# Patient Record
Sex: FEMALE | Race: WHITE | Employment: OTHER | URBAN - METROPOLITAN AREA
[De-identification: names, ages, dates, MRNs, and addresses within clinical notes are randomized per-mention and may not be internally consistent; named-entity substitution may affect disease eponyms.]

---

## 2018-03-08 PROBLEM — G43.509 PERSISTENT MIGRAINE AURA WITHOUT CEREBRAL INFARCTION AND WITHOUT STATUS MIGRAINOSUS, NOT INTRACTABLE: Status: ACTIVE | Noted: 2018-03-08

## 2018-03-08 PROBLEM — E53.8 VITAMIN B12 DEFICIENCY: Status: ACTIVE | Noted: 2018-03-08

## 2018-03-08 PROBLEM — I87.2 VENOUS INSUFFICIENCY: Status: ACTIVE | Noted: 2018-03-08

## 2018-03-08 PROBLEM — F31.70 BIPOLAR AFFECTIVE DISORDER IN REMISSION (HCC): Status: RESOLVED | Noted: 2018-03-08 | Resolved: 2018-03-08

## 2018-03-08 PROBLEM — F33.0 DEPRESSION, MAJOR, RECURRENT, MILD (HCC): Status: ACTIVE | Noted: 2018-03-08

## 2018-03-08 PROBLEM — F31.70 BIPOLAR AFFECTIVE DISORDER IN REMISSION (HCC): Status: ACTIVE | Noted: 2018-03-08

## 2018-03-20 PROBLEM — G44.89 OTHER HEADACHE SYNDROME: Status: ACTIVE | Noted: 2018-03-20

## 2018-03-20 PROBLEM — R76.8 ELEVATED RHEUMATOID FACTOR: Status: ACTIVE | Noted: 2018-03-20

## 2018-03-20 PROBLEM — R05.9 COUGH: Status: ACTIVE | Noted: 2018-03-20

## 2018-03-20 PROBLEM — E55.9 VITAMIN D DEFICIENCY: Status: ACTIVE | Noted: 2018-03-20

## 2018-04-03 PROBLEM — G44.89 OTHER HEADACHE SYNDROME: Status: RESOLVED | Noted: 2018-03-20 | Resolved: 2018-04-03

## 2018-08-15 ENCOUNTER — HOSPITAL ENCOUNTER (OUTPATIENT)
Dept: MAMMOGRAPHY | Age: 63
Discharge: HOME | End: 2018-08-15
Attending: INTERNAL MEDICINE
Payer: COMMERCIAL

## 2018-08-15 DIAGNOSIS — M81.0 OSTEOPOROSIS, UNSPECIFIED OSTEOPOROSIS TYPE, UNSPECIFIED PATHOLOGICAL FRACTURE PRESENCE: ICD-10-CM

## 2018-08-15 PROCEDURE — 77080 DXA BONE DENSITY AXIAL: CPT

## 2018-08-15 NOTE — PROGRESS NOTES
Please call patient with lab results. Low bone mass.  Please advise patient to take Vitamin D supplement of 1000 international units/day and 1200 mg calcium

## 2018-09-10 ENCOUNTER — HOSPITAL ENCOUNTER (OUTPATIENT)
Dept: SURGERY | Age: 63
Discharge: HOME | End: 2018-09-10
Payer: MEDICARE

## 2018-09-10 VITALS
WEIGHT: 165.13 LBS | HEART RATE: 98 BPM | HEIGHT: 63 IN | TEMPERATURE: 97.9 F | RESPIRATION RATE: 18 BRPM | BODY MASS INDEX: 29.26 KG/M2 | SYSTOLIC BLOOD PRESSURE: 130 MMHG | DIASTOLIC BLOOD PRESSURE: 86 MMHG | OXYGEN SATURATION: 98 %

## 2018-09-10 LAB
BACTERIA SPEC CULT: NORMAL
EST. AVERAGE GLUCOSE BLD GHB EST-MCNC: 194 MG/DL
GLUCOSE BLD STRIP.AUTO-MCNC: 101 MG/DL (ref 65–100)
HBA1C MFR BLD: 8.4 % (ref 4.8–6)
SERVICE CMNT-IMP: NORMAL

## 2018-09-10 PROCEDURE — 82962 GLUCOSE BLOOD TEST: CPT

## 2018-09-10 PROCEDURE — 87641 MR-STAPH DNA AMP PROBE: CPT

## 2018-09-10 PROCEDURE — 83036 HEMOGLOBIN GLYCOSYLATED A1C: CPT

## 2018-09-10 RX ORDER — INSULIN LISPRO 100 [IU]/ML
INJECTION, SOLUTION INTRAVENOUS; SUBCUTANEOUS
COMMUNITY
End: 2020-06-03

## 2018-09-10 RX ORDER — TOPIRAMATE 50 MG/1
50 TABLET, FILM COATED ORAL DAILY
COMMUNITY

## 2018-09-10 RX ORDER — ALPRAZOLAM 2 MG/1
2 TABLET ORAL
COMMUNITY

## 2018-09-10 RX ORDER — LANOLIN ALCOHOL/MO/W.PET/CERES
1000 CREAM (GRAM) TOPICAL DAILY
COMMUNITY
End: 2019-12-02

## 2018-09-10 NOTE — PERIOP NOTES
Patient verified name, , and surgery as listed in [facility]. Patient provided medical/health information and PTA medications to the best of their ability. TYPE  CASE:1B Orders per surgeon: Received and dated 18. Labs per surgeon:MRSA, Hgb A1C Labs per anesthesia protocol: SQBS. Results 101 EKG  :  Not needed at time of PAT Patient provided with and instructed on education handouts including Guide to Surgery, blood transfusions, pain management, and hand hygiene for the family and community, and [facility] brochure. Hibiclens and instructions given per hospital policy. Instructed patient to continue previous medications as prescribed prior to surgery unless otherwise directed and to take the following medications the day of surgery according to anesthesia guidelines : Albuterol, Advair, Spiriva, Gabapentin, Xanax,Topamax  . Instructed patient to hold  the following medications: Vitamin B12. Original medication prescription bottles not visualized during patient appointment. Patient teach back successful and patient demonstrates knowledge of instruction.

## 2018-09-10 NOTE — PERIOP NOTES
Recent Results (from the past 12 hour(s)) MSSA/MRSA SC BY PCR, NASAL SWAB Collection Time: 09/10/18  1:39 PM  
Result Value Ref Range Special Requests: NO SPECIAL REQUESTS Culture result:     
  SA target not detected. A MRSA NEGATIVE, SA NEGATIVE test result does not preclude MRSA or SA nasal colonization. HEMOGLOBIN A1C WITH EAG Collection Time: 09/10/18  1:49 PM  
Result Value Ref Range Hemoglobin A1c 8.4 (H) 4.8 - 6.0 % Est. average glucose 194 mg/dL GLUCOSE, POC Collection Time: 09/10/18  1:49 PM  
Result Value Ref Range Glucose (POC) 101 (H) 65 - 100 mg/dL Reviewed

## 2018-09-11 ENCOUNTER — ANESTHESIA EVENT (OUTPATIENT)
Dept: SURGERY | Age: 63
End: 2018-09-11
Payer: MEDICARE

## 2018-09-12 ENCOUNTER — HOSPITAL ENCOUNTER (OUTPATIENT)
Age: 63
Setting detail: OUTPATIENT SURGERY
Discharge: HOME | End: 2018-09-12
Attending: ORTHOPAEDIC SURGERY | Admitting: ORTHOPAEDIC SURGERY
Payer: MEDICARE

## 2018-09-12 ENCOUNTER — APPOINTMENT (OUTPATIENT)
Dept: GENERAL RADIOLOGY | Age: 63
End: 2018-09-12
Attending: ORTHOPAEDIC SURGERY
Payer: MEDICARE

## 2018-09-12 ENCOUNTER — ANESTHESIA (OUTPATIENT)
Dept: SURGERY | Age: 63
End: 2018-09-12
Payer: MEDICARE

## 2018-09-12 VITALS
HEIGHT: 63 IN | BODY MASS INDEX: 28.84 KG/M2 | RESPIRATION RATE: 11 BRPM | SYSTOLIC BLOOD PRESSURE: 105 MMHG | HEART RATE: 68 BPM | DIASTOLIC BLOOD PRESSURE: 56 MMHG | OXYGEN SATURATION: 96 % | WEIGHT: 162.8 LBS | TEMPERATURE: 98 F

## 2018-09-12 DIAGNOSIS — M48.062 LUMBAR STENOSIS WITH NEUROGENIC CLAUDICATION: Primary | ICD-10-CM

## 2018-09-12 LAB
GLUCOSE BLD STRIP.AUTO-MCNC: 181 MG/DL (ref 65–100)
GLUCOSE BLD STRIP.AUTO-MCNC: 283 MG/DL (ref 65–100)
GLUCOSE BLD STRIP.AUTO-MCNC: 318 MG/DL (ref 65–100)
POTASSIUM BLD-SCNC: 4.1 MMOL/L (ref 3.5–5.1)

## 2018-09-12 PROCEDURE — 77030019908 HC STETH ESOPH SIMS -A: Performed by: ANESTHESIOLOGY

## 2018-09-12 PROCEDURE — 76010000161 HC OR TIME 1 TO 1.5 HR INTENSV-TIER 1: Performed by: ORTHOPAEDIC SURGERY

## 2018-09-12 PROCEDURE — 82962 GLUCOSE BLOOD TEST: CPT

## 2018-09-12 PROCEDURE — 77030030163 HC BN WAX J&J -A: Performed by: ORTHOPAEDIC SURGERY

## 2018-09-12 PROCEDURE — 77030031139 HC SUT VCRL2 J&J -A: Performed by: ORTHOPAEDIC SURGERY

## 2018-09-12 PROCEDURE — 74011250636 HC RX REV CODE- 250/636

## 2018-09-12 PROCEDURE — 77030008477 HC STYL SATN SLP COVD -A: Performed by: ANESTHESIOLOGY

## 2018-09-12 PROCEDURE — 77030025623 HC BUR RND PRECIS STRY -D: Performed by: ORTHOPAEDIC SURGERY

## 2018-09-12 PROCEDURE — 77030012894: Performed by: ORTHOPAEDIC SURGERY

## 2018-09-12 PROCEDURE — 77030012406 HC DRN WND PENRS BARD -A: Performed by: ORTHOPAEDIC SURGERY

## 2018-09-12 PROCEDURE — 77030020782 HC GWN BAIR PAWS FLX 3M -B: Performed by: ANESTHESIOLOGY

## 2018-09-12 PROCEDURE — 74011250636 HC RX REV CODE- 250/636: Performed by: ANESTHESIOLOGY

## 2018-09-12 PROCEDURE — 77030032490 HC SLV COMPR SCD KNE COVD -B: Performed by: ORTHOPAEDIC SURGERY

## 2018-09-12 PROCEDURE — 74011250636 HC RX REV CODE- 250/636: Performed by: ORTHOPAEDIC SURGERY

## 2018-09-12 PROCEDURE — 76060000033 HC ANESTHESIA 1 TO 1.5 HR: Performed by: ORTHOPAEDIC SURGERY

## 2018-09-12 PROCEDURE — 74011000250 HC RX REV CODE- 250

## 2018-09-12 PROCEDURE — 74011000250 HC RX REV CODE- 250: Performed by: ORTHOPAEDIC SURGERY

## 2018-09-12 PROCEDURE — 76210000020 HC REC RM PH II FIRST 0.5 HR: Performed by: ORTHOPAEDIC SURGERY

## 2018-09-12 PROCEDURE — 77030014650 HC SEAL MTRX FLOSEL BAXT -C: Performed by: ORTHOPAEDIC SURGERY

## 2018-09-12 PROCEDURE — 84132 ASSAY OF SERUM POTASSIUM: CPT

## 2018-09-12 PROCEDURE — 72020 X-RAY EXAM OF SPINE 1 VIEW: CPT

## 2018-09-12 PROCEDURE — 74011250637 HC RX REV CODE- 250/637: Performed by: ANESTHESIOLOGY

## 2018-09-12 PROCEDURE — 77030008703 HC TU ET UNCUF COVD -A: Performed by: ANESTHESIOLOGY

## 2018-09-12 PROCEDURE — 77030018836 HC SOL IRR NACL ICUM -A: Performed by: ORTHOPAEDIC SURGERY

## 2018-09-12 PROCEDURE — 76210000016 HC OR PH I REC 1 TO 1.5 HR: Performed by: ORTHOPAEDIC SURGERY

## 2018-09-12 PROCEDURE — 77030021678 HC GLIDESCP STAT DISP VERT -B: Performed by: ANESTHESIOLOGY

## 2018-09-12 PROCEDURE — 77030039425 HC BLD LARYNG TRULITE DISP TELE -A: Performed by: ANESTHESIOLOGY

## 2018-09-12 RX ORDER — MIDAZOLAM HYDROCHLORIDE 1 MG/ML
2 INJECTION, SOLUTION INTRAMUSCULAR; INTRAVENOUS
Status: DISCONTINUED | OUTPATIENT
Start: 2018-09-12 | End: 2018-09-12 | Stop reason: HOSPADM

## 2018-09-12 RX ORDER — KETOROLAC TROMETHAMINE 30 MG/ML
INJECTION, SOLUTION INTRAMUSCULAR; INTRAVENOUS AS NEEDED
Status: DISCONTINUED | OUTPATIENT
Start: 2018-09-12 | End: 2018-09-12 | Stop reason: HOSPADM

## 2018-09-12 RX ORDER — MIDAZOLAM HYDROCHLORIDE 1 MG/ML
2 INJECTION, SOLUTION INTRAMUSCULAR; INTRAVENOUS ONCE
Status: DISCONTINUED | OUTPATIENT
Start: 2018-09-12 | End: 2018-09-12 | Stop reason: HOSPADM

## 2018-09-12 RX ORDER — VANCOMYCIN HYDROCHLORIDE 1 G/20ML
INJECTION, POWDER, LYOPHILIZED, FOR SOLUTION INTRAVENOUS AS NEEDED
Status: DISCONTINUED | OUTPATIENT
Start: 2018-09-12 | End: 2018-09-12 | Stop reason: HOSPADM

## 2018-09-12 RX ORDER — GLYCOPYRROLATE 0.2 MG/ML
INJECTION INTRAMUSCULAR; INTRAVENOUS AS NEEDED
Status: DISCONTINUED | OUTPATIENT
Start: 2018-09-12 | End: 2018-09-12 | Stop reason: HOSPADM

## 2018-09-12 RX ORDER — LIDOCAINE HYDROCHLORIDE 10 MG/ML
0.1 INJECTION INFILTRATION; PERINEURAL AS NEEDED
Status: DISCONTINUED | OUTPATIENT
Start: 2018-09-12 | End: 2018-09-12 | Stop reason: HOSPADM

## 2018-09-12 RX ORDER — BUPIVACAINE HYDROCHLORIDE AND EPINEPHRINE 5; 5 MG/ML; UG/ML
INJECTION, SOLUTION EPIDURAL; INTRACAUDAL; PERINEURAL AS NEEDED
Status: DISCONTINUED | OUTPATIENT
Start: 2018-09-12 | End: 2018-09-12 | Stop reason: HOSPADM

## 2018-09-12 RX ORDER — OXYCODONE HYDROCHLORIDE 5 MG/1
5 TABLET ORAL
Status: COMPLETED | OUTPATIENT
Start: 2018-09-12 | End: 2018-09-12

## 2018-09-12 RX ORDER — FENTANYL CITRATE 50 UG/ML
INJECTION, SOLUTION INTRAMUSCULAR; INTRAVENOUS AS NEEDED
Status: DISCONTINUED | OUTPATIENT
Start: 2018-09-12 | End: 2018-09-12 | Stop reason: HOSPADM

## 2018-09-12 RX ORDER — FENTANYL CITRATE 50 UG/ML
100 INJECTION, SOLUTION INTRAMUSCULAR; INTRAVENOUS ONCE
Status: DISCONTINUED | OUTPATIENT
Start: 2018-09-12 | End: 2018-09-12 | Stop reason: HOSPADM

## 2018-09-12 RX ORDER — ROCURONIUM BROMIDE 10 MG/ML
INJECTION, SOLUTION INTRAVENOUS AS NEEDED
Status: DISCONTINUED | OUTPATIENT
Start: 2018-09-12 | End: 2018-09-12 | Stop reason: HOSPADM

## 2018-09-12 RX ORDER — SODIUM CHLORIDE, SODIUM LACTATE, POTASSIUM CHLORIDE, CALCIUM CHLORIDE 600; 310; 30; 20 MG/100ML; MG/100ML; MG/100ML; MG/100ML
100 INJECTION, SOLUTION INTRAVENOUS CONTINUOUS
Status: DISCONTINUED | OUTPATIENT
Start: 2018-09-12 | End: 2018-09-12 | Stop reason: HOSPADM

## 2018-09-12 RX ORDER — ACETAMINOPHEN 10 MG/ML
1000 INJECTION, SOLUTION INTRAVENOUS ONCE
Status: COMPLETED | OUTPATIENT
Start: 2018-09-12 | End: 2018-09-12

## 2018-09-12 RX ORDER — HYDROMORPHONE HYDROCHLORIDE 2 MG/ML
0.5 INJECTION, SOLUTION INTRAMUSCULAR; INTRAVENOUS; SUBCUTANEOUS
Status: DISCONTINUED | OUTPATIENT
Start: 2018-09-12 | End: 2018-09-12 | Stop reason: HOSPADM

## 2018-09-12 RX ORDER — NEOSTIGMINE METHYLSULFATE 1 MG/ML
INJECTION INTRAVENOUS AS NEEDED
Status: DISCONTINUED | OUTPATIENT
Start: 2018-09-12 | End: 2018-09-12 | Stop reason: HOSPADM

## 2018-09-12 RX ORDER — PROPOFOL 10 MG/ML
INJECTION, EMULSION INTRAVENOUS AS NEEDED
Status: DISCONTINUED | OUTPATIENT
Start: 2018-09-12 | End: 2018-09-12 | Stop reason: HOSPADM

## 2018-09-12 RX ORDER — HYDROCODONE BITARTRATE AND ACETAMINOPHEN 7.5; 325 MG/1; MG/1
1 TABLET ORAL
Qty: 35 TAB | Refills: 0 | Status: SHIPPED | OUTPATIENT
Start: 2018-09-12 | End: 2018-12-19 | Stop reason: ALTCHOICE

## 2018-09-12 RX ORDER — NALOXONE HYDROCHLORIDE 0.4 MG/ML
0.04 INJECTION, SOLUTION INTRAMUSCULAR; INTRAVENOUS; SUBCUTANEOUS
Status: DISCONTINUED | OUTPATIENT
Start: 2018-09-12 | End: 2018-09-12 | Stop reason: HOSPADM

## 2018-09-12 RX ORDER — LIDOCAINE HYDROCHLORIDE 20 MG/ML
INJECTION, SOLUTION EPIDURAL; INFILTRATION; INTRACAUDAL; PERINEURAL AS NEEDED
Status: DISCONTINUED | OUTPATIENT
Start: 2018-09-12 | End: 2018-09-12 | Stop reason: HOSPADM

## 2018-09-12 RX ORDER — CEFAZOLIN SODIUM/WATER 2 G/20 ML
2 SYRINGE (ML) INTRAVENOUS
Status: COMPLETED | OUTPATIENT
Start: 2018-09-12 | End: 2018-09-12

## 2018-09-12 RX ORDER — ONDANSETRON 2 MG/ML
INJECTION INTRAMUSCULAR; INTRAVENOUS AS NEEDED
Status: DISCONTINUED | OUTPATIENT
Start: 2018-09-12 | End: 2018-09-12 | Stop reason: HOSPADM

## 2018-09-12 RX ADMIN — OXYCODONE HYDROCHLORIDE 5 MG: 5 TABLET ORAL at 11:36

## 2018-09-12 RX ADMIN — LIDOCAINE HYDROCHLORIDE 60 MG: 20 INJECTION, SOLUTION EPIDURAL; INFILTRATION; INTRACAUDAL; PERINEURAL at 09:51

## 2018-09-12 RX ADMIN — ROCURONIUM BROMIDE 50 MG: 10 INJECTION, SOLUTION INTRAVENOUS at 09:51

## 2018-09-12 RX ADMIN — Medication 2 G: at 09:49

## 2018-09-12 RX ADMIN — SODIUM CHLORIDE, SODIUM LACTATE, POTASSIUM CHLORIDE, AND CALCIUM CHLORIDE: 600; 310; 30; 20 INJECTION, SOLUTION INTRAVENOUS at 10:45

## 2018-09-12 RX ADMIN — FENTANYL CITRATE 100 MCG: 50 INJECTION, SOLUTION INTRAMUSCULAR; INTRAVENOUS at 09:51

## 2018-09-12 RX ADMIN — PROPOFOL 200 MG: 10 INJECTION, EMULSION INTRAVENOUS at 09:51

## 2018-09-12 RX ADMIN — ACETAMINOPHEN 1000 MG: 10 INJECTION, SOLUTION INTRAVENOUS at 11:33

## 2018-09-12 RX ADMIN — NEOSTIGMINE METHYLSULFATE 3 MG: 1 INJECTION INTRAVENOUS at 10:44

## 2018-09-12 RX ADMIN — SODIUM CHLORIDE, SODIUM LACTATE, POTASSIUM CHLORIDE, AND CALCIUM CHLORIDE 100 ML/HR: 600; 310; 30; 20 INJECTION, SOLUTION INTRAVENOUS at 08:04

## 2018-09-12 RX ADMIN — ONDANSETRON 4 MG: 2 INJECTION INTRAMUSCULAR; INTRAVENOUS at 10:44

## 2018-09-12 RX ADMIN — HYDROMORPHONE HYDROCHLORIDE 0.25 MG: 2 INJECTION, SOLUTION INTRAMUSCULAR; INTRAVENOUS; SUBCUTANEOUS at 11:58

## 2018-09-12 RX ADMIN — GLYCOPYRROLATE 0.4 MG: 0.2 INJECTION INTRAMUSCULAR; INTRAVENOUS at 10:44

## 2018-09-12 RX ADMIN — KETOROLAC TROMETHAMINE 30 MG: 30 INJECTION, SOLUTION INTRAMUSCULAR; INTRAVENOUS at 10:44

## 2018-09-12 NOTE — ANESTHESIA POSTPROCEDURE EVALUATION
Post-Anesthesia Evaluation and Assessment Patient: Eyad Bergeron MRN: [id number]  SSN: [id number] YOB: 1955  Age: 59 y.o. Sex: female Cardiovascular Function/Vital Signs Visit Vitals  /56  Pulse 68  Temp 36.7 °C (98 °F)  Resp 11  
 Ht 5' 3\" (1.6 m)  Wt 73.8 kg (162 lb 12.8 oz)  SpO2 96%  BMI 28.84 kg/m2 Patient is status post general anesthesia for Procedure(s): LEFT L4-5 FARHEEN LAMINECTOMY. Nausea/Vomiting: None Postoperative hydration reviewed and adequate. Pain: 
Pain Scale 1: Numeric (0 - 10) (09/12/18 1159) Pain Intensity 1: 7 (09/12/18 1159) Managed Neurological Status:  
Neuro (WDL): Exceptions to WDL (09/12/18 1137) Neuro Neurologic State: Alert (09/12/18 1137) Orientation Level: Oriented X4 (09/12/18 1137) Cognition: Appropriate decision making; Appropriate for age attention/concentration; Appropriate safety awareness; Follows commands (09/12/18 1137) Speech: Clear (09/12/18 1137) LUE Motor Response: Purposeful (09/12/18 1137) LLE Motor Response: Purposeful (09/12/18 1137) RUE Motor Response: Purposeful (09/12/18 1137) RLE Motor Response: Purposeful (09/12/18 1137) At baseline Mental Status and Level of Consciousness: Awake. Pulmonary Status:  
O2 Device: Room air (09/12/18 1219) Adequate oxygenation and airway patent Complications related to anesthesia: None Post-anesthesia assessment completed. No concerns Signed By: Lorraine Lew MD   
 September 12, 2018

## 2018-09-12 NOTE — DISCHARGE INSTRUCTIONS
Discharge Instructions    Wound Care and Showering  Your wound will typically be covered with a clear plastic dressing when you go home from the hospital. Since it is transparent, you will see the underlying gauze turn red with blood which is normal. You do not need to change the dressing unless it is leaking from the edges. Otherwise leave this dressing in place. The clear plastic dressing is waterproof so you can take a shower while it is on. You may remove the clear plastic dressing and the underlying gauze 3 days after surgery. There will be small tape strips under the gauze which should be left in place. If there is no leaking from the wound, you may take a shower and allow the tape strips to get wet. Some of them may fall off. The remaining strips will be removed once you return to the office. If there is persistent leaking when you first remove the clear dressing, apply new gauze and new clear plastic dressing (typically purchased at a pharmacy) over the wound. Hair washing is permissible in the shower. No tub baths, hot tubs or whirlpools until seen in the office. If any of the following should occur, please call the office:    -Persistent drainage from the incision site.  -Opening of incisions  -Fevers greater than 101 degrees  -Flu-like symptoms  -Increased redness    Exercise  You have unlimited walking and stair climbing privileges. Walking outside or walking on a treadmill without an incline is also allowed. Do NOT lift anything weighing greater than 10-15 lbs. Especially try to avoid lifting or reaching above your head. Sleeping  You may sleep in any comfortable position. Many patients find comfort sleeping in a recliner chair. It is normal to have difficulty sleeping for the first several weeks following your surgery. We recommend trying Benadryl, Melatonin, or Tylenol PM for help sleeping. All are over-the-counter and can be found in drugstores.      Eating  Because of the tubes in your throat while asleep during surgery, it is normal to have a sore throat and some difficulty swallowing solid foods after your surgery. This may persist for several weeks. Eating soft foods like yogurt, macaroni, and mashed potatoes seem to help. Today, you may have bland foods, nothing spicy or greasy. Pain  If you feel you need pain medicine, you may take regular or extra-strength Tylenol. Do NOT take an anti-inflammatory medication such as Advil, Aleve, or Motrin for the first 8 weeks following your surgery. Anti-inflammatory medications like these hinder bone growth and healing, which is critical in the weeks following surgery. Do NOT resume taking Foasamax for 8 weeks after your fusion surgery. To help alleviate persistent soreness around the shoulder lades, apply ice or warm moist compresses. Driving  You may NOT drive a car until told otherwise by your physician. You may be a passenger for short distances (about 20-30 minutes). If you must take a longer trip, be sure to make several pit stops so that you can walk and stretch your legs. Reclining in the passenger seat seems to be the most comfortable position for most patients. In some states, it is illegal to drive a car while wearing a neck brace. Follow up appointments  When you are discharged from the hospital, a follow up appointment will be made for 2-3 weeks from your surgery date. Call [phone] to confirm your appointment. After general anesthesia or intravenous sedation, for 24 hours or while taking prescription Narcotics:  · Limit your activities  · Do not drive and operate hazardous machinery  · Do not make important personal or business decisions  · Do  not drink alcoholic beverages  · If you have not urinated within 8 hours after discharge, please contact your surgeon on call. *  Please give a list of your current medications to your Primary Care Provider.   *  Please update this list whenever your medications are discontinued, doses are      changed, or new medications (including over-the-counter products) are added. *  Please carry medication information at all times in case of emergency situations. These are general instructions for a healthy lifestyle:  No smoking/ No tobacco products/ Avoid exposure to second hand smoke  Surgeon General's Warning:  Quitting smoking now greatly reduces serious risk to your health. Obesity, smoking, and sedentary lifestyle greatly increases your risk for illness  A healthy diet, regular physical exercise & weight monitoring are important for maintaining a healthy lifestyle    You may be retaining fluid if you have a history of heart failure or if you experience any of the following symptoms:  Weight gain of 3 pounds or more overnight or 5 pounds in a week, increased swelling in our hands or feet or shortness of breath while lying flat in bed. Please call your doctor as soon as you notice any of these symptoms; do not wait until your next office visit. Recognize signs and symptoms of STROKE:  F-face looks uneven  A-arms unable to move or move unevenly  S-speech slurred or non-existent  T-time-call 911 as soon as signs and symptoms begin-DO NOT go       Back to bed or wait to see if you get better-TIME IS BRAIN.

## 2018-09-12 NOTE — PERIOP NOTES
POC potassium = 4.1. Glucose = 283. Reported to Dr. Elia Boas. Orders rcvd to instruct patient to put insulin pump back on at basal rate. Patient advised and complied.

## 2018-09-12 NOTE — OP NOTES
[address] [address]   [phone]    OPERATIVE REPORT    Patient ID:Vicky Moss  [id number]  1955  59 y.o. DATE OF SURGERY: 9/12/2018    SURGEON: Dr. Rhina Santana DIAGNOSIS:left  L4 - L5 lateral recess stenosis. POSTOPERATIVE DIAGNOSIS: left L4 - L5 lateral recess stenosis. PROCEDURE: left L4 - L5 hemilaminectomy with use of the operating microscope for decompression of the neural elements. ANESTHESIA: General.    ESTIMATED BLOOD LOSS: 50 cc    POSTOPERATIVE CONDITION: Stable. INTRAOPERATIVE COMPLICATIONS: None. INDICATION FOR PROCEDURE: The patient has a history of low back pain with episodic radiation to the right lower extremity consistent with neurogenic claudication primarily affecting the L5 nerve root. The patient has failed an extended period of observation and conservative measures. In the outpatient setting, the risks, benefits, and potential complications of the procedure were discussed and an informed consent was obtained. DESCRIPTION OF PROCEDURE: After adequate induction of general anesthesia, the patient was positioned prone on the [facility] spinal table. Care was taken to pad all bony prominences. Shoulders and elbows were placed in a 90-90 position. The abdomen was allowed to hang free to decrease intraabdominal pressure. The legs were flexed using the sling. The lumbar area was prepped and draped in the usual sterile fashion using a DuraPrep scrub. Preoperative antibiotics were administered. A time-out was called to confirm appropriate patient, proposed procedure, and proposed incision site. With this confirmation, an incision was created over the appropriate interspace. Dissection was carried down to the lumbodorsal fascia. The lumbodorsal fascia was released on the left side and dissection was carried down to the lamina and pars interarticularis.  A 000 curette was used to elevate the ligamentum flavum at its origin on the caudal surface of the L4  lamina and a Penfield number four was slipped just anterior to the lamina. C-arm fluoroscopy was brought in and used to obtain a cross table fluoroscopic image to confirm appropriate spinal level. With this confirmation, the Lemon Deck number four was removed and the area was marked with electrocautery. The operating microscope was brought to the surgical field. The ligamentum flavum was then elevated off of its insertion on the cephalad surface of the  L5  lamina. A 4 mm Kerrison was used to perform a hemilaminectomy of L4 as well as the cephalad portion of L5. The ligamentum flavum was carefully elevated off of the thecal sac and excised with the Kerrison rongeur. The descending nerve root was identified and retracted medially. The lateral recess was undercut laterally to the pedicle. A foraminotomy was performed to decompress the exiting nerve root. The nerve root was retracted medially again with the CASANDRA'Felix nerve root retractor. The underlying disk was identified and inspected. A disc buldge was noted and trimmed. The nerve was released from retraction and the area inspected and palpated with a nerve hook for adequacy of decompression. This was satisfactory. The lateral rescess was flushed with saline solution. The dorsal wound was flushed as well. The lumbodorsal fascia was approximated with a number one Vicryl suture in interrupted fashion. The skin and subcutaneous tissue were then closed in a layered fashion. Marcaine was infiltrated subcutaneously. Benzoin and Steri-Strips were applied. Sterile dressings were applied. The patient tolerated the procedure well and was returned to the postanesthesia care unit in stable condition. At the end of the case, all sponge, needle, and instrument counts were correct. Signed: Ila Guan.  Tiffanie Nesbitt MD

## 2018-09-12 NOTE — PERIOP NOTES
DR Giana Banegas MADE AWARE OF PTS B/P OF 90/52. DR Giana Banegas GAVE VERBAL ORDER FOR 1 G OFIRMEV IV AT THIS TIME.

## 2018-09-12 NOTE — IP AVS SNAPSHOT
[address] [address] 
[phone] Patient: Tariq Lares MRN: [id number] QNP:[id number] About your hospitalization You were admitted on:  September 12, 2018 You last received care in the:  [facility] PACU You were discharged on:  September 12, 2018 Why you were hospitalized Your primary diagnosis was:  Lumbar Stenosis With Neurogenic Claudication Follow-up Information Follow up With Details Comments Contact Info Ahsan Park MD   [address] [facility] [address] 
[phone] Flakito Guerrero MD Follow up on 9/27/2018 1:15 PM @ [address] [facility] [address] 
[phone] Your Scheduled Appointments Tuesday October 16, 2018  8:45 AM EDT  
LAB with [facility] [address] [place] [address] ([address] [place] [address]) [address] [place] [id number]  
[phone] Wednesday October 24, 2018 11:00 AM EDT Office Visit with Ahsan Park MD  
[address] [place] [address] ([address] [place] [address]) [address] [place] [address]  
[phone] Discharge Orders None A check elisha indicates which time of day the medication should be taken. My Medications START taking these medications Instructions Each Dose to Equal  
 Morning Noon Evening Bedtime HYDROcodone-acetaminophen 7.5-325 mg per tablet Commonly known as:  Rolinda Doles Your last dose was: Your next dose is: Take 1 Tab by mouth every four (4) hours as needed for Pain. Max Daily Amount: 6 Tabs. 1 Tab CHANGE how you take these medications Instructions Each Dose to Equal  
 Morning Noon Evening Bedtime  
 lisinopril 40 mg tablet Commonly known as:  Talisha Shows What changed:  when to take this Your last dose was: Your next dose is: Take 1 Tab by mouth daily. 40 mg CONTINUE taking these medications Instructions Each Dose to Equal  
 Morning Noon Evening Bedtime  
 albuterol 90 mcg/actuation inhaler Commonly known as:  PROVENTIL HFA, VENTOLIN HFA, PROAIR HFA Your last dose was: Your next dose is: Take 1 Puff by inhalation every six (6) hours as needed for Wheezing. 1 Puff  
    
   
   
   
  
 cyanocobalamin 1,000 mcg tablet Your last dose was: Your next dose is: Take 1,000 mcg by mouth daily. 1000 mcg  
    
   
   
   
  
 diclofenac 1 % Gel Commonly known as:  VOLTAREN Your last dose was: Your next dose is:    
   
   
 Apply 4 g to affected area four (4) times daily as needed. 4 g  
    
   
   
   
  
 fluticasone-salmeterol 250-50 mcg/dose diskus inhaler Commonly known as:  ADVAIR Your last dose was: Your next dose is: Take 1 Puff by inhalation two (2) times a day. 1 Puff  
    
   
   
   
  
 furosemide 20 mg tablet Commonly known as:  LASIX Your last dose was: Your next dose is: Take 1 Tab by mouth daily. 20 mg  
    
   
   
   
  
 gabapentin 300 mg capsule Commonly known as:  NEURONTIN Your last dose was: Your next dose is: Take 300 mg by mouth three (3) times daily. 300 mg HumaLOG U-100 Insulin 100 unit/mL injection Generic drug:  insulin lispro Your last dose was: Your next dose is:    
   
   
 by SubCUTAneous route. 16.8 units per day continuous plus bolus doses. LATUDA 80 mg Tab tablet Generic drug:  lurasidone Your last dose was: Your next dose is: Take 80 mg by mouth daily (with dinner). 80 mg  
    
   
   
   
  
 SUMAtriptan 100 mg tablet Commonly known as:  IMITREX Your last dose was: Your next dose is: Take 100 mg by mouth once as needed for Migraine. 100 mg Syringe with Needle (Disp) 1 mL 25 gauge x 5/8\" Syrg Your last dose was: Your next dose is:    
   
   
 1,000 mcg by Does Not Apply route. 1000 mcg Syringe with Needle, Safety 3 mL 25 gauge x 1\" Syrg Your last dose was: Your next dose is:    
   
   
 1 mL by IntraMUSCular route. 1 mL  
    
   
   
   
  
 tiotropium bromide 1.25 mcg/actuation inhaler Commonly known as:  Jona Founds Your last dose was: Your next dose is: Take 2 Puffs by inhalation daily. 2 Puff TOPAMAX 50 mg tablet Generic drug:  topiramate Your last dose was: Your next dose is: Take 50 mg by mouth two (2) times a day. 50 mg  
    
   
   
   
  
 XANAX 2 mg tablet Generic drug:  ALPRAZolam  
   
Your last dose was: Your next dose is: Take 2 mg by mouth three (3) times daily as needed for Anxiety. 2 mg Where to Get Your Medications Information on where to get these meds will be given to you by the nurse or doctor. ! Ask your nurse or doctor about these medications HYDROcodone-acetaminophen 7.5-325 mg per tablet Opioid Education Prescription Opioids: What You Need to Know: 
 
Prescription opioids can be used to help relieve moderate-to-severe pain and are often prescribed following a surgery or injury, or for certain health conditions. These medications can be an important part of treatment but also come with serious risks. Opioids are strong pain medicines. Examples include hydrocodone, oxycodone, fentanyl, and morphine. Heroin is an example of an illegal opioid.   It is important to work with your health care provider to make sure you are getting the safest, most effective care. WHAT ARE THE RISKS AND SIDE EFFECTS OF OPIOID USE? Prescription opioids carry serious risks of addiction and overdose, especially with prolonged use. An opioid overdose, often marked by slow breathing, can cause sudden death. The use of prescription opioids can have a number of side effects as well, even when taken as directed. · Tolerance-meaning you might need to take more of a medication for the same pain relief · Physical dependence-meaning you have symptoms of withdrawal when the medication is stopped. Withdrawal symptoms can include nausea, sweating, chills, diarrhea, stomach cramps, and muscle aches. Withdrawal can last up to several weeks, depending on which drug you took and how long you took it. · Increased sensitivity to pain · Constipation · Nausea, vomiting, and dry mouth · Sleepiness and dizziness · Confusion · Depression · Low levels of testosterone that can result in lower sex drive, energy, and strength · Itching and sweating RISKS ARE GREATER WITH:      
· History of drug misuse, substance use disorder, or overdose · Mental health conditions (such as depression or anxiety) · Sleep apnea · Older age (72 years or older) · Pregnancy Avoid alcohol while taking prescription opioids. Also, unless specifically advised by your health care provider, medications to avoid include: · Benzodiazepines (such as Xanax or Valium) · Muscle relaxants (such as Soma or Flexeril) · Hypnotics (such as Ambien or Lunesta) · Other prescription opioids KNOW YOUR OPTIONS Talk to your health care provider about ways to manage your pain that don't involve prescription opioids. Some of these options may actually work better and have fewer risks and side effects. Options may include: 
· Pain relievers such as acetaminophen, ibuprofen, and naproxen · Some medications that are also used for depression or seizures · Physical therapy and exercise · Counseling to help patients learn how to cope better with triggers of pain and stress. · Application of heat or cold compress · Massage therapy · Relaxation techniques Be Informed Make sure you know the name of your medication, how much and how often to take it, and its potential risks & side effects. IF YOU ARE PRESCRIBED OPIOIDS FOR PAIN: 
· Never take opioids in greater amounts or more often than prescribed. Remember the goal is not to be pain-free but to manage your pain at a tolerable level. · Follow up with your primary care provider to: · Work together to create a plan on how to manage your pain. · Talk about ways to help manage your pain that don't involve prescription opioids. · Talk about any and all concerns and side effects. · Help prevent misuse and abuse. · Never sell or share prescription opioids · Help prevent misuse and abuse. · Store prescription opioids in a secure place and out of reach of others (this may include visitors, children, friends, and family). · Safely dispose of unused/unwanted prescription opioids: Find your community drug take-back program or your pharmacy mail-back program, or flush them down the toilet, following guidance from the Food and Drug Administration (www.fda.gov/Drugs/ResourcesForYou). · Visit www.cdc.gov/drugoverdose to learn about the risks of opioid abuse and overdose. · If you believe you may be struggling with addiction, tell your health care provider and ask for guidance or call [address] [place], [place] [address]Cloud[employer]s at [phone]. Discharge Instructions Discharge Instructions Wound Care and Showering Your wound will typically be covered with a clear plastic dressing when you go home from the hospital. Since it is transparent, you will see the underlying gauze turn red with blood which is normal. You do not need to change the dressing unless it is leaking from the edges. Otherwise leave this dressing in place. The clear plastic dressing is waterproof so you can take a shower while it is on. You may remove the clear plastic dressing and the underlying gauze 3 days after surgery. There will be small tape strips under the gauze which should be left in place. If there is no leaking from the wound, you may take a shower and allow the tape strips to get wet. Some of them may fall off. The remaining strips will be removed once you return to the office. If there is persistent leaking when you first remove the clear dressing, apply new gauze and new clear plastic dressing (typically purchased at a pharmacy) over the wound. Hair washing is permissible in the shower. No tub baths, hot tubs or whirlpools until seen in the office. If any of the following should occur, please call the office: 
 
-Persistent drainage from the incision site. 
-Opening of incisions 
-Fevers greater than 101 degrees 
-Flu-like symptoms 
-Increased redness Exercise You have unlimited walking and stair climbing privileges. Walking outside or walking on a treadmill without an incline is also allowed. Do NOT lift anything weighing greater than 10-15 lbs. Especially try to avoid lifting or reaching above your head. Sleeping You may sleep in any comfortable position. Many patients find comfort sleeping in a recliner chair. It is normal to have difficulty sleeping for the first several weeks following your surgery. We recommend trying Benadryl, Melatonin, or Tylenol PM for help sleeping. All are over-the-counter and can be found in drugstores. Eating Because of the tubes in your throat while asleep during surgery, it is normal to have a sore throat and some difficulty swallowing solid foods after your surgery. This may persist for several weeks. Eating soft foods like yogurt, macaroni, and mashed potatoes seem to help.  Today, you may have bland foods, nothing spicy or greasy. Pain If you feel you need pain medicine, you may take regular or extra-strength Tylenol. Do NOT take an anti-inflammatory medication such as Advil, Aleve, or Motrin for the first 8 weeks following your surgery. Anti-inflammatory medications like these hinder bone growth and healing, which is critical in the weeks following surgery. Do NOT resume taking Foasamax for 8 weeks after your fusion surgery. To help alleviate persistent soreness around the shoulder lades, apply ice or warm moist compresses. Driving You may NOT drive a car until told otherwise by your physician. You may be a passenger for short distances (about 20-30 minutes). If you must take a longer trip, be sure to make several pit stops so that you can walk and stretch your legs. Reclining in the passenger seat seems to be the most comfortable position for most patients. In some states, it is illegal to drive a car while wearing a neck brace. Follow up appointments When you are discharged from the hospital, a follow up appointment will be made for 2-3 weeks from your surgery date. Call [phone] to confirm your appointment. After general anesthesia or intravenous sedation, for 24 hours or while taking prescription Narcotics: · Limit your activities · Do not drive and operate hazardous machinery · Do not make important personal or business decisions · Do  not drink alcoholic beverages · If you have not urinated within 8 hours after discharge, please contact your surgeon on call. *  Please give a list of your current medications to your Primary Care Provider. *  Please update this list whenever your medications are discontinued, doses are 
    changed, or new medications (including over-the-counter products) are added. *  Please carry medication information at all times in case of emergency situations. These are general instructions for a healthy lifestyle: No smoking/ No tobacco products/ Avoid exposure to second hand smoke Surgeon General's Warning:  Quitting smoking now greatly reduces serious risk to your health. Obesity, smoking, and sedentary lifestyle greatly increases your risk for illness A healthy diet, regular physical exercise & weight monitoring are important for maintaining a healthy lifestyle You may be retaining fluid if you have a history of heart failure or if you experience any of the following symptoms:  Weight gain of 3 pounds or more overnight or 5 pounds in a week, increased swelling in our hands or feet or shortness of breath while lying flat in bed. Please call your doctor as soon as you notice any of these symptoms; do not wait until your next office visit. Recognize signs and symptoms of STROKE: 
F-face looks uneven A-arms unable to move or move unevenly S-speech slurred or non-existent T-time-call 911 as soon as signs and symptoms begin-DO NOT go Back to bed or wait to see if you get better-TIME IS BRAIN. Introducing [facility]! Mariajose Bland introduces [employer] patient portal. Now you can access parts of your medical record, email your doctor's office, and request medication refills online. 1. In your internet browser, go to [email or web address] 2. Click on the First Time User? Click Here link in the Sign In box. You will see the New Member Sign Up page. 3. Enter your [employer] Access Code exactly as it appears below. You will not need to use this code after youve completed the sign-up process. If you do not sign up before the expiration date, you must request a new code. · [employer] Access Code: [email or web address] Expires: 9/16/2018 11:45 AM 
 
4. Enter the last four digits of your Social Security Number (xxxx) and Date of Birth (mm/dd/yyyy) as indicated and click Submit. You will be taken to the next sign-up page. 5. Create a [employer] ID. This will be your [employer] login ID and cannot be changed, so think of one that is secure and easy to remember. 6. Create a [employer] password. You can change your password at any time. 7. Enter your Password Reset Question and Answer. This can be used at a later time if you forget your password. 8. Enter your e-mail address. You will receive e-mail notification when new information is available in [facility] 9. Click Sign Up. You can now view and download portions of your medical record. 10. Click the Download Summary menu link to download a portable copy of your medical information. If you have questions, please visit the Frequently Asked Questions section of the [employer] website. Remember, [employer] is NOT to be used for urgent needs. For medical emergencies, dial 911. Now available from your iPhone and Android! Introducing Romie Richards As a [facility] patient, I wanted to make you aware of our electronic visit tool called Romie Yuanlaurenvonnie. [facility] 24/7 allows you to connect within minutes with a medical provider 24 hours a day, seven days a week via a mobile device or tablet or logging into a secure website from your computer. You can access Romie Richards from anywhere in the [place]. A virtual visit might be right for you when you have a simple condition and feel like you just dont want to get out of bed, or cant get away from work for an appointment, when your regular [facility] provider is not available (evenings, weekends or holidays), or when youre out of town and need minor care. Electronic visits cost only $49 and if the [facility] 24/7 provider determines a prescription is needed to treat your condition, one can be electronically transmitted to a nearby pharmacy*. Please take a moment to enroll today if you have not already done so. The enrollment process is free and takes just a few minutes.   To enroll, please download the [email or web address] 24/7 prashant to your tablet or phone, or visit [email or web address] to enroll on your computer. And, as an [address] [place] [address] patient with a [employer] account, the results of your visits will be scanned into your electronic medical record and your primary care provider will be able to view the scanned results. We urge you to continue to see your regular [email or web address] provider for your ongoing medical care. And while your primary care provider may not be the one available when you seek a [employer] virtual visit, the peace of mind you get from getting a real diagnosis real time can be priceless. For more information on [employer], view our Frequently Asked Questions (FAQs) at [email or web address]. Sincerely, 
 
Tameka Coon MD 
Chief Medical Officer Carola Suzy Alejo *:  certain medications cannot be prescribed via [employer] Unresulted Labs-Please follow up with your PCP about these lab tests Order Current Status NC XR TECHNOLOGIST SERVICE In process XR SPINE LUMB SNGL V In process Providers Seen During Your Hospitalization Provider Specialty Primary office phone Derrick Black MD Orthopedic Surgery [phone] Your Primary Care Physician (PCP) Primary Care Physician Office Phone Office Fax Lawyer Ramirez [phone] You are allergic to the following Allergen Reactions Doxepin Hives Nausea and Vomiting Nortriptyline Hives Other Medication Hives Nausea and Vomiting  
 elavil Plaquenil (Hydroxychloroquine) Hives Tizanidine Other (comments) Sleep walking and  nocturnal eating Vistaril (Hydroxyzine Pamoate) Other (comments) Insomnia Recent Documentation Height Weight BMI OB Status Smoking Status 1.6 m 73.8 kg 28.84 kg/m2 Hysterectomy Former Smoker Emergency Contacts Name Discharge Info Relation Home Work Mobile Carlos Sánchez DISCHARGE CAREGIVER [3] Spouse [3] [phone] Patient Belongings The following personal items are in your possession at time of discharge: 
  Dental Appliances: None  Visual Aid: Glasses, At home      Home Medications: None   Jewelry: None  Clothing: Dress, Footwear, Undergarments, Jacket/Coat    Other Valuables: Other (comment) (insulin pump) Please provide this summary of care documentation to your next provider. Signatures-by signing, you are acknowledging that this After Visit Summary has been reviewed with you and you have received a copy. Patient Signature:  ____________________________________________________________ Date:  ____________________________________________________________  
  
[facility] Provider Signature:  ____________________________________________________________ Date:  ____________________________________________________________

## 2018-09-12 NOTE — ANESTHESIA PREPROCEDURE EVALUATION
Anesthetic History Review of Systems / Medical History Patient summary reviewed, nursing notes reviewed and pertinent labs reviewed Pulmonary COPD: mild Neuro/Psych Headaches and psychiatric history Cardiovascular Hypertension: well controlled Exercise tolerance: >4 METS 
  
GI/Hepatic/Renal 
  
 
 
 
 
 
 Endo/Other Diabetes (insulin pump): well controlled, type 2, using insulin Arthritis (RA) Other Findings Physical Exam 
 
Airway Mallampati: III 
TM Distance: 4 - 6 cm Neck ROM: decreased range of motion Mouth opening: Normal 
 
 Cardiovascular Regular rate and rhythm,  S1 and S2 normal,  no murmur, click, rub, or gallop Dental 
 
Dentition: Caps/crowns Pulmonary Breath sounds clear to auscultation Abdominal 
 
 
 
 Other Findings Anesthetic Plan ASA: 2 Anesthesia type: general 
 
 
 
 
Induction: Intravenous Anesthetic plan and risks discussed with: Patient Glidescope in room.

## 2018-09-12 NOTE — PERIOP NOTES
POC glucose = 318. Patient bolused herself using her insulin pump with 4.7 of Humalog. Results and patient intervention reported to Dr. Fermín Orellana. No new orders.

## 2018-12-19 PROBLEM — R05.9 COUGH: Status: RESOLVED | Noted: 2018-03-20 | Resolved: 2018-12-19

## 2019-05-01 PROBLEM — D69.6 THROMBOCYTOPENIA (HCC): Status: ACTIVE | Noted: 2019-05-01

## 2019-05-01 PROBLEM — R77.8 ELEVATED TROPONIN: Status: ACTIVE | Noted: 2019-05-01

## 2019-05-07 ENCOUNTER — HOSPITAL ENCOUNTER (OUTPATIENT)
Dept: LAB | Age: 64
Discharge: HOME | End: 2019-05-07
Payer: MEDICARE

## 2019-05-07 DIAGNOSIS — E10.11 DIABETIC KETOACIDOSIS WITH COMA ASSOCIATED WITH TYPE 1 DIABETES MELLITUS (HCC): ICD-10-CM

## 2019-05-07 PROBLEM — N17.9 AKI (ACUTE KIDNEY INJURY) (HCC): Status: ACTIVE | Noted: 2019-05-07

## 2019-05-07 LAB
ANION GAP SERPL CALC-SCNC: 8 MMOL/L (ref 7–16)
BUN SERPL-MCNC: 11 MG/DL (ref 8–23)
CALCIUM SERPL-MCNC: 9.7 MG/DL (ref 8.3–10.4)
CHLORIDE SERPL-SCNC: 101 MMOL/L (ref 98–107)
CO2 SERPL-SCNC: 32 MMOL/L (ref 21–32)
CREAT SERPL-MCNC: 0.9 MG/DL (ref 0.6–1)
GLUCOSE SERPL-MCNC: 66 MG/DL (ref 65–100)
POTASSIUM SERPL-SCNC: 3.6 MMOL/L (ref 3.5–5.1)
SODIUM SERPL-SCNC: 141 MMOL/L (ref 136–145)

## 2019-05-07 PROCEDURE — 36415 COLL VENOUS BLD VENIPUNCTURE: CPT

## 2019-05-07 PROCEDURE — 80048 BASIC METABOLIC PNL TOTAL CA: CPT

## 2019-05-17 ENCOUNTER — HOSPITAL ENCOUNTER (OUTPATIENT)
Dept: CT IMAGING | Age: 64
Discharge: HOME | End: 2019-05-17
Attending: INTERNAL MEDICINE

## 2019-05-17 DIAGNOSIS — R93.1 ABNORMAL ECHOCARDIOGRAM: ICD-10-CM

## 2019-05-17 DIAGNOSIS — I21.4 NSTEMI (NON-ST ELEVATED MYOCARDIAL INFARCTION) (HCC): ICD-10-CM

## 2019-05-17 DIAGNOSIS — R77.8 ELEVATED TROPONIN: ICD-10-CM

## 2019-05-17 DIAGNOSIS — E10.42 TYPE 1 DIABETES MELLITUS WITH DIABETIC POLYNEUROPATHY (HCC): ICD-10-CM

## 2019-05-17 DIAGNOSIS — R94.31 ABNORMAL ELECTROCARDIOGRAM: ICD-10-CM

## 2019-05-17 RX ORDER — SODIUM CHLORIDE 0.9 % (FLUSH) 0.9 %
10 SYRINGE (ML) INJECTION
Status: ACTIVE | OUTPATIENT
Start: 2019-05-17 | End: 2019-05-17

## 2019-05-21 NOTE — PROGRESS NOTES
Called patient regarding lab results. Informed pt Dr. Raffi Iraheta would like her to be seen sooner regarding her CT results. Scheduled pt an appointment on Friday, May 31, 2019 at 8:15 AM with Dr. Raffi Iraheta at the [facility]. Informed pt of appointment time and gave her instructions to the [facility]. Pt verbalized understanding.

## 2019-05-31 PROBLEM — N17.9 AKI (ACUTE KIDNEY INJURY) (HCC): Status: RESOLVED | Noted: 2019-05-07 | Resolved: 2019-05-31

## 2019-05-31 PROBLEM — E10.11 DIABETIC KETOACIDOSIS WITH COMA ASSOCIATED WITH TYPE 1 DIABETES MELLITUS (HCC): Status: RESOLVED | Noted: 2019-05-07 | Resolved: 2019-05-31

## 2019-05-31 PROBLEM — I25.119 CORONARY ARTERY DISEASE INVOLVING NATIVE CORONARY ARTERY OF NATIVE HEART WITH ANGINA PECTORIS (HCC): Status: ACTIVE | Noted: 2019-05-31

## 2019-05-31 PROBLEM — I25.2 H/O NON-ST ELEVATION MYOCARDIAL INFARCTION (NSTEMI): Status: ACTIVE | Noted: 2019-05-31

## 2019-06-12 NOTE — PROGRESS NOTES
Pre-assessment complete. Procedure education completed with patient. Time allowed for questions/ concerns. Patient verbalizes understanding of procedure. Patient reminded to take a half dose of insulin before midnight. Patient also reminded if for some reason blood sugar drops too low, to drink clear juice and notify the preop nurse in the morning.

## 2019-06-14 ENCOUNTER — HOSPITAL ENCOUNTER (OUTPATIENT)
Dept: CARDIAC CATH/INVASIVE PROCEDURES | Age: 64
Discharge: HOME | End: 2019-06-15
Attending: INTERNAL MEDICINE | Admitting: INTERNAL MEDICINE
Payer: MEDICARE

## 2019-06-14 PROBLEM — I25.10 CAD S/P PERCUTANEOUS CORONARY ANGIOPLASTY: Status: ACTIVE | Noted: 2019-06-14

## 2019-06-14 PROBLEM — Z98.61 CAD S/P PERCUTANEOUS CORONARY ANGIOPLASTY: Status: ACTIVE | Noted: 2019-06-14

## 2019-06-14 LAB
ACT BLD: 301 SECS (ref 70–128)
ATRIAL RATE: 83 BPM
CALCULATED P AXIS, ECG09: 56 DEGREES
CALCULATED R AXIS, ECG10: 36 DEGREES
CALCULATED T AXIS, ECG11: 22 DEGREES
DIAGNOSIS, 93000: NORMAL
GLUCOSE BLD STRIP.AUTO-MCNC: 108 MG/DL (ref 65–100)
GLUCOSE BLD STRIP.AUTO-MCNC: 156 MG/DL (ref 65–100)
GLUCOSE BLD STRIP.AUTO-MCNC: 209 MG/DL (ref 65–100)
GLUCOSE BLD STRIP.AUTO-MCNC: 238 MG/DL (ref 65–100)
P-R INTERVAL, ECG05: 172 MS
Q-T INTERVAL, ECG07: 390 MS
QRS DURATION, ECG06: 88 MS
QTC CALCULATION (BEZET), ECG08: 458 MS
VENTRICULAR RATE, ECG03: 83 BPM

## 2019-06-14 PROCEDURE — 92978 ENDOLUMINL IVUS OCT C 1ST: CPT

## 2019-06-14 PROCEDURE — 74011636320 HC RX REV CODE- 636/320: Performed by: INTERNAL MEDICINE

## 2019-06-14 PROCEDURE — 77030013140 HC MSK NEB VYRM -A

## 2019-06-14 PROCEDURE — 77030004534 HC CATH ANGI DX INFN CARD -A

## 2019-06-14 PROCEDURE — 93458 L HRT ARTERY/VENTRICLE ANGIO: CPT

## 2019-06-14 PROCEDURE — 94640 AIRWAY INHALATION TREATMENT: CPT

## 2019-06-14 PROCEDURE — 82962 GLUCOSE BLOOD TEST: CPT

## 2019-06-14 PROCEDURE — 99153 MOD SED SAME PHYS/QHP EA: CPT

## 2019-06-14 PROCEDURE — C1725 CATH, TRANSLUMIN NON-LASER: HCPCS

## 2019-06-14 PROCEDURE — C1887 CATHETER, GUIDING: HCPCS

## 2019-06-14 PROCEDURE — 93005 ELECTROCARDIOGRAM TRACING: CPT | Performed by: INTERNAL MEDICINE

## 2019-06-14 PROCEDURE — 74011250636 HC RX REV CODE- 250/636

## 2019-06-14 PROCEDURE — 74011000250 HC RX REV CODE- 250: Performed by: NURSE PRACTITIONER

## 2019-06-14 PROCEDURE — 99152 MOD SED SAME PHYS/QHP 5/>YRS: CPT

## 2019-06-14 PROCEDURE — 92928 PRQ TCAT PLMT NTRAC ST 1 LES: CPT

## 2019-06-14 PROCEDURE — C1753 CATH, INTRAVAS ULTRASOUND: HCPCS

## 2019-06-14 PROCEDURE — C1769 GUIDE WIRE: HCPCS

## 2019-06-14 PROCEDURE — 85347 COAGULATION TIME ACTIVATED: CPT

## 2019-06-14 PROCEDURE — 74011250637 HC RX REV CODE- 250/637: Performed by: INTERNAL MEDICINE

## 2019-06-14 PROCEDURE — 77030029997 HC DEV COM RDL R BND TELE -B

## 2019-06-14 PROCEDURE — C1874 STENT, COATED/COV W/DEL SYS: HCPCS

## 2019-06-14 PROCEDURE — C1894 INTRO/SHEATH, NON-LASER: HCPCS

## 2019-06-14 PROCEDURE — 77030012468 HC VLV BLEEDBK CNTRL ABBT -B

## 2019-06-14 PROCEDURE — 94760 N-INVAS EAR/PLS OXIMETRY 1: CPT

## 2019-06-14 PROCEDURE — 74011250636 HC RX REV CODE- 250/636: Performed by: INTERNAL MEDICINE

## 2019-06-14 PROCEDURE — 74011000250 HC RX REV CODE- 250: Performed by: INTERNAL MEDICINE

## 2019-06-14 RX ORDER — ALBUTEROL SULFATE 0.83 MG/ML
2.5 SOLUTION RESPIRATORY (INHALATION)
Status: DISCONTINUED | OUTPATIENT
Start: 2019-06-14 | End: 2019-06-15 | Stop reason: HOSPADM

## 2019-06-14 RX ORDER — METOPROLOL SUCCINATE 50 MG/1
50 TABLET, EXTENDED RELEASE ORAL DAILY
Status: DISCONTINUED | OUTPATIENT
Start: 2019-06-15 | End: 2019-06-15 | Stop reason: HOSPADM

## 2019-06-14 RX ORDER — ALPRAZOLAM 0.5 MG/1
2 TABLET ORAL
Status: DISCONTINUED | OUTPATIENT
Start: 2019-06-14 | End: 2019-06-14 | Stop reason: SDUPTHER

## 2019-06-14 RX ORDER — ATORVASTATIN CALCIUM 40 MG/1
80 TABLET, FILM COATED ORAL DAILY
Status: DISCONTINUED | OUTPATIENT
Start: 2019-06-14 | End: 2019-06-15 | Stop reason: HOSPADM

## 2019-06-14 RX ORDER — ALPRAZOLAM 2 MG/1
2 TABLET ORAL
Status: DISCONTINUED | OUTPATIENT
Start: 2019-06-14 | End: 2019-06-15 | Stop reason: HOSPADM

## 2019-06-14 RX ORDER — ONDANSETRON 4 MG/1
4 TABLET, ORALLY DISINTEGRATING ORAL
Status: DISCONTINUED | OUTPATIENT
Start: 2019-06-14 | End: 2019-06-15 | Stop reason: HOSPADM

## 2019-06-14 RX ORDER — SODIUM CHLORIDE 9 MG/ML
75 INJECTION, SOLUTION INTRAVENOUS CONTINUOUS
Status: DISPENSED | OUTPATIENT
Start: 2019-06-14 | End: 2019-06-14

## 2019-06-14 RX ORDER — HYDROMORPHONE HYDROCHLORIDE 1 MG/ML
.5-1 INJECTION, SOLUTION INTRAMUSCULAR; INTRAVENOUS; SUBCUTANEOUS
Status: DISCONTINUED | OUTPATIENT
Start: 2019-06-14 | End: 2019-06-14

## 2019-06-14 RX ORDER — FUROSEMIDE 20 MG/1
20 TABLET ORAL DAILY
Status: DISCONTINUED | OUTPATIENT
Start: 2019-06-15 | End: 2019-06-15 | Stop reason: HOSPADM

## 2019-06-14 RX ORDER — ATORVASTATIN CALCIUM 40 MG/1
80 TABLET, FILM COATED ORAL DAILY
Status: DISCONTINUED | OUTPATIENT
Start: 2019-06-15 | End: 2019-06-14

## 2019-06-14 RX ORDER — NITROGLYCERIN 0.4 MG/1
0.4 TABLET SUBLINGUAL AS NEEDED
Status: DISCONTINUED | OUTPATIENT
Start: 2019-06-14 | End: 2019-06-15 | Stop reason: HOSPADM

## 2019-06-14 RX ORDER — LIDOCAINE HYDROCHLORIDE 10 MG/ML
10-200 INJECTION INFILTRATION; PERINEURAL ONCE
Status: COMPLETED | OUTPATIENT
Start: 2019-06-14 | End: 2019-06-14

## 2019-06-14 RX ORDER — TOPIRAMATE 50 MG/1
50 TABLET, FILM COATED ORAL 2 TIMES DAILY
Status: DISCONTINUED | OUTPATIENT
Start: 2019-06-14 | End: 2019-06-15 | Stop reason: HOSPADM

## 2019-06-14 RX ORDER — HEPARIN SODIUM 10000 [USP'U]/ML
40-80 INJECTION, SOLUTION INTRAVENOUS; SUBCUTANEOUS
Status: DISCONTINUED | OUTPATIENT
Start: 2019-06-14 | End: 2019-06-14

## 2019-06-14 RX ORDER — MIDAZOLAM HYDROCHLORIDE 1 MG/ML
.5-2 INJECTION, SOLUTION INTRAMUSCULAR; INTRAVENOUS
Status: DISCONTINUED | OUTPATIENT
Start: 2019-06-14 | End: 2019-06-14

## 2019-06-14 RX ORDER — ATORVASTATIN CALCIUM 40 MG/1
80 TABLET, FILM COATED ORAL DAILY
Status: DISCONTINUED | OUTPATIENT
Start: 2019-06-14 | End: 2019-06-14

## 2019-06-14 RX ORDER — GABAPENTIN 300 MG/1
300 CAPSULE ORAL 3 TIMES DAILY
Status: DISCONTINUED | OUTPATIENT
Start: 2019-06-14 | End: 2019-06-15 | Stop reason: HOSPADM

## 2019-06-14 RX ORDER — HEPARIN SODIUM 200 [USP'U]/100ML
3 INJECTION, SOLUTION INTRAVENOUS CONTINUOUS
Status: DISCONTINUED | OUTPATIENT
Start: 2019-06-14 | End: 2019-06-14

## 2019-06-14 RX ORDER — FENTANYL CITRATE 50 UG/ML
25-50 INJECTION, SOLUTION INTRAMUSCULAR; INTRAVENOUS
Status: DISCONTINUED | OUTPATIENT
Start: 2019-06-14 | End: 2019-06-14

## 2019-06-14 RX ORDER — BUDESONIDE 0.5 MG/2ML
500 INHALANT ORAL
Status: DISCONTINUED | OUTPATIENT
Start: 2019-06-14 | End: 2019-06-15 | Stop reason: HOSPADM

## 2019-06-14 RX ORDER — GUAIFENESIN 100 MG/5ML
81-324 LIQUID (ML) ORAL ONCE
Status: DISCONTINUED | OUTPATIENT
Start: 2019-06-14 | End: 2019-06-14

## 2019-06-14 RX ORDER — DULOXETIN HYDROCHLORIDE 60 MG/1
60 CAPSULE, DELAYED RELEASE ORAL 2 TIMES DAILY
Status: DISCONTINUED | OUTPATIENT
Start: 2019-06-14 | End: 2019-06-15 | Stop reason: HOSPADM

## 2019-06-14 RX ORDER — GUAIFENESIN 100 MG/5ML
81 LIQUID (ML) ORAL DAILY
Status: DISCONTINUED | OUTPATIENT
Start: 2019-06-14 | End: 2019-06-15 | Stop reason: HOSPADM

## 2019-06-14 RX ORDER — SODIUM CHLORIDE 9 MG/ML
75 INJECTION, SOLUTION INTRAVENOUS CONTINUOUS
Status: DISCONTINUED | OUTPATIENT
Start: 2019-06-14 | End: 2019-06-14

## 2019-06-14 RX ORDER — DIAZEPAM 5 MG/1
5 TABLET ORAL ONCE
Status: COMPLETED | OUTPATIENT
Start: 2019-06-14 | End: 2019-06-14

## 2019-06-14 RX ADMIN — DULOXETIN HYDROCHLORIDE 60 MG: 60 CAPSULE, DELAYED RELEASE ORAL at 17:20

## 2019-06-14 RX ADMIN — SODIUM CHLORIDE 75 ML/HR: 900 INJECTION, SOLUTION INTRAVENOUS at 06:35

## 2019-06-14 RX ADMIN — HEPARIN SODIUM 7000 UNITS: 10000 INJECTION INTRAVENOUS; SUBCUTANEOUS at 08:48

## 2019-06-14 RX ADMIN — IOPAMIDOL 150 ML: 755 INJECTION, SOLUTION INTRAVENOUS at 09:13

## 2019-06-14 RX ADMIN — GABAPENTIN 300 MG: 300 CAPSULE ORAL at 20:49

## 2019-06-14 RX ADMIN — HEPARIN SODIUM 2 ML: 10000 INJECTION, SOLUTION INTRAVENOUS; SUBCUTANEOUS at 08:37

## 2019-06-14 RX ADMIN — TICAGRELOR 180 MG: 90 TABLET ORAL at 08:52

## 2019-06-14 RX ADMIN — ALBUTEROL SULFATE 2.5 MG: 2.5 SOLUTION RESPIRATORY (INHALATION) at 13:53

## 2019-06-14 RX ADMIN — ALPRAZOLAM 2 MG: 2 TABLET ORAL at 20:49

## 2019-06-14 RX ADMIN — BUDESONIDE 500 MCG: 0.5 INHALANT RESPIRATORY (INHALATION) at 19:16

## 2019-06-14 RX ADMIN — SODIUM CHLORIDE 75 ML/HR: 900 INJECTION, SOLUTION INTRAVENOUS at 12:02

## 2019-06-14 RX ADMIN — GABAPENTIN 300 MG: 300 CAPSULE ORAL at 15:59

## 2019-06-14 RX ADMIN — LIDOCAINE HYDROCHLORIDE 2 ML: 10 INJECTION, SOLUTION INFILTRATION; PERINEURAL at 08:36

## 2019-06-14 RX ADMIN — HEPARIN SODIUM 3 ML/HR: 5000 INJECTION, SOLUTION INTRAVENOUS; SUBCUTANEOUS at 08:27

## 2019-06-14 RX ADMIN — TICAGRELOR 90 MG: 90 TABLET ORAL at 20:49

## 2019-06-14 RX ADMIN — DIAZEPAM 5 MG: 5 TABLET ORAL at 07:04

## 2019-06-14 RX ADMIN — ATORVASTATIN CALCIUM 80 MG: 40 TABLET, FILM COATED ORAL at 16:00

## 2019-06-14 RX ADMIN — TOPIRAMATE 50 MG: 50 TABLET, FILM COATED ORAL at 17:19

## 2019-06-14 RX ADMIN — MIDAZOLAM HYDROCHLORIDE 2 MG: 1 INJECTION, SOLUTION INTRAMUSCULAR; INTRAVENOUS at 08:39

## 2019-06-14 RX ADMIN — ALBUTEROL SULFATE 2.5 MG: 2.5 SOLUTION RESPIRATORY (INHALATION) at 19:16

## 2019-06-14 RX ADMIN — MIDAZOLAM HYDROCHLORIDE 2 MG: 1 INJECTION, SOLUTION INTRAMUSCULAR; INTRAVENOUS at 08:36

## 2019-06-14 NOTE — PROGRESS NOTES
TRANSFER - OUT REPORT:    Verbal report given to Caty Carpenter RN(name) on Radha Santizo  being transferred to telemetry(unit) for routine progression of care       Report consisted of patients Situation, Background, Assessment and   Recommendations(SBAR). Information from the following report(s) Kardex, Procedure Summary and Cardiac Rhythm normal sinus was reviewed with the receiving nurse. Lines:   Peripheral IV 06/14/19 Left Antecubital (Active)        Opportunity for questions and clarification was provided.       Patient transported with:   Monitor  Registered Nurse

## 2019-06-14 NOTE — PROGRESS NOTES
TRANSFER - IN REPORT:    Verbal report received from Unique Hargrove RN on Arthurine Friends being received from [address] [place] [address] for routine post - op      Report consisted of patients Situation, Background, Assessment and Recommendations(SBAR). Information from the following report(s) SBAR, Kardex and Procedure Summary was reviewed with the receiving nurse. Opportunity for questions and clarification was provided. Assessment completed upon patients arrival to unit and care assumed. Patient arrived to floor via stretcher. Telemetry monitor applied. R radial site with TR band with 12 ml air. Patient educated to limit movement of R arm. Patient verbalized understanding of education. Patient oriented to room and skin and assessment completed upon arrival to room. BP cycling q15 minutes. Bed low and locked. Call light within reach. Side rails X2. Skin assessment completed. Sacrum intact. Heels are intact. R radial site with TR band. Insulin pump on RL abdomen.

## 2019-06-14 NOTE — PROCEDURES
Brief Cardiac Procedure Note    Patient: Daniel Gutierrez MRN: [id number]  SSN: [id number]    YOB: 1955  Age: 62 y.o. Sex: female      Date of Procedure: 6/14/2019     Pre-procedure Diagnosis: Typical Angina    Post-procedure Diagnosis: Coronary Artery Disease    Reason for Procedure: New Onset Angina < or = 2 Months    Procedure: Left Heart Catheterization with Percutaneous Coronary Intervention    Brief Description of Procedure: rra    Performed By: Estle Hodgkins, MD     Assistants:     Anesthesia: Moderate Sedation    Estimated Blood Loss: Less than 10 mL      Specimens: None    Implants: None    Findings:   Ef 65  Lm ok  Lad 75% prox  lcx ok  rca ok  Pci: 0% lad 3.5x 23 Gail  + heparin  + Brilinta    Complications: None    Recommendations: Continue medical therapy.     Signed By: Estle Hodgkins, MD     June 14, 2019

## 2019-06-14 NOTE — PROGRESS NOTES
Patient has her own insulin pump to RLQ abdomen.  Patient signed consent to use her own insulin pump while at hospital.

## 2019-06-14 NOTE — PROGRESS NOTES
Patient received to [address] [place] [address] room # 10  Ambulatory from [facility]. Patient scheduled for [facility] today with Dr Charles Rodríguez. Procedure reviewed & questions answered, voiced good understanding consent obtained & placed on chart. All medications and medical history reviewed. Will prep patient per orders. Patient & family updated on plan of care. The patient has a fraility score of 3-MANAGING WELL, based on independent of ADLs/ambulation. Increased symptoms with exertion.

## 2019-06-14 NOTE — DIABETES MGMT
Patient s/p [facility] with PCI. Admitting blood glucose 209. HbA1c 7.5 (eAG 169). Most recent FSBS 238. Creatinine 0.90. GFR >60. Diabetes management consult acknowledged for insulin pump. Pt is a type 1 diabetic. Pt was diagnosed with diabetes in \"her late 35s. \" Pt voices a positive family history of diabetes. Pt states she has a working glucometer and supplies at home. Per patient she typically checks her blood glucose levels \"8 times a day. \" Pt sees Dr. Emmanuelle Crane with endocrinology for management of diabetes. Pt currently using minimed paradigm insulin pump per provider order. Pt infuses Humalog. Per patient she last changed her set yesterday (June 13th). Patient basal rates:  0111-7827: 0.55 units  9526-9220: 0.7 units  4432-8655: 0.75 units  8975-8927: 0.75 units    TDD: 16.8 units  I:C- 8  Sensitivity factor: 30    Pt HbA1c ranged from 6.8%-9% since 2013. Pt HbA1c in December was 9. April was 7.7%. Now 7.5%. Pt unsure what she has been doing to improve her A1c, but then states I have been trying to eat less carbs. Pt also states \"I have to eat 3 meals a day\" to keep her blood glucose levels stable. Pt does voice a history of hypoglycemia. 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 Pt states yesterday her sugar dropped to the 40s. Reviewed signs, symptoms, and treatment of hypoglycemia. Pt states \"my  doesn't understand when my blood sugar drops low it wipes me out for the whole day. \" Empathized with patient and encouraged pt to evaluate possible causes of hypoglycemia at home to reduce risk of future hypoglycemia. Pt states she boluses \"anytime I put something in my mouth. \" Pt previously had Dexcom, but states it broke and insurance would not cover a new one. Pt states she now has new insurance and is to follow up with Dr. Emmanuelle Crane on Monday. Encouraged pt to discuss the possibility of getting a CGM to help with glycemic control. Pt verbalized understanding.  Pt has had formal diabetic education in the past and states if she wants more education Dr. Ingris Rodríguez can send her. Pt has had a history of DKA and MI in April. Pt states she has had DKA in the past as well. Educated pt on the effects and stress of DKA. Pt verbalized understanding. Reviewed the risk of complications from uncontrolled diabetes. Educated pt on the risk of further heart disease with uncontrolled diabetes. Pt states \"I didn't know the stent could clog back up. \" Encouraged compliance with diabetic regimen. Pt states she ate lunch today. Encouraged compliance with diabetic diet at home. Emphasized the importance of calling endocrinology if blood glucose levels remain out of target goals. Pt verbalized understanding. Encouraged patient to continue to work on lifestyle modifications and to follow up with PCP for further titration of regimen. Patient verbalized understanding and voices no further questions regarding diabetes management.

## 2019-06-14 NOTE — PROGRESS NOTES
TRANSFER - OUT REPORT:    Verbal report given to Randolph Chowdhury RN on Kirsty Perdomo  being transferred to [address] [place] [address] for routine progression of care       Report consisted of patients Situation, Background, Assessment and Recommendations(SBAR). Information from the following report(s) SBAR, Kardex, Procedure Summary and MAR was reviewed with the receiving nurse. Opportunity for questions and clarification was provided.       [facility] with Dr Juancarlos Hill  HERIBERTO LAD  7000 heparin    180 brilinta  4 versed  Right radial RBand 10ml at 0915

## 2019-06-14 NOTE — PROGRESS NOTES
Pt admitted at Observation status. Pt instructed on home medication policy; pt voices understanding. Pt provided copies of the following: Admission pamphlet with Observation insert and Medicare FAQ's. Home meds ordered per MD, verified with [facility] and supplied by patient. No narcotic meds. Medications verified and labeled per pharmacy and placed in locked box in patient's room. The medications received from the patient include Topamax, cymbalta, lipitor, metoprolol, latuda, and lasix. Pt admitted as Observation status. Pt instructed on home medication policy; pt voices understanding. Pt provided copies of the following: Admission pamphlet with Observation insert and Medicare FAQ's. Home meds ordered per MD, verified with [facility] and supplied by patient. Home medications include narcotics. Medications verified and labeled per pharmacy and placed in locked box in patient's room. Home narcotics counted and verified with patient, 2 RN's and pharmacy; home narcotics locked up at nursing station with Narcotic Inventory Record completed. The medications received from the patient include Xanax and Gabapentin.

## 2019-06-14 NOTE — PROGRESS NOTES
Radial compression band removed at 1345 after slowly reducing air from 12 cc to zero as per hospital protocol. No bleeding or hematoma noted. 2 x 2 gauze with tegaderm placed over puncture site. The affected extremity is warm and dry to the touch. Frequent vital signs documented per flowsheet. Patient instructed to call if any bleeding noted on gauze. Patient verbalized understanding the nursing instructions.

## 2019-06-14 NOTE — PROGRESS NOTES
Bedside and Verbal shift change report given to Tammy Be RN (oncoming nurse) by self [facility] nurse). Report included the following information SBAR, Kardex, Intake/Output, MAR, Recent Results and Med Rec Status. R radial site assessed at bedside.

## 2019-06-14 NOTE — PROGRESS NOTES
Report received from [address] [place] [address]. Procedural findings communicated. Intra procedural  medication administration reviewed. Progression of care discussed.      Patient received into [facility] 7 post sheath removal.     Access site without bleeding or swelling yes    Dressing dry and intact yes    Patient instructed to limit movement to right upper extremity    Routine post procedural vital signs and site assessment initiated yes

## 2019-06-15 VITALS
OXYGEN SATURATION: 98 % | WEIGHT: 158.1 LBS | HEIGHT: 63 IN | DIASTOLIC BLOOD PRESSURE: 76 MMHG | SYSTOLIC BLOOD PRESSURE: 127 MMHG | RESPIRATION RATE: 18 BRPM | BODY MASS INDEX: 28.01 KG/M2 | TEMPERATURE: 98.2 F | HEART RATE: 88 BPM

## 2019-06-15 LAB
ANION GAP SERPL CALC-SCNC: 9 MMOL/L (ref 7–16)
BUN SERPL-MCNC: 8 MG/DL (ref 8–23)
CALCIUM SERPL-MCNC: 8.7 MG/DL (ref 8.3–10.4)
CHLORIDE SERPL-SCNC: 110 MMOL/L (ref 98–107)
CO2 SERPL-SCNC: 23 MMOL/L (ref 21–32)
CREAT SERPL-MCNC: 0.7 MG/DL (ref 0.6–1)
GLUCOSE BLD STRIP.AUTO-MCNC: 222 MG/DL (ref 65–100)
GLUCOSE SERPL-MCNC: 159 MG/DL (ref 65–100)
POTASSIUM SERPL-SCNC: 3.5 MMOL/L (ref 3.5–5.1)
SODIUM SERPL-SCNC: 142 MMOL/L (ref 136–145)

## 2019-06-15 PROCEDURE — 74011250637 HC RX REV CODE- 250/637: Performed by: INTERNAL MEDICINE

## 2019-06-15 PROCEDURE — 74011250637 HC RX REV CODE- 250/637: Performed by: NURSE PRACTITIONER

## 2019-06-15 PROCEDURE — 80048 BASIC METABOLIC PNL TOTAL CA: CPT

## 2019-06-15 PROCEDURE — 82962 GLUCOSE BLOOD TEST: CPT

## 2019-06-15 PROCEDURE — 36415 COLL VENOUS BLD VENIPUNCTURE: CPT

## 2019-06-15 RX ORDER — ATORVASTATIN CALCIUM 80 MG/1
80 TABLET, FILM COATED ORAL DAILY
Qty: 30 TAB | Refills: 11 | Status: SHIPPED | OUTPATIENT
Start: 2019-06-15 | End: 2020-06-03 | Stop reason: SDUPTHER

## 2019-06-15 RX ORDER — ACETAMINOPHEN 500 MG
1000 TABLET ORAL
Status: DISCONTINUED | OUTPATIENT
Start: 2019-06-15 | End: 2019-06-15 | Stop reason: HOSPADM

## 2019-06-15 RX ORDER — POTASSIUM CHLORIDE 20 MEQ/1
40 TABLET, EXTENDED RELEASE ORAL
Status: COMPLETED | OUTPATIENT
Start: 2019-06-15 | End: 2019-06-15

## 2019-06-15 RX ORDER — GUAIFENESIN 100 MG/5ML
81 LIQUID (ML) ORAL DAILY
Qty: 30 TAB | Refills: 11 | Status: SHIPPED | OUTPATIENT
Start: 2019-06-15 | End: 2020-06-03 | Stop reason: SDUPTHER

## 2019-06-15 RX ORDER — NITROGLYCERIN 0.4 MG/1
0.4 TABLET SUBLINGUAL AS NEEDED
Qty: 1 BOTTLE | Refills: 3 | Status: SHIPPED | OUTPATIENT
Start: 2019-06-15

## 2019-06-15 RX ADMIN — FUROSEMIDE 20 MG: 20 TABLET ORAL at 08:11

## 2019-06-15 RX ADMIN — ACETAMINOPHEN 1000 MG: 500 TABLET, FILM COATED ORAL at 04:18

## 2019-06-15 RX ADMIN — ASPIRIN 81 MG 81 MG: 81 TABLET ORAL at 08:08

## 2019-06-15 RX ADMIN — METOPROLOL SUCCINATE 50 MG: 50 TABLET, EXTENDED RELEASE ORAL at 08:11

## 2019-06-15 RX ADMIN — TOPIRAMATE 50 MG: 50 TABLET, FILM COATED ORAL at 08:12

## 2019-06-15 RX ADMIN — DULOXETIN HYDROCHLORIDE 60 MG: 60 CAPSULE, DELAYED RELEASE ORAL at 08:09

## 2019-06-15 RX ADMIN — TICAGRELOR 90 MG: 90 TABLET ORAL at 08:08

## 2019-06-15 RX ADMIN — POTASSIUM CHLORIDE 40 MEQ: 20 TABLET, EXTENDED RELEASE ORAL at 08:08

## 2019-06-15 RX ADMIN — GABAPENTIN 300 MG: 300 CAPSULE ORAL at 08:11

## 2019-06-15 RX ADMIN — ATORVASTATIN CALCIUM 80 MG: 40 TABLET, FILM COATED ORAL at 08:09

## 2019-06-15 NOTE — PROGRESS NOTES
Problem: Falls - Risk of  Goal: *Absence of Falls  Description  Document Arelis Schneider Fall Risk and appropriate interventions in the flowsheet.   Outcome: Progressing Towards Goal     Problem: Patient Education: Go to Patient Education Activity  Goal: Patient/Family Education  Outcome: Progressing Towards Goal     Problem: Cath Lab Procedures: Post-Cath Day of Procedure (Initiate SCIP Measures for Post-Op Care)  Goal: Activity/Safety  Outcome: Progressing Towards Goal  Goal: Consults, if ordered  Outcome: Progressing Towards Goal  Goal: Diagnostic Test/Procedures  Outcome: Progressing Towards Goal  Goal: Nutrition/Diet  Outcome: Progressing Towards Goal  Goal: Discharge Planning  Outcome: Progressing Towards Goal  Goal: Medications  Outcome: Progressing Towards Goal  Goal: Respiratory  Outcome: Progressing Towards Goal  Goal: Treatments/Interventions/Procedures  Outcome: Progressing Towards Goal  Goal: Psychosocial  Outcome: Progressing Towards Goal  Goal: *Procedure site is without bleeding and signs of infection six hours post sheath removal  Outcome: Progressing Towards Goal  Goal: *Hemodynamically stable  Outcome: Progressing Towards Goal  Goal: *Optimal pain control at patient's stated goal  Outcome: Progressing Towards Goal

## 2019-06-15 NOTE — DISCHARGE SUMMARY
[facility] Cardiology Discharge Summary     Patient ID:  Darcy Schaffer  [id number]  31 y.o.  1955    Admit date: 6/14/2019    Discharge date:  6/15/2019    Admitting Physician: Prciila Davis MD     Discharge Physician:  Dr. Charissa Woodson    Admission Diagnoses: HTN (hypertension) [I10]  CAD S/P percutaneous coronary angioplasty [I25.10, Z98.61]    Discharge Diagnoses:    Diagnosis    CAD S/P percutaneous coronary angioplasty    H/O non-ST elevation myocardial infarction (NSTEMI)    Coronary artery disease involving native coronary artery of native heart with angina pectoris    Depression, major, recurrent, mild     Vitamin B12 deficiency    Fibromyalgia    Hypertension    Type 1 diabetes mellitus     Chronic obstructive pulmonary disease     Former smoker       Cardiology Procedures this admission:  Left heart catheterization with PCI  Consults: None    Hospital Course: Patient was seen at the office of [facility] Cardiology by Dr. Brielle Lucas. Andry Anne for complaints of recent NSTEMI while admitted with DKA at [facility]. She had CTA that showed significant LAD stenosis and she was subsequently scheduled for a LHC at [facility] on 6/14/19. Patient underwent cardiac catheterization by Dr. Rosanne Wild. Patient was found to have a high-grade 75% stenosis of the pLAD that was stented with a 3.5 x 23 [address] with 0% residual stenosis. Patient tolerated the procedure well and was taken to the telemetry floor for recovery. The following morning patient was up feeling well without any complaints of chest pain or shortness of breath. Patient's right radial cath site was clean, dry and intact without hematoma or bruit. Patient's labs were stable. Patient was seen and examined by Dr. Charissa Woodson and determined stable and ready for discharge. Patient was instructed on the importance of medication compliance including taking Aspirin and Brilinta everyday without missing a dose.  After receiving drug eluting stents, the patient will remain on dual anti-platelet therapy for 1 year. For maximized medical therapy for CAD, patient will continue BB, ACE-I, and statin as well. The patient will follow up with [address] [place] [address] Cardiology Dr. Syd Felix. Joyceann Shock on 6/28/19 @ 9:45AM in the [place] office and has been referred to cardiac rehab. DISPOSITION: The patient is being discharged home in stable condition on a low saturated fat, low cholesterol and low salt diet. The patient is instructed to advance activities as tolerated to the limit of fatigue or shortness of breath. The patient is instructed to avoid all heavy lifting, straining, stooping or squatting for 3-5 days. The patient is instructed to watch the cath site for bleeding/oozing; if seen, the patient is instructed to apply firm pressure with a clean cloth and call [address] [place] [address] Cardiology at [phone]. The patient is instructed to watch for signs of infection which include: increasing area of redness, fever/hot to touch or purulent drainage at the catheterization site. The patient is instructed not to soak in a bathtub for 7-10 days, but is cleared to shower. The patient is instructed to call the office or return to the ER for immediate evaluation for any shortness of breath or chest pain not relieved by NTG. Discharge Exam:   Visit Vitals  /74 (BP 1 Location: Left arm, BP Patient Position: At rest)   Pulse 87   Temp 97.8 °F (36.6 °C)   Resp 18   Ht 5' 3\" (1.6 m)   Wt 70.8 kg (156 lb)   SpO2 98%   BMI 27.63 kg/m²       Patient has been seen by Dr. Felicita Blake: see his progress note for exam details.     Recent Results (from the past 24 hour(s))   EKG, 12 LEAD, INITIAL    Collection Time: 06/14/19  6:53 AM   Result Value Ref Range    Ventricular Rate 83 BPM    Atrial Rate 83 BPM    P-R Interval 172 ms    QRS Duration 88 ms    Q-T Interval 390 ms    QTC Calculation (Bezet) 458 ms    Calculated P Axis 56 degrees    Calculated R Axis 36 degrees    Calculated T Axis 22 degrees    Diagnosis Normal sinus rhythm  Normal ECG  When compared with ECG of 10-DILLAN-2015 08:31,  Nonspecific T wave abnormality now evident in Inferior leads  Confirmed by DANICA FLORES (), Armida Juan A ([id number]) on 6/14/2019 7:21:48 AM     GLUCOSE, POC    Collection Time: 06/14/19  7:03 AM   Result Value Ref Range    Glucose (POC) 209 (H) 65 - 100 mg/dL   POC ACTIVATED CLOTTING TIME    Collection Time: 06/14/19  8:59 AM   Result Value Ref Range    Activated Clotting Time (POC) 301 (H) 70 - 128 SECS   GLUCOSE, POC    Collection Time: 06/14/19 11:23 AM   Result Value Ref Range    Glucose (POC) 238 (H) 65 - 100 mg/dL   GLUCOSE, POC    Collection Time: 06/14/19  4:00 PM   Result Value Ref Range    Glucose (POC) 156 (H) 65 - 100 mg/dL   GLUCOSE, POC    Collection Time: 06/14/19  9:18 PM   Result Value Ref Range    Glucose (POC) 108 (H) 65 - 100 mg/dL         Patient Instructions:      My Medications      START taking these medications      Instructions Each Dose to Equal Morning Noon Evening Bedtime   aspirin 81 mg chewable tablet  Replaces:  aspirin 325 mg tablet    Your last dose was: Your next dose is: Take 1 Tab by mouth daily. 81 mg                 ticagrelor 90 mg tablet  Commonly known as:  BRILINTA    Your last dose was: Your next dose is: Take 1 Tab by mouth every twelve (12) hours every twelve (12) hours. 90 mg                    CHANGE how you take these medications      Instructions Each Dose to Equal Morning Noon Evening Bedtime   atorvastatin 80 mg tablet  Commonly known as:  LIPITOR  What changed:    · medication strength  · how much to take    Your last dose was: Your next dose is: Take 1 Tab by mouth daily. 80 mg                 * nitroglycerin 0.4 mg SL tablet  Commonly known as:  NITROSTAT  What changed:  Another medication with the same name was added. Make sure you understand how and when to take each. Your last dose was:       Your next dose is:          PLACE 1 T UNDER THE TONGUE Q 5 MINUTES PRN FOR CHEST PAIN                  * nitroglycerin 0.4 mg SL tablet  Commonly known as:  NITROSTAT  What changed: You were already taking a medication with the same name, and this prescription was added. Make sure you understand how and when to take each. Your last dose was: Your next dose is:          1 Tab by SubLINGual route as needed for Chest Pain. Up to 3 doses. 0.4 mg                     * This list has 2 medication(s) that are the same as other medications prescribed for you. Read the directions carefully, and ask your doctor or other care provider to review them with you. CONTINUE taking these medications      Instructions Each Dose to Equal Morning Noon Evening Bedtime   albuterol 90 mcg/actuation inhaler  Commonly known as:  PROVENTIL HFA, VENTOLIN HFA, PROAIR HFA    Your last dose was: Your next dose is: Take 1 Puff by inhalation every six (6) hours as needed for Wheezing. 1 Puff                 CALCIUM 500 + D PO    Your last dose was: Your next dose is: Take  by mouth.                  cyanocobalamin 1,000 mcg tablet    Your last dose was: Your next dose is: Take 1,000 mcg by mouth daily. 1000 mcg                 DULoxetine 60 mg capsule  Commonly known as:  CYMBALTA    Your last dose was: Your next dose is: Take 60 mg by mouth two (2) times a day. 60 mg                 fluticasone propion-salmeterol 250-50 mcg/dose diskus inhaler  Commonly known as:  ADVAIR/WIXELA    Your last dose was: Your next dose is: Take 1 Puff by inhalation two (2) times a day. 1 Puff                 furosemide 20 mg tablet  Commonly known as:  LASIX    Your last dose was: Your next dose is: Take 1 Tab by mouth daily. 20 mg                 gabapentin 300 mg capsule  Commonly known as:  NEURONTIN    Your last dose was: Your next dose is:           Take 300 mg by mouth three (3) times daily. 300 mg                 HumaLOG U-100 Insulin 100 unit/mL injection  Generic drug:  insulin lispro    Your last dose was: Your next dose is:          by SubCUTAneous route. 16.8 units per day continuous plus bolus doses. LATUDA 80 mg Tab tablet  Generic drug:  lurasidone    Your last dose was: Your next dose is: Take 80 mg by mouth daily (with dinner). 80 mg                 metoprolol succinate 50 mg XL tablet  Commonly known as:  TOPROL-XL    Your last dose was: Your next dose is: Take 1 Tab by mouth daily for 30 days. Take half tablet once a day   50 mg                 ondansetron hcl 4 mg tablet  Commonly known as:  ZOFRAN    Your last dose was: Your next dose is: Take 1 Tab by mouth every eight (8) hours as needed for Nausea. 4 mg                 SUMAtriptan 100 mg tablet  Commonly known as:  IMITREX    Your last dose was: Your next dose is: Take 100 mg by mouth once as needed for Migraine. 100 mg                 Syringe with Needle (Disp) 1 mL 25 gauge x 5/8\" Syrg    Your last dose was: Your next dose is:          1,000 mcg by Does Not Apply route. 1000 mcg                 Syringe with Needle, Safety 3 mL 25 gauge x 1\" Syrg    Your last dose was: Your next dose is:          1 mL by IntraMUSCular route. 1 mL                 tiotropium bromide 1.25 mcg/actuation inhaler  Commonly known as:  Via [address] 74    Your last dose was: Your next dose is: Take 2 Puffs by inhalation daily. 2 Puff                 TOPAMAX 50 mg tablet  Generic drug:  topiramate    Your last dose was: Your next dose is: Take 50 mg by mouth two (2) times a day. 50 mg                 VITAMIN D3 PO    Your last dose was: Your next dose is: Take  by mouth. XANAX 2 mg tablet  Generic drug:  ALPRAZolam    Your last dose was: Your next dose is:           Take 2 mg by mouth three (3) times daily as needed for Anxiety. 2 mg                    STOP taking these medications    aspirin 325 mg tablet  Commonly known as:  ASPIRIN  Replaced by:  aspirin 81 mg chewable tablet        clopidogrel 75 mg Tab  Commonly known as:  PLAVIX              Where to Get Your Medications      Information on where to get these meds will be given to you by the nurse or doctor.     Ask your nurse or doctor about these medications  · aspirin 81 mg chewable tablet  · atorvastatin 80 mg tablet  · nitroglycerin 0.4 mg SL tablet  · ticagrelor 90 mg tablet       Signed:   BRADFORD Rabago  6/15/19 @ [phone]

## 2019-06-15 NOTE — PROGRESS NOTES
Bedside and Verbal shift change report given to Farrah Nails. Report included the following information SBAR, Kardex, MAR, Accordion and Recent Results.

## 2019-06-15 NOTE — PROGRESS NOTES
Discharge instructions reviewed with Patient and spouse. Prescriptions given for ASA, brilinta,nitro, and lipitor and med info sheets provided for all new medications. Opportunity for questions provided. Patient and Spouse voiced understanding of all discharge instructions. Telemetry monitor removed by primary RN.

## 2019-06-15 NOTE — DISCHARGE INSTRUCTIONS
The patient is being discharged home in stable condition on a low saturated fat, low cholesterol and low salt diet. The patient is instructed to advance activities as tolerated to the limit of fatigue or shortness of breath. The patient is instructed to avoid all heavy lifting, straining, stooping or squatting for 3-5 days. The patient is instructed to watch the cath site for bleeding/oozing; if seen, the patient is instructed to apply firm pressure with a clean cloth and call [facility] at [phone]. The patient is instructed to watch for signs of infection which include: increasing area of redness, fever/hot to touch or purulent drainage at the catheterization site. The patient is instructed not to soak in a bathtub for 7-10 days, but is cleared to shower. The patient is instructed to call the office or return to the ER for immediate evaluation for any shortness of breath or chest pain not relieved by NTG.        Patient Education        Coronary Angiogram: What to Expect at [address] [place] [address]    A coronary angiogram is a test to examine the large blood vessel of your heart (coronary artery). The doctor inserted a thin, flexible tube (catheter) into a blood vessel in your groin. In some cases, the catheter is placed in a blood vessel in the arm. Your groin or arm may have a bruise and feel sore for a day or two after a coronary angiogram. You can do light activities around the house but nothing strenuous for several days. This care sheet gives you a general idea about how long it will take for you to recover. But each person recovers at a different pace. Follow the steps below to feel better as quickly as possible. How can you care for yourself at home? Activity    · If the doctor gave you a sedative:  ? For 24 hours, don't do anything that requires attention to detail.  It takes time for the medicine's effects to completely wear off.  ? For your safety, do not drive or operate any machinery that could be dangerous. Wait until the medicine wears off and you can think clearly and react easily.     · Do not do strenuous exercise and do not lift, pull, or push anything heavy until your doctor says it is okay. This may be for a day or two. You can walk around the house and do light activity, such as cooking.     · If the catheter was placed in your groin, try not to walk up stairs for the first couple of days.     · If the catheter was placed in your arm near your wrist, do not bend your wrist deeply for the first couple of days. Be careful using your hand to get into and out of a chair or bed.     · If your doctor recommends it, get more exercise. Walking is a good choice. Bit by bit, increase the amount you walk every day. Try for at least 30 minutes on most days of the week. Diet    · Drink plenty of fluids to help your body flush out the dye. If you have kidney, heart, or liver disease and have to limit fluids, talk with your doctor before you increase the amount of fluids you drink.     · Keep eating a heart-healthy diet that has lots of fruits, vegetables, and whole grains. If you have not been eating this way, talk to your doctor. You also may want to talk to a dietitian. This expert can help you to learn about healthy foods and plan meals. Medicines    · Your doctor will tell you if and when you can restart your medicines. He or she will also give you instructions about taking any new medicines.     · If you take blood thinners, such as warfarin (Coumadin), clopidogrel (Plavix), or aspirin, be sure to talk to your doctor. He or she will tell you if and when to start taking those medicines again. Make sure that you understand exactly what your doctor wants you to do.     · Your doctor may prescribe a blood-thinning medicine like aspirin or clopidogrel (Plavix).  It is very important that you take these medicines exactly as directed in order to keep the coronary artery open and reduce your risk of a heart attack. Be safe with medicines. Call your doctor if you think you are having a problem with your medicine.    Care of the catheter site    · For 1 or 2 days, keep a bandage over the spot where the catheter was inserted. The bandage probably will fall off in this time.     · Put ice or a cold pack on the area for 10 to 20 minutes at a time to help with soreness or swelling. Put a thin cloth between the ice and your skin.     · You may shower 24 to 48 hours after the procedure, if your doctor okays it. Pat the incision dry.     · Do not soak the catheter site until it is healed. Don't take a bath for 1 week, or until your doctor tells you it is okay.     · If you are bleeding, lie down and press on the area for 15 minutes to try to make it stop. If the bleeding does not stop, call your doctor or seek immediate medical care. Follow-up care is a key part of your treatment and safety. Be sure to make and go to all appointments, and call your doctor if you are having problems. It's also a good idea to know your test results and keep a list of the medicines you take. When should you call for help? Call 911 anytime you think you may need emergency care. For example, call if:    · You passed out (lost consciousness).     · You have severe trouble breathing.     · You have sudden chest pain and shortness of breath, or you cough up blood.     · You have symptoms of a heart attack. These may include:  ? Chest pain or pressure, or a strange feeling in the chest.  ? Sweating. ? Shortness of breath. ? Nausea or vomiting. ? Pain, pressure, or a strange feeling in the back, neck, jaw, or upper belly, or in one or both shoulders or arms. ? Lightheadedness or sudden weakness. ? A fast or irregular heartbeat. After you call 911, the  may tel you to chew 1 adult-strength or 2 to 4 low-dose aspirin. Wait for an ambulance.  Do not try to drive yourself.     · You have been diagnosed with angina, and you have symptoms that do not go away with rest or are not getting better within 5 minutes after you take a dose of nitroglycerin.    Call your doctor now or seek immediate medical care if:    · You are bleeding from the area where the catheter was put in your artery.     · You have a fast-growing, painful lump at the catheter site.     · You have signs of infection, such as:  ? Increased pain, swelling, warmth, or redness. ? Red streaks leading from the catheter site. ? Pus draining from the catheter site. ? A fever.     · Your leg or arm looks blue or feels cold, numb, or tingly.    Watch closely for changes in your health, and be sure to contact your doctor if you have any problems. Where can you learn more? Go to http://ismael-haleigh.info/. Enter S960 in the search box to learn more about \"Coronary Angiogram: What to Expect at Home. \"  Current as of: July 22, 2018  Content Version: 11.9  © 0159-6704 [employer]. Care instructions adapted under license by [employer] (which disclaims liability or warranty for this information). If you have questions about a medical condition or this instruction, always ask your healthcare professional. [place] [address] any warranty or liability for your use of this information. Patient Education        Learning About Coronary Artery Disease (CAD)  What is coronary artery disease? Coronary artery disease (CAD) occurs when plaque builds up in the arteries that bring oxygen-rich blood to your heart. Plaque is a fatty substance made of cholesterol, calcium, and other substances in the blood. This process is called hardening of the arteries, or atherosclerosis. What happens when you have coronary artery disease? · Plaque may narrow the coronary arteries. Narrowed arteries cause poor blood flow. This can lead to angina symptoms such as chest pain or discomfort.  If blood flow is completely blocked, you could have a heart attack. · You can slow CAD and reduce the risk of future problems by making changes in your lifestyle. These include quitting smoking and eating heart-healthy foods. · Treatments for CAD, along with changes in your lifestyle, can help you live a longer and healthier life. How can you prevent coronary artery disease? · Do not smoke. It may be the best thing you can do to prevent heart disease. If you need help quitting, talk to your doctor about stop-smoking programs and medicines. These can increase your chances of quitting for good. · Be active. Get at least 30 minutes of exercise on most days of the week. Walking is a good choice. You also may want to do other activities, such as running, swimming, cycling, or playing tennis or team sports. · Eat heart-healthy foods. Eat more fruits and vegetables and less foods that contain saturated and trans fats. Limit alcohol, sodium, and sweets. · Stay at a healthy weight. Lose weight if you need to. · Manage other health problems such as diabetes, high blood pressure, and high cholesterol. · Manage stress. Stress can hurt your heart. To keep stress low, talk about your problems and feelings. Don't keep your feelings hidden. · If you have talked about it with your doctor, take a low-dose aspirin every day. Aspirin can help certain people lower their risk of a heart attack or stroke. But taking aspirin isn't right for everyone, because it can cause serious bleeding. Do not start taking daily aspirin unless your doctor knows about it. How is coronary artery disease treated? · Your doctor will suggest that you make lifestyle changes. For example, your doctor may ask you to eat healthy foods, quit smoking, lose extra weight, and be more active. · You will have to take medicines. · Your doctor may suggest a procedure to open narrowed or blocked arteries. This is called angioplasty.  Or your doctor may suggest using healthy blood vessels to create detours around narrowed or blocked arteries. This is called bypass surgery. Follow-up care is a key part of your treatment and safety. Be sure to make and go to all appointments, and call your doctor if you are having problems. It's also a good idea to know your test results and keep a list of the medicines you take. Where can you learn more? Go to http://ismael-haleigh.info/. Enter [phone] in the search box to learn more about \"Learning About Coronary Artery Disease (CAD). \"  Current as of: July 22, 2018  Content Version: 11.9  © 5129-0962 [employer]. Care instructions adapted under license by [employer] (which disclaims liability or warranty for this information). If you have questions about a medical condition or this instruction, always ask your healthcare professional. [address] any warranty or liability for your use of this information. Patient Education   Ticagrelor (By mouth)   Ticagrelor (btp-VO-qtpo-or)  Helps prevent stroke, heart attack, and other heart problems. This medicine is a blood thinner. Brand Name(s): Brilinta   There may be other brand names for this medicine. When This Medicine Should Not Be Used: This medicine is not right for everyone. Do not use it if you had an allergic reaction to ticagrelor, or if you have bleeding problems (such as a bleeding stomach ulcer) or a history of bleeding in your brain. How to Use This Medicine:   Tablet  · Your doctor will tell you how much medicine to use. Do not use more than directed. Take this medicine at the same time every day. · Your doctor may tell you to take aspirin with this medicine. Do not use more than 100 milligrams of aspirin per day. Check the labels of other medicines to make sure they do not contain aspirin. · If you cannot swallow the tablet, you may do this:   ¨ Crush the tablet and mix it in a glass of water. Drink it right away.  Rinse the glass with more water and drink that too, so you get all the medicine. ¨ You may give the tablet and water mixture through a nasogastric tube. Flush the tube with more water so you receive all the medicine. · This medicine should come with a Medication Guide. Ask your pharmacist for a copy if you do not have one. · Missed dose: Skip the missed dose and take your next dose as usual. Do not take extra medicine to make up for a missed dose. · Store the medicine in a closed container at room temperature, away from heat, moisture, and direct light. Drugs and Foods to Avoid:   Ask your doctor or pharmacist before using any other medicine, including over-the-counter medicines, vitamins, and herbal products. · Some medicines can affect how ticagrelor works. Tell your doctor if you are using any of the following:  ¨ Atazanavir, carbamazepine, clarithromycin, digoxin, indinavir, itraconazole, ketoconazole, lovastatin, nefazodone, nelfinavir, phenobarbital, phenytoin, rifampin, ritonavir, saquinavir, simvastatin, telithromycin, or voriconazole  ¨ Blood thinner (including warfarin or heparin)  ¨ NSAID pain or arthritis medicine (including celecoxib, diclofenac, ibuprofen, naproxen)  Warnings While Using This Medicine:   · Tell your doctor if you are pregnant or breastfeeding, or if you have liver disease, heart rhythm problems (including slow heartbeat), lung or breathing problems (such as asthma or COPD), or a history of bleeding problems. · This medicine may cause you to bleed and bruise more easily, and it may take longer than usual for bleeding to stop. Be careful to avoid injuries. · Do not stop using this medicine unless your doctor tells you to. To stop it may increase your risk of a heart attack, blood clot, or other serious problem. · Tell any doctor or dentist who treats you that you are using this medicine.  With your doctor's permission, you may need to stop using this medicine several days before you have surgery to reduce the risk of bleeding problems. Follow your doctor's instructions carefully. · Your doctor will do lab tests at regular visits to check on the effects of this medicine. Keep all appointments. · Keep all medicine out of the reach of children. Never share your medicine with anyone. Possible Side Effects While Using This Medicine:   Call your doctor right away if you notice any of these side effects:  · Allergic reaction: Itching or hives, swelling in your face or hands, swelling or tingling in your mouth or throat, chest tightness, trouble breathing  · Bloody or black, tarry stools, red or dark brown urine  · Fast, slow, or pounding heartbeat  · Trouble breathing  · Unusual bleeding, bruising, or weakness  · Vomiting of blood or material that looks like coffee grounds  If you notice other side effects that you think are caused by this medicine, tell your doctor. Call your doctor for medical advice about side effects. You may report side effects to FDA at 8-074-FDA-6774  © 2017 [facility] Information is for End User's use only and may not be sold, redistributed or otherwise used for commercial purposes. The above information is an  only. It is not intended as medical advice for individual conditions or treatments. Talk to your doctor, nurse or pharmacist before following any medical regimen to see if it is safe and effective for you. Patient Education   Nitroglycerin (By mouth)   Nitroglycerin (ebb-[id number]-OEWS-er-in)  Treats or prevents angina (chest pain). This medicine is a nitrate. Brand Name(s): Nitro-Time, Nitrostat   There may be other brand names for this medicine. When This Medicine Should Not Be Used: This medicine is not right for everyone. Do not use it if you had an allergic reaction to nitroglycerin or similar medicines. How to Use This Medicine:   Long Acting Capsule, Tablet, Long Acting Tablet  · Your doctor will tell you how much medicine to use.  Do not use more than directed. Sit down before you take this medicine, because it could make you lightheaded. · Most people use this medicine for only part of the day or as needed. · Extended-release capsule or extended-release tablet:   ¨ Take on an empty stomach with a full glass of water. ¨ Swallow whole. Do not crush, break, or chew it. · Buccal tablet:   ¨ Place it between your gum and upper cheek or upper lip. Let the tablet dissolve slowly in your mouth over several hours. Do not chew, crush, or swallow the tablet or put it under your tongue. ¨ Avoid drinking anything hot while the tablet is in your mouth and do not touch the tablet with your tongue. ¨ Do not go to sleep with a buccal tablet in your mouth. · Sublingual tablet:   ¨ Wet the tablet with saliva and place it under your tongue or inside your cheek. Let the tablet dissolve. Do not chew, crush, or swallow the tablet. Wait 5 minutes. If you still have pain, take a second tablet. Do not take more than 3 tablets in 15 minutes. If you still have pain after you take a total of 3 tablets, this is an emergency. Call 911. Do not drive yourself to the hospital.  ¨ You may use this medicine 5 to 10 minutes before an activity that can cause angina. This may help prevent the attack. · Read and follow the patient instructions that come with this medicine. Talk to your doctor or pharmacist if you have any questions. · Missed dose: Take a dose as soon as you remember. If it is almost time for your next dose, wait until then and take a regular dose. Do not take extra medicine to make up for a missed dose. · Store the medicine in a closed container at room temperature, away from heat, moisture, and direct light. Store the sublingual tablets at room temperature in the original glass container, away from heat, moisture, and direct light.   How to Store and Dispose of This Medicine:   · Store the medicine at room temperature in a closed container, away from heat, moisture, and direct light. Ask your pharmacist, doctor, or health caregiver about the best way to dispose of any outdated medicine or medicine no longer needed. · Keep all medicine out of the reach of children and never share your medicine with anyone. Drugs and Foods to Avoid:   Ask your doctor or pharmacist before using any other medicine, including over-the-counter medicines, vitamins, and herbal products. · Do not use this medicine if you are also using avanafil, riociguat, sildenafil, tadalafil, or vardenafil. · Some medicines can affect how nitroglycerin works. Tell your doctor if you are using any of the following:  ¨ Alteplase, aspirin, heparin  ¨ Blood pressure medicine  ¨ Diuretic (water pill)  ¨ Ergot medicine  · Tell your doctor if you are using any medicine that makes your mouth dry, such as medicines that treat depression. · Do not drink alcohol while you are using this medicine. Warnings While Using This Medicine:   · Tell your doctor if you are pregnant or breastfeeding, or if you have kidney disease, anemia, congestive heart failure, enlarged heart, low blood pressure, or a recent heart attack. Tell your doctor if you have a history of a head injury. · This medicine may make you dizzy or lightheaded. Do not drive or do anything else that could be dangerous until you know how this medicine affects you. These symptoms may be worse if you drink alcohol. · Medicines that treat chest pain sometimes cause headaches when you first start using it. This is normal. Do not stop using the medicine or change the time you use it to avoid headaches. Ask your doctor if you can take aspirin or acetaminophen to treat the headache. · Tell any doctor or dentist who treats you that you are using this medicine. This medicine may affect certain medical test results. · Keep all medicine out of the reach of children. Never share your medicine with anyone.   Possible Side Effects While Using This Medicine:   Call your doctor right away if you notice any of these side effects:  · Allergic reaction: Itching or hives, swelling in your face or hands, swelling or tingling in your mouth or throat, chest tightness, trouble breathing  · Blurred vision, dry mouth  · Increased chest pain, fast or slow heartbeat  · Severe or ongoing dizziness, lightheadedness, or fainting  · Throbbing, severe, or ongoing headache, confusion, low fever, or trouble seeing  · Trouble breathing, cold sweat, blue skin, lips, or nails  · Warmth or redness in your face, neck, arms, or upper chest  If you notice these less serious side effects, talk with your doctor:   · Nausea, vomiting, weakness  If you notice other side effects that you think are caused by this medicine, tell your doctor. Call your doctor for medical advice about side effects. You may report side effects to FDA at 0-164-FDA-5188  © 2017 [facility] Information is for End User's use only and may not be sold, redistributed or otherwise used for commercial purposes. The above information is an  only. It is not intended as medical advice for individual conditions or treatments. Talk to your doctor, nurse or pharmacist before following any medical regimen to see if it is safe and effective for you. Patient Education        Taking Aspirin and Other Antiplatelets Safely: Care Instructions  Your Care Instructions    Aspirin and other antiplatelet medicines help prevent blood clots from forming. They can help some people lower their risk of a heart attack or stroke. But these medicines can also make you more likely to bleed. That's why it's important to learn how to take them safely. If you take aspirin, be sure you know how to take it. Your doctor can tell you what dose to take and how often to take it. One low-dose aspirin is 81 milligrams (mg). But the dose for daily aspirin can range from 81 mg to 325 mg.  If you take another antiplatelet, take it as prescribed. Follow-up care is a key part of your treatment and safety. Be sure to make and go to all appointments, and call your doctor if you are having problems. It's also a good idea to know your test results and keep a list of the medicines you take. How can you care for yourself at home? · Before you start to take daily aspirin or some other antiplatelet, tell your doctor all the medicines, vitamins, herbal products, and supplements you take. · Tell your doctors, dentist, and pharmacist that you take an antiplatelet. · Take your medicine as your doctor directs. Make sure that you understand exactly what your doctor wants you to do. If another doctor says to stop taking the medicine for any reason, talk to the doctor who prescribed it before you stop. · Take your medicine at the same time every day. · Do not chew or crush the coated or time-release forms of your medicine. · If you miss a dose, don't take an extra dose to make up for it. · Ask your doctor whether you can drink alcohol. And ask how much you can drink. When you take an antiplatelet, drinking too much raises your risk for liver damage and stomach bleeding. · If you are pregnant, are breastfeeding, or plan to become pregnant, talk to your doctor about what medicines are safe. · Talk with your doctor before you take a pain medicine. Many pain medicines have aspirin. Too much aspirin can be harmful. · Wear medical alert jewelry. This lets others know that you take an antiplatelet. You can buy it at most drugstores. · Try to avoid injuries that might make you bleed. For example, be careful when you exercise and when you play sports. Make your home safe to reduce your risk of falling. When should you call for help? [id number] anytime you think you may need emergency care.  For example, call if:    · You have a sudden, severe headache that is different from past headaches.    Call your doctor now or seek immediate medical care if:    · You have any abnormal bleeding, such as:  ? A nosebleed that you can't easily stop. ? Bloody or black stools, or rectal bleeding. ? Bloody or pink urine.     · You feel dizzy or lightheaded or feel like you may faint.    Watch closely for changes in your health, and be sure to contact your doctor if you have any problems. Where can you learn more? Go to http://ismael-haleigh.info/. Enter U779 in the search box to learn more about \"Taking Aspirin and Other Antiplatelets Safely: Care Instructions. \"  Current as of: July 22, 2018  Content Version: 11.9  © 6814-1242 [employer]. Care instructions adapted under license by [employer] (which disclaims liability or warranty for this information). If you have questions about a medical condition or this instruction, always ask your healthcare professional. [place] [address] any warranty or liability for your use of this information. Patient Education        Statins: Care Instructions  Your Care Instructions    Statins are medicines that lower your cholesterol and your risk for a heart attack and stroke. Cholesterol is a type of fat in your blood. If you have too much cholesterol, it can build up in blood vessels. This raises your risk of heart disease, heart attack, and stroke. Statins lower cholesterol by blocking how much your body makes. This prevents cholesterol from building up in your blood vessels. This is called hardening of the arteries. It is the starting point for some heart and blood flow problems, such as heart disease. Statins may also reduce inflammation around the buildup (called plaque). This can lower the risk that the plaque will break apart and lead to a heart attack or stroke. A heart-healthy lifestyle is important for lowering your risk whether you take statins or not. This includes eating healthy foods, being active, staying at a healthy weight, and not smoking.   You must take statins regularly for them to work well. If you stop, your cholesterol and your risk will go back up. Examples of statins include:  · Atorvastatin (Lipitor). · Lovastatin (Mevacor). · Pravastatin (Pravachol). · Simvastatin (Zocor). Statins interact with many medicines. So tell your doctor all of the other medicines that you take. These include prescription medicines, over-the-counter medicines, dietary supplements, and herbal products. Follow-up care is a key part of your treatment and safety. Be sure to make and go to all appointments, and call your doctor if you are having problems. It's also a good idea to know your test results and keep a list of the medicines you take. How can you care for yourself at home? · Take statins exactly as your doctor tells you. High cholesterol has no symptoms. So it is easy to forget to take the pills. Try to make a system that reminds you to take them. · Do not take two or more medicines at the same time unless the doctor told you to. Statins can interact with other medicines. · Always tell your doctor if you think you are having a side effect. If side effects are a problem with one medicine, a different one may be used. · Keep making the lifestyle changes your doctor suggests. Eat heart-healthy foods, be active, don't smoke, and stay at a healthy weight. · Talk to your doctor about avoiding grapefruit juice if you take statins. Grapefruit juice can raise the level of this medicine in your blood. This could increase side effects. When should you call for help? Watch closely for changes in your health, and be sure to contact your doctor if:    · You think you are having problems with your medicine.     · You have aches or muscle pain. Where can you learn more? Go to http://ismael-haleigh.info/. Enter R358 in the search box to learn more about \"Statins: Care Instructions. \"  Current as of: July 22, 2018  Content Version: 11.9  © 2577-5148 Healthwise, Incorporated. Care instructions adapted under license by [facility] (which disclaims liability or warranty for this information). If you have questions about a medical condition or this instruction, always ask your healthcare professional. [address] any warranty or liability for your use of this information. DISCHARGE SUMMARY from Nurse    PATIENT INSTRUCTIONS:    After general anesthesia or intravenous sedation, for 24 hours or while taking prescription Narcotics:  · Limit your activities  · Do not drive and operate hazardous machinery  · Do not make important personal or business decisions  · Do  not drink alcoholic beverages  · If you have not urinated within 8 hours after discharge, please contact your surgeon on call. Report the following to your surgeon:  · Excessive pain, swelling, redness or odor of or around the surgical area  · Temperature over 100.5  · Nausea and vomiting lasting longer than 4 hours or if unable to take medications  · Any signs of decreased circulation or nerve impairment to extremity: change in color, persistent  numbness, tingling, coldness or increase pain  · Any questions    What to do at Home:  Recommended activity: Activity as tolerated      *  Please give a list of your current medications to your Primary Care Provider. *  Please update this list whenever your medications are discontinued, doses are      changed, or new medications (including over-the-counter products) are added. *  Please carry medication information at all times in case of emergency situations. These are general instructions for a healthy lifestyle:    No smoking/ No tobacco products/ Avoid exposure to second hand smoke  Surgeon General's Warning:  Quitting smoking now greatly reduces serious risk to your health.     Obesity, smoking, and sedentary lifestyle greatly increases your risk for illness    A healthy diet, regular physical exercise & weight monitoring are important for maintaining a healthy lifestyle    You may be retaining fluid if you have a history of heart failure or if you experience any of the following symptoms:  Weight gain of 3 pounds or more overnight or 5 pounds in a week, increased swelling in our hands or feet or shortness of breath while lying flat in bed. Please call your doctor as soon as you notice any of these symptoms; do not wait until your next office visit. Recognize signs and symptoms of STROKE:    F-face looks uneven    A-arms unable to move or move unevenly    S-speech slurred or non-existent    T-time-call 911 as soon as signs and symptoms begin-DO NOT go       Back to bed or wait to see if you get better-TIME IS BRAIN. Warning Signs of HEART ATTACK     Call 911 if you have these symptoms:   Chest discomfort. Most heart attacks involve discomfort in the center of the chest that lasts more than a few minutes, or that goes away and comes back. It can feel like uncomfortable pressure, squeezing, fullness, or pain.  Discomfort in other areas of the upper body. Symptoms can include pain or discomfort in one or both arms, the back, neck, jaw, or stomach.  Shortness of breath with or without chest discomfort.  Other signs may include breaking out in a cold sweat, nausea, or lightheadedness. Don't wait more than five minutes to call 911 - MINUTES MATTER! Fast action can save your life. Calling 911 is almost always the fastest way to get lifesaving treatment. Emergency Medical Services staff can begin treatment when they arrive -- up to an hour sooner than if someone gets to the hospital by car. The discharge information has been reviewed with the patient. The patient verbalized understanding.   Discharge medications reviewed with the patient and appropriate educational materials and side effects teaching were provided. ___________________________________________________________________________________________________________________________________  Patient Education        Heart-Healthy Diet: Care Instructions  Your Care Instructions    A heart-healthy diet has lots of vegetables, fruits, nuts, beans, and whole grains, and is low in salt. It limits foods that are high in saturated fat, such as meats, cheeses, and fried foods. It may be hard to change your diet, but even small changes can lower your risk of heart attack and heart disease. Follow-up care is a key part of your treatment and safety. Be sure to make and go to all appointments, and call your doctor if you are having problems. It's also a good idea to know your test results and keep a list of the medicines you take. How can you care for yourself at home? Watch your portions  · Learn what a serving is. A \"serving\" and a \"portion\" are not always the same thing. Make sure that you are not eating larger portions than are recommended. For example, a serving of pasta is ½ cup. A serving size of meat is 2 to 3 ounces. A 3-ounce serving is about the size of a deck of cards. Measure serving sizes until you are good at [place]" them. Keep in mind that restaurants often serve portions that are 2 or 3 times the size of one serving. · To keep your energy level up and keep you from feeling hungry, eat often but in smaller portions. · Eat only the number of calories you need to stay at a healthy weight. If you need to lose weight, eat fewer calories than your body burns (through exercise and other physical activity). Eat more fruits and vegetables  · Eat a variety of fruit and vegetables every day. Dark green, deep orange, red, or yellow fruits and vegetables are especially good for you. Examples include spinach, carrots, peaches, and berries. · Keep carrots, celery, and other veggies handy for snacks.  Buy fruit that is in season and store it where you can see it so that you will be tempted to eat it. · Cook dishes that have a lot of veggies in them, such as stir-fries and soups. Limit saturated and trans fat  · Read food labels, and try to avoid saturated and trans fats. They increase your risk of heart disease. Trans fat is found in many processed foods such as cookies and crackers. · Use olive or canola oil when you cook. Try cholesterol-lowering spreads, such as Benecol or Take Control. · Bake, broil, grill, or steam foods instead of frying them. · Choose lean meats instead of high-fat meats such as hot dogs and sausages. Cut off all visible fat when you prepare meat. · Eat fish, skinless poultry, and meat alternatives such as soy products instead of high-fat meats. Soy products, such as tofu, may be especially good for your heart. · Choose low-fat or fat-free milk and dairy products. Eat fish  · Eat at least two servings of fish a week. Certain fish, such as salmon and tuna, contain omega-3 fatty acids, which may help reduce your risk of heart attack. Eat foods high in fiber  · Eat a variety of grain products every day. Include whole-grain foods that have lots of fiber and nutrients. Examples of whole-grain foods include oats, whole wheat bread, and brown rice. · Buy whole-grain breads and cereals, instead of white bread or pastries. Limit salt and sodium  · Limit how much salt and sodium you eat to help lower your blood pressure. · Taste food before you salt it. Add only a little salt when you think you need it. With time, your taste buds will adjust to less salt. · Eat fewer snack items, fast foods, and other high-salt, processed foods. Check food labels for the amount of sodium in packaged foods. · Choose low-sodium versions of canned goods (such as soups, vegetables, and beans). Limit sugar  · Limit drinks and foods with added sugar. These include candy, desserts, and soda pop.   Limit alcohol  · Limit alcohol to no more than 2 drinks a day for men and 1 drink a day for women. Too much alcohol can cause health problems. When should you call for help? Watch closely for changes in your health, and be sure to contact your doctor if:    · You would like help planning heart-healthy meals. Where can you learn more? Go to http://ismael-haleigh.info/. Enter V137 in the search box to learn more about \"Heart-Healthy Diet: Care Instructions. \"  Current as of: July 22, 2018  Content Version: 11.9  © 9503-5015 [employer], Incorporated. Care instructions adapted under license by [employer] (which disclaims liability or warranty for this information). If you have questions about a medical condition or this instruction, always ask your healthcare professional. [address] any warranty or liability for your use of this information.

## 2019-06-15 NOTE — PROGRESS NOTES
[facility] CARDIOLOGY PROGRESS NOTE           6/15/2019 7:28 AM    Admit Date: 6/14/2019      Subjective:   Doing well, feeling well after PCI, walking no angina. ROS:  Cardiovascular:  As noted above    Objective:      Vitals:    06/14/19 1916 06/14/19 2057 06/15/19 0044 06/15/19 0505   BP:  135/77 148/74 146/83   Pulse:  89 87 77   Resp:  18 18 18   Temp:  97.9 °F (36.6 °C) 97.8 °F (36.6 °C) 98 °F (36.7 °C)   SpO2: 97% 97% 98% 98%   Weight:    71.7 kg (158 lb 1.6 oz)   Height:           Physical Exam:  General-No Acute Distress  Neck- supple, no JVD  CV- regular rate and rhythm no MRG  Lung- clear bilaterally  Abd- soft, nontender, nondistended  Ext- no edema bilaterally. Skin- warm and dry    Data Review:   Recent Labs     06/15/19  0358 06/12/19  0839    141   K 3.5 4.1   BUN 8 11   CREA 0.70 0.90   * 172*   WBC  --  4.9   HGB  --  13.9   HCT  --  43.8   PLT  --  223   CHOL  --  193   LDLC  --  89   HDL  --  90       Assessment/Plan:     Active Problems:    CAD S/P percutaneous coronary angioplasty (6/14/2019)  Doing well after PCI, ready for d/c. Patient is doing well post stent placement. No recurrent angina. Discussed importance of compliance with dual anti-platelet therapy. Discussed risk of stent thrombosis, myocardial infarction and death if non-compliant. The patient has been instructed to call with any angina or equivalent as reviewed today. All questions were answered with the patient voicing complete understanding. Follow up with Dr. Phyllis Gore in several weeks.            Anand Carcamo DO  6/15/2019 7:28 AM

## 2019-06-15 NOTE — PROGRESS NOTES
Verbal bedside report received from [address]. Assumed care of patient. R radial site assessed  at bedside with outgoing RN.

## 2019-06-15 NOTE — PROCEDURES
[facility]  CARDIAC CATH    Name:  Baldomero Machado  MR#:  [id number]  :  1955  ACCOUNT #:  [de-identified]  DATE OF SERVICE:  2019    PROCEDURES PERFORMED:  1. Left heart catheterization with left ventriculography and coronary angiography. 2.  PCI proximal LAD. PREOPERATIVE DIAGNOSES:  Coronary artery disease with angina pectoris. POSTOPERATIVE DIAGNOSES:  Coronary artery disease with angina pectoris. SURGEON:  Benjamin Varghese MD    ASSISTANT:  None. ESTIMATED BLOOD LOSS:  Zero. SPECIMENS REMOVED:  None. COMPLICATIONS:  None. IMPLANTS:  Drug-eluting coronary stent. ANESTHESIA:  Conscious sedation. HISTORY:  This is a 81-year-old diabetic female. She has had a recent episode of DKA, an elevated troponin, a history of angina, and a  recent cardiac CTA. This revealed calcification and significant proximal LAD disease. Cardiac catheterization with possible PCI is recommended. PROCEDURE AND FINDINGS:  Via the right radial artery, a 5-Maltese multipurpose was advanced to the aorta. It is used to inject the right coronary artery. This is a nondominant normal looking vessel. The multipurpose was then advanced into the left ventricle. Left ventriculography was performed. Left ventricular size and function is normal. Ejection fraction 60%. Left ventricular end-diastolic pressure was 10 mmHg, and there was no gradient on pullback across the aortic valve. This catheter was then changed out for a 5-Maltese 3.5 EBU. Angiography reveals a normal left main. It divides into LAD, ramus intermedius, and left circumflex. The LAD has a severe proximal stenosis 75%. The ramus is normal.  The left circumflex is a dominant normal vessel. Angioplasty was embarked upon. She is anticoagulated with heparin 100 units per kg IV. She is given Brilinta 180 mg p.o. Over a 180 Runthrough wire, an IVUS was performed,.  The LAD was then directly stented with a 3.5 x 23 mm long [address] stent. This was postdilated 3.5 mm. Repeat angio and IVUS showed a good result. The case was stopped at this point. She tolerated it well. IMPRESSION:  1. Normal left ventricular function. 2.  Severe single-vessel coronary artery disease as described. There is a high-grade proximal left anterior descending stenosis. 3.  Successful percutaneous coronary intervention was performed as described above. A good result was obtained after implantation of a 3.5 x 23 mm long [address] stent.       Darby Simms MD      [id number]  D:  06/14/2019 9:35  T:  06/14/2019 9:47  JOB #:  [id number]

## 2019-06-15 NOTE — PROGRESS NOTES
Problem: Diabetes Self-Management  Goal: *Disease process and treatment process  Description  Define diabetes and identify own type of diabetes; list 3 options for treating diabetes. Outcome: Progressing Towards Goal  Goal: *Incorporating nutritional management into lifestyle  Description  Describe effect of type, amount and timing of food on blood glucose; list 3 methods for planning meals. Outcome: Progressing Towards Goal  Goal: *Incorporating physical activity into lifestyle  Description  State effect of exercise on blood glucose levels. Outcome: Progressing Towards Goal  Goal: *Developing strategies to promote health/change behavior  Description  Define the ABC's of diabetes; identify appropriate screenings, schedule and personal plan for screenings. Outcome: Progressing Towards Goal  Goal: *Using medications safely  Description  State effect of diabetes medications on diabetes; name diabetes medication taking, action and side effects. Outcome: Progressing Towards Goal  Goal: *Monitoring blood glucose, interpreting and using results  Description  Identify recommended blood glucose targets  and personal targets. Outcome: Progressing Towards Goal  Goal: *Prevention, detection, treatment of acute complications  Description  List symptoms of hyper- and hypoglycemia; describe how to treat low blood sugar and actions for lowering  high blood glucose level. Outcome: Progressing Towards Goal  Goal: *Prevention, detection and treatment of chronic complications  Description  Define the natural course of diabetes and describe the relationship of blood glucose levels to long term complications of diabetes.   Outcome: Progressing Towards Goal  Goal: *Developing strategies to address psychosocial issues  Description  Describe feelings about living with diabetes; identify support needed and support network  Outcome: Progressing Towards Goal  Goal: *Insulin pump training  Outcome: Progressing Towards Goal     Problem: Patient Education: Go to Patient Education Activity  Goal: Patient/Family Education  Outcome: Progressing Towards Goal     Problem: Falls - Risk of  Goal: *Absence of Falls  Description  Document William Troy Fall Risk and appropriate interventions in the flowsheet.   Outcome: Progressing Towards Goal     Problem: Cath Lab Procedures: Discharge Outcomes  Goal: *Stable cardiac rhythm  Outcome: Progressing Towards Goal  Goal: *Hemodynamically stable  Outcome: Progressing Towards Goal  Goal: *Optimal pain control at patient's stated goal  Outcome: Progressing Towards Goal  Goal: *Pulses palpable, skin color within defined limits, skin temperature warm  Outcome: Progressing Towards Goal  Goal: *Lungs clear or at baseline  Outcome: Progressing Towards Goal  Goal: *Demonstrates ability to perform prescribed activity without shortness of breath or discomfort  Outcome: Progressing Towards Goal  Goal: *Verbalizes home exercise program, activity guidelines, cardiac precautions  Outcome: Progressing Towards Goal  Goal: *Verbalizes understanding and describes prescribed diet  Outcome: Progressing Towards Goal  Goal: *Verbalizes understanding and describes medication purposes and frequencies  Outcome: Progressing Towards Goal  Goal: *Identifies cardiac risk factors  Outcome: Progressing Towards Goal  Goal: *No signs and symptoms of infection or wound complications  Outcome: Progressing Towards Goal  Goal: *Anxiety reduced or absent  Outcome: Progressing Towards Goal  Goal: *Verbalizes and demonstrates incision care  Outcome: Progressing Towards Goal  Goal: *Understands and describes signs and symptoms to report to providers(Stroke Metric)  Outcome: Progressing Towards Goal  Goal: *Describes follow-up/return visits to physicians  Outcome: Progressing Towards Goal  Goal: *Describes available resources and support systems  Outcome: Progressing Towards Goal

## 2019-06-19 PROBLEM — D69.6 THROMBOCYTOPENIA (HCC): Status: RESOLVED | Noted: 2019-05-01 | Resolved: 2019-06-19

## 2019-06-19 PROBLEM — R77.8 ELEVATED TROPONIN: Status: RESOLVED | Noted: 2019-05-01 | Resolved: 2019-06-19

## 2019-06-19 PROBLEM — I25.2 H/O NON-ST ELEVATION MYOCARDIAL INFARCTION (NSTEMI): Status: RESOLVED | Noted: 2019-05-31 | Resolved: 2019-06-19

## 2019-06-21 ENCOUNTER — PATIENT OUTREACH (OUTPATIENT)
Dept: CASE MANAGEMENT | Age: 64
End: 2019-06-21

## 2019-06-21 NOTE — PROGRESS NOTES
Chart audit/review completed for case management needs. Outreach to patient was completed. I explained to Ms. Armida Higuera my role and we discussed management of her health. Patient was appreciative but at this time did not wish to be enrolled. She also stated she has [employer] at this time and is no longer with [facility]. This note will not be viewable in [address]

## 2019-06-28 PROBLEM — E78.00 PURE HYPERCHOLESTEROLEMIA: Status: ACTIVE | Noted: 2019-06-28

## 2019-09-11 ENCOUNTER — HOSPITAL ENCOUNTER (OUTPATIENT)
Dept: CT IMAGING | Age: 64
Discharge: HOME | End: 2019-09-11
Attending: INTERNAL MEDICINE
Payer: MEDICARE

## 2019-09-11 DIAGNOSIS — R10.11 RIGHT UPPER QUADRANT ABDOMINAL PAIN: ICD-10-CM

## 2019-09-11 PROCEDURE — 74177 CT ABD & PELVIS W/CONTRAST: CPT

## 2019-09-11 PROCEDURE — 74011000258 HC RX REV CODE- 258: Performed by: INTERNAL MEDICINE

## 2019-09-11 PROCEDURE — 74011636320 HC RX REV CODE- 636/320: Performed by: INTERNAL MEDICINE

## 2019-09-11 RX ORDER — SODIUM CHLORIDE 0.9 % (FLUSH) 0.9 %
10 SYRINGE (ML) INJECTION
Status: COMPLETED | OUTPATIENT
Start: 2019-09-11 | End: 2019-09-11

## 2019-09-11 RX ADMIN — DIATRIZOATE MEGLUMINE AND DIATRIZOATE SODIUM 15 ML: 660; 100 LIQUID ORAL; RECTAL at 15:19

## 2019-09-11 RX ADMIN — IOPAMIDOL 100 ML: 755 INJECTION, SOLUTION INTRAVENOUS at 15:19

## 2019-09-11 RX ADMIN — SODIUM CHLORIDE 100 ML: 900 INJECTION, SOLUTION INTRAVENOUS at 15:19

## 2019-09-11 RX ADMIN — Medication 10 ML: at 15:19

## 2019-12-03 PROBLEM — E55.9 VITAMIN D DEFICIENCY: Status: RESOLVED | Noted: 2018-03-20 | Resolved: 2019-12-03

## 2019-12-13 ENCOUNTER — APPOINTMENT (OUTPATIENT)
Dept: GENERAL RADIOLOGY | Age: 64
End: 2019-12-13
Attending: EMERGENCY MEDICINE
Payer: MEDICARE

## 2019-12-13 ENCOUNTER — HOSPITAL ENCOUNTER (EMERGENCY)
Age: 64
Discharge: HOME | End: 2019-12-13
Attending: EMERGENCY MEDICINE
Payer: MEDICARE

## 2019-12-13 VITALS
DIASTOLIC BLOOD PRESSURE: 83 MMHG | HEIGHT: 63 IN | SYSTOLIC BLOOD PRESSURE: 118 MMHG | BODY MASS INDEX: 29.06 KG/M2 | OXYGEN SATURATION: 97 % | RESPIRATION RATE: 32 BRPM | WEIGHT: 164 LBS | TEMPERATURE: 99.1 F | HEART RATE: 85 BPM

## 2019-12-13 DIAGNOSIS — R07.89 MUSCULOSKELETAL CHEST PAIN: Primary | ICD-10-CM

## 2019-12-13 DIAGNOSIS — S22.41XD CLOSED FRACTURE OF MULTIPLE RIBS OF RIGHT SIDE WITH ROUTINE HEALING, SUBSEQUENT ENCOUNTER: ICD-10-CM

## 2019-12-13 LAB
ALBUMIN SERPL-MCNC: 3.4 G/DL (ref 3.2–4.6)
ALBUMIN/GLOB SERPL: 0.9 {RATIO} (ref 1.2–3.5)
ALP SERPL-CCNC: 77 U/L (ref 50–130)
ALT SERPL-CCNC: 21 U/L (ref 12–65)
ANION GAP SERPL CALC-SCNC: 7 MMOL/L (ref 7–16)
AST SERPL-CCNC: 16 U/L (ref 15–37)
BASOPHILS # BLD: 0 K/UL (ref 0–0.2)
BASOPHILS NFR BLD: 0 % (ref 0–2)
BILIRUB SERPL-MCNC: 0.9 MG/DL (ref 0.2–1.1)
BUN SERPL-MCNC: 14 MG/DL (ref 8–23)
CALCIUM SERPL-MCNC: 8.7 MG/DL (ref 8.3–10.4)
CHLORIDE SERPL-SCNC: 101 MMOL/L (ref 98–107)
CO2 SERPL-SCNC: 24 MMOL/L (ref 21–32)
CREAT SERPL-MCNC: 0.86 MG/DL (ref 0.6–1)
DIFFERENTIAL METHOD BLD: ABNORMAL
EOSINOPHIL # BLD: 0.1 K/UL (ref 0–0.8)
EOSINOPHIL NFR BLD: 1 % (ref 0.5–7.8)
ERYTHROCYTE [DISTWIDTH] IN BLOOD BY AUTOMATED COUNT: 13.6 % (ref 11.9–14.6)
GLOBULIN SER CALC-MCNC: 3.6 G/DL (ref 2.3–3.5)
GLUCOSE SERPL-MCNC: 318 MG/DL (ref 65–100)
HCT VFR BLD AUTO: 35.7 % (ref 35.8–46.3)
HGB BLD-MCNC: 12 G/DL (ref 11.7–15.4)
IMM GRANULOCYTES # BLD AUTO: 0 K/UL (ref 0–0.5)
IMM GRANULOCYTES NFR BLD AUTO: 0 % (ref 0–5)
LYMPHOCYTES # BLD: 1.4 K/UL (ref 0.5–4.6)
LYMPHOCYTES NFR BLD: 15 % (ref 13–44)
MCH RBC QN AUTO: 30.5 PG (ref 26.1–32.9)
MCHC RBC AUTO-ENTMCNC: 33.6 G/DL (ref 31.4–35)
MCV RBC AUTO: 90.8 FL (ref 79.6–97.8)
MONOCYTES # BLD: 0.6 K/UL (ref 0.1–1.3)
MONOCYTES NFR BLD: 7 % (ref 4–12)
NEUTS SEG # BLD: 7.1 K/UL (ref 1.7–8.2)
NEUTS SEG NFR BLD: 76 % (ref 43–78)
NRBC # BLD: 0 K/UL (ref 0–0.2)
PLATELET # BLD AUTO: 252 K/UL (ref 150–450)
PMV BLD AUTO: 10.9 FL (ref 9.4–12.3)
POTASSIUM SERPL-SCNC: 3.8 MMOL/L (ref 3.5–5.1)
PROT SERPL-MCNC: 7 G/DL (ref 6.3–8.2)
RBC # BLD AUTO: 3.93 M/UL (ref 4.05–5.2)
SODIUM SERPL-SCNC: 132 MMOL/L (ref 136–145)
TROPONIN I BLD-MCNC: 0 NG/ML (ref 0.02–0.05)
WBC # BLD AUTO: 9.4 K/UL (ref 4.3–11.1)

## 2019-12-13 PROCEDURE — 74011250636 HC RX REV CODE- 250/636: Performed by: EMERGENCY MEDICINE

## 2019-12-13 PROCEDURE — 99284 EMERGENCY DEPT VISIT MOD MDM: CPT | Performed by: EMERGENCY MEDICINE

## 2019-12-13 PROCEDURE — 80053 COMPREHEN METABOLIC PANEL: CPT

## 2019-12-13 PROCEDURE — 93005 ELECTROCARDIOGRAM TRACING: CPT | Performed by: EMERGENCY MEDICINE

## 2019-12-13 PROCEDURE — 84484 ASSAY OF TROPONIN QUANT: CPT

## 2019-12-13 PROCEDURE — 85025 COMPLETE CBC W/AUTO DIFF WBC: CPT

## 2019-12-13 PROCEDURE — 71045 X-RAY EXAM CHEST 1 VIEW: CPT

## 2019-12-13 PROCEDURE — 96374 THER/PROPH/DIAG INJ IV PUSH: CPT | Performed by: EMERGENCY MEDICINE

## 2019-12-13 RX ORDER — HYDROMORPHONE HYDROCHLORIDE 1 MG/ML
0.5 INJECTION, SOLUTION INTRAMUSCULAR; INTRAVENOUS; SUBCUTANEOUS ONCE
Status: COMPLETED | OUTPATIENT
Start: 2019-12-13 | End: 2019-12-13

## 2019-12-13 RX ORDER — HYDROCODONE BITARTRATE AND ACETAMINOPHEN 5; 325 MG/1; MG/1
1 TABLET ORAL
Qty: 20 TAB | Refills: 0 | Status: SHIPPED | OUTPATIENT
Start: 2019-12-13 | End: 2019-12-18

## 2019-12-13 RX ADMIN — HYDROMORPHONE HYDROCHLORIDE 0.5 MG: 1 INJECTION, SOLUTION INTRAMUSCULAR; INTRAVENOUS; SUBCUTANEOUS at 21:18

## 2019-12-14 LAB
ATRIAL RATE: 89 BPM
CALCULATED P AXIS, ECG09: 63 DEGREES
CALCULATED R AXIS, ECG10: 54 DEGREES
CALCULATED T AXIS, ECG11: 40 DEGREES
DIAGNOSIS, 93000: NORMAL
P-R INTERVAL, ECG05: 174 MS
Q-T INTERVAL, ECG07: 358 MS
QRS DURATION, ECG06: 84 MS
QTC CALCULATION (BEZET), ECG08: 435 MS
VENTRICULAR RATE, ECG03: 89 BPM

## 2019-12-14 NOTE — DISCHARGE INSTRUCTIONS

## 2019-12-14 NOTE — ED NOTES
Report given to Berenice Sheriff. Care assumed by that RN at this time. Patient medicated per MAR- restful in bed in NAD. No further needs expressed or apparent at current.

## 2019-12-14 NOTE — ED NOTES
I have reviewed discharge instructions with the patient. The patient verbalized understanding. Patient left ED via Discharge Method: wheelchair to Home with spouse. Opportunity for questions and clarification provided. Patient given 1 scripts. To continue your aftercare when you leave the hospital, you may receive an automated call from our care team to check in on how you are doing. This is a free service and part of our promise to provide the best care and service to meet your aftercare needs.  If you have questions, or wish to unsubscribe from this service please call [phone]. Thank you for Choosing our [facility] Emergency Department.

## 2019-12-14 NOTE — ED PROVIDER NOTES
Patient presents complaining of a left-sided chest pain that she cannot describe or give a specific intensity of that was present when she woke up this morning. She has been dealing with right-sided chest pain for several days after a fall resulting in some rib fractures. She states the pain on the left is also pleuritic although she denies any fever or chills and review of systems otherwise negative. In addition to the rib fracture injury sustained in the fall she also sustained a right wrist sprain and a left knee sprain. She has a large amount of bruising noted to the right hand and wrist.  The patient is on Brilinta with a history of coronary artery disease and a single vessel stenting. The history is provided by the patient. Chest Pain    This is a new problem. The current episode started 6 to 12 hours ago. The problem has not changed since onset. The problem occurs constantly. The pain is associated with normal activity and rest. The pain is severe. The pain does not radiate. The symptoms are aggravated by deep breathing. Associated symptoms include cough. Pertinent negatives include no abdominal pain, no back pain, no claudication, no diaphoresis, no dizziness, no exertional chest pressure, no fever, no headaches, no hemoptysis, no irregular heartbeat, no leg pain, no lower extremity edema, no malaise/fatigue, no nausea, no near-syncope, no numbness, no orthopnea, no palpitations, no PND, no shortness of breath, no sputum production, no vomiting and no weakness. She has tried nothing for the symptoms. The treatment provided no relief. Risk factors include cardiac disease. Procedural history includes cardiac catheterization and cardiac stents.        Past Medical History:   Diagnosis Date    Anxiety     Arthritis     Asthma     Cervical spondylosis with myelopathy 6/17/2015    Chronic obstructive pulmonary disease (HCC)     inhalers     Chronic pain     back  - and all over pain     Depression  Diabetes ([facility] [address]     type 1, adverage glucose 150-300, symptomatic below 70.  uses insulin pump    Diverticulosis     resolved at present - sees DR Andie Benson when needed    DJD (degenerative joint disease)     Fibromyalgia     all over     Former smoker     quit     Hormone replacement therapy     Hypertension     Insomnia     Migraine     Psychiatric diagnosis     bipolar    Psychiatric disorder     anxiety and depression    RA (rheumatoid arthritis) ([facility] [address]     Unspecified adverse effect of anesthesia     \"woke up\"during  surgery on toe    Vitamin B 12 deficiency     Vitamin D deficiency        Past Surgical History:   Procedure Laterality Date    HX CATARACT REMOVAL Bilateral     HX  SECTION      times 2    HX COLONOSCOPY      HX HYSTERECTOMY N/A     total     HX ORTHOPAEDIC      left knee scope    HX ORTHOPAEDIC      right foot \"bone removal\"     HX ORTHOPAEDIC Bilateral     shoulder scopes    HX ORTHOPAEDIC Right     shaved down bone in great toe     HX ORTHOPAEDIC Right     right rotator cuff     HX ORTHOPAEDIC N/A     neck ( plate and screws was put in )     HX TONY AND BSO           Family History:   Problem Relation Age of Onset    Cancer Father     Lung Disease Father     Cancer Maternal Aunt     Psychiatric Disorder Mother     Heart Disease Paternal Grandmother     Heart Disease Paternal Grandfather        Social History     Socioeconomic History    Marital status:      Spouse name: Not on file    Number of children: Not on file    Years of education: Not on file    Highest education level: Not on file   Occupational History    Not on file   Social Needs    Financial resource strain: Not on file    Food insecurity:     Worry: Not on file     Inability: Not on file    Transportation needs:     Medical: Not on file     Non-medical: Not on file   Tobacco Use    Smoking status: Former Smoker     Packs/day: 1.00     Years: 30.00     Pack years: 30.00 Types: Cigarettes     Last attempt to quit: 6/10/2002     Years since quittin.5    Smokeless tobacco: Never Used    Tobacco comment: qit 6 years ago   Substance and Sexual Activity    Alcohol use: No     Comment: rare     Drug use: No     Types: Prescription, OTC    Sexual activity: Not on file   Lifestyle    Physical activity:     Days per week: Not on file     Minutes per session: Not on file    Stress: Not on file   Relationships    Social connections:     Talks on phone: Not on file     Gets together: Not on file     Attends Yazidi service: Not on file     Active member of club or organization: Not on file     Attends meetings of clubs or organizations: Not on file     Relationship status: Not on file    Intimate partner violence:     Fear of current or ex partner: Not on file     Emotionally abused: Not on file     Physically abused: Not on file     Forced sexual activity: Not on file   Other Topics Concern    Not on file   Social History Narrative    Not on file         ALLERGIES: Doxepin; Nortriptyline; Other medication; Plaquenil [hydroxychloroquine]; Tizanidine; and Vistaril [hydroxyzine pamoate]    Review of Systems   Constitutional: Negative for diaphoresis, fever and malaise/fatigue. Respiratory: Positive for cough. Negative for hemoptysis, sputum production and shortness of breath. Cardiovascular: Positive for chest pain. Negative for palpitations, orthopnea, claudication, PND and near-syncope. Gastrointestinal: Negative for abdominal pain, nausea and vomiting. Musculoskeletal: Negative for back pain. Neurological: Negative for dizziness, weakness, numbness and headaches. All other systems reviewed and are negative. Vitals:    19 2049   BP: 128/70   Pulse: 66   Resp: 18   Temp: 99.1 °F (37.3 °C)   SpO2: 98%   Weight: 74.4 kg (164 lb)   Height: 5' 3\" (1.6 m)            Physical Exam  Vitals signs and nursing note reviewed.    Constitutional:       Appearance: She is well-developed. HENT:      Head: Normocephalic and atraumatic. Eyes:      Extraocular Movements: Extraocular movements intact. Conjunctiva/sclera: Conjunctivae normal.      Pupils: Pupils are equal, round, and reactive to light. Neck:      Musculoskeletal: Normal range of motion and neck supple. Cardiovascular:      Rate and Rhythm: Normal rate and regular rhythm. Pulses: Normal pulses. Pulmonary:      Effort: Pulmonary effort is normal.      Breath sounds: Normal breath sounds. Chest:      Chest wall: Tenderness present. Abdominal:      Tenderness: There is no tenderness. Musculoskeletal: Normal range of motion. General: Tenderness and signs of injury present. No swelling or deformity. Right lower leg: No edema. Left lower leg: No edema. Skin:     General: Skin is warm and dry. Capillary Refill: Capillary refill takes less than 2 seconds. Neurological:      General: No focal deficit present. Mental Status: She is alert and oriented to person, place, and time.    Psychiatric:         Mood and Affect: Mood normal.         Behavior: Behavior normal.          MDM  Number of Diagnoses or Management Options     Amount and/or Complexity of Data Reviewed  Clinical lab tests: ordered and reviewed  Tests in the radiology section of CPT®: ordered and reviewed  Review and summarize past medical records: yes  Independent visualization of images, tracings, or specimens: yes (EKG at 2047: Is a normal sinus rhythm, rate of 89, normal EKG)    Risk of Complications, Morbidity, and/or Mortality  Presenting problems: moderate  Diagnostic procedures: moderate  Management options: moderate    Patient Progress  Patient progress: stable         Procedures

## 2019-12-19 ENCOUNTER — HOSPITAL ENCOUNTER (OUTPATIENT)
Dept: GENERAL RADIOLOGY | Age: 64
Discharge: HOME | End: 2019-12-19

## 2019-12-19 DIAGNOSIS — M79.601 RIGHT ARM PAIN: ICD-10-CM

## 2019-12-20 NOTE — PROGRESS NOTES
Please call patient with lab results. Xrays negative. Pain control at this point. Discuss meds with psychiatry.

## 2020-06-01 LAB — SARS-COV-2, NAA: NOT DETECTED

## 2020-06-01 RX ORDER — SODIUM CHLORIDE 0.9 % (FLUSH) 0.9 %
5-40 SYRINGE (ML) INJECTION EVERY 8 HOURS
Status: CANCELLED | OUTPATIENT
Start: 2020-06-01

## 2020-06-01 RX ORDER — SODIUM CHLORIDE 0.9 % (FLUSH) 0.9 %
5-40 SYRINGE (ML) INJECTION AS NEEDED
Status: CANCELLED | OUTPATIENT
Start: 2020-06-01

## 2020-06-03 PROBLEM — S20.00XA BRUISE OF BREAST: Status: ACTIVE | Noted: 2020-06-03

## 2020-06-03 PROBLEM — N63.10 BREAST MASS, RIGHT: Status: ACTIVE | Noted: 2020-06-03

## 2020-06-03 PROBLEM — Z01.810 PREOP CARDIOVASCULAR EXAM: Status: ACTIVE | Noted: 2020-06-03

## 2020-06-03 PROBLEM — S20.01XA: Status: ACTIVE | Noted: 2020-06-03

## 2020-06-04 PROBLEM — Z01.810 PREOP CARDIOVASCULAR EXAM: Status: RESOLVED | Noted: 2020-06-03 | Resolved: 2020-06-04

## 2020-06-04 PROBLEM — Z98.61 CAD S/P PERCUTANEOUS CORONARY ANGIOPLASTY: Status: RESOLVED | Noted: 2019-06-14 | Resolved: 2020-06-04

## 2020-06-04 PROBLEM — I25.10 CAD S/P PERCUTANEOUS CORONARY ANGIOPLASTY: Status: RESOLVED | Noted: 2019-06-14 | Resolved: 2020-06-04

## 2020-06-04 PROBLEM — S20.01XA: Status: RESOLVED | Noted: 2020-06-03 | Resolved: 2020-06-04

## 2020-06-07 ENCOUNTER — ANESTHESIA EVENT (OUTPATIENT)
Dept: SURGERY | Age: 65
End: 2020-06-07
Payer: MEDICARE

## 2020-06-07 NOTE — ANESTHESIA PREPROCEDURE EVALUATION
Relevant Problems   No relevant active problems       Anesthetic History               Review of Systems / Medical History  Patient summary reviewed and pertinent labs reviewed    Pulmonary    COPD      Smoker (Former)         Neuro/Psych         Psychiatric history (Depression)     Cardiovascular    Hypertension          Past MI, CAD and cardiac stents (2019)    Exercise tolerance: >4 METS  Comments: Low risk perfusion scan 1/2020   GI/Hepatic/Renal     GERD: well controlled           Endo/Other    Diabetes: type 1    Arthritis (rheumatoid)     Other Findings              Physical Exam    Airway  Mallampati: II  TM Distance: > 6 cm  Neck ROM: normal range of motion   Mouth opening: Normal     Cardiovascular  Regular rate and rhythm,  S1 and S2 normal,  no murmur, click, rub, or gallop             Dental  No notable dental hx       Pulmonary  Breath sounds clear to auscultation               Abdominal         Other Findings            Anesthetic Plan    ASA: 3  Anesthesia type: general            Anesthetic plan and risks discussed with: Patient

## 2020-06-08 ENCOUNTER — HOSPITAL ENCOUNTER (OUTPATIENT)
Age: 65
Setting detail: OUTPATIENT SURGERY
Discharge: HOME | End: 2020-06-08
Attending: ORTHOPAEDIC SURGERY | Admitting: ORTHOPAEDIC SURGERY
Payer: MEDICARE

## 2020-06-08 ENCOUNTER — ANESTHESIA (OUTPATIENT)
Dept: SURGERY | Age: 65
End: 2020-06-08
Payer: MEDICARE

## 2020-06-08 VITALS
TEMPERATURE: 97.8 F | OXYGEN SATURATION: 94 % | SYSTOLIC BLOOD PRESSURE: 144 MMHG | WEIGHT: 179 LBS | RESPIRATION RATE: 14 BRPM | DIASTOLIC BLOOD PRESSURE: 80 MMHG | BODY MASS INDEX: 31.71 KG/M2 | HEART RATE: 88 BPM

## 2020-06-08 LAB
GLUCOSE BLD STRIP.AUTO-MCNC: 225 MG/DL (ref 65–100)
POTASSIUM BLD-SCNC: 4.5 MMOL/L (ref 3.5–5.1)

## 2020-06-08 PROCEDURE — 74011250637 HC RX REV CODE- 250/637: Performed by: ANESTHESIOLOGY

## 2020-06-08 PROCEDURE — 76210000063 HC OR PH I REC FIRST 0.5 HR: Performed by: ORTHOPAEDIC SURGERY

## 2020-06-08 PROCEDURE — 74011250636 HC RX REV CODE- 250/636: Performed by: NURSE ANESTHETIST, CERTIFIED REGISTERED

## 2020-06-08 PROCEDURE — 77030006668 HC BLD SHV MENSCS STRY -B: Performed by: ORTHOPAEDIC SURGERY

## 2020-06-08 PROCEDURE — 76210000021 HC REC RM PH II 0.5 TO 1 HR: Performed by: ORTHOPAEDIC SURGERY

## 2020-06-08 PROCEDURE — 77030038012 HC WND COBLATN S&N -F: Performed by: ORTHOPAEDIC SURGERY

## 2020-06-08 PROCEDURE — 74011250636 HC RX REV CODE- 250/636: Performed by: ORTHOPAEDIC SURGERY

## 2020-06-08 PROCEDURE — 74011000250 HC RX REV CODE- 250: Performed by: NURSE ANESTHETIST, CERTIFIED REGISTERED

## 2020-06-08 PROCEDURE — 77030010509 HC AIRWY LMA MSK TELE -A: Performed by: ANESTHESIOLOGY

## 2020-06-08 PROCEDURE — 84132 ASSAY OF SERUM POTASSIUM: CPT

## 2020-06-08 PROCEDURE — 77030029203 HC IRR KT CYSTO/TUR BBMI -A: Performed by: ORTHOPAEDIC SURGERY

## 2020-06-08 PROCEDURE — 74011250636 HC RX REV CODE- 250/636: Performed by: ANESTHESIOLOGY

## 2020-06-08 PROCEDURE — 76010000138 HC OR TIME 0.5 TO 1 HR: Performed by: ORTHOPAEDIC SURGERY

## 2020-06-08 PROCEDURE — 76060000032 HC ANESTHESIA 0.5 TO 1 HR: Performed by: ORTHOPAEDIC SURGERY

## 2020-06-08 PROCEDURE — 82962 GLUCOSE BLOOD TEST: CPT

## 2020-06-08 PROCEDURE — 77030000032 HC CUF TRNQT ZIMM -B: Performed by: ORTHOPAEDIC SURGERY

## 2020-06-08 RX ORDER — SODIUM CHLORIDE, SODIUM LACTATE, POTASSIUM CHLORIDE, CALCIUM CHLORIDE 600; 310; 30; 20 MG/100ML; MG/100ML; MG/100ML; MG/100ML
100 INJECTION, SOLUTION INTRAVENOUS CONTINUOUS
Status: DISCONTINUED | OUTPATIENT
Start: 2020-06-08 | End: 2020-06-08 | Stop reason: HOSPADM

## 2020-06-08 RX ORDER — SODIUM CHLORIDE 0.9 % (FLUSH) 0.9 %
5-40 SYRINGE (ML) INJECTION AS NEEDED
Status: DISCONTINUED | OUTPATIENT
Start: 2020-06-08 | End: 2020-06-08 | Stop reason: HOSPADM

## 2020-06-08 RX ORDER — MIDAZOLAM HYDROCHLORIDE 1 MG/ML
2 INJECTION, SOLUTION INTRAMUSCULAR; INTRAVENOUS
Status: DISCONTINUED | OUTPATIENT
Start: 2020-06-08 | End: 2020-06-08 | Stop reason: HOSPADM

## 2020-06-08 RX ORDER — LIDOCAINE HYDROCHLORIDE 20 MG/ML
INJECTION, SOLUTION EPIDURAL; INFILTRATION; INTRACAUDAL; PERINEURAL AS NEEDED
Status: DISCONTINUED | OUTPATIENT
Start: 2020-06-08 | End: 2020-06-08 | Stop reason: HOSPADM

## 2020-06-08 RX ORDER — HYDROCODONE BITARTRATE AND ACETAMINOPHEN 7.5; 325 MG/1; MG/1
1 TABLET ORAL AS NEEDED
Status: DISCONTINUED | OUTPATIENT
Start: 2020-06-08 | End: 2020-06-08 | Stop reason: HOSPADM

## 2020-06-08 RX ORDER — OXYCODONE HYDROCHLORIDE 5 MG/1
5 TABLET ORAL
Status: DISCONTINUED | OUTPATIENT
Start: 2020-06-08 | End: 2020-06-08 | Stop reason: HOSPADM

## 2020-06-08 RX ORDER — FAMOTIDINE 20 MG/1
20 TABLET, FILM COATED ORAL ONCE
Status: COMPLETED | OUTPATIENT
Start: 2020-06-08 | End: 2020-06-08

## 2020-06-08 RX ORDER — SODIUM CHLORIDE 0.9 % (FLUSH) 0.9 %
5-40 SYRINGE (ML) INJECTION EVERY 8 HOURS
Status: DISCONTINUED | OUTPATIENT
Start: 2020-06-08 | End: 2020-06-08 | Stop reason: HOSPADM

## 2020-06-08 RX ORDER — PROPOFOL 10 MG/ML
INJECTION, EMULSION INTRAVENOUS AS NEEDED
Status: DISCONTINUED | OUTPATIENT
Start: 2020-06-08 | End: 2020-06-08 | Stop reason: HOSPADM

## 2020-06-08 RX ORDER — FENTANYL CITRATE 50 UG/ML
INJECTION, SOLUTION INTRAMUSCULAR; INTRAVENOUS AS NEEDED
Status: DISCONTINUED | OUTPATIENT
Start: 2020-06-08 | End: 2020-06-08 | Stop reason: HOSPADM

## 2020-06-08 RX ORDER — LIDOCAINE HYDROCHLORIDE 10 MG/ML
0.1 INJECTION INFILTRATION; PERINEURAL AS NEEDED
Status: DISCONTINUED | OUTPATIENT
Start: 2020-06-08 | End: 2020-06-08 | Stop reason: HOSPADM

## 2020-06-08 RX ORDER — ROPIVACAINE HYDROCHLORIDE 5 MG/ML
INJECTION, SOLUTION EPIDURAL; INFILTRATION; PERINEURAL AS NEEDED
Status: DISCONTINUED | OUTPATIENT
Start: 2020-06-08 | End: 2020-06-08 | Stop reason: HOSPADM

## 2020-06-08 RX ORDER — NALOXONE HYDROCHLORIDE 0.4 MG/ML
0.1 INJECTION, SOLUTION INTRAMUSCULAR; INTRAVENOUS; SUBCUTANEOUS AS NEEDED
Status: DISCONTINUED | OUTPATIENT
Start: 2020-06-08 | End: 2020-06-08 | Stop reason: HOSPADM

## 2020-06-08 RX ORDER — FENTANYL CITRATE 50 UG/ML
100 INJECTION, SOLUTION INTRAMUSCULAR; INTRAVENOUS ONCE
Status: DISCONTINUED | OUTPATIENT
Start: 2020-06-08 | End: 2020-06-08 | Stop reason: HOSPADM

## 2020-06-08 RX ORDER — EPHEDRINE SULFATE/0.9% NACL/PF 50 MG/5 ML
SYRINGE (ML) INTRAVENOUS AS NEEDED
Status: DISCONTINUED | OUTPATIENT
Start: 2020-06-08 | End: 2020-06-08 | Stop reason: HOSPADM

## 2020-06-08 RX ORDER — SODIUM CHLORIDE 9 MG/ML
25 INJECTION, SOLUTION INTRAVENOUS CONTINUOUS
Status: DISCONTINUED | OUTPATIENT
Start: 2020-06-08 | End: 2020-06-08 | Stop reason: HOSPADM

## 2020-06-08 RX ORDER — ONDANSETRON 2 MG/ML
INJECTION INTRAMUSCULAR; INTRAVENOUS AS NEEDED
Status: DISCONTINUED | OUTPATIENT
Start: 2020-06-08 | End: 2020-06-08 | Stop reason: HOSPADM

## 2020-06-08 RX ORDER — HYDROMORPHONE HYDROCHLORIDE 2 MG/ML
0.5 INJECTION, SOLUTION INTRAMUSCULAR; INTRAVENOUS; SUBCUTANEOUS
Status: DISCONTINUED | OUTPATIENT
Start: 2020-06-08 | End: 2020-06-08 | Stop reason: HOSPADM

## 2020-06-08 RX ADMIN — Medication 10 MG: at 08:04

## 2020-06-08 RX ADMIN — ONDANSETRON 4 MG: 2 INJECTION INTRAMUSCULAR; INTRAVENOUS at 08:12

## 2020-06-08 RX ADMIN — FAMOTIDINE 20 MG: 20 TABLET, FILM COATED ORAL at 06:52

## 2020-06-08 RX ADMIN — PROPOFOL 150 MG: 10 INJECTION, EMULSION INTRAVENOUS at 07:59

## 2020-06-08 RX ADMIN — FENTANYL CITRATE 50 MCG: 50 INJECTION INTRAMUSCULAR; INTRAVENOUS at 07:59

## 2020-06-08 RX ADMIN — LIDOCAINE HYDROCHLORIDE 80 MG: 20 INJECTION, SOLUTION EPIDURAL; INFILTRATION; INTRACAUDAL; PERINEURAL at 07:59

## 2020-06-08 RX ADMIN — SODIUM CHLORIDE, SODIUM LACTATE, POTASSIUM CHLORIDE, AND CALCIUM CHLORIDE 100 ML/HR: 600; 310; 30; 20 INJECTION, SOLUTION INTRAVENOUS at 06:52

## 2020-06-08 RX ADMIN — HYDROCODONE BITARTRATE AND ACETAMINOPHEN 1 TABLET: 7.5; 325 TABLET ORAL at 09:04

## 2020-06-08 NOTE — H&P
Outpatient Surgery History and Physical:  Kylie Vance was seen and examined. CHIEF COMPLAINT:   LT knee . PE:     Visit Vitals  /71 (BP 1 Location: Left arm, BP Patient Position: At rest)   Pulse (!) 103   Temp 97.6 °F (36.4 °C)   Resp 16   Wt 179 lb (81.2 kg)   SpO2 97%   BMI 31.71 kg/m²       Heart:   Regular rhythm      Lungs:  Are clear      Past Medical History:    Patient Active Problem List    Diagnosis    Bruise of rigt breast    Breast mass, right    Pure hypercholesterolemia    Coronary artery disease involving native coronary artery of native heart with angina pectoris ([facility] [address]    Lumbar stenosis with neurogenic claudication    Elevated rheumatoid factor    Vitamin D deficiency    Depression, major, recurrent, mild (HCC)    Vitamin B12 deficiency    Persistent migraine aura without cerebral infarction and without status migrainosus, not intractable    Venous insufficiency    Fibromyalgia    Hypertension    Type 1 diabetes mellitus (HCC)     type 1, uses insulin pump--  Dr. Marcello Neri Chronic obstructive pulmonary disease ([facility] [address]    Former smoker     quit          Surgical History:   Past Surgical History:   Procedure Laterality Date    HX CATARACT REMOVAL Bilateral     HX  SECTION      times 2    HX COLONOSCOPY      HX HYSTERECTOMY N/A     total     HX ORTHOPAEDIC      left knee scope    HX ORTHOPAEDIC      right foot \"bone removal\"     HX ORTHOPAEDIC Bilateral     shoulder scopes    HX ORTHOPAEDIC Right     shaved down bone in great toe     HX ORTHOPAEDIC Right     right rotator cuff     HX ORTHOPAEDIC N/A     neck ( plate and screws was put in )     NEUROLOGICAL PROCEDURE UNLISTED  2019    lower back       Social History: Patient  reports that she quit smoking about 18 years ago. Her smoking use included cigarettes. She has a 30.00 pack-year smoking history.  She has never used smokeless tobacco. She reports current alcohol use of about 11.0 standard drinks of alcohol per week. She reports that she does not use drugs. Family History:   Family History   Problem Relation Age of Onset    Cancer Father     Lung Disease Father     Cancer Maternal Aunt     Heart Disease Paternal Grandmother     Heart Disease Paternal Grandfather        Allergies: Reviewed per EMR  Allergies   Allergen Reactions    Doxepin Hives and Nausea and Vomiting    Nortriptyline Hives    Other Medication Hives and Nausea and Vomiting     elavil    Plaquenil [Hydroxychloroquine] Hives    Tizanidine Other (comments)     Sleep walking and  nocturnal eating     Vistaril [Hydroxyzine Pamoate] Other (comments)     Insomnia           Medications:    No current facility-administered medications on file prior to encounter. Current Outpatient Medications on File Prior to Encounter   Medication Sig    omeprazole (PRILOSEC) 20 mg capsule Take 1 Cap by mouth daily.  fluticasone propion-salmeteroL (ADVAIR/WIXELA) 250-50 mcg/dose diskus inhaler Take 1 Puff by inhalation two (2) times a day.  lisinopriL (PRINIVIL, ZESTRIL) 40 mg tablet Take 1 Tab by mouth daily.  metoprolol succinate (TOPROL-XL) 50 mg XL tablet Take 1 Tab by mouth daily.  furosemide (LASIX) 20 mg tablet TAKE 1 TABLET BY MOUTH DAILY    tiotropium bromide (SPIRIVA RESPIMAT) 1.25 mcg/actuation inhaler Take 2 Puffs by inhalation daily.  ARIPiprazole (ABILIFY) 10 mg tablet Take 5 mg by mouth nightly.  gabapentin (NEURONTIN) 300 mg capsule Take 300 mg by mouth three (3) times daily.  SUMAtriptan (IMITREX) 100 mg tablet Take 50 mg by mouth once as needed for Migraine.  ALPRAZolam (XANAX) 2 mg tablet Take 2 mg by mouth three (3) times daily as needed for Anxiety. Usually takes 3 times daily    topiramate (TOPAMAX) 50 mg tablet Take 50 mg by mouth daily.     albuterol (PROVENTIL HFA, VENTOLIN HFA, PROAIR HFA) 90 mcg/actuation inhaler Take 1 Puff by inhalation every six (6) hours as needed for Wheezing.  Syringe with Needle, Disp, 1 mL 25 gauge x 5/8\" syrg 1,000 mcg by Does Not Apply route.  Syringe with Needle, Safety 3 mL 25 gauge x 1\" syrg 1 mL by IntraMUSCular route.  fluconazole (DIFLUCAN) 150 mg tablet Take 150 mg by mouth every thirty (30) days. FDA advises cautious prescribing of oral fluconazole in pregnancy. monthly    nitroglycerin (NITROSTAT) 0.4 mg SL tablet 1 Tab by SubLINGual route as needed for Chest Pain. Up to 3 doses.  ondansetron hcl (ZOFRAN) 4 mg tablet Take 1 Tab by mouth every eight (8) hours as needed for Nausea. The surgery is planned for the  LT knee. History and physical has been reviewed. The patient has been examined. There have been no significant clinical changes since the completion of the originally dated History and Physical.  Patient identified by surgeon; surgical site was confirmed by patient and surgeon. The patient is here today for outpatient surgery. I have examined the patient, no changes are noted in the patient's medical status. Necessity for the procedure/care is still present and the history and physical above is current. See the office notes for the full long term history of the problem. Please see the recent office notes for the musculoskeletal examination.     Signed By: Nusrat Delgado MD     June 8, 2020 6:58 AM

## 2020-06-08 NOTE — DISCHARGE INSTRUCTIONS
POST OPERATIVE INSTRUCTIONS FOR KNEE ARTHROSCOPY    1. Unless you receive other instructions, you may weight bear as tolerated      2. Apply ice to your knee for 20-30 minutes several times per day for the first 3 days and then as needed. Icing will decrease the amount of inflammation and is helpful after exercise    3. You may remove the dressings the following day and apply waterproof band aids. Some bleeding and drainage may persist for several days following  surgery. Waterproof band aids may be used while showering and avoid tub baths for two weeks. 4. You will be given pain medications and do not drive under the influence. Some patients take pain medication at night and antiinflammatory medication during day  when pain decreases. 5. You may be given Phenergan for nausea    6. You will receive a phone call from our office notifying you of your follow up visit    7. If any problems call [phone]    TYPICAL SIDE EFFECTS OF PAIN MEDICATION:  *    Constipation: Drink lots of fluids. Over the counter stool softener if needed. *    Nausea: Take pain medication with food. Call your doctor with persistent nausea. ACTIVITY  · As tolerated and as directed by your doctor. · Bathe or shower as directed by your doctor. DIET  · Day of surgery: Clear liquids until no nausea or vomiting; small portion, light diet Hinds foods (ex: baked chicken, plain rice, grits, scrambled eggs, toast). Nothing greasy, fried or spicy today. · Advance to regular diet on second day, unless your doctor orders otherwise. · If nausea and vomiting continues, call your doctor. PAIN  · Take pain medication as directed by your doctor. · DO NOT take aspirin or blood thinners unless directed by your doctor.        CALL YOUR DOCTOR IF    s Call your doctor if pain is NOT relieved by medication.   s Excessive bleeding that does not stop after holding pressure over the area  · Temperature of 101 degrees F or above  · Excessive redness, swelling or bruising, and/ or green or yellow, smelly discharge from incision    AFTER ANESTHESIA   · For the first 24 hours: DO NOT Drive, Drink alcoholic beverages, or Make important decisions. · Be aware of dizziness following anesthesia and while taking pain medication. DISCHARGE SUMMARY from Nurse    PATIENT INSTRUCTIONS:    After general anesthesia or intravenous sedation, for 24 hours or while taking prescription Narcotics:  · Limit your activities  · Do not drive and operate hazardous machinery  · Do not make important personal or business decisions  · Do  not drink alcoholic beverages  · If you have not urinated within 8 hours after discharge, please contact your surgeon on call. *  Please give a list of your current medications to your Primary Care Provider. *  Please update this list whenever your medications are discontinued, doses are      changed, or new medications (including over-the-counter products) are added. *  Please carry medication information at all times in case of emergency situations. Preventing Infection at Home  We care about preventing infection and avoiding the spread of germs - not only when you are in the hospital but also when you return home. When you return home from the hospital, its important to take the following steps to help prevent infection and avoid spreading germs that could infect you and others. Ask everyone in your home to follow these guidelines, too. Clean Your Hands  · Clean your hands whenever your hands are visibly dirty, before you eat, before or after touching your mouth, nose or eyes, and before preparing food. Clean them after contact with body fluids, using the restroom, touching animals or changing diapers. · When washing hands, wet them with warm water and work up a lather. Rub hands for at least 15 seconds, then rinse them and pat them dry with a clean towel or paper towel.   · When using hand sanitizers, it should take about 15 seconds to rub your hands dry. If not, you probably didnt apply enough . Cover Your Sneeze or Cough  Germs are released into the air whenever you sneeze or cough. To prevent the spread of infection:  · Turn away from other people before coughing or sneezing. · Cover your mouth or nose with a tissue when you cough or sneeze. Put the tissue in the trash. · If you dont have a tissue, cough or sneeze into your upper sleeve, not your hands. · Always clean your hands after coughing or sneezing. Care for Wounds  Your skin is your bodys first line of defense against germs, but an open wound leaves an easy way for germs to enter your body. To prevent infection:  · Clean your hands before and after changing wound dressings, and wear gloves to change dressings if recommended by your doctor. · Take special care with IV lines or other devices inserted into the body. If you must touch them, clean your hands first.  · Follow any specific instructions from your doctor to care for your wounds. Contact your doctor if you experience any signs of infection, such as fever or increased redness at the surgical or wound site. Keep a Clean Home  · Clean or wipe commonly touched hard surfaces like door handles, sinks, tabletops, phones and TV remotes. · Use products labeled disinfectant to kill harmful bacteria and viruses. · Use a clean cloth or paper towel to clean and dry surfaces. Wiping surfaces with a dirty dishcloth, sponge or towel will only spread germs. · Never share toothbrushes, rivas, drinking glasses, utensils, razor blades, face cloths or bath towels to avoid spreading germs. · Be sure that the linens that you sleep on are clean. · Keep pets away from wounds and wash your hands after touching pets, their toys or bedding. We care about you and your health. Remember, preventing infections is a team effort between you, your family, friends and health care providers. These are general instructions for a healthy lifestyle:    No smoking/ No tobacco products/ Avoid exposure to second hand smoke    Surgeon General's Warning:  Quitting smoking now greatly reduces serious risk to your health. Obesity, smoking, and sedentary lifestyle greatly increases your risk for illness    A healthy diet, regular physical exercise & weight monitoring are important for maintaining a healthy lifestyle    You may be retaining fluid if you have a history of heart failure or if you experience any of the following symptoms:  Weight gain of 3 pounds or more overnight or 5 pounds in a week, increased swelling in our hands or feet or shortness of breath while lying flat in bed. Please call your doctor as soon as you notice any of these symptoms; do not wait until your next office visit. Recognize signs and symptoms of STROKE:    F-face looks uneven    A-arms unable to move or move unevenly    S-speech slurred or non-existent    T-time-call 911 as soon as signs and symptoms begin-DO NOT go       Back to bed or wait to see if you get better-TIME IS BRAIN. Advance Care Planning  People with COVID-19 may have no symptoms, mild symptoms, such as fever, cough, and shortness of breath or they may have more severe illness, developing severe and fatal pneumonia. As a result, Advance Care Planning with attention to naming a health care decision maker (someone you trust to make healthcare decisions for you if you could not speak for yourself) and sharing other health care preferences is important BEFORE a possible health crisis. Please contact your Primary Care Provider to discuss Advance Care Planning.      Preventing the Spread of Coronavirus Disease 2019 in Homes and Residential Communities  For the most recent information go to [employer]    Prevention steps for People with confirmed or suspected COVID-19 (including persons under investigation) who do not need to be hospitalized  and   People with confirmed COVID-19 who were hospitalized and determined to be medically stable to go home    Your healthcare provider and public health staff will evaluate whether you can be cared for at home. If it is determined that you do not need to be hospitalized and can be isolated at home, you will be monitored by staff from your local or state health department. You should follow the prevention steps below until a healthcare provider or local or state health department says you can return to your normal activities. Stay home except to get medical care  People who are mildly ill with COVID-19 are able to isolate at home during their illness. You should restrict activities outside your home, except for getting medical care. Do not go to work, school, or public areas. Avoid using public transportation, ride-sharing, or taxis. Separate yourself from other people and animals in your home  People: As much as possible, you should stay in a specific room and away from other people in your home. Also, you should use a separate bathroom, if available. Animals: You should restrict contact with pets and other animals while you are sick with COVID-19, just like you would around other people. Although there have not been reports of pets or other animals becoming sick with COVID-19, it is still recommended that people sick with COVID-19 limit contact with animals until more information is known about the virus. When possible, have another member of your household care for your animals while you are sick. If you are sick with COVID-19, avoid contact with your pet, including petting, snuggling, being kissed or licked, and sharing food. If you must care for your pet or be around animals while you are sick, wash your hands before and after you interact with pets and wear a facemask.   Call ahead before visiting your doctor  If you have a medical appointment, call the healthcare provider and tell them that you have or may have COVID-19. This will help the healthcare providers office take steps to keep other people from getting infected or exposed. Wear a facemask  You should wear a facemask when you are around other people (e.g., sharing a room or vehicle) or pets and before you enter a healthcare providers office. If you are not able to wear a facemask (for example, because it causes trouble breathing), then people who live with you should not stay in the same room with you, or they should wear a facemask if they enter your room. Cover your coughs and sneezes  Cover your mouth and nose with a tissue when you cough or sneeze. Throw used tissues in a lined trash can. Immediately wash your hands with soap and water for at least 20 seconds or, if soap and water are not available, clean your hands with an alcohol-based hand  that contains at least 60% alcohol. Clean your hands often  Wash your hands often with soap and water for at least 20 seconds, especially after blowing your nose, coughing, or sneezing; going to the bathroom; and before eating or preparing food. If soap and water are not readily available, use an alcohol-based hand  with at least 60% alcohol, covering all surfaces of your hands and rubbing them together until they feel dry. Soap and water are the best option if hands are visibly dirty. Avoid touching your eyes, nose, and mouth with unwashed hands. Avoid sharing personal household items  You should not share dishes, drinking glasses, cups, eating utensils, towels, or bedding with other people or pets in your home. After using these items, they should be washed thoroughly with soap and water. Clean all high-touch surfaces everyday  High touch surfaces include counters, tabletops, doorknobs, bathroom fixtures, toilets, phones, keyboards, tablets, and bedside tables.  Also, clean any surfaces that may have blood, stool, or body fluids on them. Use a household cleaning spray or wipe, according to the label instructions. Labels contain instructions for safe and effective use of the cleaning product including precautions you should take when applying the product, such as wearing gloves and making sure you have good ventilation during use of the product. Monitor your symptoms  Seek prompt medical attention if your illness is worsening (e.g., difficulty breathing). Before seeking care, call your healthcare provider and tell them that you have, or are being evaluated for, COVID-19. Put on a facemask before you enter the facility. These steps will help the healthcare providers office to keep other people in the office or waiting room from getting infected or exposed. Ask your healthcare provider to call the local or state health department. Persons who are placed under active monitoring or facilitated self-monitoring should follow instructions provided by their local health department or occupational health professionals, as appropriate. When working with your local health department check their available hours. If you have a medical emergency and need to call 911, notify the dispatch personnel that you have, or are being evaluated for COVID-19. If possible, put on a facemask before emergency medical services arrive. Discontinuing home isolation  Patients with confirmed COVID-19 should remain under home isolation precautions until the risk of secondary transmission to others is thought to be low. The decision to discontinue home isolation precautions should be made on a case-by-case basis, in consultation with healthcare providers and state and local health departments.

## 2020-06-08 NOTE — OP NOTES
[facility]  OPERATIVE REPORT    Name:  Aretha Burrell  MR#:  [id number]  :  1955  ACCOUNT #:  [de-identified]  DATE OF SERVICE:  2020    PREOPERATIVE DIAGNOSIS:  Left lateral meniscus tear. POSTOPERATIVE DIAGNOSES:  1.  Large displaced tear of the lateral meniscus. 2.  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 3.  Mild chondromalacia of the medial femoral condyle. PROCEDURE PERFORMED:  1. Abrasion arthroplasty of the left knee, [id number].  2.  Lateral meniscectomy, 96927. SURGEON:  Emily Benton MD    ASSISTANT:  None. ANESTHESIA:  General.    COMPLICATIONS:  None. SPECIMENS REMOVED:  None. IMPLANTS:  None. ESTIMATED BLOOD LOSS:  Minimal.    PROCEDURE:  After an adequate level of general anesthesia was obtained, the left leg was prepped and draped in the usual sterile fashion. Tourniquet was used for the procedure. Photos were made for the patient. Anteromedial and anterolateral portals were made. There was some mild chondromalacia of the patellofemoral joint and a chondroplasty was performed. The gutters were clear. The ACL and PCL were intact. In the lateral compartment, the patient had a very large displaced tear of the posterior one-half of the lateral meniscus. The large tear was resected with the basket and the shaver and was left with a stable rim. Small punctated areas of grade III chondromalacia in the lateral compartment and abrasion arthroplasty was performed. The ACL and PCL were intact. There was a small area of grade II chondromalacia of the medial femoral condyle about the size of a nickel to a quarter and with delaminating flaps were stabilized with radiofrequency. The meniscus was stable. The knee was then copiously irrigated. Sterile dressings were applied.       Juanita Baptiste MD      [id number]  D:  2020 8:34  T:  2020 8:48  JOB #:  [id number]  CC:  [address] [place] [address]

## 2020-06-08 NOTE — PERIOP NOTES
Pt discharged home with rx x1 (norco), wound care instructions and follow up information. Verbal understanding of all instructions by patient and spouse Ruthy Gibson). All questions answered prior to discharge. All belongings taken with pt. Pt taken to car via wheelchair by ÁNGEL Ramos.

## 2020-06-08 NOTE — ANESTHESIA POSTPROCEDURE EVALUATION
Procedure(s):  LEFT KNEE ARTHROSCOPY WITH LATERAL MENISCECTOMY, ABRASION ARTHROPLASTY. general    Anesthesia Post Evaluation      Multimodal analgesia: multimodal analgesia used between 6 hours prior to anesthesia start to PACU discharge  Patient location during evaluation: PACU  Patient participation: complete - patient participated  Level of consciousness: awake and alert  Pain management: adequate  Airway patency: patent  Anesthetic complications: no  Cardiovascular status: acceptable  Respiratory status: acceptable  Hydration status: acceptable  Post anesthesia nausea and vomiting:  none  Final Post Anesthesia Temperature Assessment:  Normothermia (36.0-37.5 degrees C)      INITIAL Post-op Vital signs:   Vitals Value Taken Time   /77 6/8/2020  8:52 AM   Temp 36.6 °C (97.8 °F) 6/8/2020  8:39 AM   Pulse 84 6/8/2020  8:55 AM   Resp 14 6/8/2020  8:52 AM   SpO2 98 % 6/8/2020  8:55 AM   Vitals shown include unvalidated device data.

## 2020-06-29 ENCOUNTER — HOSPITAL ENCOUNTER (OUTPATIENT)
Dept: PHYSICAL THERAPY | Age: 65
Discharge: HOME | End: 2020-06-29

## 2020-07-09 ENCOUNTER — HOSPITAL ENCOUNTER (OUTPATIENT)
Dept: PHYSICAL THERAPY | Age: 65
Discharge: HOME | End: 2020-07-09
Payer: MEDICARE

## 2020-07-09 PROCEDURE — 97110 THERAPEUTIC EXERCISES: CPT

## 2020-07-09 PROCEDURE — 97162 PT EVAL MOD COMPLEX 30 MIN: CPT

## 2020-07-09 NOTE — THERAPY EVALUATION
Sonam Howard  : 1955  [place]  [place] [facility] at . [place] [address]  [address] [place] [address], [address] [place] [address]  [phone]   [phone]         OUTPATIENT PHYSICAL THERAPY:Initial Assessment 2020    ICD-10: Treatment Diagnosis:  Pain in left knee (M25.562)  Effusion, left knee (M25.462)  Stiffness of left knee, not elsewhere classified (M25.662)  Unilateral primary osteoarthritis, left knee (M17.12)          Precautions/Allergies:   Doxepin; Nortriptyline; Other medication; Plaquenil [hydroxychloroquine]; Tizanidine; and Vistaril [hydroxyzine pamoate]   Fall Risk Score: 2 (? 5 = High Risk)  MD Orders: Eval and Treat  MEDICAL/REFERRING DIAGNOSIS:  knee   DATE OF ONSET: 2020  REFERRING PHYSICIAN: Rocío Jensen MD  RETURN PHYSICIAN APPOINTMENT:      INITIAL ASSESSMENT:  Ms. Jair Kc presents to physical therapy with decreased strength, ROM, joint mobility, functional mobility. These S/S are consistent with left knee pain. Patient will benefit from skilled physical therapy for manual therapeutic techniques (as appropriate), therapeutic exercises and activities, balance and comprehensive home exercises program to address current impairments and functional limitations. Sonam Howard will benefit from skilled PT (medically necessary) in order to address above deficits affecting participation in basic ADLs and overall functional tolerance. PROBLEM LIST (Impacting functional limitations):   1. Decreased Strength  2. Decreased ADL/Functional Activities  3. Decreased Transfer Abilities  4. Decreased Ambulation Ability/Technique  5. Decreased Balance  6. Increased Pain  7. Decreased Joint mobility/Flexibility   8. Decreased Activity Tolerance  9. Decreased [place] with Home Exercise Program INTERVENTIONS PLANNED:   1. Balance Exercise  2. Bed Mobility  3. Cold  4. Cryotherapy  5. Family Education  6.  Gait Training  7. Heat  8. Home Exercise Program (HEP)  9. Manual Therapy  10. Neuromuscular Re-education/Strengthening  11. Range of Motion (ROM)  12. Therapeutic Activites  13. Therapeutic Exercise/Strengthening  14. Transfer Training  15. Ultrasound (US)  16. Vasopneumatic Compression          TREATMENT PLAN:  Effective Dates: 7/9/2020 TO 9/8/2020 (60 days). Frequency/Duration: 2 times a week for 60 Days    GOALS: (Goals have been discussed and agreed upon with patient.)   Short-Term Goals~4 weeks  Goal Met   1. Chato Barger will be independent with HEP for strength and ROM 1.  [] Date:   2. Chato Barger will tolerate manual therapy/joint mobilizations to increase knee flexion ROM so pt can ambulate stairs and walk with a more normalized gait pattern. 2.  [] Date:   3. Chato Barger will participate in LE strengthening exercises for hip, knee, ankle with weight as appropriate for 3 sets of 10. 3.  [] Date:   4. Chato Barger will tolerate scar massage as appropriate to improve tissue mobility with patient to perform independently after education 4. [] Date:   5. Chato Barger will participate in static and dynamic balance activities for 5 minutes to help improve proprioception and decrease risk of falls 5. [] Date:   6. Chato Barger to increase lower extremity functional scale by 10 points to show improvement in areas of difficulty 6. [] Date:   7. Chato Barger will demonstrate left knee flexion >= 115 degrees to improve functional mobility and tolerance of ADLs. 7.  [] Date:   8. Chato Barger will demonstrate left knee extension >= 0 degrees to improve functional mobility and tolerance of ADLs 8. [] Date:   5. Chato Barger will improve MMT Left LE to >=4+/5 to improve current level of independence and community reintegration. 9.  [] Date:         Long Term Goals~8 weeks Goal Met   1. Chato Barger will be independent in HEP of stretching and strengthening 1. [] Date:   2. Wolfgang Holliday will be able to perform sit to stand transfers independently with increased knee flexion and decreased use of upper extremities 2. [] Date:   3. Wolfgang Holliday will ascend/descend 12 steps with reciprocal gait pattern and rail 3. [] Date:   4. Wolfgang Holliday to increase lower extremity functional scale by 20 points to show improvement in areas of difficulty  4. [] Date:                 Outcome Measure: Tool Used: Lower Extremity Functional Scale (LEFS)    Score:  Initial: 6/80 Most Recent: X/80 (Date: -- )   Interpretation of Score: 20 questions each scored on a 5 point scale with 0 representing \"extreme difficulty or unable to perform\" and 4 representing \"no difficulty\". The lower the score, the greater the functional disability. 80/80 represents no disability. Minimal detectable change is 9 points. Medical Necessity:   · Skilled intervention continues to be required due to current impairment. Reason for Services/Other Comments:  · Patient continues to require skilled intervention due to patient continues to present with impairments assessed at initial evaluation and requiring skilled physical therapy to meet goals for PT. Total Treatment Duration:         Rehabilitation Potential For Stated Goals: Good  Regarding Mateo Sánchez's therapy, I certify that the treatment plan above will be carried out by a therapist or under their direction. Thank you for this referral,  Jose Eduardo Carney, PT     Referring Physician Signature: Edmond Johns MD              Date                  HISTORY:   History of Present Injury/Illness (Reason for Referral):  Pt reports that she has had long standing knee pain and a previous history of surgery in her left knee. Pt had her last surgery on June 08, 2020 but hasn't been seen in PT clinics since.  Pt comes intro therapy with limited ROM and increased pain in her knee and wants to get back to functioning properly.    -Present Symptoms (on day of evaluation):  Pain above and below knee on lateral side. Pain Severity:  · Currently: 4/10  · Best: 3/10  · Worst: 8/10    · Aggravating factors: going up and down stairs, standing, walking, squatting, kneeling, rising after sitting, lateral movements   · Relieving factors: Rest and Self-medicating  · Irritability: High (onset of Pain is shorter than alleviation of Pain)    Past Medical History/Comorbidities:   Ms. Edith Wolfe  has a past medical history of Anxiety, Arthritis, Asthma, CAD (coronary artery disease) (2019), Cervical spondylosis with myelopathy (2015), Chronic obstructive pulmonary disease ([address] Chronic pain, Depression, Diabetes ([address] Diverticulosis, DJD (degenerative joint disease), Fibromyalgia, Former smoker, GERD (gastroesophageal reflux disease), Hormone replacement therapy, Hypertension, Insomnia, Left knee pain, Migraine, Psychiatric diagnosis, RA (rheumatoid arthritis) ([address] (dx yrs ago), Unspecified adverse effect of anesthesia, Vitamin B 12 deficiency, and Vitamin D deficiency. Ms. Edith Wolfe  has a past surgical history that includes hx colonoscopy; hx cataract removal (Bilateral); hx  section; hx hysterectomy (N/A, ); hx orthopaedic; hx orthopaedic; hx orthopaedic (Bilateral); hx orthopaedic (Right); hx orthopaedic (Right); hx orthopaedic (N/A); and neurological procedure unlisted ().     Active Ambulatory Problems     Diagnosis Date Noted    Fibromyalgia     Hypertension     Type 1 diabetes mellitus (HCC)     Chronic obstructive pulmonary disease ([address]     Former smoker     Depression, major, recurrent, mild ([address] 2018    Vitamin B12 deficiency 2018    Persistent migraine aura without cerebral infarction and without status migrainosus, not intractable 2018    Venous insufficiency 2018    Elevated rheumatoid factor 2018    Vitamin D deficiency 2018    Lumbar stenosis with neurogenic claudication 2018    Coronary artery disease involving native coronary artery of native heart with angina pectoris ([facility] 05/31/2019    Pure hypercholesterolemia 06/28/2019    Bruise of rigt breast 06/03/2020    Breast mass, right 06/03/2020     Resolved Ambulatory Problems     Diagnosis Date Noted    Psychiatric diagnosis     Hormone replacement therapy     Cervical spondylosis with myelopathy 06/17/2015    Depression 06/17/2015    Bipolar affective disorder in remission ([facility] 03/08/2018    Cough 03/20/2018    Other headache syndrome 03/20/2018    Elevated troponin 05/01/2019    Thrombocytopenia ([facility] 05/01/2019    Diabetic ketoacidosis with coma associated with type 1 diabetes mellitus ([facility] 05/07/2019    ADRIENNE [facility]acute kidney injury) [facility] 05/07/2019    H/O non-ST elevation myocardial infarction (NSTEMI) 05/31/2019    CAD S/P percutaneous coronary angioplasty 06/14/2019    Traumatic ecchymosis of right female breast 06/03/2020    Preop cardiovascular exam 06/03/2020     Past Medical History:   Diagnosis Date    Anxiety     Arthritis     Asthma     CAD (coronary artery disease) 04/24/2019    Chronic pain     Diabetes ([address]     Diverticulosis     DJD (degenerative joint disease)     GERD (gastroesophageal reflux disease)     Insomnia     Left knee pain     Migraine     RA (rheumatoid arthritis) ([facility] dx yrs ago    Unspecified adverse effect of anesthesia     Vitamin B 12 deficiency      Social History/Living Environment:        Social History     Socioeconomic History    Marital status:      Spouse name: Not on file    Number of children: Not on file    Years of education: Not on file    Highest education level: Not on file   Occupational History    Not on file   Social Needs    Financial resource strain: Not on file    Food insecurity     Worry: Not on file     Inability: Not on file    Transportation needs     Medical: Not on file     Non-medical: Not on file   Tobacco Use    Smoking status: Former Smoker     Packs/day: 1.00     Years: 30.00     Pack years: 30.00     Types: Cigarettes     Last attempt to quit: 6/10/2002     Years since quittin.0    Smokeless tobacco: Never Used   Substance and Sexual Activity    Alcohol use: Yes     Alcohol/week: 11.0 standard drinks     Types: 4 Glasses of wine, 7 Cans of beer per week    Drug use: No    Sexual activity: Not on file   Lifestyle    Physical activity     Days per week: Not on file     Minutes per session: Not on file    Stress: Not on file   Relationships    Social connections     Talks on phone: Not on file     Gets together: Not on file     Attends Anabaptism service: Not on file     Active member of club or organization: Not on file     Attends meetings of clubs or organizations: Not on file     Relationship status: Not on file    Intimate partner violence     Fear of current or ex partner: Not on file     Emotionally abused: Not on file     Physically abused: Not on file     Forced sexual activity: Not on file   Other Topics Concern    Not on file   Social History Narrative    Not on file     Prior Level of Function/Work/Activity:  Normal  Previous Treatment Approach  none  Other Clinical Tests:  none  Current Medications:    Current Outpatient Medications:     DULoxetine (CYMBALTA) 60 mg capsule, Take 1 Cap by mouth daily. , Disp: 90 Cap, Rfl: 0    OTHER, Insulin via pump, Disp: , Rfl:     clopidogreL (Plavix) 75 mg tab, Take 1 Tab by mouth daily. , Disp: 30 Tab, Rfl: 5    atorvastatin (LIPITOR) 80 mg tablet, Take 1 Tab by mouth daily. , Disp: 30 Tab, Rfl: 5    aspirin 81 mg chewable tablet, Take 1 Tab by mouth daily. , Disp: 30 Tab, Rfl: 5    omeprazole (PRILOSEC) 20 mg capsule, Take 1 Cap by mouth daily. , Disp: 10 Cap, Rfl: 0    fluticasone propion-salmeteroL (ADVAIR/WIXELA) 250-50 mcg/dose diskus inhaler, Take 1 Puff by inhalation two (2) times a day., Disp: 1 Inhaler, Rfl: 4    lisinopriL (PRINIVIL, ZESTRIL) 40 mg tablet, Take 1 Tab by mouth daily. , Disp: 30 Tab, Rfl: 3    metoprolol succinate (TOPROL-XL) 50 mg XL tablet, Take 1 Tab by mouth daily. , Disp: 30 Tab, Rfl: 5    furosemide (LASIX) 20 mg tablet, TAKE 1 TABLET BY MOUTH DAILY, Disp: 90 Tab, Rfl: 3    tiotropium bromide (SPIRIVA RESPIMAT) 1.25 mcg/actuation inhaler, Take 2 Puffs by inhalation daily. , Disp: 1 Inhaler, Rfl: 6    ARIPiprazole (ABILIFY) 10 mg tablet, Take 5 mg by mouth nightly., Disp: , Rfl:     fluconazole (DIFLUCAN) 150 mg tablet, Take 150 mg by mouth every thirty (30) days. FDA advises cautious prescribing of oral fluconazole in pregnancy. monthly, Disp: , Rfl:     nitroglycerin (NITROSTAT) 0.4 mg SL tablet, 1 Tab by SubLINGual route as needed for Chest Pain. Up to 3 doses. , Disp: 1 Bottle, Rfl: 3    ondansetron hcl (ZOFRAN) 4 mg tablet, Take 1 Tab by mouth every eight (8) hours as needed for Nausea., Disp: 20 Tab, Rfl: 0    gabapentin (NEURONTIN) 300 mg capsule, Take 300 mg by mouth three (3) times daily. , Disp: , Rfl:     SUMAtriptan (IMITREX) 100 mg tablet, Take 50 mg by mouth once as needed for Migraine. , Disp: , Rfl:     ALPRAZolam (XANAX) 2 mg tablet, Take 2 mg by mouth three (3) times daily as needed for Anxiety. Usually takes 3 times daily, Disp: , Rfl:     topiramate (TOPAMAX) 50 mg tablet, Take 50 mg by mouth daily. , Disp: , Rfl:     albuterol (PROVENTIL HFA, VENTOLIN HFA, PROAIR HFA) 90 mcg/actuation inhaler, Take 1 Puff by inhalation every six (6) hours as needed for Wheezing., Disp: 1 Inhaler, Rfl: 6    Syringe with Needle, Disp, 1 mL 25 gauge x 5/8\" syrg, 1,000 mcg by Does Not Apply route., Disp: , Rfl:     Syringe with Needle, Safety 3 mL 25 gauge x 1\" syrg, 1 mL by IntraMUSCular route., Disp: , Rfl:       Ambulatory/Rehab Services  Model Falls Risk Assessment    Risk Factors:       (5)  History of Recent Falls [w/in 3 months] Ability to Rise from Chair:       (1)  Pushes up, successful in one attempt    Falls Prevention Plan:       Mobility Assistance Device (specify):  Two Wheeled walker   Total: (5 or greater = High Risk): 6    ©2010 [facility] [employer] States Patent #[id number]. Federal Law prohibits the replication, distribution or use without written permission from [facility]         Date Last Reviewed:  7/9/2020   Number of Personal Factors/Comorbidities that affect the Plan of Care: 1-2: MODERATE COMPLEXITY   EXAMINATION:   Observation/Orthostatic Postural Assessment:   Gait:  Step two, antalgic gait  Genu Valgus  Palpation:  Assessed @ Initial Visit: Tenderness to Generalized knee    AROM/PROM         Joint: Eval Date:  Re-Assess Date:  Re-Assess Date:    Active ROM RIGHT LEFT RIGHT LEFT RIGHT LEFT   Knee Extension             Knee Flexion             Hip Flexion             Ankle mobility                                                 Passive ROM         Knee Extension             Knee Flexion               Strength:     Eval Date:  Re-Assess Date:  Re-Assess Date:      RIGHT LEFT RIGHT LEFT RIGHT LEFT   Knee Flexion  3+/5  3+/5           Knee Extension  4/5  3+/5           Hip Flexion  4/5  4-/5           Hip Abduction  4-/5  4-/5           Hip Extension  4-/5  4-/5           Ankle Dorsiflexion   5/5 5/5                          Special Tests:   Not Indicated      Manual:  Initial Evaluation           Joint Directon Grade Treatment Effect Re-asess                         Joint Directon Grade Treatment Effect   Tibiofemoral Distraction I, II and III Increased Symptoms                                          Pt presents with  two  Incisions the Anterior Knee from 5 o'clock an 7 o'clock. Normal Skin Healing around the incisions. Scar had no evidence of Redness. Pt was educated on S/S of DVT and infections and who to call if symptoms change.     Pt does had a doppler after surgery for blood clots but was found to be negative. Skin inspection and weight weight seemed to be tolerated well but with some signs of pain due to post surgical status. Mild tightness in back of calf. Neurological Screen:  No radiating symptoms down leg    Functional Mobility: Limited tolerance of walking and standing  Sit to Stand=  Squat=  Single Leg Step Down=    Balance:    Single leg stance: R= <6 seconds seconds / L=<6 seconds seconds  TUG= 15 seconds  30 second STS= 3   Body Structures Involved:  1. Bones  2. Joints  3. Muscles  4. Ligaments Body Functions Affected:  1. Sensory/Pain  2. Neuromusculoskeletal  3. Movement Related Activities and Participation Affected:  1. Mobility  2. Self Care   Number of elements that affect the Plan of Care: 3: MODERATE COMPLEXITY   CLINICAL PRESENTATION:   Presentation: Evolving clinical presentation with unstable and unpredictable characteristics: HIGH COMPLEXITY   CLINICAL DECISION MAKING:      Use of outcome tool(s) and clinical judgement create a POC that gives a: Difficult prediction of patient's progress: HIGH COMPLEXITY   See treatment note for associated treatment provided today.       Future Appointments   Date Time Provider Nikhil Eng   7/13/2020  1:00 PM Yamila Wright, [facility]   7/15/2020  1:00 PM Yamila Wright, PT [facility]   7/17/2020  1:00 PM Emigdio Khan DPT [facility]   7/20/2020  1:00 PM Yamila Wright, PT [facility]   7/22/2020  3:00 PM Yamila Wright, PT [facility]   7/24/2020  1:00 PM Emigdio Khan DPT [facility]   7/27/2020  1:00 PM Yamila Wright, PT [facility]   7/29/2020  1:00 PM Yamila Wright, PT [facility]   7/31/2020  1:00 PM Yamila Wright, PT [facility]   8/3/2020  1:00 PM Yamila Wright, PT [facility]   8/5/2020  1:00 PM Yamila Wright, PT [facility]   8/10/2020  1:00 PM Yamila Wright, PT [facility]   8/12/2020  1:00 PM Mary Rotunda NADIRA, PT [facility]   8/17/2020  1:00 PM Macie Adan PT [facility]   8/19/2020  1:00 PM [facility]an PT [facility]   8/24/2020 10:15 AM Sharif Zavala MD [facility]SA MAIN   8/24/2020  1:00 PM Macie Adan, [facility]   8/26/2020  1:00 PM [facility]   9/8/2020  1:45 PM Mckayla Durand MD [facility]   11/30/2020 10:00 AM [facility]   12/7/2020  3:40 PM Rico Damico MD [facility]         Lori Crooks, PT

## 2020-07-09 NOTE — PROGRESS NOTES
Camden Nav  : 1955  [place] / [place] / Product Type: Medicare /  [facility] at [address] [place] [address]. [facility], [address], [address] [place] [address]  [phone]   [phone]                                                          Ramandeep Rosales MD      OUTPATIENT PHYSICAL THERAPY: Daily Treatment Note 2020 Visit Count:  1    Tx Diagnosis:  Pain in left knee (M25.562)  Effusion, left knee (M25.462)  Stiffness of left knee, not elsewhere classified (M25.662)  Unilateral primary osteoarthritis, left knee (M17.12)        Pre-treatment Symptoms/Complaints:  See Initial Eval Dated 7/10/2020 for more details. Pain: Initial:1/10 Post Session: 4/10   Medications Last Reviewed:  7/10/2020     Updated Objective Findings: See Initial Eval for more details. TREATMENT:   THERAPEUTIC EXERCISE: (30 minutes):  Exercises per grid below to improve mobility, strength and balance. Required minimal visual, verbal and manual cues to promote proper body alignment and promote proper body posture. Progressed resistance and complexity of movement as indicated. Date:  2020 Date:   Date:     Activity/Exercise Parameters Parameters Parameters   Education HEP, POC, PT goals, anatomy/pathology, Education on Scar management, Prognosis, Ambulatory device, Precautions and Goals for therapy. Asistive device education and fitting 8 min     Knee extension 10 min     Knee bending 6 min                             THERAPEUTIC ACTIVITY: ( 0 minutes): Activities per gid below to improve functional movement related mobility, strength and balance to improve neuro-muscular carryover to daily functional activities for improving patient's quality of life. Required visual, verbal and manual cues to promote proper body alignment and promote proper body posture/mechanics. Progressed resistance and complexity of movement as indicated.      Date:  2020 Date: Date:     Activity/Exercise Parameters Parameters Parameters                                                                               MANUAL THERAPY: (0 minutes): Joint mobilization, Soft tissue mobilization was utilized and necessary because of the patient's restricted joint motion and restricted motion of soft tissue mobility. Date  7/9/2020    Technique Used Grade  Level # Time(s) Effect while being performed                                                                 HEP Log Date 1.    7/10/2020   2.  7/10/2020   3. 7/10/2020   4.    5.           [email or web address] Portal  Treatment/Session Summary:    Response to Treatment: Pt demonstrated understanding of POC and initial HEP. No increase in pain or adverse reactions. Communication/Consultation:  POC, HEP, PT goals, Faxed initial evaluation to MD.   Equipment provided today: HEP Handout     Recommendations/Intent for next treatment session:   Next visit will focus on Manual Therapy Quad strengthening Hip strengthening soft tissue mobilization Gait training. Treatment Plan of Care Effective Dates: 7/9/2020 TO 9/8/2020 (60 days).   Frequency/Duration: 3 times a week for 60 Days             Total Treatment Billable Duration:   30  Rx plus Eval   PT Patient Time In/Time Out  Time In: 1402  Time Out: Charles 70, PT    Future Appointments   Date Time Provider Nikhil Eng   7/13/2020  1:00 PM Kinza Ibarra, PT [facility]   7/15/2020  1:00 PM Kinza Ibarra, PT [facility]   7/17/2020  1:00 PM Johnie Pederson, SWATHIT [facility]   7/20/2020  1:00 PM Kinza Ibarra, PT [facility]   7/22/2020  3:00 PM Kinza Ibarra, PT [facility]   7/24/2020  1:00 PM Johnie Pederson, KAILEY [facility]   7/27/2020  1:00 PM Kinza Ibarra, PT [facility]   7/29/2020  1:00 PM Kinza Ibarra, PT [facility]   7/31/2020  1:00 PM Kinza Ibarra, PT [facility]   8/3/2020  1:00 PM Gre[place] Course, PT [facility]   8/5/2020  1:00 PM [place] Course, PT [facility]   8/10/2020  1:00 PM Shilpa Course, PT [facility]   8/12/2020  1:00 PM Shilpa Course, PT [facility]   8/17/2020  1:00 PM Gre[place] Course, PT [facility]   8/19/2020  1:00 PM Greendale Course, PT [facility]   8/24/2020 10:15 AM Siena Barnes MD [facility] MA[facility]   8/24/2020  1:00 PM [place] Course, PT [facility]   8/26/2020  1:00 PM Shilpa Course, PT [facility]   9/8/2020  1:45 PM Tanja Montes MD [facility]   11/30/2020 10:00 AM [facility]   12/7/2020  3:40 PM Lázaro Lamar MD [facility]

## 2020-07-13 ENCOUNTER — HOSPITAL ENCOUNTER (OUTPATIENT)
Dept: PHYSICAL THERAPY | Age: 65
Discharge: HOME | End: 2020-07-13
Payer: MEDICARE

## 2020-07-13 PROCEDURE — 97110 THERAPEUTIC EXERCISES: CPT

## 2020-07-13 NOTE — PROGRESS NOTES
Alis Pollard  : 1955  [place] / Product Type: Medicare /  [address] at [address] [facility]. [facility], [address] [place] [address]  [phone]   [phone]                                                          Brady Closs, MD      OUTPATIENT PHYSICAL THERAPY: Daily Treatment Note 2020 Visit Count:  2    Tx Diagnosis:  Pain in left knee (M25.562)  Effusion, left knee (M25.462)  Stiffness of left knee, not elsewhere classified (M25.662)  Unilateral primary osteoarthritis, left knee (M17.12)        Pre-treatment Symptoms/Complaints:  Pt reports a marked improvement in symtpoms walking improved feelinig improved knee extension improved. Pain: Initial:1/10 Post Session: 4/10   Medications Last Reviewed:  2020     Updated Objective Findings: BP: 154/80 77-BPM        TREATMENT:   THERAPEUTIC EXERCISE: (41 minutes):  Exercises per grid below to improve mobility, strength and balance. Required minimal visual, verbal and manual cues to promote proper body alignment and promote proper body posture. Progressed resistance and complexity of movement as indicated. Date:  2020 Date:  2020 Date:     Activity/Exercise Parameters Parameters Parameters   Education HEP, POC, PT goals, anatomy/pathology, Education on Scar management, Prognosis, Ambulatory device, Precautions and Goals for therapy. Education on Medical supply stores and walker fitting. Asistive device education and fitting 8 min     Knee extension 10 min 7 min    Knee bending 6 min 15 min    walking  4 min    nustep  8 min                THERAPEUTIC ACTIVITY: ( 0 minutes): Activities per gid below to improve functional movement related mobility, strength and balance to improve neuro-muscular carryover to daily functional activities for improving patient's quality of life.  Required visual, verbal and manual cues to promote proper body alignment and promote proper body posture/mechanics. Progressed resistance and complexity of movement as indicated. Date:  7/9/2020 Date:  7/13/2020 Date:     Activity/Exercise Parameters Parameters Parameters                                                                               MANUAL THERAPY: (0 minutes): Joint mobilization, Soft tissue mobilization was utilized and necessary because of the patient's restricted joint motion and restricted motion of soft tissue mobility. Date  7/13/2020    Technique Used Grade Level # Time(s) Effect while being performed                                                                 HEP Log Date 1.    7/13/2020   2.  7/13/2020   3. 7/13/2020   4.    5.           [employer] Portal  Treatment/Session Summary:    Response to Treatment: Pt limited due to drop in blood sugar and uneasiness. PT focued on Knee ROM and dosage  of exercises to improve knee motion. Pt ha dimproved walk with upright posture. Communication/Consultation:  POC, HEP, PT goals, Faxed initial evaluation to MD.   Equipment provided today: HEP Handout     Recommendations/Intent for next treatment session:   Next visit will focus on Manual Therapy Quad strengthening Hip strengthening soft tissue mobilization Gait training. Treatment Plan of Care Effective Dates: 7/9/2020 TO 9/8/2020 (60 days).   Frequency/Duration: 3 times a week for 60 Days             Total Treatment Billable Duration:   41  Rx  PT Patient Time In/Time Out  Time In: 1301  Time Out: Roxanna Caballero PT    Future Appointments   Date Time Provider Nikhil Eng   7/15/2020  1:00 PM Haze Minal, PT [facility]   7/17/2020  1:00 PM Lor Dewey DPT [facility]   7/20/2020  1:00 PM [facility]   7/22/2020  3:00 PM Haze Bigness, [facility]   7/24/2020  1:00 PM Lor eDwey DPT [facility]   7/27/2020  1:00 PM Damion Phi, PT [facility]   7/29/2020  1:00 PM Damion Yip, PT [facility]   7/31/2020  1:00 PM Damion Yip, PT [facility]   8/3/2020  1:00 PM Damion Yip, PT [facility]   8/5/2020  1:00 PM Damion Yip, PT [facility]   8/10/2020  1:00 PM Damion Yip, PT [facility]   8/12/2020  1:00 PM Damion Yip, PT [facility]   8/17/2020  1:00 PM Damion Yip, PT [facility]   8/19/2020  1:00 PM Damion Phi, PT [facility]   8/24/2020 10:15 AM Vivian Vargas MD [facility] MAIN   8/24/2020  1:00 PM Damion Yip, PT [facility]   8/26/2020  1:00 PM Damion Yip, PT [facility]   9/8/2020  1:45 PM Barber Levin MD [facility]   11/30/2020 10:00 AM [facility]   12/7/2020  3:40 PM Aleyad Fields MD [facility]

## 2020-07-15 ENCOUNTER — HOSPITAL ENCOUNTER (OUTPATIENT)
Dept: PHYSICAL THERAPY | Age: 65
Discharge: HOME | End: 2020-07-15
Payer: MEDICARE

## 2020-07-15 PROCEDURE — 97110 THERAPEUTIC EXERCISES: CPT

## 2020-07-15 NOTE — PROGRESS NOTES
Chato Barger  : 1955  [place] / Product Type: Medicare /  [address] [facility] at [address] [place] [address]. [facility], [address] [place] [address]  [phone]   [phone]                                                          Shayna Navarro MD      OUTPATIENT PHYSICAL THERAPY: Daily Treatment Note 7/15/2020 Visit Count:  3    Tx Diagnosis:  Pain in left knee (M25.562)  Effusion, left knee (M25.462)  Stiffness of left knee, not elsewhere classified (M25.662)  Unilateral primary osteoarthritis, left knee (M17.12)        Pre-treatment Symptoms/Complaints:  Pt reports that she fell at her house when her dogs started barking. Patient rushed to make sure it wasn't an intruder. Pt tripped on the way over and her knee has been hurting since. Pt Came into therapy without her walker. Pain: Initial:1/10 Post Session: 4/10   Medications Last Reviewed:  7/15/2020     Updated Objective Findings: BP: 154/80 77-BPM        TREATMENT:   THERAPEUTIC EXERCISE: (41 minutes):  Exercises per grid below to improve mobility, strength and balance. Required minimal visual, verbal and manual cues to promote proper body alignment and promote proper body posture. Progressed resistance and complexity of movement as indicated. Date:  2020 Date:  2020 Date:  7/15/2020   Activity/Exercise Parameters Parameters Parameters   Education HEP, POC, PT goals, anatomy/pathology, Education on Scar management, Prognosis, Ambulatory device, Precautions and Goals for therapy. Education on Medical supply stores and walker fitting. Education on the importance of being on a walker. Risk of falls Education on update with doctor. Asistive device education and fitting 8 min     Knee extension 10 min 7 min    Knee bending 6 min 15 min    walking  4 min    nustep  8 min 10 min               THERAPEUTIC ACTIVITY: ( 0 minutes):  Activities per gid below to improve functional movement related mobility, strength and balance to improve neuro-muscular carryover to daily functional activities for improving patient's quality of life. Required visual, verbal and manual cues to promote proper body alignment and promote proper body posture/mechanics. Progressed resistance and complexity of movement as indicated. Date:  7/9/2020 Date:  7/13/2020 Date:     Activity/Exercise Parameters Parameters Parameters                                                                               MANUAL THERAPY: (0 minutes): Joint mobilization, Soft tissue mobilization was utilized and necessary because of the patient's restricted joint motion and restricted motion of soft tissue mobility. Date  7/15/2020    Technique Used Grade Level # Time(s) Effect while being performed                                                                 HEP Log Date 1.    7/15/2020   2.  7/15/2020   3. 7/15/2020   4.    5.           [employer] Portal  Treatment/Session Summary:    Response to Treatment: Pt held from therapy today and referred to physician to update on status. Pts gait was atntalgic with weight bearing and limited on mobility. Imporved with in clinic rolling walking. Communication/Consultation:  POC, HEP, PT goals, Faxed initial evaluation to MD.   Equipment provided today: HEP Handout     Recommendations/Intent for next treatment session:   Next visit will focus on Manual Therapy Quad strengthening Hip strengthening soft tissue mobilization Gait training. Treatment Plan of Care Effective Dates: 7/9/2020 TO 9/8/2020 (60 days).   Frequency/Duration: 3 times a week for 60 Days             Total Treatment Billable Duration:   30  Rx     Verjaxon Millernds, PT    Future Appointments   Date Time Provider Nikhil Eng   7/20/2020  1:00 PM Yumiko Phelps PT [facility]   7/22/2020  3:00 PM Yumiko Phelps PT [facility]   7/24/2020  1:00 PM Michael Gonzalez, DPT [facility]   7/27/2020  1:00 PM Beau Ramal, PT [facility]   7/29/2020  1:00 PM Beau Ramal, PT [facility]   7/31/2020  1:00 PM Beau Ramal, PT [facility]   8/3/2020  1:00 PM Beau Ramal, PT [facility]   8/5/2020  1:00 PM Beau Ramal, PT [facility]   8/10/2020  1:00 PM Beau Ramal, PT [facility]   8/12/2020  1:00 PM Beau Ramal, PT [facility]   8/17/2020  1:00 PM Beau Ramal, PT [facility]   8/19/2020  1:00 PM Beau Ramal, PT [facility]   8/24/2020 10:15 AM Alexandro Telles MD [facility] MA[facility]   8/24/2020  1:00 PM Beau Ramal, PT [facility]   8/26/2020  1:00 PM Beau Ramal, PT [facility]   9/8/2020  1:45 PM Denae Sequeira MD [facility]   11/30/2020 10:00 AM [facility] LAB [facility]   12/7/2020  3:40 PM Silver Brock MD [facility]

## 2020-07-17 ENCOUNTER — APPOINTMENT (OUTPATIENT)
Dept: PHYSICAL THERAPY | Age: 65
End: 2020-07-17
Payer: MEDICARE

## 2020-07-22 ENCOUNTER — APPOINTMENT (OUTPATIENT)
Dept: PHYSICAL THERAPY | Age: 65
End: 2020-07-22
Payer: MEDICARE

## 2020-07-24 ENCOUNTER — APPOINTMENT (OUTPATIENT)
Dept: PHYSICAL THERAPY | Age: 65
End: 2020-07-24
Payer: MEDICARE

## 2020-07-27 ENCOUNTER — APPOINTMENT (OUTPATIENT)
Dept: PHYSICAL THERAPY | Age: 65
End: 2020-07-27
Payer: MEDICARE

## 2020-07-31 ENCOUNTER — APPOINTMENT (OUTPATIENT)
Dept: PHYSICAL THERAPY | Age: 65
End: 2020-07-31
Payer: MEDICARE

## 2020-08-03 ENCOUNTER — HOSPITAL ENCOUNTER (OUTPATIENT)
Dept: PHYSICAL THERAPY | Age: 65
Discharge: HOME | End: 2020-08-03
Payer: MEDICARE

## 2020-08-03 PROCEDURE — 97110 THERAPEUTIC EXERCISES: CPT

## 2020-08-03 PROCEDURE — 97116 GAIT TRAINING THERAPY: CPT

## 2020-08-03 NOTE — PROGRESS NOTES
Romie No  : 1955  [place] / Product Type: Medicare /  [id number] at [address] [place]. [facility]., [address] [place], [address]  [phone]   [phone]                                                          Paulina Lee MD      OUTPATIENT PHYSICAL THERAPY: Daily Treatment Note 8/3/2020 Visit Count:  4    Tx Diagnosis:  Pain in left knee (M25.562)  Effusion, left knee (M25.462)  Stiffness of left knee, not elsewhere classified (M25.662)  Unilateral primary osteoarthritis, left knee (M17.12)        Pre-treatment Symptoms/Complaints:  Pt reports that she fell at her house when her dogs started barking. Patient rushed to make sure it wasn't an intruder. Pt tripped on the way over and her knee has been hurting since. Pt Came into therapy without her walker. Pain: Initial:1/10 Post Session: 4/10   Medications Last Reviewed:  8/3/2020     Updated Objective Findings: BP: 154/80 77-BPM        TREATMENT:   THERAPEUTIC EXERCISE: (30 minutes):  Exercises per grid below to improve mobility, strength and balance. Required minimal visual, verbal and manual cues to promote proper body alignment and promote proper body posture. Progressed resistance and complexity of movement as indicated. Date:  2020 Date:  2020 Date:  7/15/2020 Date  8/3/2020   Activity/Exercise Parameters Parameters Parameters    Education HEP, POC, PT goals, anatomy/pathology, Education on Scar management, Prognosis, Ambulatory device, Precautions and Goals for therapy. Education on Medical supply stores and walker fitting. Education on the importance of being on a walker. Risk of falls Education on update with doctor. Educated on injections and the expectations of injections in regards of pain and the overall goals of therapy.       Asistive device education and fitting 8 min      Knee extension 10 min 7 min  11 min   Knee bending 6 min 15 min     walking  4 min     nustep  8 min 10 min 10 min   Calf raises    30xs   Knee extension    30s red band   Hamstring Curl    30xs red band                       THERAPEUTIC ACTIVITY: ( 0 minutes): Activities per gid below to improve functional movement related mobility, strength and balance to improve neuro-muscular carryover to daily functional activities for improving patient's quality of life. Required visual, verbal and manual cues to promote proper body alignment and promote proper body posture/mechanics. Progressed resistance and complexity of movement as indicated. Date:  7/9/2020 Date:  7/13/2020 Date:   Date  8/3/2020   Activity/Exercise Parameters Parameters Parameters                                                                            Gait Training ( 10):  Gait training to improve and/or restore physical functioning as related to mobility and balance. Ambulated   with modified independence and minimal verbal cues related to their stride length and heel striketo promote proper body mechanics. Instruction in performance of Assistive device training to correct Unstable walking with hurt knee. Gracy Hargrove MANUAL THERAPY: (0 minutes): Joint mobilization, Soft tissue mobilization was utilized and necessary because of the patient's restricted joint motion and restricted motion of soft tissue mobility. Date  8/3/2020    Technique Used Grade Level # Time(s) Effect while being performed                                                                 HEP Log Date 1.    8/3/2020   2.  8/3/2020   3. 8/3/2020   4.    5.           [employer] Portal  Treatment/Session Summary:    Response to Treatment: Pt imporved in walking tolerance and quality today but was educated that pain may return despite injection. MIld pain with weight bearing exercises that improved with rest. Patient encouraged to still keep a cane along with her incase of aggravation.    Communication/Consultation: POC, HEP, PT goals, Faxed initial evaluation to MD.   Equipment provided today: HEP Handout     Recommendations/Intent for next treatment session:   Next visit will focus on Manual Therapy Quad strengthening Hip strengthening soft tissue mobilization Gait training. Treatment Plan of Care Effective Dates: 7/9/2020 TO 9/8/2020 (60 days).   Frequency/Duration: 3 times a week for 60 Days             Total Treatment Billable Duration:   40  Rx  PT Patient Time In/Time Out  Time In: 1301  Time Out: Calvin 60 Miguel Thrasher PT    Future Appointments   Date Time Provider Nikhil Eng   8/10/2020  1:00 PM Billy Kidney, PT [facility]   8/12/2020  1:00 PM Billy Kidney, PT [facility]   8/17/2020  1:00 PM Billy Kidney, PT [facility]   8/19/2020  1:00 PM Billy Kidney, PT [facility]   8/24/2020 10:15 AM Salvador Goodpasture Claudetta Rosella, MD [facility] CSA [facility] MAIN   8/24/2020  1:00 PM Billy Kidney, PT [facility]   9/8/2020  1:45 PM Abhishek Everett MD [facility] UCD   11/30/2020 10:00 AM [facility]   12/7/2020  3:40 PM Leticia Vo MD [facility] CA[facility]

## 2020-08-05 ENCOUNTER — APPOINTMENT (OUTPATIENT)
Dept: PHYSICAL THERAPY | Age: 65
End: 2020-08-05
Payer: MEDICARE

## 2020-08-10 ENCOUNTER — HOSPITAL ENCOUNTER (OUTPATIENT)
Dept: PHYSICAL THERAPY | Age: 65
Discharge: HOME | End: 2020-08-10
Payer: MEDICARE

## 2020-08-10 PROCEDURE — 97110 THERAPEUTIC EXERCISES: CPT

## 2020-08-10 NOTE — PROGRESS NOTES
Catrina Black  : 1955  [place] / Product Type: Medicare /  [address] [facility] at [address] [place] [address]. [facility], [address] [place] [address]  [phone]   [phone]                                                          Eduin Harris MD      OUTPATIENT PHYSICAL THERAPY: Daily Treatment Note 8/10/2020 Visit Count:  5    Tx Diagnosis:  Pain in left knee (M25.562)  Effusion, left knee (M25.462)  Stiffness of left knee, not elsewhere classified (M25.662)  Unilateral primary osteoarthritis, left knee (M17.12)        Pre-treatment Symptoms/Complaints:  Pt reports that she has been in pain since last week and she is having a real hard time walking around. Pain: Initial:1/10 Post Session: 4/10   Medications Last Reviewed:  8/10/2020     Updated Objective Findings: BP: 154/80 77-BPM        TREATMENT:   THERAPEUTIC EXERCISE: (15 minutes):  Exercises per grid below to improve mobility, strength and balance. Required minimal visual, verbal and manual cues to promote proper body alignment and promote proper body posture. Progressed resistance and complexity of movement as indicated. Date:  2020 Date:  2020 Date:  7/15/2020 Date  8/3/2020 Date  8/10/2020   Activity/Exercise Parameters Parameters Parameters     Education HEP, POC, PT goals, anatomy/pathology, Education on Scar management, Prognosis, Ambulatory device, Precautions and Goals for therapy. Education on Medical supply stores and walker fitting. Education on the importance of being on a walker. Risk of falls Education on update with doctor. Educated on injections and the expectations of injections in regards of pain and the overall goals of therapy.     Education on DVT and Aicha Shaista for DVTS.  += pitting edema, >3 cms difference, point tenderness along venous system   Asistive device education and fitting 8 min       Knee extension 10 min 7 min  11 min Knee bending 6 min 15 min      walking  4 min      nustep  8 min 10 min 10 min    Calf raises    30xs    Knee extension    30s red band    Hamstring Curl    30xs red band            DVT evaluation              THERAPEUTIC ACTIVITY: ( 0 minutes): Activities per gid below to improve functional movement related mobility, strength and balance to improve neuro-muscular carryover to daily functional activities for improving patient's quality of life. Required visual, verbal and manual cues to promote proper body alignment and promote proper body posture/mechanics. Progressed resistance and complexity of movement as indicated. Date:  7/9/2020 Date:  7/13/2020 Date:   Date  8/3/2020   Activity/Exercise Parameters Parameters Parameters       [facility]                                                           . MANUAL THERAPY: (0 minutes): Joint mobilization, Soft tissue mobilization was utilized and necessary because of the patient's restricted joint motion and restricted motion of soft tissue mobility. Date  8/10/2020    Technique Used Grade Level # Time(s) Effect while being performed                                                                 HEP Log Date 1.    8/10/2020   2.  8/10/2020   3. 8/10/2020   4.    5.           [employer] Portal  Treatment/Session Summary:    Response to Treatment: POt was assessed with Wells criteria of risk stratification with med-high risk of DVT. PT called the patients referring physician to inform of findings. Pt referred today to innervision for a doppler of the left lower extremity. Communication/Consultation:  POC, HEP, PT goals, Faxed initial evaluation to MD.   Equipment provided today: HEP Handout     Recommendations/Intent for next treatment session:   Next visit will focus on Manual Therapy Quad strengthening Hip strengthening soft tissue mobilization Gait training.            Treatment Plan of Care Effective Dates: 7/9/2020 TO 9/8/2020 (60 days).   Frequency/Duration: 3 times a week for 60 Days             Total Treatment Billable Duration:   15  Rx  PT Patient Time In/Time Out  Time In: 1301  Time Out: [address] Kayode Payne, [facility]OL[facility]    Future Appointments   Date Time Provider Nikhil Eng   8/12/2020  1:00 PM Brandi Jones, PT [facility] AND HOME [facility]   8/17/2020  1:00 PM Brandi Jones PT [facility]   8/19/2020  1:00 PM Brandi Jones PT [facility]   8/24/2020 10:15 AM Patrica Sen MD [facility] CSA [facility] MAIN   8/24/2020  1:00 PM Brandi Jones PT [facility]   9/8/2020  1:45 PM Karron Blizzard, MD [facility]   11/30/2020 10:00 AM [facility]   12/7/2020  3:40 PM Werner Parkinson MD [facility]

## 2020-08-12 ENCOUNTER — HOSPITAL ENCOUNTER (OUTPATIENT)
Dept: PHYSICAL THERAPY | Age: 65
Discharge: HOME | End: 2020-08-12
Payer: MEDICARE

## 2020-08-12 ENCOUNTER — APPOINTMENT (OUTPATIENT)
Dept: PHYSICAL THERAPY | Age: 65
End: 2020-08-12
Payer: MEDICARE

## 2020-08-12 PROCEDURE — 97140 MANUAL THERAPY 1/> REGIONS: CPT

## 2020-08-12 PROCEDURE — 97110 THERAPEUTIC EXERCISES: CPT

## 2020-08-12 NOTE — PROGRESS NOTES
Romie No  : 1955  [place] / Product Type: Medicare /  Amee Aponte at 4 [facility]. [facility], [address] [place] [address], [address]  [phone]   [phone]                                                          Paulina Lee MD      OUTPATIENT PHYSICAL THERAPY: Daily Treatment Note 2020 Visit Count:  6    Tx Diagnosis:  Pain in left knee (M25.562)  Effusion, left knee (M25.462)  Stiffness of left knee, not elsewhere classified (M25.662)  Unilateral primary osteoarthritis, left knee (M17.12)        Pre-treatment Symptoms/Complaints:  Pt reports that she is in lot of pain and she had her doppler. Pt comes into PT without a walker. Pain: Initial:1/10 Post Session: 4/10   Medications Last Reviewed:  2020     Updated Objective Findings: BP: 154/80 77-BPM        TREATMENT:   THERAPEUTIC EXERCISE: (15 minutes):  Exercises per grid below to improve mobility, strength and balance. Required minimal visual, verbal and manual cues to promote proper body alignment and promote proper body posture. Progressed resistance and complexity of movement as indicated. Date:  2020 Date:  7/15/2020 Date  8/3/2020 Date  8/10/2020 Date  2020   Activity/Exercise Parameters Parameters      Education Education on Medical supply stores and walker fitting. Education on the importance of being on a walker. Risk of falls Education on update with doctor. Educated on injections and the expectations of injections in regards of pain and the overall goals of therapy.     Education on DVT and Meño Noe for DVTS.  += pitting edema, >3 cms difference, point tenderness along venous system    Asistive device education and fitting        Knee extension 7 min  11 min     Knee bending 15 min       walking 4 min       nustep 8 min 10 min 10 min     Calf raises   30xs     Knee extension   30s red band     Hamstring Curl   30xs red band     Walking program     12 min   Weight shifting     3 min         THERAPEUTIC ACTIVITY: ( 0 minutes): Activities per gid below to improve functional movement related mobility, strength and balance to improve neuro-muscular carryover to daily functional activities for improving patient's quality of life. Required visual, verbal and manual cues to promote proper body alignment and promote proper body posture/mechanics. Progressed resistance and complexity of movement as indicated. Date:  7/9/2020 Date:  7/13/2020 Date:   Date  8/3/2020   Activity/Exercise Parameters Parameters Parameters       [facility]                                                           . Game ready: No pressure 20 min     MANUAL THERAPY: (13 minutes): Joint mobilization, Soft tissue mobilization was utilized and necessary because of the patient's restricted joint motion and restricted motion of soft tissue mobility. Date  8/12/2020    Technique Used Grade Level # Time(s) Effect while being performed   Unicondylar glides  Knee 13 min                                                           HEP Log Date 1.    8/12/2020   2.  8/12/2020   3. 8/12/2020   4.    5.           [facility] Portal  Treatment/Session Summary:    Response to Treatment: Pt focused on palliative care to improve weight bearing tolerance and gait quality. Pt tolerated treatment well and had improvement quality in walking. Communication/Consultation:  POC, HEP, PT goals, Faxed initial evaluation to MD.   Equipment provided today: HEP Handout     Recommendations/Intent for next treatment session:   Next visit will focus on Manual Therapy Quad strengthening Hip strengthening soft tissue mobilization Gait training. Treatment Plan of Care Effective Dates: 7/9/2020 TO 9/8/2020 (60 days).   Frequency/Duration: 3 times a week for 60 Days             Total Treatment Billable Duration:   28  Rx  PT Patient Time In/Time Out  Time In: 1302  Time Out: [address] [facility] Jomar Spaulding PT    Future Appointments   Date Time Provider Nikhil Albertina   8/17/2020  1:00 PM Rosemary Hui, PT [facility] AND [facility]   8/19/2020  1:00 PM Rosemary Hui, PT [facility] AND [facility]   8/24/2020 10:15 AM Juanise Shea Haley MD [facility] CSA CSA MAIN   8/24/2020  1:00 PM Rosemary Hui, PT [facility]   9/21/2020  2:15 PM Heather aLwrence MD [facility] UCDS UCD   11/30/2020 10:00 AM CAFM LAB [facility] CAFM CAFM   12/7/2020  3:40 PM Gerardo Sales MD [facility] CAFM CAFM

## 2020-08-17 ENCOUNTER — HOSPITAL ENCOUNTER (OUTPATIENT)
Dept: PHYSICAL THERAPY | Age: 65
Discharge: HOME | End: 2020-08-17
Payer: MEDICARE

## 2020-08-17 NOTE — PROGRESS NOTES
Diamond Rodríguez  : 1955  [place] / Product Type: Medicare /  Alvino Gem at 4 [facility]. [address] [facility] [address] [place] [address], [address]  [phone]   [phone]        OUTPATIENT DAILY NOTE    NAME: Diamond Rodríguez    DATE: 2020    Patient Cancelled physical therapy appointment today due to [address] Appointment. Will follow up next appointment. Pt will follow up with doctor next week to return to PT.     Bre Pagan, YOLY    Future Appointments   Date Time Provider Nikhil Eng   2020  7:00 PM Joy Ibarra, PT [facility] AND [facility]   2020  1:00 PM Joy Ibarra, PT [facility]   2020 10:15 AM [place] MD [facility] Sharp CSA CSA MAIN   2020  1:00 PM Joy Ibarra PT [facility]   2020  2:15 PM MD CORY Lamas UCDS UCD   2020 10:00 AM CAFM LAB [facility] CAFM CAFM   2020  3:40 PM MD CORY Jin CAFM CAFM     2

## 2020-08-19 ENCOUNTER — APPOINTMENT (OUTPATIENT)
Dept: PHYSICAL THERAPY | Age: 65
End: 2020-08-19
Payer: MEDICARE

## 2020-08-19 ENCOUNTER — HOSPITAL ENCOUNTER (OUTPATIENT)
Dept: PHYSICAL THERAPY | Age: 65
Discharge: HOME | End: 2020-08-19
Payer: MEDICARE

## 2020-08-19 PROCEDURE — 97140 MANUAL THERAPY 1/> REGIONS: CPT

## 2020-08-19 PROCEDURE — 97110 THERAPEUTIC EXERCISES: CPT

## 2020-08-19 NOTE — PROGRESS NOTES
Jenn Ryan  : 1955  [place] / Product Type: Medicare /  [id number] at [address] [place]. [facility], [address] [place] [address], [facility], [address] [place] [address]  [phone]   [phone]                                                          Huy Cameron MD      OUTPATIENT PHYSICAL THERAPY: Daily Treatment Note 2020 Visit Count:  7    Tx Diagnosis:  Pain in left knee (M25.562)  Effusion, left knee (M25.462)  Stiffness of left knee, not elsewhere classified (M25.662)  Unilateral primary osteoarthritis, left knee (M17.12)        Pre-treatment Symptoms/Complaints:  Pt reports that she has imrpoved a great deal and is walking around much better. Pain: Initial:1/10 Post Session: 4/10   Medications Last Reviewed:  2020     Updated Objective Findings: BP: 154/80 77-BPM        TREATMENT:   THERAPEUTIC EXERCISE: (40 minutes):  Exercises per grid below to improve mobility, strength and balance. Required minimal visual, verbal and manual cues to promote proper body alignment and promote proper body posture. Progressed resistance and complexity of movement as indicated. Date:  7/15/2020 Date  8/3/2020 Date  8/10/2020 Date  2020 Date  2020   Activity/Exercise Parameters       Education Education on the importance of being on a walker. Risk of falls Education on update with doctor. Educated on injections and the expectations of injections in regards of pain and the overall goals of therapy.     Education on DVT and Dixie Minks for DVTS.  += pitting edema, >3 cms difference, point tenderness along venous system     Asistive device education and fitting        Knee extension  11 min      Knee bending        walking        nustep 10 min 10 min   8 min   Calf raises  30xs   30xs   Knee extension  30s red band      Hamstring Curl  30xs red band      Walking program    12 min    Weight shifting    3 min    Hip abduction     30xs each side Sit to stands     30xs elevated surface   Calf stretch     3 min   Knee swings      10 lbs 3 min         THERAPEUTIC ACTIVITY: ( 0 minutes): Activities per gid below to improve functional movement related mobility, strength and balance to improve neuro-muscular carryover to daily functional activities for improving patient's quality of life. Required visual, verbal and manual cues to promote proper body alignment and promote proper body posture/mechanics. Progressed resistance and complexity of movement as indicated. Date:  7/9/2020 Date:  7/13/2020 Date:   Date  8/3/2020   Activity/Exercise Parameters Parameters Parameters                                                                                MANUAL THERAPY: (18 minutes): Joint mobilization, Soft tissue mobilization was utilized and necessary because of the patient's restricted joint motion and restricted motion of soft tissue mobility. Date  8/19/2020    Technique Used Grade Level # Time(s) Effect while being performed   Unicondylar glides and distraction  Knee 18 min                                                Game ready  Supine 15 min 34 degrees low pressure           HEP Log Date 1.    8/19/2020   2.  8/19/2020   3. 8/19/2020   4.    5.           [employer] Portal  Treatment/Session Summary:    Response to Treatment: Pt continued with body weight strengthening and walking to imporve knee motion and exercise tolerance. No pain with exercises and ha improvemnt in functional trasfers post treatment. Communication/Consultation:  POC, HEP, PT goals, Faxed initial evaluation to MD.   Equipment provided today: HEP Handout     Recommendations/Intent for next treatment session:   Next visit will focus on Manual Therapy Quad strengthening Hip strengthening soft tissue mobilization Gait training. Treatment Plan of Care Effective Dates: 7/9/2020 TO 9/8/2020 (60 days).   Frequency/Duration: 3 times a week for 60 Days Total Treatment Billable Duration:   58  Rx  PT Patient Time In/Time Out  Time In: 1300  Time Out: [address], PT    Future Appointments   Date Time Provider Nikhil Eng   8/24/2020 10:15 AM Donnamae Alpers, MD [facility] CSA CSA MAIN   8/24/2020  1:00 PM Calista Landing, PT [facility]   8/28/2020  1:00 PM Calista Landing, PT [facility]   9/1/2020  2:00 PM Calista Landing, PT [facility]   9/4/2020 11:00 AM Calista Landing, PT [facility]   9/8/2020  1:00 PM Calista Landing, PT [facility]   9/10/2020  1:00 PM Calista Landing, PT [facility]   9/14/2020  1:00 PM Calista Landing, PT [facility]   9/17/2020  1:00 PM Calista Landing, PT [facility]   9/21/2020 11:00 AM Calista Landing, PT [facility]   9/21/2020  2:15 PM Biis Tejeda MD [facility] UCDS UCD   9/24/2020 11:00 AM Calista Landing, PT [facility]   9/28/2020  1:00 PM Calista Landing, PT [facility]   11/30/2020 10:00 AM [facility] LAB [facility] CAFM CAFM   12/7/2020  3:40 PM Yudelka Altamirano MD [facility] CAFM CAFM

## 2020-08-24 ENCOUNTER — HOSPITAL ENCOUNTER (OUTPATIENT)
Dept: PHYSICAL THERAPY | Age: 65
Discharge: HOME | End: 2020-08-24
Payer: MEDICARE

## 2020-08-24 PROBLEM — Z91.198: Status: ACTIVE | Noted: 2020-08-24

## 2020-08-24 PROCEDURE — 97110 THERAPEUTIC EXERCISES: CPT

## 2020-08-24 PROCEDURE — 97530 THERAPEUTIC ACTIVITIES: CPT

## 2020-08-24 NOTE — PROGRESS NOTES
Veronica Olivo  : 1955  [place] / Product Type: Medicare /  [address] [facility] at [address] [place] [address]. [facility], [address], [address] [place] [address]  [phone]   [phone]                                                          David Ayon MD      OUTPATIENT PHYSICAL THERAPY: Daily Treatment Note 2020 Visit Count:  8    Tx Diagnosis:  Pain in left knee (M25.562)  Effusion, left knee (M25.462)  Stiffness of left knee, not elsewhere classified (M25.662)  Unilateral primary osteoarthritis, left knee (M17.12)        Pre-treatment Symptoms/Complaints:  Pt reports that she is doing well but her major complaint is waking up in the night and having her knee is painful. Pain: Initial:1/10 Post Session: 4/10   Medications Last Reviewed:  2020     Updated Objective Findings: -6 degrees of extension        TREATMENT:   THERAPEUTIC EXERCISE: (42 minutes):  Exercises per grid below to improve mobility, strength and balance. Required minimal visual, verbal and manual cues to promote proper body alignment and promote proper body posture. Progressed resistance and complexity of movement as indicated. Date  8/3/2020 Date  8/10/2020 Date  2020 Date  2020 Date  2020   Activity/Exercise        Education Educated on injections and the expectations of injections in regards of pain and the overall goals of therapy. Education on DVT and Arthea Pippins for DVTS.  += pitting edema, >3 cms difference, point tenderness along venous system   Educated on pain and Harm vs hurt. Reviewed Patient's progress over the course of two weeks.    Asistive device education and fitting        Knee extension 11 min       Knee bending        walking     6 min 600 ft   nustep 10 min   8 min 8 min   Calf raises 30xs   30xs 30xs   Knee extension 30s red band       Hamstring Curl 30xs red band       Walking program   12 min     Weight shifting   3 min     Hip abduction    30xs each side 30xs   Sit to stands    30xs elevated surface 30xs on low table   Calf stretch    3 min    Knee swings     10 lbs 3 min 13 lbs 3 min   Stool scoots     20 ft 5 xs                                 THERAPEUTIC ACTIVITY: ( 12 minutes): Activities per gid below to improve functional movement related mobility, strength and balance to improve neuro-muscular carryover to daily functional activities for improving patient's quality of life. Required visual, verbal and manual cues to promote proper body alignment and promote proper body posture/mechanics. Progressed resistance and complexity of movement as indicated. Date:  7/9/2020 Date:  7/13/2020 Date:   Date  8/3/2020   Activity/Exercise Parameters Parameters Parameters    Lateral walking       8xs no band   Monster walk       8xs no band   Stair climbs       3 mins                                                 MANUAL THERAPY: (0 minutes): Joint mobilization, Soft tissue mobilization was utilized and necessary because of the patient's restricted joint motion and restricted motion of soft tissue mobility. Date  8/24/2020    Technique Used Grade Level # Time(s) Effect while being performed                                                      Game ready  Supine 10 min 34 degrees low pressure           HEP Log Date 1.    8/24/2020   2.  8/24/2020   3. 8/24/2020   4.    5.           [employer] Portal  Treatment/Session Summary:    Response to Treatment: Pt continued with strengthening and progressed with exercises to improve strengthening and mobility. Mild pain with exercises so patient was educated on the premis of harm vs hurt.    Communication/Consultation:  POC, HEP, PT goals, Faxed initial evaluation to MD.   Equipment provided today: HEP Handout     Recommendations/Intent for next treatment session:   Next visit will focus on Manual Therapy Quad strengthening Hip strengthening soft tissue mobilization Gait training. Treatment Plan of Care Effective Dates: 7/9/2020 TO 9/8/2020 (60 days).   Frequency/Duration: 3 times a week for 60 Days             Total Treatment Billable Duration:   57  Rx  PT Patient Time In/Time Out  Time In: 1301  Time Out: [address] [place], YOLY    Future Appointments   Date Time Provider Nikhil Eng   8/28/2020  1:00 PM Lamas Kiran, PT [facility]   9/1/2020  2:00 PM Lamas Kiran, PT [facility]   9/3/2020  2:45 PM Emily Alex MD [facility] CSA CSA MAIN   9/4/2020 11:00 AM Lamas Kiran, PT [facility]   9/8/2020  1:00 PM Lamas Kiran, PT [facility]   9/10/2020  1:00 PM Lamas Kiran, PT [facility]   9/14/2020  1:00 PM Lamas Kiran, PT [facility]   9/17/2020  1:00 PM Lamas Kiran, PT [facility]   9/21/2020  2:15 PM Lydia Lynn MD [facility] UCDS UCD   9/22/2020  1:00 PM Lamas Kiran, PT [facility]   9/24/2020 11:00 AM Lamas Kiran, PT [facility]   9/28/2020  1:00 PM Lamas Kiran, PT [facility]   11/30/2020 10:00 AM CA[facility] LAB [facility] CAFM CAFM   12/7/2020  3:40 PM Ly Jaquez MD [facility] CAFM CAFM

## 2020-08-26 ENCOUNTER — APPOINTMENT (OUTPATIENT)
Dept: PHYSICAL THERAPY | Age: 65
End: 2020-08-26
Payer: MEDICARE

## 2020-08-28 ENCOUNTER — HOSPITAL ENCOUNTER (OUTPATIENT)
Dept: PHYSICAL THERAPY | Age: 65
Discharge: HOME | End: 2020-08-28
Payer: MEDICARE

## 2020-08-28 NOTE — PROGRESS NOTES
Yobany Shay  : 1955  [place] / Product Type: Medicare /  [id number] at [address] [facility]. [address] [place] [address] [address] [place] [address], [address]  [phone]   [phone]        OUTPATIENT DAILY NOTE    NAME: Yobany Shay    DATE: 2020    Patient Cancelled physical therapy appointment today due to Sickness. Will follow up next appointment.     Tye Scott PT    Future Appointments   Date Time Provider Nikhil Eng   2020  7:00 PM Damion Yip, PT [facility]   2020  2:00 PM Damion Yip, PT [facility]   9/3/2020  2:45 PM Merced Posada MD [facility] CSA CSA MAIN   2020 11:00 AM Damion Yip, PT [facility]   2020  1:00 PM Damion Yip, PT [facility]   9/10/2020  1:00 PM Damion Yip, PT [facility]   2020  1:00 PM Damion Yip, PT [facility]   2020  1:00 PM Damion Yip, PT [facility]   2020  2:15 PM Barber Levin MD [facility] UCDS UCD   2020  1:00 PM Damion Yip, PT [facility]   2020 11:00 AM Damion Yip, PT [facility]   2020  1:00 PM Damion Yip, PT [facility]   2020 10:00 AM [facility] LAB [facility] CAFM CAFM   2020  3:40 PM Aleyda Fields MD [facility] CAFM CAFM

## 2020-09-01 ENCOUNTER — HOSPITAL ENCOUNTER (OUTPATIENT)
Dept: PHYSICAL THERAPY | Age: 65
Discharge: HOME | End: 2020-09-01
Payer: MEDICARE

## 2020-09-01 PROCEDURE — 97530 THERAPEUTIC ACTIVITIES: CPT

## 2020-09-01 PROCEDURE — 97140 MANUAL THERAPY 1/> REGIONS: CPT

## 2020-09-01 PROCEDURE — 97110 THERAPEUTIC EXERCISES: CPT

## 2020-09-01 NOTE — PROGRESS NOTES
Otto Franklin  : 1955  [place] / Product Type: Medicare /  [address] [facility] at [address] [place] [address]. [facility] [address], [address] [place] [address]  [phone]   [phone]                                                          Luis Hernandez MD      OUTPATIENT PHYSICAL THERAPY: Daily Treatment Note 2020 Visit Count:  9    Tx Diagnosis:  Pain in left knee (M25.562)  Effusion, left knee (M25.462)  Stiffness of left knee, not elsewhere classified (M25.662)  Unilateral primary osteoarthritis, left knee (M17.12)        Pre-treatment Symptoms/Complaints:  Pt reports that her knee was doing well but she had to clean her whole house and her knee is bothering her. Pain: Initial:1/10 Post Session: 4/10   Medications Last Reviewed:  2020     Updated Objective Findings: -6 degrees of extension        TREATMENT:   THERAPEUTIC EXERCISE: (25 minutes):  Exercises per grid below to improve mobility, strength and balance. Required minimal visual, verbal and manual cues to promote proper body alignment and promote proper body posture. Progressed resistance and complexity of movement as indicated. Date  8/10/2020 Date  2020 Date  2020 Date  2020 Date  2020   Activity/Exercise        Education Education on DVT and Wilmon Polio for DVTS.  += pitting edema, >3 cms difference, point tenderness along venous system   Educated on pain and Harm vs hurt. Reviewed Patient's progress over the course of two weeks.     Asistive device education and fitting        Knee extension        Knee bending        walking    6 min 600 ft    nustep   8 min 8 min 9 min   Calf raises   30xs 30xs 30xs   Knee extension        Hamstring Curl        Walking program  12 min      Weight shifting  3 min      Hip abduction   30xs each side 30xs 30xs   Sit to stands   30xs elevated surface 30xs on low table 30xs   Calf stretch   3 min  3 min   Knee swings    10 lbs 3 min 13 lbs 3 min    Stool scoots    20 ft 5 xs 20 ft 5 xs                                 THERAPEUTIC ACTIVITY: ( 10 minutes): Activities per gid below to improve functional movement related mobility, strength and balance to improve neuro-muscular carryover to daily functional activities for improving patient's quality of life. Required visual, verbal and manual cues to promote proper body alignment and promote proper body posture/mechanics. Progressed resistance and complexity of movement as indicated. Date:  7/9/2020 Date:  7/13/2020 Date:   Date  8/3/2020 Date  9/1/2020   Activity/Exercise Parameters Parameters Parameters     Lateral walking       8xs no band 10 xs no band   Monster walk       8xs no band 10 xs no band   Stair climbs       3 mins    shuttle        25lbs 3xq0                                          MANUAL THERAPY: (10 minutes): Joint mobilization, Soft tissue mobilization was utilized and necessary because of the patient's restricted joint motion and restricted motion of soft tissue mobility. Date  9/1/2020    Technique Used Grade Level # Time(s) Effect while being performed   distraction  Tibiofeoral 6    Soft tissue  Hip adductors 4                                         Game ready  Supine 10 min 34 degrees low pressure           HEP Log Date 1.    9/2/2020   2.  9/2/2020   3. 9/2/2020   4.    5.           [employer] Portal  Treatment/Session Summary:      Response to Treatment: COntinued with light resistance strengthening to improve mobility and hip strength. Mild pain with eccetric phase of squats but able to tolerate closed chain squatting. Communication/Consultation:  POC, HEP, PT goals, Faxed initial evaluation to MD.   Equipment provided today: HEP Handout     Recommendations/Intent for next treatment session:   Next visit will focus on Manual Therapy Quad strengthening Hip strengthening soft tissue mobilization Gait training. Treatment Plan of Care Effective Dates: 7/9/2020 TO 9/8/2020 (60 days).   Frequency/Duration: 3 times a week for 60 Days             Total Treatment Billable Duration:   55  Rx  PT Patient Time In/Time Out  Time In: 1351  Time Out: Jose Antonio Oconnell, YOLY    Future Appointments   Date Time Provider Nikhil Eng   9/3/2020  2:45 PM Nhi Capps MD [facility] CSA CSA MAIN   9/8/2020  1:00 PM Amor Aye, PT [facility]   9/10/2020  1:00 PM Amor Aye, PT [facility]   9/14/2020  1:00 PM Amor Aye, PT [facility]   9/17/2020  1:00 PM Amor Aye, PT [facility]   9/21/2020  2:15 PM Ara Walker MD [facility] UCDS UCD   9/22/2020  1:00 PM Amor Aye, PT [facility]   9/24/2020 11:00 AM Amor Belle, PT [facility]   9/28/2020  1:00 PM Amor Belle, PT [facility]   11/30/2020 10:00 AM [facility] LAB [facility] CAFM CAFM   12/7/2020  3:40 PM Joellen Read MD [facility] CAFM CAFM

## 2020-09-04 ENCOUNTER — APPOINTMENT (OUTPATIENT)
Dept: PHYSICAL THERAPY | Age: 65
End: 2020-09-04
Payer: MEDICARE

## 2020-09-08 ENCOUNTER — HOSPITAL ENCOUNTER (OUTPATIENT)
Dept: PHYSICAL THERAPY | Age: 65
Discharge: HOME | End: 2020-09-08
Payer: MEDICARE

## 2020-09-10 ENCOUNTER — APPOINTMENT (OUTPATIENT)
Dept: PHYSICAL THERAPY | Age: 65
End: 2020-09-10
Payer: MEDICARE

## 2020-09-14 ENCOUNTER — HOSPITAL ENCOUNTER (OUTPATIENT)
Dept: PHYSICAL THERAPY | Age: 65
Discharge: HOME | End: 2020-09-14
Payer: MEDICARE

## 2020-09-14 PROCEDURE — 97110 THERAPEUTIC EXERCISES: CPT

## 2020-09-14 PROCEDURE — 97530 THERAPEUTIC ACTIVITIES: CPT

## 2020-09-14 NOTE — PROGRESS NOTES
Miladsi Lopez  : 1955  [place] / Product Type: Medicare /  [address] [facility] at [address] [place] [address]. [facility], [address] [place] [address]  [phone]   [phone]                                                          Mateo Benton MD      OUTPATIENT PHYSICAL THERAPY: Daily Treatment Note 2020 Visit Count:  10    Tx Diagnosis:  Pain in left knee (M25.562)  Effusion, left knee (M25.462)  Stiffness of left knee, not elsewhere classified (M25.662)  Unilateral primary osteoarthritis, left knee (M17.12)        Pre-treatment Symptoms/Complaints:  Please See Re-Certification note dated 20 for more details. Pain: Initial:2/10 Post Session: 0/10   Medications Last Reviewed:  2020     Updated Objective Findings: Please see Re-Certification  dated 20 for more details. TREATMENT:   THERAPEUTIC EXERCISE: (27 minutes):  Exercises per grid below to improve mobility, strength and balance. Required minimal visual, verbal and manual cues to promote proper body alignment and promote proper body posture. Progressed resistance and complexity of movement as indicated. Date  8/10/2020 Date  2020 Date  2020 Date  2020 Date  2020 Date  2020   Activity/Exercise         Education Education on DVT and Sundeep Miles for DVTS.  += pitting edema, >3 cms difference, point tenderness along venous system   Educated on pain and Harm vs hurt. Reviewed Patient's progress over the course of two weeks.      Asistive device education and fitting         Knee extension         Knee bending         walking    6 min 600 ft     nustep   8 min 8 min 9 min 8 min   Calf raises   30xs 30xs 30xs 30xs   Knee extension         Hamstring Curl         Walking program  12 min       Weight shifting  3 min       Hip abduction   30xs each side 30xs 30xs 30xs   Sit to stands   30xs elevated surface 30xs on low table 30xs 30xs 15lbs   Calf stretch   3 min  3 min 3 min   Knee swings    10 lbs 3 min 13 lbs 3 min     Stool scoots    20 ft 5 xs 20 ft 5 xs                                     THERAPEUTIC ACTIVITY: ( 26 minutes): Activities per gid below to improve functional movement related mobility, strength and balance to improve neuro-muscular carryover to daily functional activities for improving patient's quality of life. Required visual, verbal and manual cues to promote proper body alignment and promote proper body posture/mechanics. Progressed resistance and complexity of movement as indicated. Date:  7/9/2020 Date:  7/13/2020 Date:   Date  8/3/2020 Date  9/1/2020 Date  9/14/2020   Activity/Exercise Parameters Parameters Parameters      Lateral walking       8xs no band 10 xs no band 20xs orange band   Monster walk       8xs no band 10 xs no band 20xs orange band   Stair climbs       3 mins  3 min   shuttle        25lbs 3x10 100 lbs 3x6                                             MANUAL THERAPY: (0 minutes): Joint mobilization, Soft tissue mobilization was utilized and necessary because of the patient's restricted joint motion and restricted motion of soft tissue mobility. Date  9/14/2020    Technique Used Grade Level # Time(s) Effect while being performed                                                      Game ready  Supine 10 min 34 degrees low pressure           HEP Log Date 1.    9/14/2020   2.  9/14/2020   3. 9/14/2020   4.    5.           [employer] Portal  Treatment/Session Summary:      Response to Treatment: Please See Certification Note dated 9.14.20 for more details. Communication/Consultation:  POC, HEP, PT goals, Faxed initial evaluation to MD.   Equipment provided today: HEP Handout     Recommendations/Intent for next treatment session:   Next visit will focus on Manual Therapy Quad strengthening Hip strengthening soft tissue mobilization Gait training.            Treatment Plan of Care Effective Dates: 7/9/2020 TO 9/8/2020 (60 days).   Frequency/Duration: 3 times a week for 60 Days             Total Treatment Billable Duration:   53  Rx  PT Patient Time In/Time Out  Time In: 1301  Time Out: 50 [facility] Elizabeth Ramos, YOLY    Future Appointments   Date Time Provider Nikhil Eng   9/17/2020  1:00 PM Darya Maddox, PT [facility]   9/21/2020  2:15 PM Alfonso Quevedo MD [facility] UCD   9/22/2020  1:00 PM Darya Maddox, PT [facility]   9/24/2020 11:00 AM Darya Maddox, PT [facility]   9/28/2020  1:00 PM Darya Maddox, PT [facility]   11/30/2020 10:00 AM [facility] LAB [facility] CAFM CAFM   12/7/2020  3:40 PM Zachary Stewart MD [facility] CAFM CAFM

## 2020-09-17 ENCOUNTER — HOSPITAL ENCOUNTER (OUTPATIENT)
Dept: PHYSICAL THERAPY | Age: 65
Discharge: HOME | End: 2020-09-17
Payer: MEDICARE

## 2020-09-17 NOTE — PROGRESS NOTES
Chaitanya Murphy  : 1955  [place]: [place] / Product Type: Medicare /  [id number] at [address] [place]. [address] [place] [address] [address] [place] [address], [facility], [address] [place] [address] Road  [phone]   [phone]        OUTPATIENT DAILY NOTE    NAME: Chaitanya Murphy    DATE: 2020    Patient Cancelled physical therapy appointment today due to Car trouble. Will follow up next appointment.     Beau Berger PT    Future Appointments   Date Time Provider Nikhil Eng   2020  7:00 PM Phuong Merida [facility]   2020  2:15 PM Paulette Toussaint MD [facility]   2020  1:00 PM Phuong Merida PT [facility]   2020 11:00 AM Phuong Merida PT [facility]   2020  1:00 PM Phuong Merida PT [facility]   2020 10:00 AM [facility]   2020  3:40 PM Fred Vidales MD [facility] HE[facility]

## 2020-09-21 ENCOUNTER — APPOINTMENT (OUTPATIENT)
Dept: PHYSICAL THERAPY | Age: 65
End: 2020-09-21
Payer: MEDICARE

## 2020-09-22 ENCOUNTER — HOSPITAL ENCOUNTER (OUTPATIENT)
Dept: PHYSICAL THERAPY | Age: 65
Discharge: HOME | End: 2020-09-22
Payer: MEDICARE

## 2020-09-22 PROCEDURE — 97110 THERAPEUTIC EXERCISES: CPT

## 2020-09-22 NOTE — PROGRESS NOTES
Catrina Black  : 1955  [place]: [place] / Product Type: Medicare /  [id number] at [address] [place]. [facility], [address] [place] [address], [facility], [address] [place] [address]  [phone]   [phone]                                                          Eduin Harris MD      OUTPATIENT PHYSICAL THERAPY: Daily Treatment Note 2020 Visit Count:  11    Tx Diagnosis:  Pain in left knee (M25.562)  Effusion, left knee (M25.462)  Stiffness of left knee, not elsewhere classified (M25.662)  Unilateral primary osteoarthritis, left knee (M17.12)        Pre-treatment Symptoms/Complaints:  Pt reports that her knee has moments where it btohers her and she last goal she wants is to walk faster. Pain: Initial:2/10 Post Session: 0/10   Medications Last Reviewed:  2020     Updated Objective Findings: Please see Re-Certification  dated 20 for more details. TREATMENT:   THERAPEUTIC EXERCISE: (44 minutes):  Exercises per grid below to improve mobility, strength and balance. Required minimal visual, verbal and manual cues to promote proper body alignment and promote proper body posture. Progressed resistance and complexity of movement as indicated. Date  2020 Date  2020 Date  2020 Date  2020 Date  2020 Date  2020   Activity/Exercise         Education   Educated on pain and Harm vs hurt. Reviewed Patient's progress over the course of two weeks.       Asistive device education and fitting         Knee extension         Knee bending         walking   6 min 600 ft      nustep  8 min 8 min 9 min 8 min 8 min   Calf raises  30xs 30xs 30xs 30xs 30xs   Knee extension         Hamstring Curl         Walking program 12 min        Weight shifting 3 min        Hip abduction  30xs each side 30xs 30xs 30xs 30s   Sit to stands  30xs elevated surface 30xs on low table 30xs 30xs 15lbs    Calf stretch  3 min  3 min 3 min 3 min   Knee swings   10 lbs 3 min 13 lbs 3 min      Stool scoots   20 ft 5 xs 20 ft 5 xs     Walking program      12 min                           THERAPEUTIC ACTIVITY: ( 0 minutes): Activities per gid below to improve functional movement related mobility, strength and balance to improve neuro-muscular carryover to daily functional activities for improving patient's quality of life. Required visual, verbal and manual cues to promote proper body alignment and promote proper body posture/mechanics. Progressed resistance and complexity of movement as indicated. Date:  7/13/2020 Date:   Date  8/3/2020 Date  9/1/2020 Date  9/14/2020   Activity/Exercise Parameters Parameters      Lateral walking     8xs no band 10 xs no band 20xs orange band   Monster walk     8xs no band 10 xs no band 20xs orange band   Stair climbs     3 mins  3 min   shuttle      25lbs 3x10 100 lbs 3x6                                       MANUAL THERAPY: (0 minutes): Joint mobilization, Soft tissue mobilization was utilized and necessary because of the patient's restricted joint motion and restricted motion of soft tissue mobility. Date  9/22/2020    Technique Used Grade Level # Time(s) Effect while being performed                                                      Game ready  Supine 10 min 34 degrees low pressure           HEP Log Date 1.    9/22/2020   2.  9/22/2020   3. 9/22/2020   4.    5.           [employer] Portal  Treatment/Session Summary:      Response to Treatment: Pt continued with exerciss but discussed the importance of getting on a walking program to improve walking speed and aquatics to help with fibro. Communication/Consultation:  POC, HEP, PT goals, Faxed initial evaluation to MD.   Equipment provided today: HEP Handout     Recommendations/Intent for next treatment session:   Next visit will focus on Manual Therapy Quad strengthening Hip strengthening soft tissue mobilization Gait training.            Treatment Plan of Care Effective Dates: 7/9/2020 TO 9/8/2020 (60 days).   Frequency/Duration: 3 times a week for 60 Days             Total Treatment Billable Duration:   44  Rx  PT Patient Time In/Time Out  Time In: 1300  Time Out: [address] Susan Triplett, PT    Future Appointments   Date Time Provider Nikhil Eng   9/24/2020 11:00 AM Damion Yip PT [facility]   9/28/2020  1:00 PM Damion Yip PT [facility]   11/30/2020 10:00 AM [facility] LAB [facility]   12/7/2020  3:40 PM Aleyda Fields MD [facility] CAF CAF

## 2020-09-24 ENCOUNTER — HOSPITAL ENCOUNTER (OUTPATIENT)
Dept: PHYSICAL THERAPY | Age: 65
Discharge: HOME | End: 2020-09-24
Payer: MEDICARE

## 2020-09-25 NOTE — PROGRESS NOTES
Aakash Dhaliwal  : 1955  [place]: [place] / Product Type: Medicare /  Shorty Negar at [address] [place] [address]. [address] [place] [address] [address] [place] [address]  [phone]   [phone]        OUTPATIENT DAILY NOTE    NAME: Aakash Dhaliwal    DATE: 2020    Patient Cancelled physical therapy appointment today due to Work Conflict. Will follow up next appointment.     Naz Avila PT    Future Appointments   Date Time Provider Nikhil Eng   2020  1:00 PM Linda Valentin PT [facility] AND [facility]   10/2/2020  1:00 PM Linda Valentin PT [facility]   2020 10:00 AM [facility]   2020  3:40 PM Isaias Dugan MD [facility] FOR BEHAVIORAL HEALTH

## 2020-09-28 ENCOUNTER — HOSPITAL ENCOUNTER (OUTPATIENT)
Dept: PHYSICAL THERAPY | Age: 65
Discharge: HOME | End: 2020-09-28
Payer: MEDICARE

## 2020-09-28 NOTE — PROGRESS NOTES
Eliecer Jackson  : 1955  [place] / Product Type: Medicare /  [id number] at [address] [facility]. [address] [place] [address] [address] [place] [address], [address]  [phone]   [phone]        OUTPATIENT DAILY NOTE    NAME: Eliecer Jackson    DATE: 2020    Patient Cancelled physical therapy appointment today due to Sickness. Patient will be discharge from Physical Therapy at this time.     Alexia Fairbanks, PT    Future Appointments   Date Time Provider Nikhil Eng   2020  [facility]   2020 10:00 AM [facility]   2020  3:40 PM Norman East MD [facility]

## 2020-12-07 PROBLEM — Z91.198: Status: RESOLVED | Noted: 2020-08-24 | Resolved: 2020-12-07

## 2020-12-07 PROBLEM — S20.00XA BRUISE OF BREAST: Status: RESOLVED | Noted: 2020-06-03 | Resolved: 2020-12-07

## 2020-12-07 PROBLEM — E78.00 PURE HYPERCHOLESTEROLEMIA: Status: RESOLVED | Noted: 2019-06-28 | Resolved: 2020-12-07

## 2021-01-29 PROBLEM — Z95.820 S/P ANGIOPLASTY WITH STENT: Status: ACTIVE | Noted: 2021-01-29

## 2021-02-28 ENCOUNTER — APPOINTMENT (OUTPATIENT)
Dept: GENERAL RADIOLOGY | Age: 66
DRG: 637 | End: 2021-02-28
Attending: STUDENT IN AN ORGANIZED HEALTH CARE EDUCATION/TRAINING PROGRAM
Payer: COMMERCIAL

## 2021-02-28 ENCOUNTER — HOSPITAL ENCOUNTER (INPATIENT)
Age: 66
LOS: 4 days | Discharge: HOME | DRG: 637 | End: 2021-03-04
Attending: STUDENT IN AN ORGANIZED HEALTH CARE EDUCATION/TRAINING PROGRAM | Admitting: FAMILY MEDICINE
Payer: COMMERCIAL

## 2021-02-28 DIAGNOSIS — E10.10 DIABETIC KETOACIDOSIS WITHOUT COMA ASSOCIATED WITH TYPE 1 DIABETES MELLITUS (HCC): Primary | ICD-10-CM

## 2021-02-28 DIAGNOSIS — A41.9 SEPTIC SHOCK (HCC): ICD-10-CM

## 2021-02-28 DIAGNOSIS — R77.8 ELEVATED TROPONIN: ICD-10-CM

## 2021-02-28 DIAGNOSIS — A41.9 SEPSIS, DUE TO UNSPECIFIED ORGANISM, UNSPECIFIED WHETHER ACUTE ORGAN DYSFUNCTION PRESENT (HCC): ICD-10-CM

## 2021-02-28 DIAGNOSIS — E10.10 TYPE 1 DIABETES MELLITUS WITH KETOACIDOSIS WITHOUT COMA (HCC): ICD-10-CM

## 2021-02-28 DIAGNOSIS — N17.9 AKI (ACUTE KIDNEY INJURY) (HCC): ICD-10-CM

## 2021-02-28 DIAGNOSIS — I25.119 CORONARY ARTERY DISEASE INVOLVING NATIVE CORONARY ARTERY OF NATIVE HEART WITH ANGINA PECTORIS (HCC): ICD-10-CM

## 2021-02-28 DIAGNOSIS — R65.21 SEPTIC SHOCK (HCC): ICD-10-CM

## 2021-02-28 DIAGNOSIS — J44.0 CHRONIC OBSTRUCTIVE PULMONARY DISEASE WITH ACUTE LOWER RESPIRATORY INFECTION (HCC): ICD-10-CM

## 2021-02-28 DIAGNOSIS — E78.00 PURE HYPERCHOLESTEROLEMIA: ICD-10-CM

## 2021-02-28 PROBLEM — E87.20 LACTIC ACIDEMIA: Status: RESOLVED | Noted: 2021-02-28 | Resolved: 2021-02-28

## 2021-02-28 PROBLEM — D72.829 LEUKOCYTOSIS: Status: ACTIVE | Noted: 2021-02-28

## 2021-02-28 PROBLEM — E87.6 HYPOKALEMIA: Status: ACTIVE | Noted: 2021-02-28

## 2021-02-28 PROBLEM — E87.6 HYPOKALEMIA: Status: RESOLVED | Noted: 2021-02-28 | Resolved: 2021-02-28

## 2021-02-28 PROBLEM — E87.20 LACTIC ACIDEMIA: Status: ACTIVE | Noted: 2021-02-28

## 2021-02-28 PROBLEM — E87.5 HYPERKALEMIA: Status: ACTIVE | Noted: 2021-02-28

## 2021-02-28 PROBLEM — E87.1 HYPONATREMIA: Status: ACTIVE | Noted: 2021-02-28

## 2021-02-28 LAB
ADMINISTERED INITIALS, ADMINIT: NORMAL
ALBUMIN SERPL-MCNC: 4.2 G/DL (ref 3.2–4.6)
ALBUMIN/GLOB SERPL: 1.1 {RATIO} (ref 1.2–3.5)
ALP SERPL-CCNC: 91 U/L (ref 50–136)
ALT SERPL-CCNC: 35 U/L (ref 12–65)
ANION GAP SERPL CALC-SCNC: 20 MMOL/L (ref 7–16)
ANION GAP SERPL CALC-SCNC: 25 MMOL/L (ref 7–16)
APPEARANCE UR: CLEAR
APTT PPP: 25.9 SEC (ref 24.1–35.1)
ARTERIAL PATENCY WRIST A: YES
AST SERPL-CCNC: 17 U/L (ref 15–37)
ATRIAL RATE: 75 BPM
BACTERIA URNS QL MICRO: 0 /HPF
BASE DEFICIT BLD-SCNC: 24 MMOL/L
BASOPHILS # BLD: 0.1 K/UL (ref 0–0.2)
BASOPHILS # BLD: 0.1 K/UL (ref 0–0.2)
BASOPHILS NFR BLD: 0 % (ref 0–2)
BASOPHILS NFR BLD: 1 % (ref 0–2)
BDY SITE: ABNORMAL
BILIRUB SERPL-MCNC: 1 MG/DL (ref 0.2–1.1)
BILIRUB UR QL: NEGATIVE
BUN SERPL-MCNC: 37 MG/DL (ref 8–23)
BUN SERPL-MCNC: 43 MG/DL (ref 8–23)
CALCIUM SERPL-MCNC: 7.7 MG/DL (ref 8.3–10.4)
CALCIUM SERPL-MCNC: 9 MG/DL (ref 8.3–10.4)
CALCULATED P AXIS, ECG09: 68 DEGREES
CALCULATED R AXIS, ECG10: 114 DEGREES
CALCULATED T AXIS, ECG11: -118 DEGREES
CASTS URNS QL MICRO: ABNORMAL /LPF
CHLORIDE SERPL-SCNC: 104 MMOL/L (ref 98–107)
CHLORIDE SERPL-SCNC: 84 MMOL/L (ref 98–107)
CO2 BLD-SCNC: 7 MMOL/L
CO2 SERPL-SCNC: 11 MMOL/L (ref 21–32)
CO2 SERPL-SCNC: 14 MMOL/L (ref 21–32)
COLLECT TIME,HTIME: 1752
COLOR UR: YELLOW
CREAT SERPL-MCNC: 1.56 MG/DL (ref 0.6–1)
CREAT SERPL-MCNC: 2.15 MG/DL (ref 0.6–1)
D50 ADMINISTERED, D50ADM: 0 ML
D50 ORDER, D50ORD: 0 ML
DIAGNOSIS, 93000: NORMAL
DIFFERENTIAL METHOD BLD: ABNORMAL
DIFFERENTIAL METHOD BLD: ABNORMAL
EOSINOPHIL # BLD: 0 K/UL (ref 0–0.8)
EOSINOPHIL # BLD: 0 K/UL (ref 0–0.8)
EOSINOPHIL NFR BLD: 0 % (ref 0.5–7.8)
EOSINOPHIL NFR BLD: 0 % (ref 0.5–7.8)
EPI CELLS #/AREA URNS HPF: ABNORMAL /HPF
ERYTHROCYTE [DISTWIDTH] IN BLOOD BY AUTOMATED COUNT: 13.3 % (ref 11.9–14.6)
ERYTHROCYTE [DISTWIDTH] IN BLOOD BY AUTOMATED COUNT: 13.4 % (ref 11.9–14.6)
EST. AVERAGE GLUCOSE BLD GHB EST-MCNC: 209 MG/DL
FLOW RATE ISTAT,IFRATE: 2 L/MIN
GAS FLOW.O2 O2 DELIVERY SYS: ABNORMAL L/MIN
GLOBULIN SER CALC-MCNC: 3.8 G/DL (ref 2.3–3.5)
GLSCOM COMMENTS: NORMAL
GLUCOSE BLD STRIP.AUTO-MCNC: 275 MG/DL (ref 65–100)
GLUCOSE BLD STRIP.AUTO-MCNC: 361 MG/DL (ref 65–100)
GLUCOSE BLD STRIP.AUTO-MCNC: 377 MG/DL (ref 65–100)
GLUCOSE BLD STRIP.AUTO-MCNC: 423 MG/DL (ref 65–100)
GLUCOSE SERPL-MCNC: 348 MG/DL (ref 65–100)
GLUCOSE SERPL-MCNC: 652 MG/DL (ref 65–100)
GLUCOSE UR STRIP.AUTO-MCNC: ABNORMAL MG/DL
GLUCOSE, GLC: 275 MG/DL
GLUCOSE, GLC: 361 MG/DL
GLUCOSE, GLC: 377 MG/DL
GLUCOSE, GLC: 423 MG/DL
HBA1C MFR BLD: 8.9 % (ref 4.2–6.3)
HCO3 BLD-SCNC: 6.4 MMOL/L (ref 22–26)
HCT VFR BLD AUTO: 42 % (ref 35.8–46.3)
HCT VFR BLD AUTO: 48.5 % (ref 35.8–46.3)
HGB BLD-MCNC: 13.1 G/DL (ref 11.7–15.4)
HGB BLD-MCNC: 15 G/DL (ref 11.7–15.4)
HGB UR QL STRIP: NEGATIVE
HIGH TARGET, HITG: 180 MG/DL
IMM GRANULOCYTES # BLD AUTO: 0.2 K/UL (ref 0–0.5)
IMM GRANULOCYTES # BLD AUTO: 0.6 K/UL (ref 0–0.5)
IMM GRANULOCYTES NFR BLD AUTO: 1 % (ref 0–5)
IMM GRANULOCYTES NFR BLD AUTO: 3 % (ref 0–5)
INSULIN ADMINSTERED, INSADM: 12 UNITS/HOUR
INSULIN ADMINSTERED, INSADM: 7.3 UNITS/HOUR
INSULIN ADMINSTERED, INSADM: 8.6 UNITS/HOUR
INSULIN ADMINSTERED, INSADM: 9.5 UNITS/HOUR
INSULIN ORDER, INSORD: 12 UNITS/HOUR
INSULIN ORDER, INSORD: 7.3 UNITS/HOUR
INSULIN ORDER, INSORD: 8.6 UNITS/HOUR
INSULIN ORDER, INSORD: 9.5 UNITS/HOUR
KETONES UR QL STRIP.AUTO: 80 MG/DL
LACTATE SERPL-SCNC: 3 MMOL/L (ref 0.4–2)
LACTATE SERPL-SCNC: 3.1 MMOL/L (ref 0.4–2)
LEUKOCYTE ESTERASE UR QL STRIP.AUTO: NEGATIVE
LOW TARGET, LOT: 140 MG/DL
LYMPHOCYTES # BLD: 1.1 K/UL (ref 0.5–4.6)
LYMPHOCYTES # BLD: 1.4 K/UL (ref 0.5–4.6)
LYMPHOCYTES NFR BLD: 6 % (ref 13–44)
LYMPHOCYTES NFR BLD: 7 % (ref 13–44)
MAGNESIUM SERPL-MCNC: 2.1 MG/DL (ref 1.8–2.4)
MAGNESIUM SERPL-MCNC: 2.7 MG/DL (ref 1.8–2.4)
MCH RBC QN AUTO: 30.4 PG (ref 26.1–32.9)
MCH RBC QN AUTO: 30.8 PG (ref 26.1–32.9)
MCHC RBC AUTO-ENTMCNC: 30.9 G/DL (ref 31.4–35)
MCHC RBC AUTO-ENTMCNC: 31.2 G/DL (ref 31.4–35)
MCV RBC AUTO: 98.2 FL (ref 79.6–97.8)
MCV RBC AUTO: 98.6 FL (ref 79.6–97.8)
MINUTES UNTIL NEXT BG, NBG: 60 MIN
MONOCYTES # BLD: 0.5 K/UL (ref 0.1–1.3)
MONOCYTES # BLD: 0.9 K/UL (ref 0.1–1.3)
MONOCYTES NFR BLD: 3 % (ref 4–12)
MONOCYTES NFR BLD: 4 % (ref 4–12)
MULTIPLIER, MUL: 0.02
MULTIPLIER, MUL: 0.03
MULTIPLIER, MUL: 0.04
MULTIPLIER, MUL: 0.04
NEUTS SEG # BLD: 14.3 K/UL (ref 1.7–8.2)
NEUTS SEG # BLD: 20.1 K/UL (ref 1.7–8.2)
NEUTS SEG NFR BLD: 87 % (ref 43–78)
NEUTS SEG NFR BLD: 88 % (ref 43–78)
NITRITE UR QL STRIP.AUTO: NEGATIVE
NRBC # BLD: 0 K/UL (ref 0–0.2)
NRBC # BLD: 0 K/UL (ref 0–0.2)
ORDER INITIALS, ORDINIT: NORMAL
OTHER OBSERVATIONS,UCOM: ABNORMAL
P-R INTERVAL, ECG05: 222 MS
PCO2 BLD: 27.5 MMHG (ref 35–45)
PH BLD: 6.98 [PH] (ref 7.35–7.45)
PH UR STRIP: 5 [PH] (ref 5–9)
PHOSPHATE SERPL-MCNC: 5.2 MG/DL (ref 2.3–3.7)
PLATELET # BLD AUTO: 238 K/UL (ref 150–450)
PLATELET # BLD AUTO: 272 K/UL (ref 150–450)
PMV BLD AUTO: 12.1 FL (ref 9.4–12.3)
PMV BLD AUTO: 12.1 FL (ref 9.4–12.3)
PO2 BLD: 102 MMHG (ref 75–100)
POTASSIUM SERPL-SCNC: 4.6 MMOL/L (ref 3.5–5.1)
POTASSIUM SERPL-SCNC: 6.7 MMOL/L (ref 3.5–5.1)
PROT SERPL-MCNC: 8 G/DL (ref 6.3–8.2)
PROT UR STRIP-MCNC: NEGATIVE MG/DL
Q-T INTERVAL, ECG07: 392 MS
QRS DURATION, ECG06: 100 MS
QTC CALCULATION (BEZET), ECG08: 437 MS
RBC # BLD AUTO: 4.26 M/UL (ref 4.05–5.2)
RBC # BLD AUTO: 4.94 M/UL (ref 4.05–5.2)
RBC #/AREA URNS HPF: ABNORMAL /HPF
SAO2 % BLD: 93 % (ref 95–98)
SERVICE CMNT-IMP: ABNORMAL
SERVICE CMNT-IMP: ABNORMAL
SODIUM SERPL-SCNC: 123 MMOL/L (ref 136–145)
SODIUM SERPL-SCNC: 135 MMOL/L (ref 136–145)
SP GR UR REFRACTOMETRY: 1.02 (ref 1–1.02)
SPECIMEN TYPE: ABNORMAL
TROPONIN-HIGH SENSITIVITY: 7 PG/ML (ref 0–14)
TROPONIN-HIGH SENSITIVITY: 9 PG/ML (ref 0–14)
UROBILINOGEN UR QL STRIP.AUTO: 0.2 EU/DL (ref 0.2–1)
VENTRICULAR RATE, ECG03: 75 BPM
WBC # BLD AUTO: 16.2 K/UL (ref 4.3–11.1)
WBC # BLD AUTO: 23.1 K/UL (ref 4.3–11.1)
WBC URNS QL MICRO: ABNORMAL /HPF

## 2021-02-28 PROCEDURE — 74011000258 HC RX REV CODE- 258: Performed by: STUDENT IN AN ORGANIZED HEALTH CARE EDUCATION/TRAINING PROGRAM

## 2021-02-28 PROCEDURE — 71045 X-RAY EXAM CHEST 1 VIEW: CPT

## 2021-02-28 PROCEDURE — 83735 ASSAY OF MAGNESIUM: CPT

## 2021-02-28 PROCEDURE — 74011000250 HC RX REV CODE- 250

## 2021-02-28 PROCEDURE — 82803 BLOOD GASES ANY COMBINATION: CPT

## 2021-02-28 PROCEDURE — 74011636637 HC RX REV CODE- 636/637: Performed by: STUDENT IN AN ORGANIZED HEALTH CARE EDUCATION/TRAINING PROGRAM

## 2021-02-28 PROCEDURE — 87086 URINE CULTURE/COLONY COUNT: CPT

## 2021-02-28 PROCEDURE — 85730 THROMBOPLASTIN TIME PARTIAL: CPT

## 2021-02-28 PROCEDURE — 87040 BLOOD CULTURE FOR BACTERIA: CPT

## 2021-02-28 PROCEDURE — 83605 ASSAY OF LACTIC ACID: CPT

## 2021-02-28 PROCEDURE — 74011250636 HC RX REV CODE- 250/636: Performed by: STUDENT IN AN ORGANIZED HEALTH CARE EDUCATION/TRAINING PROGRAM

## 2021-02-28 PROCEDURE — 93005 ELECTROCARDIOGRAM TRACING: CPT

## 2021-02-28 PROCEDURE — 96365 THER/PROPH/DIAG IV INF INIT: CPT

## 2021-02-28 PROCEDURE — 80053 COMPREHEN METABOLIC PANEL: CPT

## 2021-02-28 PROCEDURE — 96374 THER/PROPH/DIAG INJ IV PUSH: CPT

## 2021-02-28 PROCEDURE — 81003 URINALYSIS AUTO W/O SCOPE: CPT

## 2021-02-28 PROCEDURE — 94640 AIRWAY INHALATION TREATMENT: CPT

## 2021-02-28 PROCEDURE — 65610000001 HC ROOM ICU GENERAL

## 2021-02-28 PROCEDURE — 84484 ASSAY OF TROPONIN QUANT: CPT

## 2021-02-28 PROCEDURE — 99285 EMERGENCY DEPT VISIT HI MDM: CPT

## 2021-02-28 PROCEDURE — 74011000250 HC RX REV CODE- 250: Performed by: STUDENT IN AN ORGANIZED HEALTH CARE EDUCATION/TRAINING PROGRAM

## 2021-02-28 PROCEDURE — 82962 GLUCOSE BLOOD TEST: CPT

## 2021-02-28 PROCEDURE — 74011000250 HC RX REV CODE- 250: Performed by: FAMILY MEDICINE

## 2021-02-28 PROCEDURE — 85025 COMPLETE CBC W/AUTO DIFF WBC: CPT

## 2021-02-28 PROCEDURE — 96376 TX/PRO/DX INJ SAME DRUG ADON: CPT

## 2021-02-28 PROCEDURE — 74011000258 HC RX REV CODE- 258: Performed by: FAMILY MEDICINE

## 2021-02-28 PROCEDURE — 74011250636 HC RX REV CODE- 250/636: Performed by: FAMILY MEDICINE

## 2021-02-28 PROCEDURE — 84100 ASSAY OF PHOSPHORUS: CPT

## 2021-02-28 PROCEDURE — 83036 HEMOGLOBIN GLYCOSYLATED A1C: CPT

## 2021-02-28 PROCEDURE — 74011250637 HC RX REV CODE- 250/637: Performed by: FAMILY MEDICINE

## 2021-02-28 PROCEDURE — 96375 TX/PRO/DX INJ NEW DRUG ADDON: CPT

## 2021-02-28 PROCEDURE — 36600 WITHDRAWAL OF ARTERIAL BLOOD: CPT

## 2021-02-28 RX ORDER — DULOXETIN HYDROCHLORIDE 60 MG/1
60 CAPSULE, DELAYED RELEASE ORAL DAILY
Status: DISCONTINUED | OUTPATIENT
Start: 2021-03-01 | End: 2021-03-04 | Stop reason: HOSPADM

## 2021-02-28 RX ORDER — HEPARIN SODIUM 5000 [USP'U]/100ML
12-25 INJECTION, SOLUTION INTRAVENOUS
Status: DISCONTINUED | OUTPATIENT
Start: 2021-02-28 | End: 2021-02-28

## 2021-02-28 RX ORDER — GUAIFENESIN 100 MG/5ML
81 LIQUID (ML) ORAL DAILY
Status: DISCONTINUED | OUTPATIENT
Start: 2021-03-01 | End: 2021-03-04 | Stop reason: HOSPADM

## 2021-02-28 RX ORDER — HEPARIN SODIUM 5000 [USP'U]/ML
5000 INJECTION, SOLUTION INTRAVENOUS; SUBCUTANEOUS EVERY 8 HOURS
Status: DISCONTINUED | OUTPATIENT
Start: 2021-02-28 | End: 2021-02-28

## 2021-02-28 RX ORDER — ALPRAZOLAM 0.5 MG/1
2 TABLET ORAL
Status: DISCONTINUED | OUTPATIENT
Start: 2021-02-28 | End: 2021-03-04 | Stop reason: HOSPADM

## 2021-02-28 RX ORDER — BUDESONIDE AND FORMOTEROL FUMARATE DIHYDRATE 160; 4.5 UG/1; UG/1
2 AEROSOL RESPIRATORY (INHALATION)
Status: DISCONTINUED | OUTPATIENT
Start: 2021-02-28 | End: 2021-03-04 | Stop reason: HOSPADM

## 2021-02-28 RX ORDER — DEXTROSE 50 % IN WATER (D50W) INTRAVENOUS SYRINGE
25-50 AS NEEDED
Status: DISCONTINUED | OUTPATIENT
Start: 2021-02-28 | End: 2021-03-01

## 2021-02-28 RX ORDER — PROMETHAZINE HYDROCHLORIDE 25 MG/1
12.5 TABLET ORAL
Status: DISCONTINUED | OUTPATIENT
Start: 2021-02-28 | End: 2021-03-04

## 2021-02-28 RX ORDER — ACETAMINOPHEN 650 MG/1
650 SUPPOSITORY RECTAL
Status: DISCONTINUED | OUTPATIENT
Start: 2021-02-28 | End: 2021-03-04 | Stop reason: HOSPADM

## 2021-02-28 RX ORDER — TOPIRAMATE 50 MG/1
50 TABLET, FILM COATED ORAL DAILY
Status: DISCONTINUED | OUTPATIENT
Start: 2021-03-01 | End: 2021-03-04 | Stop reason: HOSPADM

## 2021-02-28 RX ORDER — NOREPINEPHRINE BIT/0.9 % NACL 4MG/250ML
.5-3 PLASTIC BAG, INJECTION (ML) INTRAVENOUS
Status: DISCONTINUED | OUTPATIENT
Start: 2021-02-28 | End: 2021-02-28 | Stop reason: SDUPTHER

## 2021-02-28 RX ORDER — SODIUM CHLORIDE 0.9 % (FLUSH) 0.9 %
5-40 SYRINGE (ML) INJECTION AS NEEDED
Status: DISCONTINUED | OUTPATIENT
Start: 2021-02-28 | End: 2021-03-04 | Stop reason: HOSPADM

## 2021-02-28 RX ORDER — HEPARIN SODIUM 5000 [USP'U]/ML
60 INJECTION, SOLUTION INTRAVENOUS; SUBCUTANEOUS ONCE
Status: COMPLETED | OUTPATIENT
Start: 2021-02-28 | End: 2021-02-28

## 2021-02-28 RX ORDER — SODIUM CHLORIDE 0.9 % (FLUSH) 0.9 %
5-40 SYRINGE (ML) INJECTION EVERY 8 HOURS
Status: DISCONTINUED | OUTPATIENT
Start: 2021-02-28 | End: 2021-03-04 | Stop reason: HOSPADM

## 2021-02-28 RX ORDER — DEXTROSE 40 %
15 GEL (GRAM) ORAL AS NEEDED
Status: DISCONTINUED | OUTPATIENT
Start: 2021-02-28 | End: 2021-03-01

## 2021-02-28 RX ORDER — NOREPINEPHRINE BIT/0.9 % NACL 4MG/250ML
PLASTIC BAG, INJECTION (ML) INTRAVENOUS
Status: COMPLETED
Start: 2021-02-28 | End: 2021-02-28

## 2021-02-28 RX ORDER — SODIUM CHLORIDE 9 MG/ML
200 INJECTION, SOLUTION INTRAVENOUS CONTINUOUS
Status: DISCONTINUED | OUTPATIENT
Start: 2021-02-28 | End: 2021-03-01 | Stop reason: ALTCHOICE

## 2021-02-28 RX ORDER — ONDANSETRON 2 MG/ML
4 INJECTION INTRAMUSCULAR; INTRAVENOUS
Status: DISCONTINUED | OUTPATIENT
Start: 2021-02-28 | End: 2021-03-04 | Stop reason: HOSPADM

## 2021-02-28 RX ORDER — ALBUTEROL SULFATE 0.83 MG/ML
5 SOLUTION RESPIRATORY (INHALATION)
Status: COMPLETED | OUTPATIENT
Start: 2021-02-28 | End: 2021-02-28

## 2021-02-28 RX ORDER — PANTOPRAZOLE SODIUM 40 MG/1
40 TABLET, DELAYED RELEASE ORAL
Status: DISCONTINUED | OUTPATIENT
Start: 2021-03-01 | End: 2021-03-04 | Stop reason: HOSPADM

## 2021-02-28 RX ORDER — CALCIUM CHLORIDE INJECTION 100 MG/ML
1 INJECTION, SOLUTION INTRAVENOUS
Status: DISCONTINUED | OUTPATIENT
Start: 2021-02-28 | End: 2021-02-28 | Stop reason: SDUPTHER

## 2021-02-28 RX ORDER — ONDANSETRON 2 MG/ML
INJECTION INTRAMUSCULAR; INTRAVENOUS
Status: ACTIVE
Start: 2021-02-28 | End: 2021-03-01

## 2021-02-28 RX ORDER — ATORVASTATIN CALCIUM 40 MG/1
80 TABLET, FILM COATED ORAL DAILY
Status: DISCONTINUED | OUTPATIENT
Start: 2021-03-01 | End: 2021-03-04 | Stop reason: HOSPADM

## 2021-02-28 RX ORDER — POLYETHYLENE GLYCOL 3350 17 G/17G
17 POWDER, FOR SOLUTION ORAL DAILY PRN
Status: DISCONTINUED | OUTPATIENT
Start: 2021-02-28 | End: 2021-03-04 | Stop reason: HOSPADM

## 2021-02-28 RX ORDER — ARIPIPRAZOLE 2 MG/1
5 TABLET ORAL
Status: DISCONTINUED | OUTPATIENT
Start: 2021-02-28 | End: 2021-03-04 | Stop reason: HOSPADM

## 2021-02-28 RX ORDER — CALCIUM GLUCONATE 20 MG/ML
1 INJECTION, SOLUTION INTRAVENOUS ONCE
Status: COMPLETED | OUTPATIENT
Start: 2021-02-28 | End: 2021-02-28

## 2021-02-28 RX ORDER — NOREPINEPHRINE BIT/0.9 % NACL 4MG/250ML
.5-16 PLASTIC BAG, INJECTION (ML) INTRAVENOUS
Status: DISCONTINUED | OUTPATIENT
Start: 2021-02-28 | End: 2021-03-02 | Stop reason: HOSPADM

## 2021-02-28 RX ORDER — GABAPENTIN 300 MG/1
300 CAPSULE ORAL 3 TIMES DAILY
Status: DISCONTINUED | OUTPATIENT
Start: 2021-02-28 | End: 2021-03-04 | Stop reason: HOSPADM

## 2021-02-28 RX ORDER — NOREPINEPHRINE BITARTRATE/D5W 4MG/250ML
.5-16 PLASTIC BAG, INJECTION (ML) INTRAVENOUS
Status: CANCELLED | OUTPATIENT
Start: 2021-02-28

## 2021-02-28 RX ORDER — HEPARIN SODIUM 5000 [USP'U]/100ML
12-25 INJECTION, SOLUTION INTRAVENOUS
Status: DISCONTINUED | OUTPATIENT
Start: 2021-03-01 | End: 2021-03-03

## 2021-02-28 RX ORDER — VANCOMYCIN 1.75 GRAM/500 ML IN 0.9 % SODIUM CHLORIDE INTRAVENOUS
1750 ONCE
Status: DISPENSED | OUTPATIENT
Start: 2021-02-28 | End: 2021-03-01

## 2021-02-28 RX ORDER — ACETAMINOPHEN 325 MG/1
650 TABLET ORAL
Status: DISCONTINUED | OUTPATIENT
Start: 2021-02-28 | End: 2021-03-04 | Stop reason: HOSPADM

## 2021-02-28 RX ADMIN — TOBRAMYCIN SULFATE 460 MG: 40 INJECTION, SOLUTION INTRAMUSCULAR; INTRAVENOUS at 23:28

## 2021-02-28 RX ADMIN — SODIUM CHLORIDE, SODIUM LACTATE, POTASSIUM CHLORIDE, AND CALCIUM CHLORIDE 1000 ML: 600; 310; 30; 20 INJECTION, SOLUTION INTRAVENOUS at 17:32

## 2021-02-28 RX ADMIN — ARIPIPRAZOLE 5 MG: 2 TABLET ORAL at 23:32

## 2021-02-28 RX ADMIN — CALCIUM GLUCONATE 1000 MG: 20 INJECTION, SOLUTION INTRAVENOUS at 16:35

## 2021-02-28 RX ADMIN — SODIUM CHLORIDE 0.1 UNITS/KG/HR: 9 INJECTION, SOLUTION INTRAVENOUS at 17:21

## 2021-02-28 RX ADMIN — BUDESONIDE AND FORMOTEROL FUMARATE DIHYDRATE 2 PUFF: 160; 4.5 AEROSOL RESPIRATORY (INHALATION) at 21:35

## 2021-02-28 RX ADMIN — SODIUM CHLORIDE 1000 ML: 900 INJECTION, SOLUTION INTRAVENOUS at 17:34

## 2021-02-28 RX ADMIN — ONDANSETRON 4 MG: 2 INJECTION INTRAMUSCULAR; INTRAVENOUS at 21:10

## 2021-02-28 RX ADMIN — Medication 4 MCG/MIN: at 16:48

## 2021-02-28 RX ADMIN — Medication 10 ML: at 23:40

## 2021-02-28 RX ADMIN — SODIUM CHLORIDE 200 ML/HR: 900 INJECTION, SOLUTION INTRAVENOUS at 23:41

## 2021-02-28 RX ADMIN — HUMAN INSULIN 10 UNITS: 100 INJECTION, SOLUTION SUBCUTANEOUS at 16:17

## 2021-02-28 RX ADMIN — SODIUM BICARBONATE: 84 INJECTION, SOLUTION INTRAVENOUS at 23:29

## 2021-02-28 RX ADMIN — HEPARIN SODIUM 4100 UNITS: 5000 INJECTION INTRAVENOUS; SUBCUTANEOUS at 17:36

## 2021-02-28 RX ADMIN — PIPERACILLIN SODIUM AND TAZOBACTAM SODIUM 4.5 G: 4; .5 INJECTION, POWDER, LYOPHILIZED, FOR SOLUTION INTRAVENOUS at 18:40

## 2021-02-28 RX ADMIN — ALBUTEROL SULFATE 5 MG: 2.5 SOLUTION RESPIRATORY (INHALATION) at 17:55

## 2021-02-28 RX ADMIN — SODIUM CHLORIDE 1000 ML: 900 INJECTION, SOLUTION INTRAVENOUS at 16:29

## 2021-02-28 RX ADMIN — SODIUM CHLORIDE 1000 ML: 900 INJECTION, SOLUTION INTRAVENOUS at 15:35

## 2021-02-28 RX ADMIN — HEPARIN SODIUM 12 UNITS/KG/HR: 5000 INJECTION, SOLUTION INTRAVENOUS at 17:37

## 2021-02-28 NOTE — ED TRIAGE NOTES
Pt is on insulin pump, per spouse he can \"smell\" that she is in DKA, BGLs have been running 400-500. Pt c/o nausea and fatigue.

## 2021-03-01 ENCOUNTER — APPOINTMENT (OUTPATIENT)
Dept: GENERAL RADIOLOGY | Age: 66
DRG: 637 | End: 2021-03-01
Attending: INTERNAL MEDICINE
Payer: COMMERCIAL

## 2021-03-01 LAB
ADMINISTERED INITIALS, ADMINIT: NORMAL
ANION GAP SERPL CALC-SCNC: 11 MMOL/L (ref 7–16)
ANION GAP SERPL CALC-SCNC: 12 MMOL/L (ref 7–16)
ARTERIAL PATENCY WRIST A: ABNORMAL
ATRIAL RATE: 79 BPM
BASE DEFICIT BLDV-SCNC: 9 MMOL/L
BDY SITE: ABNORMAL
BUN SERPL-MCNC: 24 MG/DL (ref 8–23)
BUN SERPL-MCNC: 29 MG/DL (ref 8–23)
CALCIUM SERPL-MCNC: 7.3 MG/DL (ref 8.3–10.4)
CALCIUM SERPL-MCNC: 7.6 MG/DL (ref 8.3–10.4)
CALCULATED P AXIS, ECG09: 67 DEGREES
CALCULATED R AXIS, ECG10: 87 DEGREES
CALCULATED T AXIS, ECG11: 54 DEGREES
CHLORIDE SERPL-SCNC: 107 MMOL/L (ref 98–107)
CHLORIDE SERPL-SCNC: 108 MMOL/L (ref 98–107)
CO2 BLD-SCNC: 18 MMOL/L
CO2 SERPL-SCNC: 19 MMOL/L (ref 21–32)
CO2 SERPL-SCNC: 19 MMOL/L (ref 21–32)
COLLECT TIME,HTIME: 238
COVID-19 RAPID TEST, COVR: NOT DETECTED
CREAT SERPL-MCNC: 1.29 MG/DL (ref 0.6–1)
CREAT SERPL-MCNC: 1.32 MG/DL (ref 0.6–1)
D50 ADMINISTERED, D50ADM: 0 ML
D50 ORDER, D50ORD: 0 ML
DIAGNOSIS, 93000: NORMAL
DIFFERENTIAL METHOD BLD: ABNORMAL
ERYTHROCYTE [DISTWIDTH] IN BLOOD BY AUTOMATED COUNT: 13.2 %
GAS FLOW.O2 O2 DELIVERY SYS: ABNORMAL L/MIN
GLSCOM COMMENTS: NORMAL
GLUCOSE BLD STRIP.AUTO-MCNC: 124 MG/DL (ref 65–100)
GLUCOSE BLD STRIP.AUTO-MCNC: 130 MG/DL (ref 65–100)
GLUCOSE BLD STRIP.AUTO-MCNC: 132 MG/DL (ref 65–100)
GLUCOSE BLD STRIP.AUTO-MCNC: 135 MG/DL (ref 65–100)
GLUCOSE BLD STRIP.AUTO-MCNC: 149 MG/DL (ref 65–100)
GLUCOSE BLD STRIP.AUTO-MCNC: 154 MG/DL (ref 65–100)
GLUCOSE BLD STRIP.AUTO-MCNC: 161 MG/DL (ref 65–100)
GLUCOSE BLD STRIP.AUTO-MCNC: 183 MG/DL (ref 65–100)
GLUCOSE BLD STRIP.AUTO-MCNC: 186 MG/DL (ref 65–100)
GLUCOSE BLD STRIP.AUTO-MCNC: 195 MG/DL (ref 65–100)
GLUCOSE BLD STRIP.AUTO-MCNC: 204 MG/DL (ref 65–100)
GLUCOSE BLD STRIP.AUTO-MCNC: 210 MG/DL (ref 65–100)
GLUCOSE BLD STRIP.AUTO-MCNC: 265 MG/DL (ref 65–100)
GLUCOSE BLD STRIP.AUTO-MCNC: >600 MG/DL (ref 65–100)
GLUCOSE SERPL-MCNC: 142 MG/DL (ref 65–100)
GLUCOSE SERPL-MCNC: 202 MG/DL (ref 65–100)
GLUCOSE, GLC: 130 MG/DL
GLUCOSE, GLC: 132 MG/DL
GLUCOSE, GLC: 135 MG/DL
GLUCOSE, GLC: 149 MG/DL
GLUCOSE, GLC: 154 MG/DL
GLUCOSE, GLC: 161 MG/DL
GLUCOSE, GLC: 186 MG/DL
GLUCOSE, GLC: 195 MG/DL
GLUCOSE, GLC: 204 MG/DL
GLUCOSE, GLC: 265 MG/DL
HCO3 BLDV-SCNC: 17.2 MMOL/L (ref 23–28)
HCT VFR BLD AUTO: 36.9 % (ref 35.8–46.3)
HGB BLD-MCNC: 12.3 G/DL (ref 11.7–15.4)
HIGH TARGET, HITG: 180 MG/DL
IMM GRANULOCYTES # BLD AUTO: 0.3 K/UL
IMM GRANULOCYTES NFR BLD AUTO: 2 %
INR PPP: 0.9
INSULIN ADMINSTERED, INSADM: 10.3 UNITS/HOUR
INSULIN ADMINSTERED, INSADM: 2.9 UNITS/HOUR
INSULIN ADMINSTERED, INSADM: 3.4 UNITS/HOUR
INSULIN ADMINSTERED, INSADM: 3.5 UNITS/HOUR
INSULIN ADMINSTERED, INSADM: 5 UNITS/HOUR
INSULIN ADMINSTERED, INSADM: 5.3 UNITS/HOUR
INSULIN ADMINSTERED, INSADM: 5.6 UNITS/HOUR
INSULIN ADMINSTERED, INSADM: 6.1 UNITS/HOUR
INSULIN ADMINSTERED, INSADM: 7.2 UNITS/HOUR
INSULIN ADMINSTERED, INSADM: 8.1 UNITS/HOUR
INSULIN ORDER, INSORD: 10.3 UNITS/HOUR
INSULIN ORDER, INSORD: 2.9 UNITS/HOUR
INSULIN ORDER, INSORD: 3.4 UNITS/HOUR
INSULIN ORDER, INSORD: 3.5 UNITS/HOUR
INSULIN ORDER, INSORD: 5 UNITS/HOUR
INSULIN ORDER, INSORD: 5.3 UNITS/HOUR
INSULIN ORDER, INSORD: 5.6 UNITS/HOUR
INSULIN ORDER, INSORD: 6.1 UNITS/HOUR
INSULIN ORDER, INSORD: 7.2 UNITS/HOUR
INSULIN ORDER, INSORD: 8.1 UNITS/HOUR
LACTATE SERPL-SCNC: 1.8 MMOL/L (ref 0.4–2)
LACTATE SERPL-SCNC: 2.2 MMOL/L (ref 0.4–2)
LACTATE SERPL-SCNC: 3.9 MMOL/L (ref 0.4–2)
LOW TARGET, LOT: 140 MG/DL
LYMPHOCYTES # BLD: 1.5 K/UL (ref 0.5–4.6)
LYMPHOCYTES NFR BLD: 9 %
MAGNESIUM SERPL-MCNC: 1.9 MG/DL (ref 1.8–2.4)
MAGNESIUM SERPL-MCNC: 2.1 MG/DL (ref 1.8–2.4)
MCH RBC QN AUTO: 30.5 PG (ref 26.1–32.9)
MCHC RBC AUTO-ENTMCNC: 33.3 G/DL (ref 31.4–35)
MCV RBC AUTO: 91.6 FL (ref 79.6–97.8)
MINUTES UNTIL NEXT BG, NBG: 60 MIN
MONOCYTES # BLD: 0.8 K/UL (ref 0.1–1.3)
MONOCYTES NFR BLD: 5 %
MULTIPLIER, MUL: 0.04
MULTIPLIER, MUL: 0.04
MULTIPLIER, MUL: 0.05
MULTIPLIER, MUL: 0.06
NEUTS SEG # BLD: 13.7 K/UL (ref 1.7–8.2)
NEUTS SEG NFR BLD: 84 %
NRBC # BLD: 0 K/UL (ref 0–0.2)
ORDER INITIALS, ORDINIT: NORMAL
P-R INTERVAL, ECG05: 224 MS
PCO2 BLDV: 36.9 MMHG (ref 41–51)
PH BLDV: 7.28 [PH] (ref 7.32–7.42)
PLATELET # BLD AUTO: 187 K/UL
PMV BLD AUTO: 11.3 FL (ref 9.4–12.3)
PO2 BLDV: 35 MMHG
POTASSIUM SERPL-SCNC: 4 MMOL/L (ref 3.5–5.1)
POTASSIUM SERPL-SCNC: 4 MMOL/L (ref 3.5–5.1)
PROTHROMBIN TIME: 12.8 SEC (ref 12.5–14.7)
Q-T INTERVAL, ECG07: 396 MS
QRS DURATION, ECG06: 92 MS
QTC CALCULATION (BEZET), ECG08: 454 MS
RBC # BLD AUTO: 4.03 M/UL
SAO2 % BLDV: 60 % (ref 65–88)
SARS-COV-2, COV2: NORMAL
SERVICE CMNT-IMP: ABNORMAL
SODIUM SERPL-SCNC: 138 MMOL/L (ref 136–145)
SODIUM SERPL-SCNC: 138 MMOL/L (ref 136–145)
SOURCE, COVRS: NORMAL
SPECIMEN TYPE: ABNORMAL
TROPONIN-HIGH SENSITIVITY: 175.2 PG/ML (ref 0–14)
TROPONIN-HIGH SENSITIVITY: 247.1 PG/ML (ref 0–14)
UFH PPP CHRO-ACNC: 0.19 IU/ML (ref 0.3–0.7)
UFH PPP CHRO-ACNC: 1.05 IU/ML (ref 0.3–0.7)
VENTRICULAR RATE, ECG03: 79 BPM
WBC # BLD AUTO: 16.3 K/UL (ref 4.3–11.1)

## 2021-03-01 PROCEDURE — 84484 ASSAY OF TROPONIN QUANT: CPT

## 2021-03-01 PROCEDURE — 36592 COLLECT BLOOD FROM PICC: CPT

## 2021-03-01 PROCEDURE — 94640 AIRWAY INHALATION TREATMENT: CPT

## 2021-03-01 PROCEDURE — C1751 CATH, INF, PER/CENT/MIDLINE: HCPCS

## 2021-03-01 PROCEDURE — 82962 GLUCOSE BLOOD TEST: CPT

## 2021-03-01 PROCEDURE — 2709999900 HC NON-CHARGEABLE SUPPLY

## 2021-03-01 PROCEDURE — 83605 ASSAY OF LACTIC ACID: CPT

## 2021-03-01 PROCEDURE — 02HV33Z INSERTION OF INFUSION DEVICE INTO SUPERIOR VENA CAVA, PERCUTANEOUS APPROACH: ICD-10-PCS | Performed by: INTERNAL MEDICINE

## 2021-03-01 PROCEDURE — 74011000250 HC RX REV CODE- 250: Performed by: FAMILY MEDICINE

## 2021-03-01 PROCEDURE — 74011250636 HC RX REV CODE- 250/636: Performed by: FAMILY MEDICINE

## 2021-03-01 PROCEDURE — 83735 ASSAY OF MAGNESIUM: CPT

## 2021-03-01 PROCEDURE — 36573 INSJ PICC RS&I 5 YR+: CPT | Performed by: INTERNAL MEDICINE

## 2021-03-01 PROCEDURE — 74011636637 HC RX REV CODE- 636/637: Performed by: FAMILY MEDICINE

## 2021-03-01 PROCEDURE — U0005 INFEC AGEN DETEC AMPLI PROBE: HCPCS

## 2021-03-01 PROCEDURE — 82803 BLOOD GASES ANY COMBINATION: CPT

## 2021-03-01 PROCEDURE — 80048 BASIC METABOLIC PNL TOTAL CA: CPT

## 2021-03-01 PROCEDURE — 71046 X-RAY EXAM CHEST 2 VIEWS: CPT

## 2021-03-01 PROCEDURE — 36415 COLL VENOUS BLD VENIPUNCTURE: CPT

## 2021-03-01 PROCEDURE — 74011636637 HC RX REV CODE- 636/637: Performed by: INTERNAL MEDICINE

## 2021-03-01 PROCEDURE — 74011000258 HC RX REV CODE- 258: Performed by: FAMILY MEDICINE

## 2021-03-01 PROCEDURE — 74011250637 HC RX REV CODE- 250/637: Performed by: FAMILY MEDICINE

## 2021-03-01 PROCEDURE — 87635 SARS-COV-2 COVID-19 AMP PRB: CPT

## 2021-03-01 PROCEDURE — 85025 COMPLETE CBC W/AUTO DIFF WBC: CPT

## 2021-03-01 PROCEDURE — 99222 1ST HOSP IP/OBS MODERATE 55: CPT | Performed by: INTERNAL MEDICINE

## 2021-03-01 PROCEDURE — 74011250636 HC RX REV CODE- 250/636: Performed by: INTERNAL MEDICINE

## 2021-03-01 PROCEDURE — 65610000001 HC ROOM ICU GENERAL

## 2021-03-01 PROCEDURE — 85520 HEPARIN ASSAY: CPT

## 2021-03-01 PROCEDURE — 85610 PROTHROMBIN TIME: CPT

## 2021-03-01 RX ORDER — HEPARIN SODIUM 5000 [USP'U]/ML
20 INJECTION, SOLUTION INTRAVENOUS; SUBCUTANEOUS ONCE
Status: COMPLETED | OUTPATIENT
Start: 2021-03-01 | End: 2021-03-01

## 2021-03-01 RX ORDER — INSULIN LISPRO 100 [IU]/ML
2 INJECTION, SOLUTION INTRAVENOUS; SUBCUTANEOUS
Status: DISCONTINUED | OUTPATIENT
Start: 2021-03-01 | End: 2021-03-02

## 2021-03-01 RX ORDER — SODIUM CHLORIDE 0.9 % (FLUSH) 0.9 %
30 SYRINGE (ML) INJECTION EVERY 8 HOURS
Status: DISCONTINUED | OUTPATIENT
Start: 2021-03-01 | End: 2021-03-04 | Stop reason: HOSPADM

## 2021-03-01 RX ORDER — INSULIN LISPRO 100 [IU]/ML
INJECTION, SOLUTION INTRAVENOUS; SUBCUTANEOUS
Status: DISCONTINUED | OUTPATIENT
Start: 2021-03-01 | End: 2021-03-04 | Stop reason: HOSPADM

## 2021-03-01 RX ORDER — SODIUM CHLORIDE 0.9 % (FLUSH) 0.9 %
30 SYRINGE (ML) INJECTION AS NEEDED
Status: DISCONTINUED | OUTPATIENT
Start: 2021-03-01 | End: 2021-03-04 | Stop reason: HOSPADM

## 2021-03-01 RX ORDER — VANCOMYCIN HYDROCHLORIDE
1250
Status: DISCONTINUED | OUTPATIENT
Start: 2021-03-01 | End: 2021-03-02

## 2021-03-01 RX ORDER — DEXTROSE MONOHYDRATE AND SODIUM CHLORIDE 5; .9 G/100ML; G/100ML
100 INJECTION, SOLUTION INTRAVENOUS CONTINUOUS
Status: DISCONTINUED | OUTPATIENT
Start: 2021-03-01 | End: 2021-03-01

## 2021-03-01 RX ORDER — INSULIN GLARGINE 100 [IU]/ML
20 INJECTION, SOLUTION SUBCUTANEOUS DAILY
Status: DISCONTINUED | OUTPATIENT
Start: 2021-03-01 | End: 2021-03-02

## 2021-03-01 RX ADMIN — Medication 30 ML: at 16:33

## 2021-03-01 RX ADMIN — INSULIN LISPRO 2 UNITS: 100 INJECTION, SOLUTION INTRAVENOUS; SUBCUTANEOUS at 11:34

## 2021-03-01 RX ADMIN — Medication 30 ML: at 21:35

## 2021-03-01 RX ADMIN — ATORVASTATIN CALCIUM 80 MG: 40 TABLET, FILM COATED ORAL at 08:23

## 2021-03-01 RX ADMIN — SODIUM CHLORIDE 1000 ML: 900 INJECTION, SOLUTION INTRAVENOUS at 12:19

## 2021-03-01 RX ADMIN — ASPIRIN 81 MG: 81 TABLET, CHEWABLE ORAL at 08:23

## 2021-03-01 RX ADMIN — INSULIN LISPRO 2 UNITS: 100 INJECTION, SOLUTION INTRAVENOUS; SUBCUTANEOUS at 09:37

## 2021-03-01 RX ADMIN — INSULIN GLARGINE 20 UNITS: 100 INJECTION, SOLUTION SUBCUTANEOUS at 09:36

## 2021-03-01 RX ADMIN — PANTOPRAZOLE SODIUM 40 MG: 40 TABLET, DELAYED RELEASE ORAL at 08:23

## 2021-03-01 RX ADMIN — Medication 10 ML: at 13:48

## 2021-03-01 RX ADMIN — INSULIN LISPRO 2 UNITS: 100 INJECTION, SOLUTION INTRAVENOUS; SUBCUTANEOUS at 16:52

## 2021-03-01 RX ADMIN — GABAPENTIN 300 MG: 300 CAPSULE ORAL at 16:32

## 2021-03-01 RX ADMIN — GABAPENTIN 300 MG: 300 CAPSULE ORAL at 21:34

## 2021-03-01 RX ADMIN — HEPARIN SODIUM 1650 UNITS: 5000 INJECTION INTRAVENOUS; SUBCUTANEOUS at 10:16

## 2021-03-01 RX ADMIN — BUDESONIDE AND FORMOTEROL FUMARATE DIHYDRATE 2 PUFF: 160; 4.5 AEROSOL RESPIRATORY (INHALATION) at 09:14

## 2021-03-01 RX ADMIN — ALPRAZOLAM 2 MG: 0.5 TABLET ORAL at 21:34

## 2021-03-01 RX ADMIN — PIPERACILLIN AND TAZOBACTAM 3.38 G: 3; .375 INJECTION, POWDER, LYOPHILIZED, FOR SOLUTION INTRAVENOUS at 08:35

## 2021-03-01 RX ADMIN — Medication 6 MCG/MIN: at 00:45

## 2021-03-01 RX ADMIN — GABAPENTIN 300 MG: 300 CAPSULE ORAL at 09:37

## 2021-03-01 RX ADMIN — BUDESONIDE AND FORMOTEROL FUMARATE DIHYDRATE 2 PUFF: 160; 4.5 AEROSOL RESPIRATORY (INHALATION) at 20:07

## 2021-03-01 RX ADMIN — INSULIN LISPRO 4 UNITS: 100 INJECTION, SOLUTION INTRAVENOUS; SUBCUTANEOUS at 16:52

## 2021-03-01 RX ADMIN — ARIPIPRAZOLE 5 MG: 2 TABLET ORAL at 21:34

## 2021-03-01 RX ADMIN — DULOXETINE HYDROCHLORIDE 60 MG: 60 CAPSULE, DELAYED RELEASE ORAL at 09:37

## 2021-03-01 RX ADMIN — SODIUM CHLORIDE 6.1 UNITS/HR: 9 INJECTION, SOLUTION INTRAVENOUS at 04:45

## 2021-03-01 RX ADMIN — Medication 10 ML: at 21:35

## 2021-03-01 RX ADMIN — SODIUM BICARBONATE: 84 INJECTION, SOLUTION INTRAVENOUS at 05:50

## 2021-03-01 RX ADMIN — PIPERACILLIN AND TAZOBACTAM 3.38 G: 3; .375 INJECTION, POWDER, LYOPHILIZED, FOR SOLUTION INTRAVENOUS at 16:32

## 2021-03-01 RX ADMIN — DEXTROSE MONOHYDRATE AND SODIUM CHLORIDE 100 ML/HR: 5; .9 INJECTION, SOLUTION INTRAVENOUS at 01:36

## 2021-03-01 RX ADMIN — PANTOPRAZOLE SODIUM 40 MG: 40 TABLET, DELAYED RELEASE ORAL at 16:32

## 2021-03-01 RX ADMIN — TOPIRAMATE 50 MG: 50 TABLET, FILM COATED ORAL at 08:23

## 2021-03-01 RX ADMIN — VANCOMYCIN HYDROCHLORIDE 1250 MG: 10 INJECTION, POWDER, LYOPHILIZED, FOR SOLUTION INTRAVENOUS at 12:19

## 2021-03-01 RX ADMIN — TIOTROPIUM BROMIDE INHALATION SPRAY 1 PUFF: 3.12 SPRAY, METERED RESPIRATORY (INHALATION) at 09:14

## 2021-03-01 RX ADMIN — PIPERACILLIN AND TAZOBACTAM 3.38 G: 3; .375 INJECTION, POWDER, LYOPHILIZED, FOR SOLUTION INTRAVENOUS at 01:53

## 2021-03-01 NOTE — H&P
[facility] CARDIOLOGY History &Physical                   Subjective:     Date of  Admission: 2/28/2021  3:21 PM     HPI:  Mayte Deleon is a 60 y.o. female with h/o IDDM type I on insulin pump, CAD s/p PCI LAD (6/14/19), HTN, dyslipidemia, COPD, Bipolar D/o, GERD and chronic pain who presented to [facility] ED with c/o nausea, vomiting and body aches. In ED, she was found to have DKA with BS >400 over last few days, leukocytosis, hypotension, ADRIENNE and sepsis. Rapid COVID was negative and the other test is still pending. She was admitted to ICU and started on IV antibiotics, IVF, levophed and insulin drip. She c/o some CP and HS troponin was elevated at 247.1, she was started on IV heparin. Cardiology was consulted to evaluate chest pain and elevated troponin. At this time patient reports that the main problem she has been having is fatigue. She denies ongoing chest pain or dyspnea at this time, states had some leg edema but this has resolved. She has been weaned from insulin gtt as well as vasopressor. Hemodynamics at this time are preserved. She denies any inciting event for her presentation to the hospital. No other associated symptoms. States she has insulin pump and takes her medications as directed.      Lab Results   Component Value Date     03/01/2021    K 4.0 03/01/2021    MG 2.1 03/01/2021    BUN 24 (H) 03/01/2021    CREA 1.29 (H) 03/01/2021    WBC 16.3 (H) 03/01/2021    HGB 12.3 03/01/2021     03/01/2021    INR 0.9 03/01/2021    TSH 0.967 03/15/2018        Past Medical History:   Diagnosis Date    Anxiety     Arthritis     Asthma     CAD (coronary artery disease) 04/24/2019    mi  stent x 1-- followed by dr Antony Cartagena Cervical spondylosis with myelopathy 6/17/2015    Chronic obstructive pulmonary disease (HCC)     daily inhaler    Chronic pain     back  - and all over pain     Depression     Diabetes ([address]     type 1, sqbs am avg 240-250-- hypo at 60's -- uses insulin pump    Diverticulosis resolved at present - sees DR Odette Smith when needed    DJD (degenerative joint disease)     Fibromyalgia     all over     Former smoker     quit     GERD (gastroesophageal reflux disease)     CONTROLLED WITH MED    Hormone replacement therapy     Hypertension     controlled with med    Insomnia     Left knee pain     Migraine     Psychiatric diagnosis     bipolar    RA (rheumatoid arthritis) ([address] dx yrs ago    does not see a rhuematologist    Unspecified adverse effect of anesthesia     \"woke up\"during  surgery on toe    Vitamin B 12 deficiency     Vitamin D deficiency       Past Surgical History:   Procedure Laterality Date    HX CATARACT REMOVAL Bilateral     HX  SECTION      times 2    HX COLONOSCOPY      HX HYSTERECTOMY N/A     total     HX ORTHOPAEDIC      left knee scope    HX ORTHOPAEDIC      right foot \"bone removal\"     HX ORTHOPAEDIC Bilateral     shoulder scopes    HX ORTHOPAEDIC Right     shaved down bone in great toe     HX ORTHOPAEDIC Right     right rotator cuff     HX ORTHOPAEDIC N/A     neck ( plate and screws was put in )     NEUROLOGICAL PROCEDURE UNLISTED      lower back      Family History   Problem Relation Age of Onset    Cancer Father     Lung Disease Father     Cancer Maternal Aunt     Heart Disease Paternal Grandmother     Heart Disease Paternal Grandfather       Social History     Tobacco Use    Smoking status: Former Smoker     Packs/day: 1.00     Years: 30.00     Pack years: 30.00     Types: Cigarettes     Quit date: 6/10/2002     Years since quittin.7    Smokeless tobacco: Never Used   Substance Use Topics    Alcohol use:  Yes     Alcohol/week: 11.0 standard drinks     Types: 4 Glasses of wine, 7 Cans of beer per week      Allergies   Allergen Reactions    Doxepin Hives and Nausea and Vomiting    Nortriptyline Hives    Other Medication Hives and Nausea and Vomiting     elavil    Plaquenil [Hydroxychloroquine] Hives    Tizanidine Other (comments)     Sleep walking and  nocturnal eating     Vistaril [Hydroxyzine Pamoate] Other (comments)     Insomnia         Review of Systems   Constitution: Positive for malaise/fatigue. Eyes: Negative. Cardiovascular: Positive for leg swelling (reports edema that is now resolved). Negative for chest pain and dyspnea on exertion. Respiratory: Negative for cough and shortness of breath. Hematologic/Lymphatic: Negative. Gastrointestinal: Negative. Negative for abdominal pain, nausea and vomiting. Neurological: Negative. All other systems reviewed and are negative. Objective:       Visit Vitals  /68   Pulse 94   Temp 97.9 °F (36.6 °C)   Resp 23   Ht 5' 3\" (1.6 m)   Wt 183 lb 13.8 oz (83.4 kg)   SpO2 95%   Breastfeeding No   BMI 32.57 kg/m²       03/01 0701 - 03/01 1900  In: 989 [I.V.:757]  Out: 1600 [Urine:1600]  02/27 1901 - 03/01 0700  In: 7581.1 [I.V.:7581.1]  Out: 650 [Urine:650]    Physical Exam   Constitutional: She is oriented to person, place, and time. She appears well-developed. No distress. HENT:   Head: Normocephalic and atraumatic. Nose: Nose normal.   Eyes: Conjunctivae are normal. Right eye exhibits no discharge. Left eye exhibits no discharge. No scleral icterus. Neck: Normal range of motion. No JVD present. No tracheal deviation present. Cardiovascular: Normal rate, regular rhythm, S1 normal, S2 normal and normal heart sounds. Exam reveals no gallop. No murmur heard. Pulses:       Radial pulses are 2+ on the right side and 2+ on the left side. Pulmonary/Chest: Effort normal and breath sounds normal. No respiratory distress. She has no wheezes. She has no rales. Rhonchi lower fields. Abdominal: Soft. She exhibits no distension. Musculoskeletal: Normal range of motion. General: No tenderness, deformity or edema (of legs). Neurological: She is alert and oriented to person, place, and time. Skin: Skin is warm and dry.  She is not diaphoretic. Psychiatric: She has a normal mood and affect. Her behavior is normal. Judgment normal.   Vitals reviewed. Data Review:   Recent Labs     03/01/21  0615 03/01/21  0030 02/28/21  1730 02/28/21  1730    138   < >  --    K 4.0 4.0   < >  --    MG 2.1 1.9   < >  --    BUN 24* 29*   < >  --    CREA 1.29* 1.32*   < >  --    * 202*   < >  --    WBC 16.3*  --   --  23.1*   HGB 12.3  --   --  13.1   HCT 36.9  --   --  42.0     --   --  238   INR 0.9  --   --   --     < > = values in this interval not displayed. Echo Results  (Last 48 hours)    None          CXR Results  (Last 48 hours)               03/01/21 1539  XR CHEST PA LAT Final result    Impression:  Good aeration of lungs           Narrative:  Chest X-ray       INDICATION: Shortness of breath and PICC line placement       PA and lateral views of the chest were obtained. FINDINGS: The lungs are clear. There are no infiltrates or effusions. There is   stable borderline prominence of the heart. Left-sided PICC line is present. Tip is near the cavoatrial junction. 02/28/21 1705  XR CHEST PORT Final result    Impression:  1. Right jugular catheter. 2. No acute airspace disease. Narrative:  Chest portable       CLINICAL INDICATION: Central venous catheter placement, acute DKA with severe   vomiting and nausea, generalized malaise and body aches; diabetes type 1 history   as well as previous DKA, recent hyperglycemic episodes. COMPARISON: 12/13/2019       TECHNIQUE: single AP portable view chest at 4:55 PM upright        FINDINGS:  There is no evidence of consolidation, pneumothorax, pleural effusion   or pulmonary edema. The mediastinal and hilar contours are stable, normal given   technique. There is a right jugular venous catheter with tip projecting over   right atrium.        Total external monitoring wires overlie the chest. Right humerus surgical screws   are again noted suggesting rotator cuff procedure. Partial visualization of   cervical spine surgical hardware.                      Assessment/Plan:   Principal Problem:    Septic shock ([facility] (2/28/2021)    Leukocytosis (2/28/2021)    - per medicine     Active Problems:    Elevated troponin    - secondary to DKA, septic shock    - obtain echo    - mildly elevated, trending down not consistent with plaque rupture event     - continue  ASA 81, Atorvastatin 80 for risk factor modification    - hemodynamics now stabilized     - EKG with ST and Twave changes, no STEMI        Coronary artery disease involving native coronary artery of native heart with angina pectoris ([facility] (5/31/2019)    - stop heparin gtt    - continue ASA 81, Atorvastatin 80       Pure hypercholesterolemia (6/28/2019)     - Atorvastatin 80       Hypertension ()    - low on arrival, long term consider ACE inhibitor with diabetes       DKA, type 1 (HCC) ()    Overview: type 1, uses insulin pump--  Dr. Wilfredo Villalobos    - per medicine       Chronic obstructive pulmonary disease ([facility] ()    - nebs, treatment per medicine       ADRIENNE (acute kidney injury) ([facility] (5/7/2019)    - likely prerenal given DKA       Benjamin Lozano DO  3/1/2021  4:44 PM

## 2021-03-01 NOTE — PROGRESS NOTES
Results for Cipriano Cotton (MRN [id number]) as of 3/1/2021 04:26   Ref.  Range 3/1/2021 02:40   Specimen type (POC) Latest Units:   VENOUS BLOOD   pH, venous (POC) Latest Ref Range: 7.32 - 7.42   7.28 (L)   pCO2, venous (POC) Latest Ref Range: 41 - 51 MMHG 36.9 (L)   pO2, venous (POC) Latest Units: mmHg 35   HCO3, venous (POC) Latest Ref Range: 23 - 28 MMOL/L 17.2 (L)   sO2, venous (POC) Latest Ref Range: 65 - 88 % 60 (L)   Base deficit, venous (POC) Latest Units: mmol/L 9   Site Latest Units:   CORD   Device: Latest Units:   ROOM AIR   Allens test (POC) Latest Units:   NOT APPLICABLE   COLLECT TIME Latest Units:   238

## 2021-03-01 NOTE — PROGRESS NOTES
Pharmacokinetic Consult to Pharmacist    Gold Jimenez is a 60 y.o. female being treated for bloodstream infection with tobramycin. Lab Results   Component Value Date/Time    BUN 37 (H) 02/28/2021 08:39 PM    Creatinine 1.56 (H) 02/28/2021 08:39 PM    WBC 23.1 (H) 02/28/2021 05:30 PM    Lactic acid 3.0 (H) 02/28/2021 07:27 PM      Estimated Creatinine Clearance: 37.3 mL/min (A) (based on SCr of 1.56 mg/dL (H)). CULTURES:  Bcx 2 pending  UA juvencio x1- pending    No results found for: GENR, GENT, GENP, TOBP, TOBR, TOBT    Day 1 of therapy. Will give pt tobramycin 460mg q48hr per [place] nomagram  Will continue to follow patient.       Thank you for this consult,  Kameron Wolff, JoaquinD

## 2021-03-01 NOTE — DIABETES MGMT
Patient admitted with septic shock, DKA. HbA1c 8.9 (). Patient currently on droplet plus precautions for COVID-19 rule out. Admitting blood glucose 652. patinet given IV regular 10 units and was placed on insulin gtt. Blood glucose this morning was 132. Anion gap 12. Creatinine 1.29. GFR 44. K+ 4.0. Patient last seen by diabetes management team in 2019. Patient has a PMH of type 1 diabetes. Per chart review patient states she was diagnosed in her late 35s with type 1 diabetes and patient has a positive family history of diabetes. Patient was using an insulin pump at the time with a TDD of 16.8 units. Per chart review patient saw her endocrinology via telemedicine on 2/26/21. Per chart review patient uses a Medtronic insulin pump 670G.      Basal rates: [place] 0.65, 3 am 0.95, 9 am 0.75, 3 pm 0.55  TDD: 17.1  I:C 10  sensitivity 30  BG target 120/120  Has Lantus 20 units in case of pump failure  Provider updated via [employer]

## 2021-03-01 NOTE — PROGRESS NOTES
Care Management Interventions  Palliative Care Criteria Met (RRAT>21 & CHF Dx)?: No(Dx Septic Shock Risk 18%)  Transition of Care Consult (CM Consult): Discharge Planning  Discharge Durable Medical Equipment: No(insulin pump)  Physical Therapy Consult: No  Occupational Therapy Consult: No  Speech Therapy Consult: No  Current Support Network: Lives with Spouse  Discharge Location  Discharge Placement: Home  Patient admitted for DKA history of type 1 diabetes and is in on insulin pump. Attempted to see patient and she was unavailable. Per chart review patient lives with her spouse in one story home with 3 steps to entrance. DME includes insulin pump. She has Medicare and [employer]. CM will f/u with patient for d/c planning as available.

## 2021-03-01 NOTE — H&P
[facility] Initial History and Physical Note    Patient: Sage Serna Date: 2/28/2021  female, 60 y.o. Admit Date: 2/28/2021  Attending: Gee Dan MD     ASSESSMENT AND PLAN:     Principal Problem:    Septic shock ([address] (2/28/2021)    Due to unknown organism. CXR unremarkable. UA pending. S/p aggressive IVF in ER and was started on Levophed. Continue Levophed and aggressive IVF. IV Vanc/Tobra/Zosyn. Blood cultures pending. Active Problems:    DKA, type 1 ([address] ()    Aggressive IVF, glucostabilizer protocol. ADRIENNE (acute kidney injury) ([address] [address] (5/7/2019)    IVF, follow BMP. Leukocytosis (2/28/2021)    Follow CBC      Hyperkalemia (2/28/2021)    Treated in ER, follow q4h BMp. Hyponatremia (2/28/2021)    IV NS, follow BMP      Metabolic acidemia (2/93/0310)    IV bicarbonate, follow ABG. Chronic obstructive pulmonary disease (HCC) ()    Stable. Continue home meds. Hypertension ()    Per above, hold antihypertensives      Coronary artery disease involving native coronary artery of native heart with angina pectoris ([address] [address] (5/31/2019)    Abnormal EKG in ER, cardiology consulted recommends heparin gtt, will continue. Depression, major, recurrent, mild ([address] (3/8/2018)    Stable. Pure hypercholesterolemia (6/28/2019)    Stable. Continue home meds. DVT Prophylaxis: Heparin gtt per above      Code Status: FULL CDE      Disposition: Admit to ICU for evaluation and treatment as per above. Anticipated discharge: 3-4 days     This visit took in excess of 40 minutes of Critical Care time in evaluation and management of a critically ill or injured patient, such that the critical illness or injury acutely impairs one or more organ system` such that there is a high probability of imminent or life-threatening deterioration in the patient's condition needing immediate attention or presence of critical care physician near bedside for the above time.  The time listed is exclusive of any procedures which may have been performed. Critical care time includes time spent at bedside performing patient interview and physical exam, time spent researching patient prior to interaction with patient, time spent discussing findings and treatment plan with patient and/or family, time spent discussing patient with consultants and colleagues, time spent reviewing pertinent laboratory and radiographic evaluations, time spent re-evaluating patient, and/or time spent discussing patient with nursing staff. CHIEF COMPLAINT:  I was asked to consult on this patient at the kind request of Krzysztof Morales MD, for medical management.     HISTORY OF PRESENT ILLNESS:      Patient Active Problem List   Diagnosis Code    Fibromyalgia M79.7    Hypertension I10    DKA, type 1 ([facility] E10.10    Chronic obstructive pulmonary disease ([facility]) J44.9    Depression, major, recurrent, mild ([facility]) F33.0    Vitamin B12 deficiency E53.8    Persistent migraine aura without cerebral infarction and without status migrainosus, not intractable G43.509    Venous insufficiency I87.2    Elevated rheumatoid factor R76.8    Vitamin D deficiency E55.9    Lumbar stenosis with neurogenic claudication M48.062    ADRIENNE (acute kidney injury) ([facility] N17.9    Coronary artery disease involving native coronary artery of native heart with angina pectoris ([facility]) I25.119    Pure hypercholesterolemia E78.00    Breast mass, right N63.10    S/P angioplasty with stent Z95.820    Septic shock ([facility]) A41.9, R65.21    Leukocytosis D72.829    Hyperkalemia E87.5    Hyponatremia D13.3    Metabolic acidemia B48.4       Avis Angeles is a 60 y.o. female, who  has a past medical history of Anxiety, Arthritis, Asthma, CAD (coronary artery disease) (04/24/2019), Cervical spondylosis with myelopathy (6/17/2015), Chronic obstructive pulmonary disease ([address] Chronic pain, Depression, Diabetes ([address] Diverticulosis, DJD (degenerative joint disease), Fibromyalgia, Former smoker, GERD (gastroesophageal reflux disease), Hormone replacement therapy, Hypertension, Insomnia, Left knee pain, Migraine, Psychiatric diagnosis, RA (rheumatoid arthritis) ([address] (dx yrs ago), Unspecified adverse effect of anesthesia, Vitamin B 12 deficiency, and Vitamin D deficiency. ,  has a past surgical history that includes hx colonoscopy; hx cataract removal (Bilateral); hx  section; hx hysterectomy (N/A, ); hx orthopaedic; hx orthopaedic; hx orthopaedic (Bilateral); hx orthopaedic (Right); hx orthopaedic (Right); hx orthopaedic (N/A); and neurological procedure unlisted (). Who presents to the ER with complaint of nausea and vomiting, along with body aches for the past four days. She reports that she does not feel much better now but cannot remember everything that happened tonight. She reports that the nausea is improved but still feels pain all over her body. She admits to subjective fevers at home. Per 's report, her FSBG has been over 400 for the past 2 days. Allergy  Allergies   Allergen Reactions    Doxepin Hives and Nausea and Vomiting    Nortriptyline Hives    Other Medication Hives and Nausea and Vomiting     elavil    Plaquenil [Hydroxychloroquine] Hives    Tizanidine Other (comments)     Sleep walking and  nocturnal eating     Vistaril [Hydroxyzine Pamoate] Other (comments)     Insomnia           Medication list  Prior to Admission Medications   Prescriptions Last Dose Informant Patient Reported? Taking? ALPRAZolam (XANAX) 2 mg tablet   Yes No   Sig: Take 2 mg by mouth three (3) times daily as needed for Anxiety. Usually takes 3 times daily   ARIPiprazole (ABILIFY) 10 mg tablet   Yes No   Sig: Take 5 mg by mouth nightly. DULoxetine (CYMBALTA) 60 mg capsule   No No   Sig: Take 1 Cap by mouth daily.    OTHER   Yes No   Sig: Insulin via pump   OTHER   Yes No   Sig: Novolog Insulin pump   SUMAtriptan (IMITREX) 100 mg tablet   Yes No Sig: Take 50 mg by mouth once as needed for Migraine. Syringe with Needle, Disp, 1 mL 25 gauge x 5/8\" syrg   Yes No   Si,000 mcg by Does Not Apply route. Syringe with Needle, Safety 3 mL 25 gauge x 1\" syrg   Yes No   Si mL by IntraMUSCular route. albuterol (PROVENTIL HFA, VENTOLIN HFA, PROAIR HFA) 90 mcg/actuation inhaler   No No   Sig: Take 1 Puff by inhalation every six (6) hours as needed for Wheezing. aspirin 81 mg chewable tablet   No No   Sig: Take 1 Tab by mouth daily. atorvastatin (LIPITOR) 80 mg tablet   No No   Sig: Take 1 Tab by mouth daily. fluconazole (DIFLUCAN) 150 mg tablet   Yes No   Sig: Take 150 mg by mouth every thirty (30) days. FDA advises cautious prescribing of oral fluconazole in pregnancy. monthly   fluticasone propion-salmeteroL (ADVAIR/WIXELA) 250-50 mcg/dose diskus inhaler   No No   Sig: Take 1 Puff by inhalation two (2) times a day. furosemide (LASIX) 20 mg tablet   No No   Sig: TAKE 1 TABLET BY MOUTH DAILY   gabapentin (NEURONTIN) 300 mg capsule   Yes No   Sig: Take 300 mg by mouth three (3) times daily. lisinopriL (PRINIVIL, ZESTRIL) 40 mg tablet   No No   Sig: Take 1 Tab by mouth daily. meloxicam (MOBIC) 7.5 mg tablet   Yes No   Sig: Take  by mouth daily. metoprolol succinate (TOPROL-XL) 50 mg XL tablet   No No   Sig: Take 1 Tab by mouth daily. nitroglycerin (NITROSTAT) 0.4 mg SL tablet   No No   Si Tab by SubLINGual route as needed for Chest Pain. Up to 3 doses. omeprazole (PRILOSEC) 20 mg capsule   No No   Sig: Take 1 Cap by mouth daily. ondansetron hcl (ZOFRAN) 4 mg tablet   No No   Sig: Take 1 Tab by mouth every eight (8) hours as needed for Nausea. tiotropium bromide (SPIRIVA RESPIMAT) 1.25 mcg/actuation inhaler   No No   Sig: Take 2 Puffs by inhalation daily. topiramate (TOPAMAX) 50 mg tablet   Yes No   Sig: Take 50 mg by mouth daily.       Facility-Administered Medications: None         Past Medical History  Past Medical History: Diagnosis Date    Anxiety     Arthritis     Asthma     CAD (coronary artery disease) 2019    mi  stent x 1-- followed by dr Selena Sparks Cervical spondylosis with myelopathy 2015    Chronic obstructive pulmonary disease (HCC)     daily inhaler    Chronic pain     back  - and all over pain     Depression     Diabetes (HCC)     type 1, sqbs am avg 240-250-- hypo at 60's -- uses insulin pump    Diverticulosis     resolved at present - sees DR Diony uBeno when needed    DJD (degenerative joint disease)     Fibromyalgia     all over     Former smoker     quit     GERD (gastroesophageal reflux disease)     CONTROLLED WITH MED    Hormone replacement therapy     Hypertension     controlled with med    Insomnia     Left knee pain     Migraine     Psychiatric diagnosis     bipolar    RA (rheumatoid arthritis) ([address] dx yrs ago    does not see a rhuematologist    Unspecified adverse effect of anesthesia     \"woke up\"during  surgery on toe    Vitamin B 12 deficiency     Vitamin D deficiency        Past Surgical History:   Procedure Laterality Date    HX CATARACT REMOVAL Bilateral     HX  SECTION      times 2    HX COLONOSCOPY      HX HYSTERECTOMY N/A     total     HX ORTHOPAEDIC      left knee scope    HX ORTHOPAEDIC      right foot \"bone removal\"     HX ORTHOPAEDIC Bilateral     shoulder scopes    HX ORTHOPAEDIC Right     shaved down bone in great toe     HX ORTHOPAEDIC Right     right rotator cuff     HX ORTHOPAEDIC N/A     neck ( plate and screws was put in )     NEUROLOGICAL PROCEDURE UNLISTED      lower back       Social History  Social History     Socioeconomic History    Marital status:      Spouse name: Not on file    Number of children: Not on file    Years of education: Not on file    Highest education level: Not on file   Tobacco Use    Smoking status: Former Smoker     Packs/day: 1.00     Years: 30.00     Pack years: 30.00     Types: Cigarettes Quit date: 6/10/2002     Years since quittin.7    Smokeless tobacco: Never Used   Substance and Sexual Activity    Alcohol use: Yes     Alcohol/week: 11.0 standard drinks     Types: 4 Glasses of wine, 7 Cans of beer per week    Drug use: No       Family History:   Family History   Problem Relation Age of Onset    Cancer Father     Lung Disease Father     Cancer Maternal Aunt     Heart Disease Paternal Grandmother     Heart Disease Paternal Grandfather        REVIEW OF SYSTEMS:   A 14 point review of systems was taken and pertinent positive as per HPI.     PHYSICAL EXAMINATION:  Vital 24 Hour Range Most Recent Value  Temperature Temp  Min: 97.7 °F (36.5 °C)  Max: 97.7 °F (36.5 °C) 97.7 °F (36.5 °C)    Pulse Pulse  Min: 75  Max: 86 86  Respiratory Resp  Min: 12  Max: 36 23  Blood Pressure BP  Min: 72/45  Max: 110/58 (!) 110/58  Pulse Oximetry SpO2  Min: 90 %  Max: 100 % 100 %  O2 No data recorded      Vital Most Recent Value First Value  Weight 68.5 kg (151 lb) Weight: 68.5 kg (151 lb)  Height 5' 3\" (160 cm) Height: 5' 3\" (160 cm)  BMI   N/A    Physical Exam:   General: No acute distress, speaking in full sentences, no use of accessory muscles   HEENT: Pupils equal and reactive to light and accommodation, oropharynx is clear   Neck: Supple, no lymphadenopathy, no JVD   Lungs: Clear to auscultation bilaterally   Cardiovascular: Regular rate and rhythm with normal S1 and S2   Abdomen: Soft, nontender, nondistended, normoactive bowel sounds   Extremities: No cyanosis clubbing or edema   Neuro: Nonfocal, A&O x3, significant psychomotor slowing, occasinally repeating herself  Psych: Normal affect     Intake/Output last 3 shifts:      Labs:  magnesium:7,phos:7)CMP:   Lab Results   Component Value Date/Time     (L) 2021 03:30 PM    K 6.7 (HH) 2021 03:30 PM    CL 84 (L) 2021 03:30 PM    CO2 14 (L) 2021 03:30 PM    AGAP 25 (H) 2021 03:30 PM     (HH) 2021 03:30 PM BUN 43 (H) 02/28/2021 03:30 PM    CREA 2.15 (H) 02/28/2021 03:30 PM    GFRAA 30 (L) 02/28/2021 03:30 PM    GFRNA 25 (L) 02/28/2021 03:30 PM    CA 9.0 02/28/2021 03:30 PM    MG 2.7 (H) 02/28/2021 03:30 PM    ALB 4.2 02/28/2021 03:30 PM    TBILI 1.0 02/28/2021 03:30 PM    TP 8.0 02/28/2021 03:30 PM    GLOB 3.8 (H) 02/28/2021 03:30 PM    AGRAT 1.1 (L) 02/28/2021 03:30 PM    ALT 35 02/28/2021 03:30 PM         CBC:    Lab Results   Component Value Date/Time    WBC 23.1 (H) 02/28/2021 05:30 PM    HGB 13.1 02/28/2021 05:30 PM    HCT 42.0 02/28/2021 05:30 PM     02/28/2021 05:30 PM       No results found for: INR, PTMR, PTP, PT1, PT2, INREXT    ABG:    Lab Results   Component Value Date/Time    PHI 6.98 (LL) 02/28/2021 05:56 PM    PCO2I 27.5 (L) 02/28/2021 05:56 PM    PO2I 102 (H) 02/28/2021 05:56 PM    HCO3I 6.4 (L) 02/28/2021 05:56 PM           No results found for: CPK, RCK1, RCK2, RCK3, RCK4, CKMB, CKNDX, CKND1, TROPT, TROIQ, BNPP, BNP    Imagining & Other Studies    XR Results (maximum last 3): Results from Hospital Encounter encounter on 02/28/21   XR CHEST PORT    Narrative Chest portable    CLINICAL INDICATION: Central venous catheter placement, acute DKA with severe  vomiting and nausea, generalized malaise and body aches; diabetes type 1 history  as well as previous DKA, recent hyperglycemic episodes. COMPARISON: 12/13/2019    TECHNIQUE: single AP portable view chest at 4:55 PM upright     FINDINGS:  There is no evidence of consolidation, pneumothorax, pleural effusion  or pulmonary edema. The mediastinal and hilar contours are stable, normal given  technique. There is a right jugular venous catheter with tip projecting over  right atrium. Total external monitoring wires overlie the chest. Right humerus surgical screws  are again noted suggesting rotator cuff procedure. Partial visualization of  cervical spine surgical hardware. Impression 1. Right jugular catheter.   2. No acute airspace disease. Results from Hospital Encounter encounter on 12/19/19   XR ELBOW RT MIN 3 V    Narrative Clinical History: The patient is a 62years year old Female presenting with  symptoms of right hand pain wrist pain, and forearm pain, elbow pain. Comparison:  none    Findings:    3 views of the right elbow were obtained. No fracture or dislocation is identified. Joint spaces are well-maintained. No evidence of joint effusion is demonstrated  to suggest an occult injury. Impression Impression:    No acute osseous or joint abnormalities. XR FOREARM RT AP/LAT    Narrative Clinical History: The patient is a 62years year old Female presenting with  symptoms of right hand pain wrist pain, and forearm pain, elbow pain. Comparison:  none    Findings:    2 views of the right forearm were obtained. No fracture or dislocation is identified. Joint spaces are well-maintained. Impression Impression:    No acute osseous or joint abnormalities. CT Results (maximum last 3): Results from [facility] encounter on 09/11/19   CT ABD PELV W CONT    Narrative CT OF THE ABDOMEN AND PELVIS WITH CONTRAST. CLINICAL INDICATION: Right-sided mid abdominal pain for four weeks    PROCEDURE: Serial thin section axial images obtained from the lung bases through  the proximal femurs following the administration of 100 cc of Isovue 370  intravenous contrast.  Radiation dose reduction techniques were used for this  study. Our CT scanners use one or all of the following: Automated exposure  control, adjusted of the mA and/or kV according to patient size, iterative  reconstruction    COMPARISON: No prior    FINDINGS:     CT ABDOMEN: The gallbladder is normal. The liver and spleen are unremarkable. The pancreas is normal. There is no upper abdominal or retroperitoneal  adenopathy. The bowel is normal in course, caliber, and wall thickness.  The  appendix is not clearly visualized however there are secondary findings of  appendicitis. The small bowel is unremarkable. CT PELVIS: Sigmoid colon diverticular changes are present. No isrrael  diverticulitis. The rectum is normal. The bladder is well seen with smooth thin  walls. There is no inguinal hernia or adenopathy. No aggressive osseous lesions identified. Impression IMPRESSION:  1. No acute abdominal or pelvic abnormality noted to explain the patient's pain. 2. Diverticulosis without isrrael diverticulitis. Results from [place] encounter on 05/17/19   CT CORONARY ART W CA+ W EF    Narrative CT Coronary Arteries  5/17/2019    INDICATION: The patient is a 56-year-old female referred for cardiac CTA to  evaluate her coronary anatomy. The patient had diabetic ketoacidosis in outside  hospital elevated troponin. She has diabetes and multiple risk factors. PROCEDURE: A 64 slice CT coronary angiogram procedure was performed. 110 mL of  Optiray 350 were administered intravenously followed by axial imaging through  the chest with a standard cardiac reconstruction protocol. Patient was given  oral beta blockers prior to the procedure. The average heart rate was stable  beats per minute. The calcium score was calculated and left ventricular  function was not assessed. Technical Quality: Good    INTERPRETATION:     Coronary Calcium Score: 107 located to the proximal left anterior descending     Coronary Angiography:    1. The left main originates from the left cusp and divides into the left  anterior descending and a large dominant circumflex vessel. The left main  appears normal   2. The left anterior descending large vessel coursing the apex. There is very  dense calcified and mixed calcified plaque throughout the proximal left anterior  descending artery with the area of greater than 70-80% stenosis. The distal  vessels widely patent. The diagonal's and no significant stenosis  3.  The left circumflex is the dominant vessel large dominant vessel providing  the posterior lateral wall posterior descending branches. The main circumflex  has no calcium noted. The marginal branches are all widely patent is no  significant stenosis   4. The right coronary artery small nondominant vessel which appears normal    Left Ventricle: Normal size and thickness. Left Atrium: Grossly normal size left atrium with the normal appendage. The  pulmonary veins appear normal   Right Ventricle: Normal size   Right Atrium: Normal  Pericardium: No pericardial effusion  Aorta: Normal size aorta with no significant calcification. Pulmonary Artery: Normal caliber pulmonary arteries and distal opacification   Valves: Grossly normal aortic valve and mitral valve. Mediastinum / Lung Fields: Lung fields are grossly clear. This portion be over  read by radiology. Impression IMPRESSION:  1. Calcium score: 107 located in the left anterior descending  2. Significant proximal calcified and mixed calcified LAD stenosis   3. No significant stenosis in the circumflex or small nondominant right  4. Normal left upper chamber dimension     CT CARDIAC OVER READ    Narrative CT CARDIAC OVER-READ    Multiple gated axial images were obtained through the heart during a cardiac CT. Radiation dose reduction techniques were used for this study: All CT scans  performed at this facility use one or all of the following: Automated exposure  control, adjustment of the mA and/or kVp according to patient's size, iterative  reconstruction. COMPARISON: None    FINDINGS:  -LUNGS: No infiltrates, masses, or effusions. -MEDIASTINUM/AXILLA: No significant adenopathy. -PULMONARY ARTERIES: Normal.    -CHEST WALL/BONES: Normal.  -UPPER ABDOMEN: No acute findings. Impression IMPRESSION: No acute findings in the visualized portions of the chest.                       MRI Results (maximum last 3): No results found for this or any previous visit.     Nuclear Medicine Results (maximum last 3): No results found for this or any previous visit. US Results (maximum last 3): No results found for this or any previous visit. DEXA Results (maximum last 3): Results from Hospital Encounter encounter on 08/15/18   DEXA BONE DENSITY STUDY AXIAL    Narrative DXA BONE DENSITOMETRY STUDY  8/15/2018 3:06 PM    History:  post menopausal patient    Technique: A dual energy x-ray done densitometry was performed using a [address] Comparison:  None Available    Findings: Bone mineral density is as follows:    L1-L4: 1.344 g/cm2 correlating with a T-score of 1.2    Left femoral neck:  0.755 g/cm2 correlating with a T-score -2.0    Right femoral neck: 0.837 g/cm2 correlating with a T-score of -1.4     FRAX: The patient's 10 year risk of a major osteoporotic fracture is 10.2 %. The  patient's 10 year risk of hip fracture is 1.4 %. Impression Impression: Low bone density with left femoral neck T score -2.0.        **    The National Osteoporosis Foundation recommends that medical therapy be  considered in postmenopausal women and men age 48 years and older with a:       a. Hip or vertebral fracture,       b. T-score <= -2.5 in the spine or hip (osteoporotic), or       c.  T-score between -1.0 and -2.5 (low bone mass, osteopenic), and FRAX =>  3% for hip fracture or => 20% for major osteoporotic fracture. AYE Results (maximum last 3): No results found for this or any previous visit. IR Results (maximum last 3): No results found for this or any previous visit. VAS/US Results (maximum last 3): No results found for this or any previous visit. PET Results (maximum last 3): No results found for this or any previous visit.       EKG Results     Procedure 720 Value Units Date/Time    EKG, 12 LEAD, INITIAL [[id number]] Collected: 02/28/21 1603    Order Status: Completed Updated: 02/28/21 1707     Ventricular Rate 75 BPM      Atrial Rate 75 BPM      P-R Interval 222 ms      QRS Duration 100 ms      Q-T Interval 392 ms      QTC Calculation (Bezet) 437 ms      Calculated P Axis 68 degrees      Calculated R Axis 114 degrees      Calculated T Axis -118 degrees      Diagnosis --     !! AGE AND GENDER SPECIFIC ECG ANALYSIS !! Sinus rhythm with 1st degree A-V block  Right axis deviation  qR noted in V1.   ST & T wave abnormality, consider inferior ischemia  ST & T wave abnormality, consider anterolateral ischemia  Abnormal ECG  When compared with ECG of 13-DEC-2019 20:47,  DE interval has increased    Confirmed by GERI FLORES (), TORRES STEVENSON ([id number]) on 2/28/2021 5:07:01 PM            Mat Verde MD  2/28/2021 7:19 PM

## 2021-03-01 NOTE — PROGRESS NOTES
vanc Consult    MD ordering: jesse dewey following? no  Indication: bloodstream infection  DOT:  -  days  Goal level(s): 15-20mcg/ml    Ht Readings from Last 1 Encounters:   21 5' 3\" (1.6 m)      Wt Readings from Last 1 Encounters:   21 83.4 kg (183 lb 13.8 oz)         Temp (24hrs), Av.7 °F (36.5 °C), Min:97.7 °F (36.5 °C), Max:97.7 °F (36.5 °C)    Dosing weight: 83.4 kg  60 y.o. Date:  Dose/Freq Admin Times Level/Time:    1750mg x 1835    3/1 1250 mg q18h (12)                        Recent Labs     219 21  1927 21  1730 21  1530   BUN 37*  --   --  43*   CREA 1.56*  --   --  2.15*   WBC  --   --  23.1* 16.2*   LAC  --  3.0* 3.1*  --      Estimated Creatinine Clearance: 37.3 mL/min (A) (based on SCr of 1.56 mg/dL (H)). No results found for: Kali Rizzo    Day 2  Assessment and Plan: Will continue Vancomycin 1250 mg IV every 18 hours. The next dose is due at noon.     Loi Orozco, PharmD, BCPS

## 2021-03-01 NOTE — PROGRESS NOTES
Hospitalist Progress Note    3/1/2021  Admit Date: 2021  3:21 PM   NAME: Benita Hills   :  1955   MRN:  [id number]   Attending: Ruchi Ricardo MD  PCP:  Omar Daley MD    SUBJECTIVE:   57yo female with DM1, h/o DKA, HTN, CAD (s/p stent 2019), COPD, bipolar who presents to the ED with N/V, body aches. She was septic in the ED, in DKA, hypotensive. Central line placed, started on insulin gtt, Vanc, Tobra, Zosyn. Also with concerning EKG changes, started on heparin gtt at recommendation of cardiology. 3/1 - AG closed, transition to SQ insulin, d/c bicarb gtt, cards consulted, weaning Levo    Pt reports onset of HA, body aches, chills approx 3-4 days ago. + nonproductive cough, mild SOB, + intermittent aching CP. Confused overnight but oriented this morning, aware of events leading to hospitalization. A/P:    Septic shock  Leukocytosis  No obvious infection at this time. May be related to DKA, dehydration. - cont abx for now  - f/u CXR  - rapid COVID due to viral like symptoms  - PA/lat CXR   - f/u cultures  - wean Levo    DKA  DM1  Previous hospitalizations for this. On insulin pump at home, just saw endocrine last week. AG closed. - transition to Lantus 20 units   - Humalog units TID  - diabetic educator consulted  - will likely transition back to insulin pump at d/c    Acute metabolic encephalopathy  - resolved    ADRIENNE  Cr 2.15 on admit, prerenal in setting of DKA. Down to 1.29 this AM.  - cont IVFs  - follow BMP    Chest pain  Abnormal EKG  CAD  Tropons x 2 WNL. Does endorse some intermittent CP but has chest tenderness which she states reproduces the pain.   - cont heparin gtt until cards eval  - cont ASA, statin    Bipolar disorder  - cont Abilify    Proph - heparin SQ  Full code  Dispo - home in likely 2-3 days    38 minutes critical care time spent    Review of Systems negative with exception of pertinent positives noted above  PHYSICAL EXAM     Patient Vitals for the past 24 hrs: Temp Pulse Resp BP SpO2   03/01/21 0915 -- -- -- -- 98 %   03/01/21 0736 98.4 °F (36.9 °C) 81 17 114/65 96 %   03/01/21 0631 -- 80 (!) 52 108/68 96 %   03/01/21 0616 -- 82 29 113/65 96 %   03/01/21 0601 -- 79 13 106/64 97 %   03/01/21 0546 -- 81 18 (!) 106/56 96 %   03/01/21 0531 -- 78 18 115/61 94 %   03/01/21 0516 -- 76 18 122/66 96 %   03/01/21 0501 -- 77 20 114/63 98 %   03/01/21 0448 -- 83 20 125/71 98 %   03/01/21 0346 98.7 °F (37.1 °C) 79 15 138/75 96 %   03/01/21 0339 -- -- -- 125/71 95 %   03/01/21 0316 -- 83 19 (!) 134/57 (!) 78 %   03/01/21 0301 -- 82 19 119/71 98 %   03/01/21 0246 -- 80 20 (!) 95/59 94 %   03/01/21 0231 -- 84 25 129/66 96 %   03/01/21 0216 -- 82 15 (!) 111/59 94 %   03/01/21 0201 -- 84 23 119/63 96 %   03/01/21 0146 -- 82 24 110/65 96 %   03/01/21 0131 -- 84 28 (!) 108/57 96 %   03/01/21 0116 -- 82 27 (!) 104/59 98 %   03/01/21 0101 -- 81 11 125/68 95 %   03/01/21 0046 -- 84 (!) 34 129/75 98 %   03/01/21 0031 -- 83 22 123/71 99 %   03/01/21 0016 -- 81 29 126/71 92 %   03/01/21 0001 99 °F (37.2 °C) 80 21 136/75 96 %   02/28/21 2346 -- 81 24 90/62 97 %   02/28/21 2342 -- 83 (!) 55 114/81 92 %   02/28/21 2326 -- 77 17 137/77 98 %   02/28/21 2316 -- 83 21 121/66 98 %   02/28/21 2255 -- 82 (!) 44 123/77 97 %   02/28/21 2242 -- 83 17 139/82 98 %   02/28/21 2231 -- 88 (!) 39 (!) 148/83 99 %   02/28/21 2200 -- 90 24 116/69 94 %   02/28/21 2145 -- 83 19 129/75 94 %   02/28/21 2130 -- 84 25 127/83 95 %   02/28/21 2115 -- 85 15 124/75 98 %   02/28/21 2100 -- 84 18 133/78 98 %   02/28/21 2045 -- 88 23 (!) 140/78 96 %   02/28/21 2030 -- 87 20 (!) 140/86 97 %   02/28/21 2021 97.4 °F (36.3 °C) 90 19 133/66 99 %   02/28/21 1935 -- 85 22 (!) 102/55 100 %   02/28/21 1930 -- 86 24 (!) 102/55 100 %   02/28/21 1925 -- 84 16 (!) 103/57 100 %   02/28/21 1920 -- 85 17 91/65 100 %   02/28/21 1915 -- 85 18 106/63 100 %   02/28/21 1910 -- 86 23 (!) 110/58 100 %   02/28/21 1905 -- 85 21 105/60 100 %   02/28/21 1900 -- 84 21 (!) 102/50 100 %   02/28/21 1855 -- -- -- (!) 96/51 100 %   02/28/21 1850 -- 84 20 (!) 100/56 100 %   02/28/21 1845 -- -- -- (!) 108/57 100 %   02/28/21 1840 -- 84 28 (!) 108/57 100 %   02/28/21 1830 -- 83 26 (!) 107/59 100 %   02/28/21 1820 -- 84 27 (!) 99/57 100 %   02/28/21 1815 -- 85 21 (!) 97/53 100 %   02/28/21 1810 -- 83 28 (!) 97/53 100 %   02/28/21 1805 -- 81 18 (!) 92/53 100 %   02/28/21 1800 -- 81 12 (!) 102/59 100 %   02/28/21 1757 -- -- -- -- 100 %   02/28/21 1755 -- 82 16 (!) 103/59 100 %   02/28/21 1750 -- 80 20 (!) 103/58 100 %   02/28/21 1745 -- 80 (!) 35 (!) 97/52 100 %   02/28/21 1740 -- 80 28 (!) 100/55 100 %   02/28/21 1735 -- 79 30 (!) 93/50 100 %   02/28/21 1730 -- 79 15 (!) 83/53 100 %   02/28/21 1727 -- 79 21 (!) 86/50 100 %   02/28/21 1722 -- 79 19 (!) 87/51 100 %   02/28/21 1719 -- 79 28 (!) 81/50 100 %   02/28/21 1712 -- 78 (!) 33 (!) 76/46 100 %   02/28/21 1702 -- 79 28 (!) 90/51 --   02/28/21 1656 -- 79 (!) 36 (!) 87/51 --   02/28/21 1652 -- 78 (!) 32 (!) 84/52 --   02/28/21 1649 -- 85 -- (!) 80/50 --   02/28/21 1644 -- 78 17 (!) 72/45 --   02/28/21 1606 -- -- -- (!) 76/41 --   02/28/21 1604 -- 76 -- -- 99 %   02/28/21 1549 -- -- -- (!) 72/50 90 %   02/28/21 1538 -- -- -- (!) 87/52 94 %   02/28/21 1536 -- -- -- (!) 92/54 --   02/28/21 1513 97.7 °F (36.5 °C) 75 18 (!) 81/51 98 %     Oxygen Therapy  O2 Sat (%): 98 % (03/01/21 0915)  Pulse via Oximetry: 82 beats per minute (03/01/21 0915)  O2 Device: Room air (03/01/21 0915)  O2 Flow Rate (L/min): 0 l/min (03/01/21 0915)    Intake/Output Summary (Last 24 hours) at 3/1/2021 0955  Last data filed at 3/1/2021 0835  Gross per 24 hour   Intake 7944.38 ml   Output 650 ml   Net 7294.38 ml      General: No acute distress    Neck:  R IJ  Lungs:  CTA Bilaterally  Heart:  Regular rate and rhythm,  No murmur, rub, or gallop  Abdomen: Soft, Non distended, Non tender, Positive bowel sounds  Extremities: No cyanosis, clubbing or edema  Neurologic:  No focal deficits  Psych:  Calm, cooperative    Recent Results (from the past 24 hour(s))   GLUCOSE, POC    Collection Time: 02/28/21  3:15 PM   Result Value Ref Range    Glucose (POC) >600 (HH) 65 - 100 mg/dL   CBC WITH AUTOMATED DIFF    Collection Time: 02/28/21  3:30 PM   Result Value Ref Range    WBC 16.2 (H) 4.3 - 11.1 K/uL    RBC 4.94 4.05 - 5.2 M/uL    HGB 15.0 11.7 - 15.4 g/dL    HCT 48.5 (H) 35.8 - 46.3 %    MCV 98.2 (H) 79.6 - 97.8 FL    MCH 30.4 26.1 - 32.9 PG    MCHC 30.9 (L) 31.4 - 35.0 g/dL    RDW 13.4 11.9 - 14.6 %    PLATELET 469 826 - 615 K/uL    MPV 12.1 9.4 - 12.3 FL    ABSOLUTE NRBC 0.00 0.0 - 0.2 K/uL    DF AUTOMATED      NEUTROPHILS 88 (H) 43 - 78 %    LYMPHOCYTES 7 (L) 13 - 44 %    MONOCYTES 3 (L) 4.0 - 12.0 %    EOSINOPHILS 0 (L) 0.5 - 7.8 %    BASOPHILS 1 0.0 - 2.0 %    IMMATURE GRANULOCYTES 1 0.0 - 5.0 %    ABS. NEUTROPHILS 14.3 (H) 1.7 - 8.2 K/UL    ABS. LYMPHOCYTES 1.1 0.5 - 4.6 K/UL    ABS. MONOCYTES 0.5 0.1 - 1.3 K/UL    ABS. EOSINOPHILS 0.0 0.0 - 0.8 K/UL    ABS. BASOPHILS 0.1 0.0 - 0.2 K/UL    ABS. IMM. GRANS. 0.2 0.0 - 0.5 K/UL   METABOLIC PANEL, COMPREHENSIVE    Collection Time: 02/28/21  3:30 PM   Result Value Ref Range    Sodium 123 (L) 136 - 145 mmol/L    Potassium 6.7 (HH) 3.5 - 5.1 mmol/L    Chloride 84 (L) 98 - 107 mmol/L    CO2 14 (L) 21 - 32 mmol/L    Anion gap 25 (H) 7 - 16 mmol/L    Glucose 652 (HH) 65 - 100 mg/dL    BUN 43 (H) 8 - 23 MG/DL    Creatinine 2.15 (H) 0.6 - 1.0 MG/DL    GFR est AA 30 (L) >60 ml/min/1.73m2    GFR est non-AA 25 (L) >60 ml/min/1.73m2    Calcium 9.0 8.3 - 10.4 MG/DL    Bilirubin, total 1.0 0.2 - 1.1 MG/DL    ALT (SGPT) 35 12 - 65 U/L    AST (SGOT) 17 15 - 37 U/L    Alk.  phosphatase 91 50 - 136 U/L    Protein, total 8.0 6.3 - 8.2 g/dL    Albumin 4.2 3.2 - 4.6 g/dL    Globulin 3.8 (H) 2.3 - 3.5 g/dL    A-G Ratio 1.1 (L) 1.2 - 3.5     MAGNESIUM    Collection Time: 02/28/21  3:30 PM   Result Value Ref Range    Magnesium 2.7 (H) 1.8 - 2.4 mg/dL   TROPONIN-HIGH SENSITIVITY    Collection Time: 02/28/21  3:30 PM   Result Value Ref Range    Troponin-High Sensitivity 7.0 0 - 14 pg/mL   EKG, 12 LEAD, INITIAL    Collection Time: 02/28/21  4:03 PM   Result Value Ref Range    Ventricular Rate 75 BPM    Atrial Rate 75 BPM    P-R Interval 222 ms    QRS Duration 100 ms    Q-T Interval 392 ms    QTC Calculation (Bezet) 437 ms    Calculated P Axis 68 degrees    Calculated R Axis 114 degrees    Calculated T Axis -118 degrees    Diagnosis       !! AGE AND GENDER SPECIFIC ECG ANALYSIS !! Sinus rhythm with 1st degree A-V block  Right axis deviation  qR noted in V1. ST & T wave abnormality, consider inferior ischemia  ST & T wave abnormality, consider anterolateral ischemia  Abnormal ECG  When compared with ECG of 13-DEC-2019 20:47,  NV interval has increased    Confirmed by GERI FLORES (), TORRES STEVENSON ([id number]) on 2/28/2021 5:07:01 PM     LACTIC ACID    Collection Time: 02/28/21  5:30 PM   Result Value Ref Range    Lactic acid 3.1 (H) 0.4 - 2.0 MMOL/L   PTT    Collection Time: 02/28/21  5:30 PM   Result Value Ref Range    aPTT 25.9 24.1 - 35.1 SEC   CBC WITH AUTOMATED DIFF    Collection Time: 02/28/21  5:30 PM   Result Value Ref Range    WBC 23.1 (H) 4.3 - 11.1 K/uL    RBC 4.26 4.05 - 5.2 M/uL    HGB 13.1 11.7 - 15.4 g/dL    HCT 42.0 35.8 - 46.3 %    MCV 98.6 (H) 79.6 - 97.8 FL    MCH 30.8 26.1 - 32.9 PG    MCHC 31.2 (L) 31.4 - 35.0 g/dL    RDW 13.3 11.9 - 14.6 %    PLATELET 142 638 - 985 K/uL    MPV 12.1 9.4 - 12.3 FL    ABSOLUTE NRBC 0.00 0.0 - 0.2 K/uL    DF AUTOMATED      NEUTROPHILS 87 (H) 43 - 78 %    LYMPHOCYTES 6 (L) 13 - 44 %    MONOCYTES 4 4.0 - 12.0 %    EOSINOPHILS 0 (L) 0.5 - 7.8 %    BASOPHILS 0 0.0 - 2.0 %    IMMATURE GRANULOCYTES 3 0.0 - 5.0 %    ABS. NEUTROPHILS 20.1 (H) 1.7 - 8.2 K/UL    ABS. LYMPHOCYTES 1.4 0.5 - 4.6 K/UL    ABS. MONOCYTES 0.9 0.1 - 1.3 K/UL    ABS. EOSINOPHILS 0.0 0.0 - 0.8 K/UL    ABS. BASOPHILS 0.1 0.0 - 0.2 K/UL    ABS. IMM. GRANS. 0.6 (H) 0.0 - 0.5 K/UL   TROPONIN-HIGH SENSITIVITY    Collection Time: 02/28/21  5:30 PM   Result Value Ref Range    Troponin-High Sensitivity 9.0 0 - 14 pg/mL   HEMOGLOBIN A1C WITH EAG    Collection Time: 02/28/21  5:30 PM   Result Value Ref Range    Hemoglobin A1c 8.9 (H) 4.20 - 6.30 %    Est. average glucose 209 mg/dL   EKG, 12 LEAD, INITIAL    Collection Time: 02/28/21  5:33 PM   Result Value Ref Range    Ventricular Rate 79 BPM    Atrial Rate 79 BPM    P-R Interval 224 ms    QRS Duration 92 ms    Q-T Interval 396 ms    QTC Calculation (Bezet) 454 ms    Calculated P Axis 67 degrees    Calculated R Axis 87 degrees    Calculated T Axis 54 degrees    Diagnosis       !! AGE AND GENDER SPECIFIC ECG ANALYSIS !!   Sinus rhythm with 1st degree A-V block  Low voltage QRS  Incomplete right bundle branch block  Nonspecific ST and T wave abnormality  Abnormal ECG  When compared with ECG of 28-FEB-2021 16:03,  ST no longer depressed in Inferior leads  T wave inversion no longer evident in Inferior leads  Nonspecific T wave abnormality has replaced inverted T waves in Anterolateral   leads  Confirmed by Macario Auguste MD (), Heaven Valencia ([id number]) on 3/1/2021 7:07:23 AM     POC G3    Collection Time: 02/28/21  5:56 PM   Result Value Ref Range    Device: NASAL CANNULA      pH (POC) 6.98 (LL) 7.35 - 7.45      pCO2 (POC) 27.5 (L) 35 - 45 MMHG    pO2 (POC) 102 (H) 75 - 100 MMHG    HCO3 (POC) 6.4 (L) 22 - 26 MMOL/L    sO2 (POC) 93 (L) 95 - 98 %    Base deficit (POC) 24 mmol/L    Allens test (POC) YES      Site RIGHT RADIAL      Specimen type (POC) ARTERIAL      Performed by Claudia     CO2, POC 7 MMOL/L    Flow rate (POC) 2.000 L/min    Critical value read back 00:01     COLLECT TIME 1,752     URINALYSIS W/ RFLX MICROSCOPIC    Collection Time: 02/28/21  7:07 PM   Result Value Ref Range    Color YELLOW      Appearance CLEAR      Specific gravity 1.022 1.001 - 1.023      pH (UA) 5.0 5.0 - 9.0      Protein Negative NEG mg/dL Glucose GREATER THAN/EQUAL TO 2000 (A) NEG mg/dL    Ketone 80 (A) NEG mg/dL    Bilirubin Negative NEG      Blood Negative NEG      Urobilinogen 0.2 0.2 - 1.0 EU/dL    Nitrites Negative NEG      Leukocyte Esterase Negative NEG      Bacteria 0 0 /hpf    WBC 3-5 0 /hpf    RBC 0-3 0 /hpf    Epithelial cells 0-3 0 /hpf    Casts 5-10 0 /lpf    Other observations RESULTS VERIFIED MANUALLY     LACTIC ACID    Collection Time: 02/28/21  7:27 PM   Result Value Ref Range    Lactic acid 3.0 (H) 0.4 - 2.0 MMOL/L   GLUCOSE, POC    Collection Time: 02/28/21  7:54 PM   Result Value Ref Range    Glucose (POC) 423 (H) 65 - 100 mg/dL   GLUCOSTABILIZER    Collection Time: 02/28/21  8:17 PM   Result Value Ref Range    Glucose 423 mg/dL    Insulin order 7.3 units/hour    Insulin adminstered 7.3 units/hour    Multiplier 0.020     Low target 140 mg/dL    High target 180 mg/dL    D50 order 0.0 ml    D50 administered 0.00 ml    Minutes until next BG 60 min    Order initials ALB     Administered initials ALB     GLSCOM Comments     METABOLIC PANEL, BASIC    Collection Time: 02/28/21  8:39 PM   Result Value Ref Range    Sodium 135 (L) 136 - 145 mmol/L    Potassium 4.6 3.5 - 5.1 mmol/L    Chloride 104 98 - 107 mmol/L    CO2 11 (L) 21 - 32 mmol/L    Anion gap 20 (H) 7 - 16 mmol/L    Glucose 348 (H) 65 - 100 mg/dL    BUN 37 (H) 8 - 23 MG/DL    Creatinine 1.56 (H) 0.6 - 1.0 MG/DL    GFR est AA 43 (L) >60 ml/min/1.73m2    GFR est non-AA 36 (L) >60 ml/min/1.73m2    Calcium 7.7 (L) 8.3 - 10.4 MG/DL   MAGNESIUM    Collection Time: 02/28/21  8:39 PM   Result Value Ref Range    Magnesium 2.1 1.8 - 2.4 mg/dL   PHOSPHORUS    Collection Time: 02/28/21  8:39 PM   Result Value Ref Range    Phosphorus 5.2 (H) 2.3 - 3.7 MG/DL   GLUCOSE, POC    Collection Time: 02/28/21  9:20 PM   Result Value Ref Range    Glucose (POC) 377 (H) 65 - 100 mg/dL   GLUCOSTABILIZER    Collection Time: 02/28/21  9:23 PM   Result Value Ref Range    Glucose 377 mg/dL    Insulin order 9.5 units/hour    Insulin adminstered 9.5 units/hour    Multiplier 0.030     Low target 140 mg/dL    High target 180 mg/dL    D50 order 0.0 ml    D50 administered 0.00 ml    Minutes until next BG 60 min    Order initials jlg     Administered initials deisi     GLSCOM Comments     GLUCOSE, POC    Collection Time: 02/28/21 10:29 PM   Result Value Ref Range    Glucose (POC) 361 (H) 65 - 100 mg/dL   GLUCOSTABILIZER    Collection Time: 02/28/21 10:29 PM   Result Value Ref Range    Glucose 361 mg/dL    Insulin order 12.0 units/hour    Insulin adminstered 12.0 units/hour    Multiplier 0.040     Low target 140 mg/dL    High target 180 mg/dL    D50 order 0.0 ml    D50 administered 0.00 ml    Minutes until next BG 60 min    Order initials feliceg     Administered initials deisi     GLSCSHOSHANA Comments     GLUCOSE, POC    Collection Time: 02/28/21 11:26 PM   Result Value Ref Range    Glucose (POC) 275 (H) 65 - 100 mg/dL   GLUCOSTABILIZER    Collection Time: 02/28/21 11:32 PM   Result Value Ref Range    Glucose 275 mg/dL    Insulin order 8.6 units/hour    Insulin adminstered 8.6 units/hour    Multiplier 0.040     Low target 140 mg/dL    High target 180 mg/dL    D50 order 0.0 ml    D50 administered 0.00 ml    Minutes until next BG 60 min    Order initials feliceg     Administered initials deisi     GLSCSHOSHANA Comments     METABOLIC PANEL, BASIC    Collection Time: 03/01/21 12:30 AM   Result Value Ref Range    Sodium 138 136 - 145 mmol/L    Potassium 4.0 3.5 - 5.1 mmol/L    Chloride 108 (H) 98 - 107 mmol/L    CO2 19 (L) 21 - 32 mmol/L    Anion gap 11 7 - 16 mmol/L    Glucose 202 (H) 65 - 100 mg/dL    BUN 29 (H) 8 - 23 MG/DL    Creatinine 1.32 (H) 0.6 - 1.0 MG/DL    GFR est AA 52 (L) >60 ml/min/1.73m2    GFR est non-AA 43 (L) >60 ml/min/1.73m2    Calcium 7.3 (L) 8.3 - 10.4 MG/DL   MAGNESIUM    Collection Time: 03/01/21 12:30 AM   Result Value Ref Range    Magnesium 1.9 1.8 - 2.4 mg/dL   HEPARIN XA UFH    Collection Time: 03/01/21 12:30 AM   Result Value Ref Range    Heparin Xa UFH 1.05 (H) 0.3 - 0.7 IU/mL   LACTIC ACID    Collection Time: 03/01/21 12:30 AM   Result Value Ref Range    Lactic acid 2.2 (H) 0.4 - 2.0 MMOL/L   GLUCOSE, POC    Collection Time: 03/01/21 12:39 AM   Result Value Ref Range    Glucose (POC) 186 (H) 65 - 100 mg/dL   GLUCOSTABILIZER    Collection Time: 03/01/21 12:39 AM   Result Value Ref Range    Glucose 186 mg/dL    Insulin order 5.0 units/hour    Insulin adminstered 5.0 units/hour    Multiplier 0.040     Low target 140 mg/dL    High target 180 mg/dL    D50 order 0.0 ml    D50 administered 0.00 ml    Minutes until next BG 60 min    Order initials ALB     Administered initials ALB     GLSCOM Comments     GLUCOSE, POC    Collection Time: 03/01/21  1:41 AM   Result Value Ref Range    Glucose (POC) 265 (H) 65 - 100 mg/dL   GLUCOSTABILIZER    Collection Time: 03/01/21  1:42 AM   Result Value Ref Range    Glucose 265 mg/dL    Insulin order 10.3 units/hour    Insulin adminstered 10.3 units/hour    Multiplier 0.050     Low target 140 mg/dL    High target 180 mg/dL    D50 order 0.0 ml    D50 administered 0.00 ml    Minutes until next BG 60 min    Order initials jlg     Administered initials jlg     GLSCOM Comments     GLUCOSE, POC    Collection Time: 03/01/21  2:29 AM   Result Value Ref Range    Glucose (POC) 204 (H) 65 - 100 mg/dL   GLUCOSTABILIZER    Collection Time: 03/01/21  2:31 AM   Result Value Ref Range    Glucose 204 mg/dL    Insulin order 7.2 units/hour    Insulin adminstered 7.2 units/hour    Multiplier 0.050     Low target 140 mg/dL    High target 180 mg/dL    D50 order 0.0 ml    D50 administered 0.00 ml    Minutes until next BG 60 min    Order initials jlg     Administered initials jlg     GLSCOM Comments     POC VENOUS BLOOD GAS    Collection Time: 03/01/21  2:40 AM   Result Value Ref Range    Device: ROOM AIR      pH, venous (POC) 7.28 (L) 7.32 - 7.42      pCO2, venous (POC) 36.9 (L) 41 - 51 MMHG    pO2, venous (POC) 35 mmHg HCO3, venous (POC) 17.2 (L) 23 - 28 MMOL/L    sO2, venous (POC) 60 (L) 65 - 88 %    Base deficit, venous (POC) 9 mmol/L    Allens test (POC) NOT APPLICABLE      Site CORD      Specimen type (POC) VENOUS BLOOD      Performed by Ninfa Shah     CO2, POC 18 MMOL/L    COLLECT TIME 238     GLUCOSE, POC    Collection Time: 03/01/21  3:37 AM   Result Value Ref Range    Glucose (POC) 195 (H) 65 - 100 mg/dL   GLUCOSTABILIZER    Collection Time: 03/01/21  3:42 AM   Result Value Ref Range    Glucose 195 mg/dL    Insulin order 8.1 units/hour    Insulin adminstered 8.1 units/hour    Multiplier 0.060     Low target 140 mg/dL    High target 180 mg/dL    D50 order 0.0 ml    D50 administered 0.00 ml    Minutes until next BG 60 min    Order initials jlg     Administered initials jlg     GLSCOM Comments     GLUCOSE, POC    Collection Time: 03/01/21  4:44 AM   Result Value Ref Range    Glucose (POC) 161 (H) 65 - 100 mg/dL   GLUCOSTABILIZER    Collection Time: 03/01/21  4:44 AM   Result Value Ref Range    Glucose 161 mg/dL    Insulin order 6.1 units/hour    Insulin adminstered 6.1 units/hour    Multiplier 0.060     Low target 140 mg/dL    High target 180 mg/dL    D50 order 0.0 ml    D50 administered 0.00 ml    Minutes until next BG 60 min    Order initials sb     Administered initials sb     GLSCOM Comments     GLUCOSE, POC    Collection Time: 03/01/21  5:52 AM   Result Value Ref Range    Glucose (POC) 154 (H) 65 - 100 mg/dL   GLUCOSTABILIZER    Collection Time: 03/01/21  5:53 AM   Result Value Ref Range    Glucose 154 mg/dL    Insulin order 5.6 units/hour    Insulin adminstered 5.6 units/hour    Multiplier 0.060     Low target 140 mg/dL    High target 180 mg/dL    D50 order 0.0 ml    D50 administered 0.00 ml    Minutes until next BG 60 min    Order initials jlg     Administered initials jlg     GLSCOM Comments     METABOLIC PANEL, BASIC    Collection Time: 03/01/21  6:15 AM   Result Value Ref Range    Sodium 138 136 - 145 mmol/L Potassium 4.0 3.5 - 5.1 mmol/L    Chloride 107 98 - 107 mmol/L    CO2 19 (L) 21 - 32 mmol/L    Anion gap 12 7 - 16 mmol/L    Glucose 142 (H) 65 - 100 mg/dL    BUN 24 (H) 8 - 23 MG/DL    Creatinine 1.29 (H) 0.6 - 1.0 MG/DL    GFR est AA 54 (L) >60 ml/min/1.73m2    GFR est non-AA 44 (L) >60 ml/min/1.73m2    Calcium 7.6 (L) 8.3 - 10.4 MG/DL   MAGNESIUM    Collection Time: 03/01/21  6:15 AM   Result Value Ref Range    Magnesium 2.1 1.8 - 2.4 mg/dL   CBC WITH AUTOMATED DIFF    Collection Time: 03/01/21  6:15 AM   Result Value Ref Range    WBC 16.3 (H) 4.3 - 11.1 K/uL    RBC 4.03 M/uL    HGB 12.3 11.7 - 15.4 g/dL    HCT 36.9 35.8 - 46.3 %    MCV 91.6 79.6 - 97.8 FL    MCH 30.5 26.1 - 32.9 PG    MCHC 33.3 31.4 - 35.0 g/dL    RDW 13.2 %    PLATELET 791 K/uL    MPV 11.3 9.4 - 12.3 FL    ABSOLUTE NRBC 0 0.0 - 0.2 K/uL    DF AUTOMATED      NEUTROPHILS 84 %    LYMPHOCYTES 9 %    MONOCYTES 5 %    IMMATURE GRANULOCYTES 2 %    ABS. NEUTROPHILS 13.7 (H) 1.7 - 8.2 K/UL    ABS. LYMPHOCYTES 1.5 0.5 - 4.6 K/UL    ABS. MONOCYTES 0.8 0.1 - 1.3 K/UL    ABS. IMM.  GRANS. 0.3 K/UL   LACTIC ACID    Collection Time: 03/01/21  6:15 AM   Result Value Ref Range    Lactic acid 3.9 (HH) 0.4 - 2.0 MMOL/L   HEPARIN XA UFH    Collection Time: 03/01/21  6:15 AM   Result Value Ref Range    Heparin Xa UFH 0.19 (L) 0.3 - 0.7 IU/mL   GLUCOSE, POC    Collection Time: 03/01/21  6:55 AM   Result Value Ref Range    Glucose (POC) 149 (H) 65 - 100 mg/dL   GLUCOSTABILIZER    Collection Time: 03/01/21  6:56 AM   Result Value Ref Range    Glucose 149 mg/dL    Insulin order 5.3 units/hour    Insulin adminstered 5.3 units/hour    Multiplier 0.060     Low target 140 mg/dL    High target 180 mg/dL    D50 order 0.0 ml    D50 administered 0.00 ml    Minutes until next BG 60 min    Order initials jlg     Administered initials jlg     GLSCOM Comments     LACTIC ACID    Collection Time: 03/01/21  7:57 AM   Result Value Ref Range    Lactic acid 1.8 0.4 - 2.0 MMOL/L GLUCOSE, POC    Collection Time: 03/01/21  8:01 AM   Result Value Ref Range    Glucose (POC) 132 (H) 65 - 100 mg/dL   GLUCOSTABILIZER    Collection Time: 03/01/21  8:02 AM   Result Value Ref Range    Glucose 132 mg/dL    Insulin order 3.5 units/hour    Insulin adminstered 3.5 units/hour    Multiplier 0.048     Low target 140 mg/dL    High target 180 mg/dL    D50 order 0.0 ml    D50 administered 0.00 ml    Minutes until next BG 60 min    Order initials KJM     Administered initials KJM     GLSCOM Comments         Micro: All Micro Results     Procedure Component Value Units Date/Time    CULTURE, URINE [[id number]] Collected: 02/28/21 1907    Order Status: Completed Updated: 03/01/21 0854    CULTURE, BLOOD [[id number]] Collected: 02/28/21 1903    Order Status: Completed Specimen: Blood Updated: 02/28/21 1905    CULTURE, BLOOD [[id number]] Collected: 02/28/21 1730    Order Status: Completed Specimen: Blood Updated: 02/28/21 1756          Other studies last 24 hours:  Xr Chest Port    Result Date: 2/28/2021  1. Right jugular catheter. 2. No acute airspace disease. All imaging, labs reviewed.     ASSESSMENT      Hospital Problems as of 3/1/2021 Date Reviewed: 1/29/2021          Codes Class Noted - Resolved POA    * (Principal) Septic shock ([facility] [address] [facility] ICD-10-CM: A41.9, R65.21  ICD-9-CM: 038.9, 785.52, 995.92  2/28/2021 - Present Yes        Leukocytosis ICD-10-CM: D72.829  ICD-9-CM: 288.60  2/28/2021 - Present Yes        Hyperkalemia ICD-10-CM: E87.5  ICD-9-CM: 276.7  2/28/2021 - Present Yes        Hyponatremia ICD-10-CM: E87.1  ICD-9-CM: 276.1  2/28/2021 - Present Yes        Metabolic acidemia JHG-16-MG: E87.2  ICD-9-CM: 276.2  2/28/2021 - Present Unknown        Pure hypercholesterolemia ICD-10-CM: E78.00  ICD-9-CM: 272.0  6/28/2019 - Present Yes        Coronary artery disease involving native coronary artery of native heart with angina pectoris ([email or web address] ICD-10-CM: I25.119  ICD-9-CM: 414.01, 413.9  5/31/2019 - Present Yes ADRIENNE (acute kidney injury) ([facility] ICD-10-CM: N17.9  ICD-9-CM: 584.9  5/7/2019 - Present Unknown        Depression, major, recurrent, mild ([facility] ICD-10-CM: F33.0  ICD-9-CM: 296.31  3/8/2018 - Present Yes        Hypertension ICD-10-CM: I10  ICD-9-CM: 401.9  Unknown - Present Yes        DKA, type 1 ([facility] ICD-10-CM: E10.10  ICD-9-CM: 250.13  Unknown - Present Yes    Overview Addendum 12/19/2018  1:27 PM by Medhat Mcgraw MD     type 1, uses insulin pump--  Dr. Jazz Shelton             Chronic obstructive pulmonary disease ([facility] ICD-10-CM: J44.9  ICD-9-CM: 320  Unknown - Present Yes        RESOLVED: Hypokalemia ICD-10-CM: E87.6  ICD-9-CM: 276.8  2/28/2021 - 2/28/2021 Yes                Signed By: hCarlee Barrera MD     March 1, 2021

## 2021-03-01 NOTE — PROGRESS NOTES
TRANSFER - IN REPORT:    Verbal report received from ÁNGEL Ford on Kylie Vance  being received from ER for routine progression of care      Report consisted of patients Situation, Background, Assessment and   Recommendations(SBAR). Information from the following report(s) SBAR, ED Summary, Procedure Summary, Intake/Output, MAR and Recent Results was reviewed with the receiving nurse. Opportunity for questions and clarification was provided. Assessment completed upon patients arrival to unit and care assumed.

## 2021-03-01 NOTE — ROUTINE PROCESS
Bedside and Verbal report given to self by Sanchez Singh RN. Report included SBAR, Kardex, ED Summary, Procedure Summary, Intake and Output and Cardiac Rhythm NSR. Heparin and Insulin gtts verified on MAR at bedside with off-going RN.

## 2021-03-01 NOTE — PROGRESS NOTES
PICC Placement Note    PRE-PROCEDURE VERIFICATION  Correct Procedure: yes. Time out completed with assistant Mariusz Whatley RN and all persons present in agreement with time out. Correct Site:  yes  Temperature: Temp: 97.3 °F (36.3 °C), Temperature Source: Temp Source: Oral  Recent Labs     03/01/21  0615   BUN 24*   CREA 1.29*      INR 0.9   WBC 16.3*     Allergies: Doxepin, Nortriptyline, Other medication, Plaquenil [hydroxychloroquine], Tizanidine, and Vistaril [hydroxyzine pamoate]  Education materials for [facility] Care given to patient or family. PROCEDURE DETAIL  A triple lumen PICC line was started for vascular access. The following documentation is in addition to the PICC properties in the lines/airways flowsheet :  Lot #: [id number]  xylocaine used: yes  Mid-Arm Circumference: 32 (cm)  Internal Catheter Length: 42 (cm)  Internal Catheter Total Length: 42 (cm)  Vein Selection for PICC:left basilic  Central Line Bundle followed yes  Complication Related to Insertion: none  Both the insertion guidewire and ECG guidewire were removed intact all ports have positive blood return and were flush well with normal saline. The location of the tip of the PICC is verified using ECG technology. The tip is in the SVC per ECG reading. See image below.            Line is okay to use: yes

## 2021-03-02 LAB
ANION GAP SERPL CALC-SCNC: 12 MMOL/L (ref 7–16)
ANION GAP SERPL CALC-SCNC: 17 MMOL/L (ref 7–16)
BASOPHILS # BLD: 0 K/UL (ref 0–0.2)
BASOPHILS NFR BLD: 1 % (ref 0–2)
BUN SERPL-MCNC: 6 MG/DL (ref 8–23)
BUN SERPL-MCNC: 8 MG/DL (ref 8–23)
CALCIUM SERPL-MCNC: 8.3 MG/DL (ref 8.3–10.4)
CALCIUM SERPL-MCNC: 8.4 MG/DL (ref 8.3–10.4)
CHLORIDE SERPL-SCNC: 102 MMOL/L (ref 98–107)
CHLORIDE SERPL-SCNC: 104 MMOL/L (ref 98–107)
CO2 SERPL-SCNC: 16 MMOL/L (ref 21–32)
CO2 SERPL-SCNC: 21 MMOL/L (ref 21–32)
CREAT SERPL-MCNC: 0.81 MG/DL (ref 0.6–1)
CREAT SERPL-MCNC: 0.83 MG/DL (ref 0.6–1)
DIFFERENTIAL METHOD BLD: ABNORMAL
EOSINOPHIL # BLD: 0.1 K/UL (ref 0–0.8)
EOSINOPHIL NFR BLD: 1 % (ref 0.5–7.8)
ERYTHROCYTE [DISTWIDTH] IN BLOOD BY AUTOMATED COUNT: 14 % (ref 11.9–14.6)
GLUCOSE BLD STRIP.AUTO-MCNC: 211 MG/DL (ref 65–100)
GLUCOSE BLD STRIP.AUTO-MCNC: 227 MG/DL (ref 65–100)
GLUCOSE BLD STRIP.AUTO-MCNC: 236 MG/DL (ref 65–100)
GLUCOSE BLD STRIP.AUTO-MCNC: 343 MG/DL (ref 65–100)
GLUCOSE SERPL-MCNC: 178 MG/DL (ref 65–100)
GLUCOSE SERPL-MCNC: 319 MG/DL (ref 65–100)
HCT VFR BLD AUTO: 32.4 % (ref 35.8–46.3)
HGB BLD-MCNC: 10.9 G/DL (ref 11.7–15.4)
IMM GRANULOCYTES # BLD AUTO: 0 K/UL (ref 0–0.5)
IMM GRANULOCYTES NFR BLD AUTO: 0 % (ref 0–5)
LYMPHOCYTES # BLD: 1.2 K/UL (ref 0.5–4.6)
LYMPHOCYTES NFR BLD: 15 % (ref 13–44)
MAGNESIUM SERPL-MCNC: 2 MG/DL (ref 1.8–2.4)
MCH RBC QN AUTO: 30.5 PG (ref 26.1–32.9)
MCHC RBC AUTO-ENTMCNC: 33.6 G/DL (ref 31.4–35)
MCV RBC AUTO: 90.8 FL (ref 79.6–97.8)
MONOCYTES # BLD: 0.5 K/UL (ref 0.1–1.3)
MONOCYTES NFR BLD: 6 % (ref 4–12)
NEUTS SEG # BLD: 6.2 K/UL (ref 1.7–8.2)
NEUTS SEG NFR BLD: 77 % (ref 43–78)
NRBC # BLD: 0 K/UL (ref 0–0.2)
PLATELET # BLD AUTO: 123 K/UL (ref 150–450)
PMV BLD AUTO: 11.4 FL (ref 9.4–12.3)
POTASSIUM SERPL-SCNC: 3.7 MMOL/L (ref 3.5–5.1)
POTASSIUM SERPL-SCNC: 4 MMOL/L (ref 3.5–5.1)
RBC # BLD AUTO: 3.57 M/UL (ref 4.05–5.2)
SARS COV-2, XPGCVT: NEGATIVE
SODIUM SERPL-SCNC: 135 MMOL/L (ref 136–145)
SODIUM SERPL-SCNC: 137 MMOL/L (ref 136–145)
WBC # BLD AUTO: 8 K/UL (ref 4.3–11.1)

## 2021-03-02 PROCEDURE — 94640 AIRWAY INHALATION TREATMENT: CPT

## 2021-03-02 PROCEDURE — 80048 BASIC METABOLIC PNL TOTAL CA: CPT

## 2021-03-02 PROCEDURE — 2709999900 HC NON-CHARGEABLE SUPPLY

## 2021-03-02 PROCEDURE — 83735 ASSAY OF MAGNESIUM: CPT

## 2021-03-02 PROCEDURE — 99232 SBSQ HOSP IP/OBS MODERATE 35: CPT | Performed by: INTERNAL MEDICINE

## 2021-03-02 PROCEDURE — 74011250636 HC RX REV CODE- 250/636: Performed by: INTERNAL MEDICINE

## 2021-03-02 PROCEDURE — 74011250637 HC RX REV CODE- 250/637: Performed by: FAMILY MEDICINE

## 2021-03-02 PROCEDURE — 74011250636 HC RX REV CODE- 250/636: Performed by: HOSPITALIST

## 2021-03-02 PROCEDURE — 94760 N-INVAS EAR/PLS OXIMETRY 1: CPT

## 2021-03-02 PROCEDURE — 74011250636 HC RX REV CODE- 250/636: Performed by: FAMILY MEDICINE

## 2021-03-02 PROCEDURE — 74011636637 HC RX REV CODE- 636/637: Performed by: HOSPITALIST

## 2021-03-02 PROCEDURE — 65270000029 HC RM PRIVATE

## 2021-03-02 PROCEDURE — 74011000258 HC RX REV CODE- 258: Performed by: FAMILY MEDICINE

## 2021-03-02 PROCEDURE — 82962 GLUCOSE BLOOD TEST: CPT

## 2021-03-02 PROCEDURE — 74011636637 HC RX REV CODE- 636/637: Performed by: INTERNAL MEDICINE

## 2021-03-02 PROCEDURE — 36415 COLL VENOUS BLD VENIPUNCTURE: CPT

## 2021-03-02 PROCEDURE — 85025 COMPLETE CBC W/AUTO DIFF WBC: CPT

## 2021-03-02 RX ORDER — SODIUM CHLORIDE 9 MG/ML
100 INJECTION, SOLUTION INTRAVENOUS CONTINUOUS
Status: DISCONTINUED | OUTPATIENT
Start: 2021-03-02 | End: 2021-03-03

## 2021-03-02 RX ORDER — INSULIN GLARGINE 100 [IU]/ML
25 INJECTION, SOLUTION SUBCUTANEOUS DAILY
Status: DISCONTINUED | OUTPATIENT
Start: 2021-03-02 | End: 2021-03-04 | Stop reason: HOSPADM

## 2021-03-02 RX ORDER — INSULIN LISPRO 100 [IU]/ML
3 INJECTION, SOLUTION INTRAVENOUS; SUBCUTANEOUS
Status: DISCONTINUED | OUTPATIENT
Start: 2021-03-02 | End: 2021-03-04 | Stop reason: HOSPADM

## 2021-03-02 RX ADMIN — SODIUM CHLORIDE 100 ML/HR: 900 INJECTION, SOLUTION INTRAVENOUS at 09:16

## 2021-03-02 RX ADMIN — PIPERACILLIN AND TAZOBACTAM 3.38 G: 3; .375 INJECTION, POWDER, LYOPHILIZED, FOR SOLUTION INTRAVENOUS at 02:00

## 2021-03-02 RX ADMIN — ASPIRIN 81 MG: 81 TABLET, CHEWABLE ORAL at 09:21

## 2021-03-02 RX ADMIN — DULOXETINE HYDROCHLORIDE 60 MG: 60 CAPSULE, DELAYED RELEASE ORAL at 09:21

## 2021-03-02 RX ADMIN — GABAPENTIN 300 MG: 300 CAPSULE ORAL at 16:44

## 2021-03-02 RX ADMIN — INSULIN LISPRO 4 UNITS: 100 INJECTION, SOLUTION INTRAVENOUS; SUBCUTANEOUS at 12:15

## 2021-03-02 RX ADMIN — Medication 30 ML: at 06:31

## 2021-03-02 RX ADMIN — INSULIN LISPRO 3 UNITS: 100 INJECTION, SOLUTION INTRAVENOUS; SUBCUTANEOUS at 16:50

## 2021-03-02 RX ADMIN — ARIPIPRAZOLE 5 MG: 2 TABLET ORAL at 21:21

## 2021-03-02 RX ADMIN — VANCOMYCIN HYDROCHLORIDE 1250 MG: 10 INJECTION, POWDER, LYOPHILIZED, FOR SOLUTION INTRAVENOUS at 06:31

## 2021-03-02 RX ADMIN — Medication 30 ML: at 21:24

## 2021-03-02 RX ADMIN — Medication 10 ML: at 21:25

## 2021-03-02 RX ADMIN — BUDESONIDE AND FORMOTEROL FUMARATE DIHYDRATE 2 PUFF: 160; 4.5 AEROSOL RESPIRATORY (INHALATION) at 08:32

## 2021-03-02 RX ADMIN — ACETAMINOPHEN 650 MG: 325 TABLET, FILM COATED ORAL at 23:22

## 2021-03-02 RX ADMIN — PIPERACILLIN AND TAZOBACTAM 3.38 G: 3; .375 INJECTION, POWDER, LYOPHILIZED, FOR SOLUTION INTRAVENOUS at 09:12

## 2021-03-02 RX ADMIN — INSULIN LISPRO 2 UNITS: 100 INJECTION, SOLUTION INTRAVENOUS; SUBCUTANEOUS at 07:38

## 2021-03-02 RX ADMIN — INSULIN LISPRO 8 UNITS: 100 INJECTION, SOLUTION INTRAVENOUS; SUBCUTANEOUS at 07:38

## 2021-03-02 RX ADMIN — SODIUM CHLORIDE 100 ML/HR: 900 INJECTION, SOLUTION INTRAVENOUS at 15:38

## 2021-03-02 RX ADMIN — INSULIN LISPRO 3 UNITS: 100 INJECTION, SOLUTION INTRAVENOUS; SUBCUTANEOUS at 12:15

## 2021-03-02 RX ADMIN — Medication 10 ML: at 06:32

## 2021-03-02 RX ADMIN — GABAPENTIN 300 MG: 300 CAPSULE ORAL at 21:21

## 2021-03-02 RX ADMIN — INSULIN GLARGINE 25 UNITS: 100 INJECTION, SOLUTION SUBCUTANEOUS at 09:26

## 2021-03-02 RX ADMIN — TOPIRAMATE 50 MG: 50 TABLET, FILM COATED ORAL at 09:21

## 2021-03-02 RX ADMIN — BUDESONIDE AND FORMOTEROL FUMARATE DIHYDRATE 2 PUFF: 160; 4.5 AEROSOL RESPIRATORY (INHALATION) at 20:16

## 2021-03-02 RX ADMIN — TIOTROPIUM BROMIDE INHALATION SPRAY 1 PUFF: 3.12 SPRAY, METERED RESPIRATORY (INHALATION) at 08:32

## 2021-03-02 RX ADMIN — PANTOPRAZOLE SODIUM 40 MG: 40 TABLET, DELAYED RELEASE ORAL at 06:31

## 2021-03-02 RX ADMIN — PANTOPRAZOLE SODIUM 40 MG: 40 TABLET, DELAYED RELEASE ORAL at 16:44

## 2021-03-02 RX ADMIN — ATORVASTATIN CALCIUM 80 MG: 40 TABLET, FILM COATED ORAL at 09:21

## 2021-03-02 RX ADMIN — Medication 30 ML: at 15:37

## 2021-03-02 RX ADMIN — GABAPENTIN 300 MG: 300 CAPSULE ORAL at 09:21

## 2021-03-02 RX ADMIN — INSULIN LISPRO 4 UNITS: 100 INJECTION, SOLUTION INTRAVENOUS; SUBCUTANEOUS at 16:50

## 2021-03-02 NOTE — DIABETES MGMT
Patient admitted with septic shock, on droplet plus precautions for COVID-19 rule out. Blood glucose ranged 183-210 yesterday once off insulin gtt, receiving Lantus 20 units and Humalog 10 units. Blood glucose this morning was 343. Reviewed patient current regimen: Lantus 20 units daily, Humalog 2 units with meals, and Humalog SSI. Patient would likely benefit from an increase in basal insulin as fasting blood glucose is not at goal. Patient would also likely benefit from an increase in prandial insulin to help offset hyperglycemia during the day related to caloric intake. Updated provider via [employer] regarding recommendations and patient glycemic control.

## 2021-03-02 NOTE — PROGRESS NOTES
Problem: Diabetes Self-Management  Goal: *Monitoring blood glucose, interpreting and using results  Description: Identify recommended blood glucose targets  and personal targets. Outcome: Progressing Towards Goal     Problem: Falls - Risk of  Goal: *Absence of Falls  Description: Document Carri Umanzor Fall Risk and appropriate interventions in the flowsheet.   Outcome: Progressing Towards Goal  Note: Fall Risk Interventions:  Mobility Interventions: Bed/chair exit alarm, Communicate number of staff needed for ambulation/transfer, Patient to call before getting OOB    Mentation Interventions: Bed/chair exit alarm, Adequate sleep, hydration, pain control, Door open when patient unattended, Increase mobility, More frequent rounding, Room close to nurse's station, Reorient patient, Update white board    Medication Interventions: Bed/chair exit alarm, Patient to call before getting OOB, Teach patient to arise slowly    Elimination Interventions: Bed/chair exit alarm, Call light in reach, Patient to call for help with toileting needs    History of Falls Interventions: Bed/chair exit alarm, Door open when patient unattended, Investigate reason for fall, Room close to nurse's station    Problem: Patient Education: Go to Patient Education Activity  Goal: Patient/Family Education  Outcome: Progressing Towards Goal

## 2021-03-02 NOTE — PROGRESS NOTES
[facility] Hospitalist Service Progress Note    INTERVAL HISTORY  / Subjective:    59yo female with DM1, h/o DKA, HTN, CAD (s/p stent 2019), COPD, bipolar who presents to the ED with N/V, body aches. She was septic in the ED, in DKA, hypotensive. Central line placed, started on insulin gtt, Vanc, Tobra, Zosyn. Also with concerning EKG changes, started on heparin gtt at recommendation of cardiology.     3/1 - AG closed, transition to SQ insulin, d/c bicarb gtt, cards consulted, weaning Levo    3/2 patient is alert awake oriented x3. Answering questions appropriately. No chest pain no palpitations. No fever no chills. No nausea no vomiting no diarrhea no constipation. No shortness of breath. Assessment / Plan:    Septic shock/hypovolemic present on admission resolved  Leukocytosis  No obvious infection at this time. May be related to DKA, dehydration. - 3/2 vancomycin DC'd this morning. Zosyn DC'd this afternoon. Monitor off of antibiotics as blood cultures and urine culture so far negative. - CXR negative for acute cardiopulmonary abnormality.  - rapid COVID negative due to viral like symptoms. PCR negative  -Given blood cultures urine cultures negative this is likely hypovolemic shock from DKA rather than septic shock.     DKA POA resolved  DM1  Previous hospitalizations for this. On insulin pump at home, just saw endocrine last week. - 3/1 transitioned to Lantus 20  - Humalog units TID  - diabetic educator consulted  3/2 Lantus dose increased to 25 units. Humalog dose increased to 3 units 3 times daily. - will likely transition back to insulin pump at d/c  Anion gap this morning slightly elevated. Restart IV fluids. Recheck BMP this afternoon.     Acute metabolic encephalopathy  - resolved     ADRIENNE Pre-renal POA resolved  -Continue hydration with IV fluids due to metabolic acidosis. Repeat BMP this afternoon as well as daily.     Chest pain  Abnormal EKG  CAD  Likely demand supply mismatch. Cardiology on board. Off of heparin drip. - 3/2 cont ASA, statin     Bipolar disorder  - cont Abilify     DVT Prophylaxis - heparin SQ  Full code    DISPO: Patient to be transferred to remote telemetry today. Anticipate discharge home in 24 hours if patient continues to improve. Chief Complaint : High Blood Sugar        Objective:  Visit Vitals  /70   Pulse 99   Temp 97.4 °F (36.3 °C)   Resp 21   Ht 5' 3\" (1.6 m)   Wt 83.4 kg (183 lb 13.8 oz)   SpO2 100%   Breastfeeding No   BMI 32.57 kg/m²                 Physical Exam:  General: No acute distress, speaking in full sentences, no use of accessory muscles   HEENT: Pupils equal and reactive to light and accommodation, oropharynx is clear   Neck: Supple, no lymphadenopathy, no JVD   Lungs: Clear to auscultation bilaterally   Cardiovascular: Regular rate and rhythm with normal S1 and S2   Abdomen: Soft, nontender, nondistended, normoactive bowel sounds   Extremities: No cyanosis clubbing or edema   Neuro: Nonfocal, A&O x3   Psych: Normal affect     Intake and Output:  Date 03/01/21 0700 - 03/02/21 0659 03/02/21 0700 - 03/03/21 0659   Shift 3765-2714 0880-3673 24 Hour Total 7562-4754 2099-3944 24 Hour Total   INTAKE   P.O. 1150  1150        P. O. 1150  1150      I. V.(mL/kg/hr) 757(0.8)  757(0.4)        Volume (0.45% sodium chloride 1,000 mL with sodium bicarbonate (8.4%) 50 mEq infusion) 363.3  363.3        Volume (dextrose 5% and 0.9% NaCl infusion) 393.8  393.8      Shift Total(mL/kg) 1907(22.9)  1907(22.9)      OUTPUT   Urine(mL/kg/hr) 1600(1.6)  1600(0.8) 400  400     Urine Voided 1600  1600 400  400   Shift Total(mL/kg) 1600(19.2)  1600(19.2) 400(4.8)  400(4.8)     307 -400  -400   Weight (kg) 83.4 83.4 83.4 83.4 83.4 83.4       LAB:  No results displayed because visit has over 200 results. IMAGING:  Xr Chest Pa Lat    Result Date: 3/1/2021  Good aeration of lungs     Xr Chest Port    Result Date: 2/28/2021  1.  Right jugular catheter. 2. No acute airspace disease. EKG:  Results for orders placed or performed in visit on 01/29/21   AMB POC EKG ROUTINE W/ 12 LEADS, INTER & REP     Status: None    Impression    Normal sinus rhythm rate of 95. Normal intervals.   No acute ST segment changes             Claire Castro MD  3/2/2021 2:05 PM

## 2021-03-02 NOTE — PROGRESS NOTES
[facility] CARDIOLOGY PROGRESS NOTE           3/2/2021 5:49 PM    Admit Date: 2/28/2021    Subjective:   Patient states she is feeling better, no chest pain or dyspnea. No other complaints at this time. ROS:  Cardiovascular:  As noted above    Objective:      Vitals:    03/02/21 1330 03/02/21 1400 03/02/21 1430 03/02/21 1522   BP: (!) 151/84 (!) 142/72 120/64 124/65   Pulse: (!) 101 (!) 102 100 99   Resp:  26     Temp:    97.8 °F (36.6 °C)   SpO2: 97% 91% 95% 97%   Weight:       Height:           Physical Exam:  General-No Acute Distress, pleasant, interactive   Neck- supple, no JVD  CV- regular rate and rhythm no MRG  Lung- clear bilaterally  Abd- soft, nontender, nondistended  Ext- no edema bilaterally.   Skin- warm and dry    Data Review:   Recent Labs     03/02/21  1654 03/02/21  0453 03/01/21  0615   * 137 138   K 3.7 4.0 4.0   MG  --  2.0 2.1   BUN 6* 8 24*   CREA 0.81 0.83 1.29*   * 319* 142*   WBC  --  8.0 16.3*   HGB  --  10.9* 12.3   HCT  --  32.4* 36.9   PLT  --  123* 187   INR  --   --  0.9       Assessment/Plan:     Principal Problem:    Septic shock (HCC) (2/28/2021)    Leukocytosis (2/28/2021)    - per medicine     - resolved , hemodynamics stable, out of ICU      Active Problems:    Elevated troponin    - secondary to DKA, septic shock    - pending echo    - mildly elevated, trending down not consistent with plaque rupture event     - continue  ASA 81, Atorvastatin 80 for risk factor modification    - hemodynamics stabilized     - EKG with ST and T wave changes, no STEMI        Coronary artery disease involving native coronary artery of native heart with angina pectoris ([address] (5/31/2019)    - continue ASA 81, Atorvastatin 80    - consider adding betablocker in future if hemodynamics remain stable        Pure hypercholesterolemia (6/28/2019)     - Atorvastatin 80        Hypertension ()    - low on arrival, long term consider ACE inhibitor with diabetes        DKA, type 1 (HCC) ()    Overview: type 1, uses insulin pump--  Dr. Yadiel Roblero    - per medicine        Chronic obstructive pulmonary disease ([facility] ()    - nebs, treatment per medicine        ADRIENNE (acute kidney injury) ([facility].) (5/7/2019)    - likely prerenal given DKA     - resolved       Arie Cline DO  3/2/2021 5:49 PM

## 2021-03-02 NOTE — PROGRESS NOTES
03/02/21 1530   Dual Skin Pressure Injury Assessment   Dual Skin Pressure Injury Assessment WDL   Second Care Provider (Based on [address] [place] [address]) Bella RN   Skin Integumentary   Skin Integumentary (WDL) X    Pressure  Injury Documentation No Pressure Injury Noted-Pressure Ulcer Prevention Initiated   Skin Color Appropriate for ethnicity   Skin Condition/Temp Warm;Dry   Skin Integrity Piercing(s); Tattoos (comment)  (belly button)   Wound Prevention and Protection Methods   Orientation of Wound Prevention Posterior   Location of Wound Prevention Sacrum/Coccyx   Dressing Present  Yes; Intact, not due to be changed   Dressing Status Intact   Wound Offloading (Prevention Methods) Bed, pressure redistribution/air

## 2021-03-02 NOTE — PROGRESS NOTES
Care Management Interventions  PCP Verified by CM: Yes  Palliative Care Criteria Met (RRAT>21 & CHF Dx)?: No(Dx Septic Shock Risk 19% )  Transition of Care Consult (CM Consult): Discharge Planning  Discharge Durable Medical Equipment: No(insulin pump)  Physical Therapy Consult: No  Occupational Therapy Consult: No  Speech Therapy Consult: No  Current Support Network: Lives with Spouse  Confirm Follow Up Transport: Family  Discharge Location  Discharge Placement: Home  Met with patient for d/c planning. Patient alert and oriented x 3, independent of ADL's and lives with her spouse in one story home. She requires no DME for ambulation does have an insulin pump. She has transportation as needed. She has Medicare and [employer] and able to obtain medications. Verifies her PCP is Dr Zacarias Clark She voices no concerns or needs for d/c. Current d/c plan is home with spouse when medically ready.

## 2021-03-02 NOTE — ACP (ADVANCE CARE PLANNING)
Advance Care Planning     Advance Care Planning Activator (Inpatient)  Conversation Note  Met with patient for ACP discussion. Patient does not have a HCPOA/ACP but states her  Isatu Edmond is her HCPOA. She requested information on ACP and CM provided written information to patient and informed her that if she would like to complete that the  could assist and she voiced understanding. Date of ACP Conversation: 03/02/21     Conversation Conducted with:  Patient Donal Jimenez    ACP Activator: Chet Pride RN      Health Care Decision Maker:    Current Designated Health Care Decision Maker:    Name Rachel Morales Relationship: Spouse Phone [phone]      Care Preferences    Ventilation: \"If you were in your present state of health and suddenly became very ill and were unable to breathe on your own, what would your preference be about the use of a ventilator (breathing machine) if it were available to you? \"      If patient would desire the use of a ventilator (breathing machine), answer \"yes\", if not \"no\":yes    \"If your health worsens and it becomes clear that your chance of recovery is unlikely, what would your preference be about the use of a ventilator (breathing machine) if it were available to you? \"     Would the patient desire the use of a ventilator (breathing machine)? YES      Resuscitation  \"CPR works best to restart the heart when there is a sudden event, like a heart attack, in someone who is otherwise healthy. Unfortunately, CPR does not typically restart the heart for people who have serious health conditions or who are very sick. \"    \"In the event your heart stopped as a result of an underlying serious health condition, would you want attempts to be made to restart your heart (answer \"yes\" for attempt to resuscitate) or would you prefer a natural death (answer \"no\" for do not attempt to resuscitate)? \" yes      [] Yes  [] No   Educated Patient / Decision Maker regarding differences between Advance Directives and portable DNR orders. Length of ACP Conversation in minutes:  10 minutes    Conversation Outcomes:  [x] ACP discussion completed  [] Existing advance directive reviewed with patient; no changes to patient's previously recorded wishes     [] New Advance Directive completed   [] Portable Do Not Resuscitate prepared for Provider review and signature  [] POLST/POST/MOLST/MOST prepared for Provider review and signature      Follow-up plan:    [] Schedule follow-up conversation to continue planning  [] Referred individual to Provider for additional questions/concerns   [] Advised patient/agent/surrogate to review completed ACP document and update if needed with changes in condition, patient preferences or care setting     [] This note routed to one or more involved healthcare providers      These are patient's current wishes as discussed at bedside today and are not intended to take place of [address]

## 2021-03-03 ENCOUNTER — APPOINTMENT (OUTPATIENT)
Dept: CT IMAGING | Age: 66
DRG: 637 | End: 2021-03-03
Attending: HOSPITALIST
Payer: COMMERCIAL

## 2021-03-03 LAB
AMPHET UR QL SCN: NEGATIVE
ANION GAP SERPL CALC-SCNC: 10 MMOL/L (ref 7–16)
BACTERIA SPEC CULT: NORMAL
BACTERIA SPEC CULT: NORMAL
BARBITURATES UR QL SCN: NEGATIVE
BASOPHILS # BLD: 0 K/UL (ref 0–0.2)
BASOPHILS NFR BLD: 0 % (ref 0–2)
BENZODIAZ UR QL: NEGATIVE
BUN SERPL-MCNC: 5 MG/DL (ref 8–23)
CALCIUM SERPL-MCNC: 8.2 MG/DL (ref 8.3–10.4)
CANNABINOIDS UR QL SCN: NEGATIVE
CHLORIDE SERPL-SCNC: 104 MMOL/L (ref 98–107)
CO2 SERPL-SCNC: 23 MMOL/L (ref 21–32)
COCAINE UR QL SCN: NEGATIVE
CREAT SERPL-MCNC: 0.64 MG/DL (ref 0.6–1)
DIFFERENTIAL METHOD BLD: ABNORMAL
EOSINOPHIL # BLD: 0.1 K/UL (ref 0–0.8)
EOSINOPHIL NFR BLD: 3 % (ref 0.5–7.8)
ERYTHROCYTE [DISTWIDTH] IN BLOOD BY AUTOMATED COUNT: 13.8 % (ref 11.9–14.6)
FERRITIN SERPL-MCNC: 372 NG/ML (ref 8–388)
FOLATE SERPL-MCNC: 6.2 NG/ML (ref 3.1–17.5)
GLUCOSE BLD STRIP.AUTO-MCNC: 123 MG/DL (ref 65–100)
GLUCOSE BLD STRIP.AUTO-MCNC: 164 MG/DL (ref 65–100)
GLUCOSE BLD STRIP.AUTO-MCNC: 172 MG/DL (ref 65–100)
GLUCOSE BLD STRIP.AUTO-MCNC: 202 MG/DL (ref 65–100)
GLUCOSE BLD STRIP.AUTO-MCNC: 70 MG/DL (ref 65–100)
GLUCOSE SERPL-MCNC: 164 MG/DL (ref 65–100)
HCT VFR BLD AUTO: 28.9 % (ref 35.8–46.3)
HGB BLD-MCNC: 9.8 G/DL (ref 11.7–15.4)
IMM GRANULOCYTES # BLD AUTO: 0 K/UL (ref 0–0.5)
IMM GRANULOCYTES NFR BLD AUTO: 1 % (ref 0–5)
IRON SATN MFR SERPL: 43 %
IRON SERPL-MCNC: 107 UG/DL (ref 35–150)
LYMPHOCYTES # BLD: 1.1 K/UL (ref 0.5–4.6)
LYMPHOCYTES NFR BLD: 24 % (ref 13–44)
MCH RBC QN AUTO: 29.8 PG (ref 26.1–32.9)
MCHC RBC AUTO-ENTMCNC: 33.9 G/DL (ref 31.4–35)
MCV RBC AUTO: 87.8 FL (ref 79.6–97.8)
METHADONE UR QL: NEGATIVE
MONOCYTES # BLD: 0.4 K/UL (ref 0.1–1.3)
MONOCYTES NFR BLD: 8 % (ref 4–12)
NEUTS SEG # BLD: 3.2 K/UL (ref 1.7–8.2)
NEUTS SEG NFR BLD: 65 % (ref 43–78)
NRBC # BLD: 0 K/UL (ref 0–0.2)
OPIATES UR QL: NEGATIVE
PCP UR QL: NEGATIVE
PLATELET # BLD AUTO: 121 K/UL (ref 150–450)
PMV BLD AUTO: 11.2 FL (ref 9.4–12.3)
POTASSIUM SERPL-SCNC: 3.2 MMOL/L (ref 3.5–5.1)
RBC # BLD AUTO: 3.29 M/UL (ref 4.05–5.2)
SERVICE CMNT-IMP: NORMAL
SODIUM SERPL-SCNC: 137 MMOL/L (ref 136–145)
TIBC SERPL-MCNC: 249 UG/DL (ref 250–450)
TSH SERPL DL<=0.005 MIU/L-ACNC: 1.82 UIU/ML
VIT B12 SERPL-MCNC: 739 PG/ML (ref 193–986)
WBC # BLD AUTO: 4.8 K/UL (ref 4.3–11.1)

## 2021-03-03 PROCEDURE — 74011000258 HC RX REV CODE- 258: Performed by: HOSPITALIST

## 2021-03-03 PROCEDURE — 74011250636 HC RX REV CODE- 250/636: Performed by: HOSPITALIST

## 2021-03-03 PROCEDURE — 65270000029 HC RM PRIVATE

## 2021-03-03 PROCEDURE — 82962 GLUCOSE BLOOD TEST: CPT

## 2021-03-03 PROCEDURE — 80307 DRUG TEST PRSMV CHEM ANLYZR: CPT

## 2021-03-03 PROCEDURE — 70470 CT HEAD/BRAIN W/O & W/DYE: CPT

## 2021-03-03 PROCEDURE — C8929 TTE W OR WO FOL WCON,DOPPLER: HCPCS

## 2021-03-03 PROCEDURE — 2709999900 HC NON-CHARGEABLE SUPPLY

## 2021-03-03 PROCEDURE — 36592 COLLECT BLOOD FROM PICC: CPT

## 2021-03-03 PROCEDURE — 82607 VITAMIN B-12: CPT

## 2021-03-03 PROCEDURE — 82728 ASSAY OF FERRITIN: CPT

## 2021-03-03 PROCEDURE — 85025 COMPLETE CBC W/AUTO DIFF WBC: CPT

## 2021-03-03 PROCEDURE — 99232 SBSQ HOSP IP/OBS MODERATE 35: CPT | Performed by: INTERNAL MEDICINE

## 2021-03-03 PROCEDURE — 74011636637 HC RX REV CODE- 636/637: Performed by: INTERNAL MEDICINE

## 2021-03-03 PROCEDURE — 94640 AIRWAY INHALATION TREATMENT: CPT

## 2021-03-03 PROCEDURE — 83550 IRON BINDING TEST: CPT

## 2021-03-03 PROCEDURE — 74011000636 HC RX REV CODE- 636: Performed by: HOSPITALIST

## 2021-03-03 PROCEDURE — 80048 BASIC METABOLIC PNL TOTAL CA: CPT

## 2021-03-03 PROCEDURE — 74011000250 HC RX REV CODE- 250: Performed by: HOSPITALIST

## 2021-03-03 PROCEDURE — 94760 N-INVAS EAR/PLS OXIMETRY 1: CPT

## 2021-03-03 PROCEDURE — 74011250637 HC RX REV CODE- 250/637: Performed by: FAMILY MEDICINE

## 2021-03-03 PROCEDURE — 74011636637 HC RX REV CODE- 636/637: Performed by: HOSPITALIST

## 2021-03-03 PROCEDURE — 84443 ASSAY THYROID STIM HORMONE: CPT

## 2021-03-03 PROCEDURE — 82746 ASSAY OF FOLIC ACID SERUM: CPT

## 2021-03-03 PROCEDURE — 74011250637 HC RX REV CODE- 250/637: Performed by: HOSPITALIST

## 2021-03-03 RX ORDER — INSULIN GLARGINE 100 [IU]/ML
INJECTION, SOLUTION SUBCUTANEOUS
Qty: 12 ML | Refills: 0 | Status: SHIPPED | OUTPATIENT
Start: 2021-03-03 | End: 2021-10-28

## 2021-03-03 RX ORDER — INSULIN ASPART 100 [IU]/ML
INJECTION, SOLUTION INTRAVENOUS; SUBCUTANEOUS
Qty: 10 ML | Refills: 0 | Status: SHIPPED | OUTPATIENT
Start: 2021-03-03 | End: 2021-10-28

## 2021-03-03 RX ORDER — SODIUM CHLORIDE 0.9 % (FLUSH) 0.9 %
10 SYRINGE (ML) INJECTION
Status: COMPLETED | OUTPATIENT
Start: 2021-03-03 | End: 2021-03-03

## 2021-03-03 RX ORDER — POTASSIUM CHLORIDE 20 MEQ/1
40 TABLET, EXTENDED RELEASE ORAL
Status: COMPLETED | OUTPATIENT
Start: 2021-03-03 | End: 2021-03-03

## 2021-03-03 RX ADMIN — INSULIN GLARGINE 25 UNITS: 100 INJECTION, SOLUTION SUBCUTANEOUS at 07:56

## 2021-03-03 RX ADMIN — INSULIN LISPRO 3 UNITS: 100 INJECTION, SOLUTION INTRAVENOUS; SUBCUTANEOUS at 07:53

## 2021-03-03 RX ADMIN — INSULIN LISPRO 2 UNITS: 100 INJECTION, SOLUTION INTRAVENOUS; SUBCUTANEOUS at 07:54

## 2021-03-03 RX ADMIN — PANTOPRAZOLE SODIUM 40 MG: 40 TABLET, DELAYED RELEASE ORAL at 05:27

## 2021-03-03 RX ADMIN — Medication 30 ML: at 22:13

## 2021-03-03 RX ADMIN — PERFLUTREN 1 ML: 6.52 INJECTION, SUSPENSION INTRAVENOUS at 09:30

## 2021-03-03 RX ADMIN — Medication 30 ML: at 05:25

## 2021-03-03 RX ADMIN — GABAPENTIN 300 MG: 300 CAPSULE ORAL at 16:17

## 2021-03-03 RX ADMIN — ASPIRIN 81 MG: 81 TABLET, CHEWABLE ORAL at 07:59

## 2021-03-03 RX ADMIN — BUDESONIDE AND FORMOTEROL FUMARATE DIHYDRATE 2 PUFF: 160; 4.5 AEROSOL RESPIRATORY (INHALATION) at 20:00

## 2021-03-03 RX ADMIN — ACETAMINOPHEN 650 MG: 325 TABLET, FILM COATED ORAL at 10:25

## 2021-03-03 RX ADMIN — BUDESONIDE AND FORMOTEROL FUMARATE DIHYDRATE 2 PUFF: 160; 4.5 AEROSOL RESPIRATORY (INHALATION) at 08:26

## 2021-03-03 RX ADMIN — ACETAMINOPHEN 650 MG: 325 TABLET, FILM COATED ORAL at 20:45

## 2021-03-03 RX ADMIN — INSULIN LISPRO 2 UNITS: 100 INJECTION, SOLUTION INTRAVENOUS; SUBCUTANEOUS at 16:19

## 2021-03-03 RX ADMIN — INSULIN LISPRO 3 UNITS: 100 INJECTION, SOLUTION INTRAVENOUS; SUBCUTANEOUS at 16:20

## 2021-03-03 RX ADMIN — Medication 10 ML: at 05:24

## 2021-03-03 RX ADMIN — ARIPIPRAZOLE 5 MG: 2 TABLET ORAL at 20:45

## 2021-03-03 RX ADMIN — TIOTROPIUM BROMIDE INHALATION SPRAY 1 PUFF: 3.12 SPRAY, METERED RESPIRATORY (INHALATION) at 09:00

## 2021-03-03 RX ADMIN — INSULIN LISPRO 4 UNITS: 100 INJECTION, SOLUTION INTRAVENOUS; SUBCUTANEOUS at 11:27

## 2021-03-03 RX ADMIN — IOPAMIDOL 100 ML: 755 INJECTION, SOLUTION INTRAVENOUS at 15:09

## 2021-03-03 RX ADMIN — POTASSIUM CHLORIDE 40 MEQ: 1500 TABLET, EXTENDED RELEASE ORAL at 10:25

## 2021-03-03 RX ADMIN — Medication 10 ML: at 22:13

## 2021-03-03 RX ADMIN — TOPIRAMATE 50 MG: 50 TABLET, FILM COATED ORAL at 07:59

## 2021-03-03 RX ADMIN — Medication 30 ML: at 14:45

## 2021-03-03 RX ADMIN — GABAPENTIN 300 MG: 300 CAPSULE ORAL at 21:26

## 2021-03-03 RX ADMIN — PANTOPRAZOLE SODIUM 40 MG: 40 TABLET, DELAYED RELEASE ORAL at 16:18

## 2021-03-03 RX ADMIN — Medication 10 ML: at 15:11

## 2021-03-03 RX ADMIN — INSULIN LISPRO 3 UNITS: 100 INJECTION, SOLUTION INTRAVENOUS; SUBCUTANEOUS at 11:27

## 2021-03-03 RX ADMIN — DULOXETINE HYDROCHLORIDE 60 MG: 60 CAPSULE, DELAYED RELEASE ORAL at 07:59

## 2021-03-03 RX ADMIN — SODIUM CHLORIDE 100 ML/HR: 900 INJECTION, SOLUTION INTRAVENOUS at 01:35

## 2021-03-03 RX ADMIN — ATORVASTATIN CALCIUM 80 MG: 40 TABLET, FILM COATED ORAL at 07:59

## 2021-03-03 RX ADMIN — GABAPENTIN 300 MG: 300 CAPSULE ORAL at 07:59

## 2021-03-03 NOTE — PROGRESS NOTES
Pt resting in bed, spouse at bedside. Bedside report given to velia terrazas, Brown NEAL. No complaints voiced.

## 2021-03-03 NOTE — PROGRESS NOTES
Pt expressed frustration about hospitalization and reported not sleeping and feeling disoriented. Pt currently resting in bed. Report given to Jameson Banegas RN.

## 2021-03-03 NOTE — PROGRESS NOTES
Pt off monitor to shower, Monitor room/tech notified. Pt awaiting Neuro MD to come on the screen for consult. Left PICC line wrapped for shower.

## 2021-03-03 NOTE — PROGRESS NOTES
Report given to myself by Sridevi De at 1500. Pt arrived to room 347 and settled in bed. Denies pain and nausea at this time. No complaints voiced.

## 2021-03-03 NOTE — DIABETES MGMT
Patient seen for insulin pump notification. Pt admitted with DKA and started on an insulin drip-Glucostabilizer. Has transitioned to subcutaneous insulin injections. Pump was discontinued on admission. Patient is alert and oriented x4. Admitting blood glucose 652. HbA1c 8.9 (ea). Patient has a past medical history of MI with stent, gastroparesis, HTN, hypercholesterolemia, COPD, and neuropathy. Patient states they were diagnosed in their 29's with type 1 DM and voices positive family history of diabetes. Patient states they do have a working glucometer with supplies at home. Per patient they typically check blood glucose levels several times a day. Per patient their blood glucose levels have been running high for a few days at home. Patient states they have a guardian continuous glucose monitor at home, but does not use it because of all the alarms which she states were becoming annoying. Patient states they are currently using a Minimed insulin pump at home for management of diabetes, but is concerned that it may have malfunctioned or kinked. Pt states that she does not have the insulin pump at the hospital and does not recall the insulin pump settings. She states that she would prefer to use Lantus and Novolog at discharge until she follows up with her endocrinologist. Pt states that she does not have any Lantus at home and will need a prescription for Lantus and Novolog. Pt plans to discuss different insulin pump options and CGM's with her endocrinologist at her next visit because she has not been happy with her insulin pump or Guardian CGM. Educated patient regarding current basal/bolus regimen of Lantus 25 units daily, Humalog 3 units 3x/day ac, and Humalog sliding scale coverage 3x/day ac, including type of insulin, timing with meals, onset, duration of effect, and peak of insulin dose. Educated patient on the differences between prandial insulin and sliding scale insulin.  Instructed patient to always seek guidance from their primary care provider about adjusting their insulin doses and not to adjust them on their own as this could negatively impact their glycemic control or result in hypoglycemia. Patient verbalizes understanding. Discussed sick day management, including frequent blood glucose monitoring, staying hydrated and ketone testing. Also, Making a sick day plan with endocrinologist. Pt states that she did not have any Lantus at home in case of insulin pump failure. Pt given a blood glucose log book to record her blood glucose readings to bring to endocrinology appointment to assist with medication titration. Stressed the importance of follow up care for diabetes management with endocrinologist. Nubia Goncalves that she plans on calling them tomorrow to move up her appointment. Pt has no further questions regarding diabetes management at this time.

## 2021-03-03 NOTE — PROGRESS NOTES
This RN returned from lunch to discuss discharge instructions with pt. Pt mentioned that she is to have a CT of the head. No orders; Dr. Luella Paget via [employer]. Pt to have labs, CT scan and neurology consult. Labs drawn at 1450, urine sent for drug screen. Pt off floor from 51 196 19 99, and settled in bed. Tele neuro machine in room, set up and waiting for MD to come on the screen.

## 2021-03-03 NOTE — PROGRESS NOTES
Resting in bed, c/o headache, which started this am, but is an occasional occurrence at home, pta. Tylenol given as ordered, see MAR and pain assess flowsheet.

## 2021-03-04 ENCOUNTER — APPOINTMENT (OUTPATIENT)
Dept: MRI IMAGING | Age: 66
DRG: 637 | End: 2021-03-04
Attending: PSYCHIATRY & NEUROLOGY
Payer: COMMERCIAL

## 2021-03-04 VITALS
WEIGHT: 183.86 LBS | TEMPERATURE: 97.8 F | DIASTOLIC BLOOD PRESSURE: 95 MMHG | OXYGEN SATURATION: 100 % | HEART RATE: 98 BPM | RESPIRATION RATE: 20 BRPM | HEIGHT: 63 IN | SYSTOLIC BLOOD PRESSURE: 150 MMHG | BODY MASS INDEX: 32.58 KG/M2

## 2021-03-04 LAB
ANION GAP SERPL CALC-SCNC: 6 MMOL/L (ref 7–16)
BASOPHILS # BLD: 0 K/UL (ref 0–0.2)
BASOPHILS NFR BLD: 1 % (ref 0–2)
BUN SERPL-MCNC: 4 MG/DL (ref 8–23)
CALCIUM SERPL-MCNC: 8.5 MG/DL (ref 8.3–10.4)
CHLORIDE SERPL-SCNC: 111 MMOL/L (ref 98–107)
CO2 SERPL-SCNC: 27 MMOL/L (ref 21–32)
CREAT SERPL-MCNC: 0.46 MG/DL (ref 0.6–1)
DIFFERENTIAL METHOD BLD: ABNORMAL
EOSINOPHIL # BLD: 0.2 K/UL (ref 0–0.8)
EOSINOPHIL NFR BLD: 4 % (ref 0.5–7.8)
ERYTHROCYTE [DISTWIDTH] IN BLOOD BY AUTOMATED COUNT: 13.9 % (ref 11.9–14.6)
GLUCOSE BLD STRIP.AUTO-MCNC: 115 MG/DL (ref 65–100)
GLUCOSE BLD STRIP.AUTO-MCNC: 151 MG/DL (ref 65–100)
GLUCOSE BLD STRIP.AUTO-MCNC: 254 MG/DL (ref 65–100)
GLUCOSE SERPL-MCNC: 159 MG/DL (ref 65–100)
HCT VFR BLD AUTO: 30.6 % (ref 35.8–46.3)
HGB BLD-MCNC: 10.6 G/DL (ref 11.7–15.4)
IMM GRANULOCYTES # BLD AUTO: 0 K/UL (ref 0–0.5)
IMM GRANULOCYTES NFR BLD AUTO: 1 % (ref 0–5)
LYMPHOCYTES # BLD: 1 K/UL (ref 0.5–4.6)
LYMPHOCYTES NFR BLD: 24 % (ref 13–44)
MCH RBC QN AUTO: 30.3 PG (ref 26.1–32.9)
MCHC RBC AUTO-ENTMCNC: 34.6 G/DL (ref 31.4–35)
MCV RBC AUTO: 87.4 FL (ref 79.6–97.8)
MONOCYTES # BLD: 0.4 K/UL (ref 0.1–1.3)
MONOCYTES NFR BLD: 9 % (ref 4–12)
NEUTS SEG # BLD: 2.6 K/UL (ref 1.7–8.2)
NEUTS SEG NFR BLD: 62 % (ref 43–78)
NRBC # BLD: 0 K/UL (ref 0–0.2)
PLATELET # BLD AUTO: 110 K/UL (ref 150–450)
PMV BLD AUTO: 11 FL (ref 9.4–12.3)
POTASSIUM SERPL-SCNC: 3.5 MMOL/L (ref 3.5–5.1)
RBC # BLD AUTO: 3.5 M/UL (ref 4.05–5.2)
SODIUM SERPL-SCNC: 144 MMOL/L (ref 136–145)
WBC # BLD AUTO: 4.2 K/UL (ref 4.3–11.1)

## 2021-03-04 PROCEDURE — 74011636637 HC RX REV CODE- 636/637: Performed by: INTERNAL MEDICINE

## 2021-03-04 PROCEDURE — 99222 1ST HOSP IP/OBS MODERATE 55: CPT | Performed by: PSYCHIATRY & NEUROLOGY

## 2021-03-04 PROCEDURE — 80048 BASIC METABOLIC PNL TOTAL CA: CPT

## 2021-03-04 PROCEDURE — A9575 INJ GADOTERATE MEGLUMI 0.1ML: HCPCS | Performed by: HOSPITALIST

## 2021-03-04 PROCEDURE — 74011250637 HC RX REV CODE- 250/637: Performed by: FAMILY MEDICINE

## 2021-03-04 PROCEDURE — 36592 COLLECT BLOOD FROM PICC: CPT

## 2021-03-04 PROCEDURE — 74011636637 HC RX REV CODE- 636/637: Performed by: HOSPITALIST

## 2021-03-04 PROCEDURE — 74011250636 HC RX REV CODE- 250/636: Performed by: HOSPITALIST

## 2021-03-04 PROCEDURE — 74011250637 HC RX REV CODE- 250/637: Performed by: PSYCHIATRY & NEUROLOGY

## 2021-03-04 PROCEDURE — 77030020847 HC STATLOK BARD -A

## 2021-03-04 PROCEDURE — 85025 COMPLETE CBC W/AUTO DIFF WBC: CPT

## 2021-03-04 PROCEDURE — 82962 GLUCOSE BLOOD TEST: CPT

## 2021-03-04 PROCEDURE — 70553 MRI BRAIN STEM W/O & W/DYE: CPT

## 2021-03-04 PROCEDURE — 94640 AIRWAY INHALATION TREATMENT: CPT

## 2021-03-04 PROCEDURE — 74011250637 HC RX REV CODE- 250/637: Performed by: HOSPITALIST

## 2021-03-04 PROCEDURE — 2709999900 HC NON-CHARGEABLE SUPPLY

## 2021-03-04 RX ORDER — SODIUM CHLORIDE 0.9 % (FLUSH) 0.9 %
10 SYRINGE (ML) INJECTION
Status: COMPLETED | OUTPATIENT
Start: 2021-03-04 | End: 2021-03-04

## 2021-03-04 RX ORDER — LANOLIN ALCOHOL/MO/W.PET/CERES
100 CREAM (GRAM) TOPICAL 2 TIMES DAILY
Status: DISCONTINUED | OUTPATIENT
Start: 2021-03-04 | End: 2021-03-04 | Stop reason: HOSPADM

## 2021-03-04 RX ORDER — GADOTERATE MEGLUMINE 376.9 MG/ML
16 INJECTION INTRAVENOUS
Status: COMPLETED | OUTPATIENT
Start: 2021-03-04 | End: 2021-03-04

## 2021-03-04 RX ORDER — POTASSIUM CHLORIDE 20 MEQ/1
40 TABLET, EXTENDED RELEASE ORAL
Status: COMPLETED | OUTPATIENT
Start: 2021-03-04 | End: 2021-03-04

## 2021-03-04 RX ORDER — LANOLIN ALCOHOL/MO/W.PET/CERES
100 CREAM (GRAM) TOPICAL 2 TIMES DAILY
Qty: 60 TAB | Refills: 0 | Status: SHIPPED | OUTPATIENT
Start: 2021-03-04 | End: 2021-04-03

## 2021-03-04 RX ADMIN — TOPIRAMATE 50 MG: 50 TABLET, FILM COATED ORAL at 08:40

## 2021-03-04 RX ADMIN — INSULIN LISPRO 2 UNITS: 100 INJECTION, SOLUTION INTRAVENOUS; SUBCUTANEOUS at 08:37

## 2021-03-04 RX ADMIN — Medication 30 ML: at 05:39

## 2021-03-04 RX ADMIN — GABAPENTIN 300 MG: 300 CAPSULE ORAL at 08:40

## 2021-03-04 RX ADMIN — TIOTROPIUM BROMIDE INHALATION SPRAY 1 PUFF: 3.12 SPRAY, METERED RESPIRATORY (INHALATION) at 08:31

## 2021-03-04 RX ADMIN — Medication 100 MG: at 12:09

## 2021-03-04 RX ADMIN — INSULIN LISPRO 3 UNITS: 100 INJECTION, SOLUTION INTRAVENOUS; SUBCUTANEOUS at 16:54

## 2021-03-04 RX ADMIN — Medication 100 MG: at 15:55

## 2021-03-04 RX ADMIN — Medication 10 ML: at 14:44

## 2021-03-04 RX ADMIN — GABAPENTIN 300 MG: 300 CAPSULE ORAL at 15:55

## 2021-03-04 RX ADMIN — ASPIRIN 81 MG: 81 TABLET, CHEWABLE ORAL at 08:40

## 2021-03-04 RX ADMIN — PANTOPRAZOLE SODIUM 40 MG: 40 TABLET, DELAYED RELEASE ORAL at 15:55

## 2021-03-04 RX ADMIN — INSULIN LISPRO 3 UNITS: 100 INJECTION, SOLUTION INTRAVENOUS; SUBCUTANEOUS at 08:37

## 2021-03-04 RX ADMIN — GADOTERATE MEGLUMINE 16 ML: 376.9 INJECTION INTRAVENOUS at 14:44

## 2021-03-04 RX ADMIN — PANTOPRAZOLE SODIUM 40 MG: 40 TABLET, DELAYED RELEASE ORAL at 06:18

## 2021-03-04 RX ADMIN — INSULIN GLARGINE 25 UNITS: 100 INJECTION, SOLUTION SUBCUTANEOUS at 08:36

## 2021-03-04 RX ADMIN — BUDESONIDE AND FORMOTEROL FUMARATE DIHYDRATE 2 PUFF: 160; 4.5 AEROSOL RESPIRATORY (INHALATION) at 08:31

## 2021-03-04 RX ADMIN — Medication 30 ML: at 12:13

## 2021-03-04 RX ADMIN — ATORVASTATIN CALCIUM 80 MG: 40 TABLET, FILM COATED ORAL at 08:40

## 2021-03-04 RX ADMIN — ALPRAZOLAM 2 MG: 0.5 TABLET ORAL at 03:40

## 2021-03-04 RX ADMIN — POTASSIUM CHLORIDE 40 MEQ: 1500 TABLET, EXTENDED RELEASE ORAL at 08:40

## 2021-03-04 RX ADMIN — INSULIN LISPRO 3 UNITS: 100 INJECTION, SOLUTION INTRAVENOUS; SUBCUTANEOUS at 12:08

## 2021-03-04 RX ADMIN — Medication 10 ML: at 05:39

## 2021-03-04 RX ADMIN — DULOXETINE HYDROCHLORIDE 60 MG: 60 CAPSULE, DELAYED RELEASE ORAL at 08:40

## 2021-03-04 RX ADMIN — INSULIN LISPRO 6 UNITS: 100 INJECTION, SOLUTION INTRAVENOUS; SUBCUTANEOUS at 16:54

## 2021-03-04 NOTE — PROGRESS NOTES
[facility] Progress Note    INTERVAL HISTORY  / Subjective:    59yo female with DM1, h/o DKA, HTN, CAD (s/p stent 2019), COPD, bipolar who presents to the ED with N/V, body aches. She was septic in the ED, in DKA, hypotensive. Central line placed, started on insulin gtt, Vanc, Tobra, Zosyn. Also with concerning EKG changes, started on heparin gtt at recommendation of cardiology.     3/1 - AG closed, transition to SQ insulin, d/c bicarb gtt, cards consulted, weaning Levo    3/2 patient is alert awake oriented x3. Answering questions appropriately. No chest pain no palpitations. No fever no chills. No nausea no vomiting no diarrhea no constipation. No shortness of breath. 3/3 - pt cc hallcuination, kept for inpatient workup   Assessment / Plan:    Auditory and visual hallucinations-onset is subacute, 1 week prior presentaton,   March 4-first complaints, patient stated that she has been seen \"dogs which was in their when she ask her family \" patient denies prior episodes, CT head, tsh screening, vitamin lab, neurology consult requested, awaiting work-up      Septic shock/hypovolemic present on admission resolved  Leukocytosis  No obvious infection at this time. May be related to DKA, dehydration. - 3/2 vancomycin DC'd this morning. Zosyn DC'd this afternoon. Monitor off of antibiotics as blood cultures and urine culture so far negative. - CXR negative for acute cardiopulmonary abnormality.  - rapid COVID negative due to viral like symptoms. PCR negative  -Given blood cultures urine cultures negative this is likely hypovolemic shock from DKA rather than septic shock.     DKA POA resolved  DM1  Previous hospitalizations for this. On insulin pump at home, just saw endocrine last week. - 3/1 transitioned to Lantus 20  - Humalog units TID  - diabetic educator consulted  3/2 Lantus dose increased to 25 units. Humalog dose increased to 3 units 3 times daily.   - will likely transition back to insulin pump at d/c  Anion gap this morning slightly elevated. Restart IV fluids. Recheck BMP this afternoon.     Acute metabolic encephalopathy  - resolved     ADRIENNE Pre-renal POA resolved  -Continue hydration with IV fluids due to metabolic acidosis. Repeat BMP this afternoon as well as daily. Chest pain  Abnormal EKG  CAD  Likely demand supply mismatch. Cardiology on board. Off of heparin drip. - 3/2 cont ASA, statin     Bipolar disorder  - cont Abilify     DVT Prophylaxis - heparin SQ  Full code    DISPO: Patient to be transferred to remote telemetry today. Anticipate discharge home in 24 hours if patient continues to improve. Chief Complaint : High Blood Sugar        Objective:  Visit Vitals  BP (!) 161/92 (BP 1 Location: Right upper arm, BP Patient Position: At rest)   Pulse 85   Temp 98 °F (36.7 °C)   Resp 18   Ht 5' 3\" (1.6 m)   Wt 83.4 kg (183 lb 13.8 oz)   SpO2 98%   Breastfeeding No   BMI 32.57 kg/m²                 Physical Exam:  General: No acute distress, speaking in full sentences, no use of accessory muscles   HEENT: Pupils equal and reactive to light and accommodation, oropharynx is clear   Neck: Supple, no lymphadenopathy, no JVD   Lungs: Clear to auscultation bilaterally   Cardiovascular: Regular rate and rhythm with normal S1 and S2   Abdomen: Soft, nontender, nondistended, normoactive bowel sounds   Extremities: No cyanosis clubbing or edema   Neuro: Nonfocal, A&O x3   Psych: Normal affect     Intake and Output:  Date 03/03/21 0700 - 03/04/21 0659 03/04/21 0700 - 03/05/21 0659   Shift 8543-9361 0822-7514 24 Hour Total 6891-2661 0071-8584 24 Hour Total   INTAKE   P.O.         P. O.       I. V.(mL/kg/hr) 800(0.8)  800(0.4)        I.V. 800  800      Shift Total(mL/kg) 1640(19.7) 240(2.9) 1880(22.5)      OUTPUT   Urine(mL/kg/hr) 500(0.5)  500(0.2)        Urine Voided 500  500        Urine Occurrence(s)  2 x 2 x      Shift Total(mL/kg) 500(6)  500(6)      NET 1140 240 1380      Weight (kg) 83.4 83.4 83.4 83.4 83.4 83.4       LAB:  No results displayed because visit has over 200 results. IMAGING:  Xr Chest Pa Lat    Result Date: 3/1/2021  Good aeration of lungs     Ct Head W Wo Cont    Result Date: 3/3/2021  Unremarkable exam.     Xr Chest Port    Result Date: 2/28/2021  1. Right jugular catheter. 2. No acute airspace disease. EKG:  Results for orders placed or performed in visit on 01/29/21   AMB POC EKG ROUTINE W/ 12 LEADS, INTER & REP     Status: None    Impression    Normal sinus rhythm rate of 95. Normal intervals.   No acute ST segment changes             Kellogg Pu, DO  3/4/2021 2:05 PM

## 2021-03-04 NOTE — PROGRESS NOTES
[facility] CARDIOLOGY PROGRESS NOTE           3/3/2021 6:37 PM    Admit Date: 2/28/2021      Subjective:     No overnight events. Clinically improved. Wants to go home. Denies any chest discomfort. Does report some increased dyspnea on exertion over the last few months off over a block of activity. ROS:  Cardiovascular:  As noted above    Objective:      Vitals:    03/03/21 0835 03/03/21 1024 03/03/21 1502 03/03/21 1830   BP:  (!) 151/95 (!) 160/82 (!) 151/103   Pulse:  80 84 69   Resp:  18 18 16   Temp:  97.9 °F (36.6 °C) 97.9 °F (36.6 °C) 97.3 °F (36.3 °C)   SpO2: 96% 99% 99% 97%   Weight:       Height:           Physical Exam:  General-No Acute Distress  Neck- supple, no JVD  CV- regular rate and rhythm no MRG  Lung- clear bilaterally  Abd- soft, nontender, nondistended  Ext- no edema bilaterally.   Skin- warm and dry    Data Review:   Recent Labs     03/03/21  0314 03/02/21  1654 03/02/21  0453 03/01/21  0615    135* 137 138   K 3.2* 3.7 4.0 4.0   MG  --   --  2.0 2.1   BUN 5* 6* 8 24*   CREA 0.64 0.81 0.83 1.29*   * 178* 319* 142*   WBC 4.8  --  8.0 16.3*   HGB 9.8*  --  10.9* 12.3   HCT 28.9*  --  32.4* 36.9   *  --  123* 187   INR  --   --   --  0.9       Assessment/Plan:     Principal Problem:    Septic shock ([facility] [address] (2/28/2021)    Leukocytosis (2/28/2021)    - per medicine     - resolved , hemodynamics stable, out of ICU      Active Problems:    Elevated troponin    - secondary to DKA, hypotension, renal failure    -echo unremarkable    - mildly elevated, trending down not consistent with plaque rupture event     - continue  ASA 81mg, Atorvastatin 80mg for risk factor modification          Coronary artery disease involving native coronary artery of native heart with angina pectoris ([facility] [address] (5/31/2019)    - continue ASA 81mg, Atorvastatin 80mg     -States some increased dyspnea on exertion as an outpatient and consider stress testing by primary cardiologist during outpatient follow-up.      Pure hypercholesterolemia (6/28/2019)     -Continue high intensity statin       Hypertension ()    - low on arrival, target less than 130/80 and titrate as needed       DKA, type 1 (HCC) ()    Overview: type 1, uses insulin pump--  Dr. Lionel Love- per medicine        Chronic obstructive pulmonary disease ([facility] [address] ()    - nebs, treatment per medicine        ADRIENNE (acute kidney injury) ([facility] [address] (5/7/2019)    - likely prerenal given DKA     - resolved     No further cardiac recs at this time. Will sign off please call if questions.     Aimee Marin MD  3/3/2021 6:37 PM

## 2021-03-04 NOTE — PROGRESS NOTES
Bedside report to [facility] AND [facility]. Resting in bed with spouse. Pt finished showering to be reconnected to telemetry monitor shortly by Anna.

## 2021-03-04 NOTE — CONSULTS
Consult    Patient: Sage Serna MRN: [id number]  SSN: [id number]    YOB: 1955  Age: 60 y.o. Sex: female      Subjective:      Sage Serna is a 60 y.o. female who is being seen for evaluation of some isolated formed transient hallucinations that have occurred since she was in the hospital.  She was admitted with DKA and quite toxic several days ago this has been stabilized she had had a blood glucose over 400 at home for several days before admission. No prior history of stroke but does have a history of bipolar disease.   She additionally has had a history of heart disease with elevated troponins in the past    Past Medical History:   Diagnosis Date    Anxiety     Arthritis     Asthma     CAD (coronary artery disease) 2019    mi  stent x 1-- followed by dr Stefani Mcgowan Cervical spondylosis with myelopathy 2015    Chronic obstructive pulmonary disease (HCC)     daily inhaler    Chronic pain     back  - and all over pain     Depression     Diabetes ([facility] [address]     type 1, sqbs am avg 240-250-- hypo at 60's -- uses insulin pump    Diverticulosis     resolved at present - sees DR Kunal Garner when needed    DJD (degenerative joint disease)     Fibromyalgia     all over     Former smoker     quit     GERD (gastroesophageal reflux disease)     CONTROLLED WITH MED    Hormone replacement therapy     Hypertension     controlled with med    Insomnia     Left knee pain     Migraine     Psychiatric diagnosis     bipolar    RA (rheumatoid arthritis) ([facility] [address] dx yrs ago    does not see a rhuematologist    Unspecified adverse effect of anesthesia     \"woke up\"during  surgery on toe    Vitamin B 12 deficiency     Vitamin D deficiency      Past Surgical History:   Procedure Laterality Date    HX CATARACT REMOVAL Bilateral     HX  SECTION      times 2    HX COLONOSCOPY      HX HYSTERECTOMY N/A     total     HX ORTHOPAEDIC      left knee scope    HX ORTHOPAEDIC right foot \"bone removal\"     HX ORTHOPAEDIC Bilateral     shoulder scopes    HX ORTHOPAEDIC Right     shaved down bone in great toe     HX ORTHOPAEDIC Right     right rotator cuff     HX ORTHOPAEDIC N/A     neck ( plate and screws was put in )     HX OTHER SURGICAL      insulin pump    NEUROLOGICAL PROCEDURE UNLISTED  2019    lower back      Family History   Problem Relation Age of Onset    Cancer Father     Lung Disease Father     Cancer Maternal Aunt     Heart Disease Paternal Grandmother     Heart Disease Paternal Grandfather      Social History     Tobacco Use    Smoking status: Former Smoker     Packs/day: 1.00     Years: 30.00     Pack years: 30.00     Types: Cigarettes     Quit date: 6/10/2002     Years since quittin.7    Smokeless tobacco: Never Used   Substance Use Topics    Alcohol use:  Yes     Alcohol/week: 11.0 standard drinks     Types: 4 Glasses of wine, 7 Cans of beer per week      Current Facility-Administered Medications   Medication Dose Route Frequency Provider Last Rate Last Admin    thiamine HCL (B-1) tablet 100 mg  100 mg Oral BID Sukhi Jhaveri MD        insulin glargine (LANTUS) injection 25 Units  25 Units SubCUTAneous DAILY Altagracia Castro MD   25 Units at 21 0836    insulin lispro (HUMALOG) injection 3 Units  3 Units SubCUTAneous TIDAC Altagracia Castro MD   3 Units at 21 0837    insulin lispro (HUMALOG) injection   SubCUTAneous TIDAC Ricci Sotelo MD   2 Units at 21 0837    sodium chloride (NS) flush 30 mL  30 mL InterCATHeter Q8H Ricci Sotelo MD   30 mL at 21 0539    sodium chloride (NS) flush 30 mL  30 mL InterCATHeter PRN Ricci Sotelo MD        ALPRAZolam Chryl Porras) tablet 2 mg  2 mg Oral TID PRN Elin GUAJARDO MD   2 mg at 21 0340    ARIPiprazole (ABILIFY) tablet 5 mg  5 mg Oral QHS Elin GUAJARDO MD   5 mg at 21    aspirin chewable tablet 81 mg  81 mg Oral DAILY Rosi Vasquez MD   81 mg at 21 0840  atorvastatin (LIPITOR) tablet 80 mg  80 mg Oral DAILY Marck GUAJARDO MD   80 mg at 03/04/21 0840    DULoxetine (CYMBALTA) capsule 60 mg  60 mg Oral DAILY Eda Cortez MD   60 mg at 03/04/21 0840    budesonide-formoteroL (SYMBICORT) 160-4.5 mcg/actuation HFA inhaler 2 Puff  2 Puff Inhalation BID RT Eda Cortez MD   2 Puff at 03/04/21 0831    gabapentin (NEURONTIN) capsule 300 mg  300 mg Oral TID Eda Cortez MD   300 mg at 03/04/21 0840    pantoprazole (PROTONIX) tablet 40 mg  40 mg Oral ACB&D Eda Cortez MD   40 mg at 03/04/21 0618    tiotropium bromide (SPIRIVA RESPIMAT) 2.5 mcg /actuation  1 Puff Inhalation DAILY Eda Cortez MD   1 Puff at 03/04/21 0831    topiramate (TOPAMAX) tablet 50 mg  50 mg Oral DAILY Marck GUAJARDO MD   50 mg at 03/04/21 0840    sodium chloride (NS) flush 5-40 mL  5-40 mL IntraVENous Q8H Marck GUAJARDO MD   10 mL at 03/04/21 0539    sodium chloride (NS) flush 5-40 mL  5-40 mL IntraVENous PRN Eda Cortez MD        acetaminophen (TYLENOL) tablet 650 mg  650 mg Oral Q6H PRN Eda Cortez MD   650 mg at 03/03/21 2045    Or    acetaminophen (TYLENOL) suppository 650 mg  650 mg Rectal Q6H PRN Eda Cortez MD        polyethylene glycol (MIRALAX) packet 17 g  17 g Oral DAILY PRN Eda Cortez MD        promethazine (PHENERGAN) tablet 12.5 mg  12.5 mg Oral Q6H PRN Eda Cortez MD        Or    ondansetron [facility]) injection 4 mg  4 mg IntraVENous Q6H PRN Eda Cortez MD   Stopped at 02/28/21 2200        Allergies   Allergen Reactions    Doxepin Hives and Nausea and Vomiting    Nortriptyline Hives    Other Medication Hives and Nausea and Vomiting     elavil    Plaquenil [Hydroxychloroquine] Hives    Tizanidine Other (comments)     Sleep walking and  nocturnal eating     Vistaril [Hydroxyzine Pamoate] Other (comments)     Insomnia           Review of Systems:Review of systems  ENT no sinus congestion no epistaxis no unilateral hearing loss no swallowing difficulty  Cardiopulmonary-denies shortness of breath, no orthopnea, no wheezing, no productive sputum no hemoptysis  GI weight appetite steady no constipation diarrhea abdominal pain  Joints no inflammation no hip pain or shoulder pain no inflammation. No new onset back pain  Skin no rash or ecchymosis  Neuro no syncope vertigo diplopia dysarthria dysphagia difficulty with balance or coordination unilateral weakness          Objective:     Vitals:    03/04/21 0335 03/04/21 0726 03/04/21 0832 03/04/21 0915   BP: (!) 161/92 (!) 161/105  (!) 140/88   Pulse: 85 89  99   Resp: 18 16     Temp: 98 °F (36.7 °C) 98.6 °F (37 °C)     SpO2: 98% 95% 95%    Weight:       Height:            Physical Exam:The patient is alert cooperative and interactive on the telemedicine evaluation. The patient appears well-hydrated. The patient's respiratory rate is normal.  Patient is not dyspneic at rest.  Inspection of the head and neck in an AP view reveals no evidence of thyromegaly   Orbital anatomy to inspection appears normal  The oral cavity demonstrates no evidence of drooling or dry mouth  Cranial nerve examination. Normal gaze. No evidence of extraocular muscle paralysis. Pupils equal in size. There is no ptosis. The face moves symmetrically nasolabial folds are symmetric cheek puff symmetric   Tongue protrudes midline  Hearing is intact  Speech is normal and articulate. No word finding difficulties and recent and remote memory appears intact. The patient was asked to stand and standing posture and balance were observed  Additionally drift in the upper extremities was assessed. The small muscles of the hands were assessed by asking the patient to place the hands in front of the camera and assessed thenar eminence hyperthenar eminence and dorsal interossei.   Additionally joints of the hands assessed      Assessment:   Several episodes of formed hallucinations in the setting of otherwise neurologic normalcy likely pharmaceutical reaction doubt thiamine deficiency but will cover her for same no history of alcoholism. While febrile and toxic at time of arrival no evidence to suggest meningeal encephalitis  Hospital Problems  Date Reviewed: 1/29/2021          Codes Class Noted POA    * (Principal) Septic shock ([place] [address] ICD-10-CM: A41.9, R65.21  ICD-9-CM: 038.9, 785.52, 995.92  2/28/2021 Yes        Leukocytosis ICD-10-CM: D72.829  ICD-9-CM: 288.60  2/28/2021 Yes        Hyperkalemia ICD-10-CM: E87.5  ICD-9-CM: 276.7  2/28/2021 Yes        Hyponatremia ICD-10-CM: E87.1  ICD-9-CM: 276.1  2/28/2021 Yes        Metabolic acidemia EMS-17-JJ: E87.2  ICD-9-CM: 276.2  2/28/2021 Unknown        Pure hypercholesterolemia ICD-10-CM: E78.00  ICD-9-CM: 272.0  6/28/2019 Yes        Coronary artery disease involving native coronary artery of native heart with angina pectoris ([place] [address] ICD-10-CM: I25.119  ICD-9-CM: 414.01, 413.9  5/31/2019 Yes        ADRIENNE (acute kidney injury) ([place] [address] ICD-10-CM: N17.9  ICD-9-CM: 584.9  5/7/2019 Unknown        Depression, major, recurrent, mild (HCC) ICD-10-CM: F33.0  ICD-9-CM: 296.31  3/8/2018 Yes        Hypertension ICD-10-CM: I10  ICD-9-CM: 401.9  Unknown Yes        DKA, type 1 ([place] [address] ICD-10-CM: E10.10  ICD-9-CM: 250.13  Unknown Yes    Overview Addendum 12/19/2018  1:27 PM by Toño Olivo MD     type 1, uses insulin pump--  Dr. Mccollum Sick             Chronic obstructive pulmonary disease ([place] [address] ICD-10-CM: J44.9  ICD-9-CM: 626  Unknown Yes          Impression had a degree of toxic metabolic encephalopathy on entry.   No evidence of stroke this is highly unlikely to represent seizure    Plan:     MRI brain  Cover with thiamine in terms of dietary insufficiency  If persists telepsych evaluation may be of assistance would doubt this is epileptiform equivalent    Signed By: Lindsay Garner MD     March 4, 2021

## 2021-03-04 NOTE — PROGRESS NOTES
END OF SHIFT NOTE: 
 
VITAL SIGNS Patient Vitals for the past 12 hrs: 
 Temp Pulse Resp BP SpO2  
03/04/21 0335 98 °F (36.7 °C) 85 18 (!) 161/92 98 % 03/03/21 2300 97.8 °F (36.6 °C) 93 18 (!) 142/90 96 % 03/03/21 1830 97.3 °F (36.3 °C) 69 16 (!) 151/103 97 % Ambulating Yes Additional Information: PRN xanax administered x1 for anxiety Pt exhibited periodic confusion in late evening Shift report given to oncoming nurse, Dyllan Darnell RN, at the bedside.  
 
ANALY Barreto

## 2021-03-04 NOTE — DIABETES MGMT
Patient admitted with septic shock. Blood glucose ranged  yesterday with patient receiving Lantus 25 units and Humalog 17 units. Blood glucose this morning was 151. Most recent FSBS 115. Reviewed patient current regimen: Lantus 25 units daily, Humalog 3 units with meals, and Humalog SSI. If glucose continues to trend down provider could consider a reduction in basal insulin and changing SSI to sensitive scale to reduce risk of hypoglycemia.

## 2021-03-04 NOTE — DISCHARGE SUMMARY
[address] [facility] Discharge Summary    Patient ID:  Ирина Abraham. female.   1955.  [id number]    Admit date: 2/28/2021  3:21 PM    Discharge date: 03/04/21     Admitting Physician: Aaron Billings MD  Attending Physician: Blayne Cantrell DO  Primary Care Physician: Miguel Angel Graves MD     Discharge Physician: Donn Marin DO    Discharged Condition: Stable    Indication for Admission:   Chief Complaint   Patient presents with    High Blood Sugar        Reason for hospitalization:   Patient Active Problem List   Diagnosis Code    Fibromyalgia M79.7    Hypertension I10    DKA, type 1 ([facility] [address] E10.10    Chronic obstructive pulmonary disease ([facility] [address] J44.9    Depression, major, recurrent, mild (HCC) F33.0    Vitamin B12 deficiency E53.8    Persistent migraine aura without cerebral infarction and without status migrainosus, not intractable G43.509    Venous insufficiency I87.2    Elevated rheumatoid factor R76.8    Vitamin D deficiency E55.9    Lumbar stenosis with neurogenic claudication M48.062    ADRIENNE (acute kidney injury) ([facility] [address] N17.9    Coronary artery disease involving native coronary artery of native heart with angina pectoris ([address] I25.119    Pure hypercholesterolemia E78.00    Breast mass, right N63.10    S/P angioplasty with stent Z95.820    Septic shock ([facility] [address] A41.9, R65.21    Leukocytosis D72.829    Hyperkalemia E87.5    Hyponatremia H13.6    Metabolic acidemia M28.7       Discharge Diagnosis:         Auditory and visual hallucinations possible medication SE       shock/hypovolemic      DKA POA resolved  DM1        Acute metabolic encephalopathy due to adrienne   -       ADRIENNE Pre-renal POA resolved        Chest pain  CAD, with demand ischemia      Bipolar disorder       Discharge Disposition:   home    Follow up Instructions:   F/u with neurology and pcp and psyhciatry and endocrine     Did Patient have Sepsis (YES OR NO): no     Hospital Course:   60 y.o. female 59yo female with DM1, h/o DKA, HTN, CAD (s/p stent 2019), COPD, bipolar who presents to the ED with N/V, body aches. She was septic in the ED, in DKA, hypotensive. Central line placed, started on insulin gtt, Vanc, Tobra, Zosyn. Also with concerning EKG changes, started on heparin gtt at recommendation of cardiology. Pt is admitted for shock, dka, later cc hallucination        Auditory and visual hallucinations-onset is subacute, 1 week prior presentaton, March 4-first complaints, patient stated that she has been seen \"dogs which was in their when she ask her family \" patient denies prior episodes, CT head, tsh screening, vitamin lab, neurology consult requested, awaiting work-up   appreciate neurology, ct head wnl, mri brain unspecific periventricular hypersensitive,   Pt had no further episode, possible med se from gabapentin or phenergan, pt is advised to f/u with neurology and psychiatry       /hypovolemic present on admission resolved  Leukocytosis  No obvious infection at this time. May be related to DKA, dehydration. - 3/2 vancomycin DC'd this morning. Zosyn DC'd this afternoon. Monitor off of antibiotics as blood cultures and urine culture so far negative. - CXR negative for acute cardiopulmonary abnormality.  - rapid COVID negative due to viral like symptoms. PCR negative  -Given blood cultures urine cultures negative this is likely hypovolemic shock from DKA rather than septic shock.     DKA POA resolved  DM1  Previous hospitalizations for this. On insulin pump at home, just saw endocrine last week. - 3/1 transitioned to Lantus 20  - Humalog units TID  - diabetic educator consulted  3/2 Lantus dose increased to 25 units. Humalog dose increased to 3 units 3 times daily. - will likely transition back to insulin pump at d/c  Anion gap this morning slightly elevated. Restart IV fluids.   Recheck BMP this afternoon.     Acute metabolic encephalopathy  - resolved     ADRIENNE Pre-renal POA resolved  -Continue hydration with IV fluids due to metabolic acidosis. Repeat BMP this afternoon as well as daily.     Chest pain  Abnormal EKG  CAD  Likely demand supply mismatch. Cardiology on board. Off of heparin drip.   - 3/2 cont ASA, statin     Bipolar disorder  - cont Abilify       Discharge Exam:  Visit Vitals  BP (!) 150/95 (BP 1 Location: Right upper arm, BP Patient Position: Sitting)   Pulse 98   Temp 97.8 °F (36.6 °C)   Resp 20   Ht 5' 3\" (1.6 m)   Wt 83.4 kg (183 lb 13.8 oz)   SpO2 100%   Breastfeeding No   BMI 32.57 kg/m²         General: No acute distress, speaking in full sentences, no use of accessory muscles   HEENT: Pupils equal and reactive to light and accommodation, oropharynx is clear   Neck: Supple, no lymphadenopathy, no JVD   Lungs: Clear to auscultation bilaterally   Cardiovascular: Regular rate and rhythm with normal S1 and S2   Abdomen: Soft, nontender, nondistended, normoactive bowel sounds   Extremities: No cyanosis clubbing or edema   Neuro: Nonfocal, A&O x3   Psych: Normal affect     Consults: IP CONSULT TO CARDIOLOGY  IP CONSULT TO NEUROLOGY    Code Status: Full Code    Significant Diagnostic Studies:   CMP:   Lab Results   Component Value Date/Time     03/04/2021 08:31 AM    K 3.5 03/04/2021 08:31 AM     (H) 03/04/2021 08:31 AM    CO2 27 03/04/2021 08:31 AM    AGAP 6 (L) 03/04/2021 08:31 AM     (H) 03/04/2021 08:31 AM    BUN 4 (L) 03/04/2021 08:31 AM    CREA 0.46 (L) 03/04/2021 08:31 AM    GFRAA >60 03/04/2021 08:31 AM    GFRNA >60 03/04/2021 08:31 AM    CA 8.5 03/04/2021 08:31 AM         CBC:    Lab Results   Component Value Date/Time    WBC 4.2 (L) 03/04/2021 03:24 AM    HGB 10.6 (L) 03/04/2021 03:24 AM    HCT 30.6 (L) 03/04/2021 03:24 AM     (L) 03/04/2021 03:24 AM       Lab Results   Component Value Date/Time    INR 0.9 03/01/2021 06:15 AM    Prothrombin time 12.8 03/01/2021 06:15 AM       ABG:  No results found for: PH, PHI, PCO2, PCO2I, PO2, PO2I, HCO3, HCO3I, FIO2, FIO2I        No results found for: CPK, RCK1, RCK2, RCK3, RCK4, CKMB, CKNDX, CKND1, TROPT, TROIQ, BNPP, BNP        Radiology Reports :   [unfilled]      Discharge Medications:  Current Discharge Medication List      START taking these medications    Details   thiamine HCL (B-1) 100 mg tablet Take 1 Tab by mouth two (2) times a day for 30 days. Qty: 60 Tab, Refills: 0      insulin glargine (Lantus Solostar U-100 Insulin) 100 unit/mL (3 mL) inpn by SubCUTAneous route. 25 unit qhs  Qty: 12 mL, Refills: 0      insulin aspart U-100 (NovoLOG U-100 Insulin aspart) 100 unit/mL injection by SubCUTAneous route. 4unit before meals tid, hold if poor appetite  Qty: 10 mL, Refills: 0         CONTINUE these medications which have NOT CHANGED    Details   meloxicam (MOBIC) 7.5 mg tablet Take  by mouth daily. atorvastatin (LIPITOR) 80 mg tablet Take 1 Tab by mouth daily. Qty: 90 Tab, Refills: 3      furosemide (LASIX) 20 mg tablet TAKE 1 TABLET BY MOUTH DAILY  Qty: 90 Tab, Refills: 3    Associated Diagnoses: Hypertension, unspecified type      lisinopriL (PRINIVIL, ZESTRIL) 40 mg tablet Take 1 Tab by mouth daily. Qty: 90 Tab, Refills: 3    Associated Diagnoses: Essential hypertension      metoprolol succinate (TOPROL-XL) 50 mg XL tablet Take 1 Tab by mouth daily. Qty: 30 Tab, Refills: 5    Associated Diagnoses: Hypertension, unspecified type; Low blood pressure reading      omeprazole (PRILOSEC) 20 mg capsule Take 1 Cap by mouth daily. Qty: 30 Cap, Refills: 1    Associated Diagnoses: Dysphagia, unspecified type      fluticasone propion-salmeteroL (ADVAIR/WIXELA) 250-50 mcg/dose diskus inhaler Take 1 Puff by inhalation two (2) times a day. Qty: 1 Inhaler, Refills: 9    Associated Diagnoses: Chronic obstructive pulmonary disease with acute lower respiratory infection (HCC)      DULoxetine (CYMBALTA) 60 mg capsule Take 1 Cap by mouth daily.   Qty: 90 Cap, Refills: 0    Associated Diagnoses: Depression, major, recurrent, mild ([address] aspirin 81 mg chewable tablet Take 1 Tab by mouth daily. Qty: 30 Tab, Refills: 5      tiotropium bromide (SPIRIVA RESPIMAT) 1.25 mcg/actuation inhaler Take 2 Puffs by inhalation daily. Qty: 1 Inhaler, Refills: 6    Associated Diagnoses: Cough      ARIPiprazole (ABILIFY) 10 mg tablet Take 5 mg by mouth nightly. fluconazole (DIFLUCAN) 150 mg tablet Take 150 mg by mouth every thirty (30) days. FDA advises cautious prescribing of oral fluconazole in pregnancy. monthly      nitroglycerin (NITROSTAT) 0.4 mg SL tablet 1 Tab by SubLINGual route as needed for Chest Pain. Up to 3 doses. Qty: 1 Bottle, Refills: 3      ondansetron hcl (ZOFRAN) 4 mg tablet Take 1 Tab by mouth every eight (8) hours as needed for Nausea. Qty: 20 Tab, Refills: 0    Associated Diagnoses: Nausea      gabapentin (NEURONTIN) 300 mg capsule Take 300 mg by mouth three (3) times daily. SUMAtriptan (IMITREX) 100 mg tablet Take 50 mg by mouth once as needed for Migraine. ALPRAZolam (XANAX) 2 mg tablet Take 2 mg by mouth three (3) times daily as needed for Anxiety. Usually takes 3 times daily      topiramate (TOPAMAX) 50 mg tablet Take 50 mg by mouth daily. albuterol (PROVENTIL HFA, VENTOLIN HFA, PROAIR HFA) 90 mcg/actuation inhaler Take 1 Puff by inhalation every six (6) hours as needed for Wheezing. Qty: 1 Inhaler, Refills: 6    Associated Diagnoses: Cough      Syringe with Needle, Disp, 1 mL 25 gauge x 5/8\" syrg 1,000 mcg by Does Not Apply route. Syringe with Needle, Safety 3 mL 25 gauge x 1\" syrg 1 mL by IntraMUSCular route.          STOP taking these medications       OTHER Comments:   Reason for Stopping:         OTHER Comments:   Reason for Stopping:               Medications Discontinued during this hospitalization:   Medications Discontinued During This Encounter   Medication Reason    calcium chloride injection 1 g REORDER    vancomycin (VANCOCIN) 1,370 mg in 0.9% sodium chloride 250 mL IVPB DOSE ADJUSTMENT    insulin regular (NOVOLIN R, HUMULIN R) 100 Units in 0.9% sodium chloride 804 mL infusion DUPLICATE ORDER    NOREPINephrine (LEVOPHED) 4 mg/250 mL (16 mcg/mL) in NS infusion DUPLICATE ORDER    vancomycin (VANCOCIN) 1,000 mg in 0.9% sodium chloride 250 mL (VIAL-MATE) DOSE ADJUSTMENT    heparin 25,000 units in dextrose 500 mL infusion     0.9% sodium chloride 1,000 mL with sodium bicarbonate (8.4%) 10 mEq infusion     heparin (porcine) injection 5,000 Units     0.9% sodium chloride infusion Alternate Therapy    tobramycin (NEBCIN) 460 mg in 0.9% sodium chloride 100 mL IVPB     0.45% sodium chloride 1,000 mL with sodium bicarbonate (8.4%) 50 mEq infusion     insulin regular (NOVOLIN R, HUMULIN R) 100 Units in 0.9% sodium chloride 100 mL infusion     dextrose 40% (GLUTOSE) oral gel 1 Tube     glucagon (GLUCAGEN) injection 1 mg     dextrose (D50W) injection syrg 12.5-25 g     dextrose 5% and 0.9% NaCl infusion     insulin glargine (LANTUS) injection 20 Units     insulin lispro (HUMALOG) injection 2 Units     Vancomycin Intermittent Dosing - Pharmacy to dose     vancomycin (VANCOCIN) 1250 mg in  ml infusion     piperacillin-tazobactam (ZOSYN) 3.375 g in 0.9% sodium chloride (MBP/ADV) 100 mL MBP     NOREPINephrine (LEVOPHED) 4 mg/250 mL (16 mcg/mL) in NS infusion Patient Transfer    heparin 25,000 units in dextrose 500 mL infusion     0.9% sodium chloride infusion     promethazine (PHENERGAN) tablet 12.5 mg        Patient Instructions: Activity: as tolerated. Diet:   DIET DIABETIC CONSISTENT CARB Regular    Therapy Ordered:  No therapy plan of the specified type found. Follow-up appointments: Follow-up Appointments   Procedures    FOLLOW UP VISIT Appointment in: One Week Endocrine as scheduled     Endocrine as scheduled     Standing Status:   Standing     Number of Occurrences:   1     Order Specific Question:   Appointment in     Answer:    One Week       Time Spent on Discharge greater than 30 minutes.     Ирина Mendez DO  3/4/2021 4:05 PM

## 2021-03-05 LAB
BACTERIA SPEC CULT: NORMAL
BACTERIA SPEC CULT: NORMAL
SERVICE CMNT-IMP: NORMAL
SERVICE CMNT-IMP: NORMAL

## 2021-03-05 NOTE — PROGRESS NOTES
Pt off floor from 1400 until 1510 for MRI. Pt pulled off PICC drsg. Dressing changed per protocol along with new stat-lock at 1615, with disk and new tegaderm using sterile technique. Pt tolerated without difficulty. Then discharge orders entered from Dr. Kenzie Corona. Pt given written instructions which were discussed with pt. Pt verbalized and demonstrates understanding at [phone]. Questions answered to pt satisfaction. Pt discharged home, ambulatory accompanied by Katherine WRIGHT, and spouse. No complaints voiced.

## 2021-10-28 ENCOUNTER — HOSPITAL ENCOUNTER (INPATIENT)
Age: 66
LOS: 3 days | Discharge: HOME | DRG: 637 | End: 2021-10-31
Attending: EMERGENCY MEDICINE | Admitting: FAMILY MEDICINE
Payer: COMMERCIAL

## 2021-10-28 ENCOUNTER — APPOINTMENT (OUTPATIENT)
Dept: GENERAL RADIOLOGY | Age: 66
DRG: 637 | End: 2021-10-28
Attending: PHYSICIAN ASSISTANT
Payer: COMMERCIAL

## 2021-10-28 DIAGNOSIS — I10 ESSENTIAL HYPERTENSION: ICD-10-CM

## 2021-10-28 DIAGNOSIS — E10.10 DIABETIC KETOACIDOSIS WITHOUT COMA ASSOCIATED WITH TYPE 1 DIABETES MELLITUS (HCC): Primary | ICD-10-CM

## 2021-10-28 PROBLEM — E11.10 DKA (DIABETIC KETOACIDOSIS) (HCC): Status: ACTIVE | Noted: 2021-10-28

## 2021-10-28 LAB
ADMINISTERED INITIALS, ADMINIT: NORMAL
ALBUMIN SERPL-MCNC: 4.1 G/DL (ref 3.2–4.6)
ALBUMIN/GLOB SERPL: 1 {RATIO} (ref 1.2–3.5)
ALP SERPL-CCNC: 92 U/L (ref 50–136)
ALT SERPL-CCNC: 45 U/L (ref 12–65)
ANION GAP SERPL CALC-SCNC: 12 MMOL/L (ref 7–16)
ANION GAP SERPL CALC-SCNC: 23 MMOL/L (ref 7–16)
ARTERIAL PATENCY WRIST A: ABNORMAL
AST SERPL-CCNC: 42 U/L (ref 15–37)
ATRIAL RATE: 82 BPM
BASE DEFICIT BLDV-SCNC: 19.2 MMOL/L
BASOPHILS # BLD: 0.1 K/UL (ref 0–0.2)
BASOPHILS NFR BLD: 0 % (ref 0–2)
BILIRUB SERPL-MCNC: 1 MG/DL (ref 0.2–1.1)
BUN SERPL-MCNC: 21 MG/DL (ref 8–23)
BUN SERPL-MCNC: 32 MG/DL (ref 8–23)
CALCIUM SERPL-MCNC: 7.7 MG/DL (ref 8.3–10.4)
CALCIUM SERPL-MCNC: 9 MG/DL (ref 8.3–10.4)
CALCULATED P AXIS, ECG09: 75 DEGREES
CALCULATED R AXIS, ECG10: 87 DEGREES
CALCULATED T AXIS, ECG11: 67 DEGREES
CHLORIDE SERPL-SCNC: 110 MMOL/L (ref 98–107)
CHLORIDE SERPL-SCNC: 93 MMOL/L (ref 98–107)
CO2 BLD-SCNC: 12 MMOL/L (ref 13–23)
CO2 SERPL-SCNC: 12 MMOL/L (ref 21–32)
CO2 SERPL-SCNC: 15 MMOL/L (ref 21–32)
CREAT SERPL-MCNC: 1.15 MG/DL (ref 0.6–1)
CREAT SERPL-MCNC: 1.71 MG/DL (ref 0.6–1)
D50 ADMINISTERED, D50ADM: 0 ML
D50 ORDER, D50ORD: 0 ML
DIAGNOSIS, 93000: NORMAL
DIFFERENTIAL METHOD BLD: ABNORMAL
EOSINOPHIL # BLD: 0 K/UL (ref 0–0.8)
EOSINOPHIL NFR BLD: 0 % (ref 0.5–7.8)
ERYTHROCYTE [DISTWIDTH] IN BLOOD BY AUTOMATED COUNT: 14.2 % (ref 11.9–14.6)
GAS FLOW.O2 O2 DELIVERY SYS: ABNORMAL L/MIN
GLOBULIN SER CALC-MCNC: 4 G/DL (ref 2.3–3.5)
GLSCOM COMMENTS: NORMAL
GLUCOSE BLD STRIP.AUTO-MCNC: 154 MG/DL (ref 65–100)
GLUCOSE BLD STRIP.AUTO-MCNC: 191 MG/DL (ref 65–100)
GLUCOSE BLD STRIP.AUTO-MCNC: 229 MG/DL (ref 65–100)
GLUCOSE BLD STRIP.AUTO-MCNC: 286 MG/DL (ref 65–100)
GLUCOSE BLD STRIP.AUTO-MCNC: 360 MG/DL (ref 65–100)
GLUCOSE BLD STRIP.AUTO-MCNC: 372 MG/DL (ref 65–100)
GLUCOSE BLD STRIP.AUTO-MCNC: 417 MG/DL (ref 65–100)
GLUCOSE BLD STRIP.AUTO-MCNC: 508 MG/DL (ref 65–100)
GLUCOSE SERPL-MCNC: 133 MG/DL (ref 65–100)
GLUCOSE SERPL-MCNC: 446 MG/DL (ref 65–100)
GLUCOSE, GLC: 154 MG/DL
GLUCOSE, GLC: 191 MG/DL
GLUCOSE, GLC: 229 MG/DL
GLUCOSE, GLC: 286 MG/DL
GLUCOSE, GLC: 360 MG/DL
GLUCOSE, GLC: 372 MG/DL
GLUCOSE, GLC: 508 MG/DL
HCO3 BLDV-SCNC: 10.2 MMOL/L (ref 23–28)
HCT VFR BLD AUTO: 46.3 % (ref 35.8–46.3)
HGB BLD-MCNC: 14.9 G/DL (ref 11.7–15.4)
HIGH TARGET, HITG: 180 MG/DL
HIGH TARGET, HITG: 250 MG/DL
IMM GRANULOCYTES # BLD AUTO: 0.2 K/UL (ref 0–0.5)
IMM GRANULOCYTES NFR BLD AUTO: 1 % (ref 0–5)
INSULIN ADMINSTERED, INSADM: 5.6 UNITS/HOUR
INSULIN ADMINSTERED, INSADM: 6.2 UNITS/HOUR
INSULIN ADMINSTERED, INSADM: 7.9 UNITS/HOUR
INSULIN ADMINSTERED, INSADM: 8.5 UNITS/HOUR
INSULIN ADMINSTERED, INSADM: 9 UNITS/HOUR
INSULIN ORDER, INSORD: 5.6 UNITS/HOUR
INSULIN ORDER, INSORD: 6.2 UNITS/HOUR
INSULIN ORDER, INSORD: 7.9 UNITS/HOUR
INSULIN ORDER, INSORD: 8.5 UNITS/HOUR
INSULIN ORDER, INSORD: 9 UNITS/HOUR
LACTATE SERPL-SCNC: 2.1 MMOL/L (ref 0.4–2)
LACTATE SERPL-SCNC: 3.4 MMOL/L (ref 0.4–2)
LIPASE SERPL-CCNC: 42 U/L (ref 73–393)
LOW TARGET, LOT: 140 MG/DL
LOW TARGET, LOT: 150 MG/DL
LYMPHOCYTES # BLD: 0.7 K/UL (ref 0.5–4.6)
LYMPHOCYTES NFR BLD: 5 % (ref 13–44)
MAGNESIUM SERPL-MCNC: 2.2 MG/DL (ref 1.8–2.4)
MCH RBC QN AUTO: 31.6 PG (ref 26.1–32.9)
MCHC RBC AUTO-ENTMCNC: 32.2 G/DL (ref 31.4–35)
MCV RBC AUTO: 98.1 FL (ref 79.6–97.8)
MINUTES UNTIL NEXT BG, NBG: 60 MIN
MONOCYTES # BLD: 0.3 K/UL (ref 0.1–1.3)
MONOCYTES NFR BLD: 2 % (ref 4–12)
MULTIPLIER, MUL: 0.02
MULTIPLIER, MUL: 0.02
MULTIPLIER, MUL: 0.03
MULTIPLIER, MUL: 0.04
MULTIPLIER, MUL: 0.05
MULTIPLIER, MUL: 0.06
MULTIPLIER, MUL: 0.06
NEUTS SEG # BLD: 12.5 K/UL (ref 1.7–8.2)
NEUTS SEG NFR BLD: 91 % (ref 43–78)
NRBC # BLD: 0 K/UL (ref 0–0.2)
O2/TOTAL GAS SETTING VFR VENT: 21 %
ORDER INITIALS, ORDINIT: NORMAL
P-R INTERVAL, ECG05: 200 MS
PCO2 BLDV: 35.4 MMHG (ref 41–51)
PH BLDV: 7.07 [PH] (ref 7.32–7.42)
PHOSPHATE SERPL-MCNC: 8.6 MG/DL (ref 2.3–3.7)
PLATELET # BLD AUTO: 240 K/UL (ref 150–450)
PMV BLD AUTO: 12.2 FL (ref 9.4–12.3)
PO2 BLDV: 33 MMHG
POTASSIUM SERPL-SCNC: 4.1 MMOL/L (ref 3.5–5.1)
POTASSIUM SERPL-SCNC: 5.9 MMOL/L (ref 3.5–5.1)
PROT SERPL-MCNC: 8.1 G/DL (ref 6.3–8.2)
Q-T INTERVAL, ECG07: 410 MS
QRS DURATION, ECG06: 92 MS
QTC CALCULATION (BEZET), ECG08: 479 MS
RBC # BLD AUTO: 4.72 M/UL (ref 4.05–5.2)
SAO2 % BLDV: 42.4 % (ref 65–88)
SERVICE CMNT-IMP: ABNORMAL
SODIUM SERPL-SCNC: 128 MMOL/L (ref 136–145)
SODIUM SERPL-SCNC: 137 MMOL/L (ref 136–145)
SPECIMEN TYPE: ABNORMAL
VENTRICULAR RATE, ECG03: 82 BPM
WBC # BLD AUTO: 13.7 K/UL (ref 4.3–11.1)

## 2021-10-28 PROCEDURE — 74011250636 HC RX REV CODE- 250/636: Performed by: FAMILY MEDICINE

## 2021-10-28 PROCEDURE — 65610000001 HC ROOM ICU GENERAL

## 2021-10-28 PROCEDURE — 99284 EMERGENCY DEPT VISIT MOD MDM: CPT

## 2021-10-28 PROCEDURE — 83605 ASSAY OF LACTIC ACID: CPT

## 2021-10-28 PROCEDURE — 96361 HYDRATE IV INFUSION ADD-ON: CPT

## 2021-10-28 PROCEDURE — 94640 AIRWAY INHALATION TREATMENT: CPT

## 2021-10-28 PROCEDURE — 71045 X-RAY EXAM CHEST 1 VIEW: CPT

## 2021-10-28 PROCEDURE — 74011636637 HC RX REV CODE- 636/637: Performed by: FAMILY MEDICINE

## 2021-10-28 PROCEDURE — 94664 DEMO&/EVAL PT USE INHALER: CPT

## 2021-10-28 PROCEDURE — 82803 BLOOD GASES ANY COMBINATION: CPT

## 2021-10-28 PROCEDURE — 96374 THER/PROPH/DIAG INJ IV PUSH: CPT

## 2021-10-28 PROCEDURE — 74011250636 HC RX REV CODE- 250/636: Performed by: PHYSICIAN ASSISTANT

## 2021-10-28 PROCEDURE — 74011250637 HC RX REV CODE- 250/637: Performed by: FAMILY MEDICINE

## 2021-10-28 PROCEDURE — 84100 ASSAY OF PHOSPHORUS: CPT

## 2021-10-28 PROCEDURE — 74011250636 HC RX REV CODE- 250/636: Performed by: EMERGENCY MEDICINE

## 2021-10-28 PROCEDURE — 83690 ASSAY OF LIPASE: CPT

## 2021-10-28 PROCEDURE — 80053 COMPREHEN METABOLIC PANEL: CPT

## 2021-10-28 PROCEDURE — 36600 WITHDRAWAL OF ARTERIAL BLOOD: CPT

## 2021-10-28 PROCEDURE — 93005 ELECTROCARDIOGRAM TRACING: CPT | Performed by: PHYSICIAN ASSISTANT

## 2021-10-28 PROCEDURE — 82962 GLUCOSE BLOOD TEST: CPT

## 2021-10-28 PROCEDURE — 74011000258 HC RX REV CODE- 258: Performed by: FAMILY MEDICINE

## 2021-10-28 PROCEDURE — 83735 ASSAY OF MAGNESIUM: CPT

## 2021-10-28 PROCEDURE — 87040 BLOOD CULTURE FOR BACTERIA: CPT

## 2021-10-28 PROCEDURE — 85025 COMPLETE CBC W/AUTO DIFF WBC: CPT

## 2021-10-28 RX ORDER — GABAPENTIN 300 MG/1
300 CAPSULE ORAL 3 TIMES DAILY
Status: DISCONTINUED | OUTPATIENT
Start: 2021-10-29 | End: 2021-10-28 | Stop reason: SDUPTHER

## 2021-10-28 RX ORDER — DEXTROSE 40 %
15 GEL (GRAM) ORAL AS NEEDED
Status: DISCONTINUED | OUTPATIENT
Start: 2021-10-28 | End: 2021-10-29

## 2021-10-28 RX ORDER — DEXTROSE MONOHYDRATE AND SODIUM CHLORIDE 5; .9 G/100ML; G/100ML
150 INJECTION, SOLUTION INTRAVENOUS CONTINUOUS
Status: DISCONTINUED | OUTPATIENT
Start: 2021-10-29 | End: 2021-10-29

## 2021-10-28 RX ORDER — ATORVASTATIN CALCIUM 40 MG/1
80 TABLET, FILM COATED ORAL
Status: DISCONTINUED | OUTPATIENT
Start: 2021-10-28 | End: 2021-10-31 | Stop reason: HOSPADM

## 2021-10-28 RX ORDER — MORPHINE SULFATE 2 MG/ML
2 INJECTION, SOLUTION INTRAMUSCULAR; INTRAVENOUS
Status: DISCONTINUED | OUTPATIENT
Start: 2021-10-28 | End: 2021-10-29

## 2021-10-28 RX ORDER — GUAIFENESIN 100 MG/5ML
81 LIQUID (ML) ORAL DAILY
Status: DISCONTINUED | OUTPATIENT
Start: 2021-10-29 | End: 2021-10-31 | Stop reason: HOSPADM

## 2021-10-28 RX ORDER — ARIPIPRAZOLE 5 MG/1
5 TABLET ORAL
Status: DISCONTINUED | OUTPATIENT
Start: 2021-10-28 | End: 2021-10-31 | Stop reason: HOSPADM

## 2021-10-28 RX ORDER — ALPRAZOLAM 0.5 MG/1
1 TABLET ORAL
Status: DISCONTINUED | OUTPATIENT
Start: 2021-10-28 | End: 2021-10-31 | Stop reason: HOSPADM

## 2021-10-28 RX ORDER — DULOXETIN HYDROCHLORIDE 60 MG/1
60 CAPSULE, DELAYED RELEASE ORAL DAILY
Status: DISCONTINUED | OUTPATIENT
Start: 2021-10-29 | End: 2021-10-31 | Stop reason: HOSPADM

## 2021-10-28 RX ORDER — ONDANSETRON 2 MG/ML
4 INJECTION INTRAMUSCULAR; INTRAVENOUS
Status: DISCONTINUED | OUTPATIENT
Start: 2021-10-28 | End: 2021-10-31 | Stop reason: HOSPADM

## 2021-10-28 RX ORDER — DEXTROSE 50 % IN WATER (D50W) INTRAVENOUS SYRINGE
25-50 AS NEEDED
Status: DISCONTINUED | OUTPATIENT
Start: 2021-10-28 | End: 2021-10-29

## 2021-10-28 RX ORDER — SUMATRIPTAN 50 MG/1
50 TABLET, FILM COATED ORAL DAILY PRN
Status: DISCONTINUED | OUTPATIENT
Start: 2021-10-28 | End: 2021-10-31 | Stop reason: HOSPADM

## 2021-10-28 RX ORDER — ONDANSETRON 2 MG/ML
4 INJECTION INTRAMUSCULAR; INTRAVENOUS
Status: COMPLETED | OUTPATIENT
Start: 2021-10-28 | End: 2021-10-28

## 2021-10-28 RX ORDER — SODIUM CHLORIDE 9 MG/ML
150 INJECTION, SOLUTION INTRAVENOUS CONTINUOUS
Status: DISCONTINUED | OUTPATIENT
Start: 2021-10-28 | End: 2021-10-28

## 2021-10-28 RX ORDER — DEXTROSE 50 % IN WATER (D50W) INTRAVENOUS SYRINGE
25-50 AS NEEDED
Status: DISCONTINUED | OUTPATIENT
Start: 2021-10-28 | End: 2021-10-28 | Stop reason: SDUPTHER

## 2021-10-28 RX ORDER — ATORVASTATIN CALCIUM 40 MG/1
80 TABLET, FILM COATED ORAL DAILY
Status: DISCONTINUED | OUTPATIENT
Start: 2021-10-29 | End: 2021-10-28 | Stop reason: SDUPTHER

## 2021-10-28 RX ORDER — BUDESONIDE AND FORMOTEROL FUMARATE DIHYDRATE 160; 4.5 UG/1; UG/1
2 AEROSOL RESPIRATORY (INHALATION)
Status: DISCONTINUED | OUTPATIENT
Start: 2021-10-28 | End: 2021-10-31 | Stop reason: HOSPADM

## 2021-10-28 RX ORDER — NITROGLYCERIN 0.4 MG/1
0.4 TABLET SUBLINGUAL AS NEEDED
Status: DISCONTINUED | OUTPATIENT
Start: 2021-10-28 | End: 2021-10-31 | Stop reason: HOSPADM

## 2021-10-28 RX ORDER — TOPIRAMATE 50 MG/1
50 TABLET, FILM COATED ORAL DAILY
Status: DISCONTINUED | OUTPATIENT
Start: 2021-10-29 | End: 2021-10-31 | Stop reason: HOSPADM

## 2021-10-28 RX ORDER — GABAPENTIN 300 MG/1
300 CAPSULE ORAL 3 TIMES DAILY
Status: DISCONTINUED | OUTPATIENT
Start: 2021-10-28 | End: 2021-10-31 | Stop reason: HOSPADM

## 2021-10-28 RX ORDER — METOPROLOL SUCCINATE 50 MG/1
50 TABLET, EXTENDED RELEASE ORAL DAILY
Status: DISCONTINUED | OUTPATIENT
Start: 2021-10-29 | End: 2021-10-31 | Stop reason: HOSPADM

## 2021-10-28 RX ADMIN — SODIUM CHLORIDE 1000 ML: 900 INJECTION, SOLUTION INTRAVENOUS at 19:27

## 2021-10-28 RX ADMIN — SODIUM CHLORIDE 1000 ML: 900 INJECTION, SOLUTION INTRAVENOUS at 15:20

## 2021-10-28 RX ADMIN — ONDANSETRON 4 MG: 2 INJECTION INTRAMUSCULAR; INTRAVENOUS at 22:14

## 2021-10-28 RX ADMIN — SODIUM CHLORIDE 1000 ML: 900 INJECTION, SOLUTION INTRAVENOUS at 18:12

## 2021-10-28 RX ADMIN — ONDANSETRON 4 MG: 2 INJECTION INTRAMUSCULAR; INTRAVENOUS at 15:32

## 2021-10-28 RX ADMIN — GABAPENTIN 300 MG: 300 CAPSULE ORAL at 21:58

## 2021-10-28 RX ADMIN — ARIPIPRAZOLE 5 MG: 5 TABLET ORAL at 21:58

## 2021-10-28 RX ADMIN — SODIUM CHLORIDE 9 UNITS/HR: 9 INJECTION, SOLUTION INTRAVENOUS at 16:58

## 2021-10-28 RX ADMIN — BUDESONIDE AND FORMOTEROL FUMARATE DIHYDRATE 2 PUFF: 160; 4.5 AEROSOL RESPIRATORY (INHALATION) at 23:08

## 2021-10-28 RX ADMIN — CEFTRIAXONE 1 G: 1 INJECTION, POWDER, FOR SOLUTION INTRAMUSCULAR; INTRAVENOUS at 19:33

## 2021-10-28 RX ADMIN — ALPRAZOLAM 1 MG: 0.5 TABLET ORAL at 22:21

## 2021-10-28 RX ADMIN — SODIUM CHLORIDE 150 ML/HR: 900 INJECTION, SOLUTION INTRAVENOUS at 20:39

## 2021-10-28 RX ADMIN — ATORVASTATIN CALCIUM 80 MG: 40 TABLET, FILM COATED ORAL at 21:58

## 2021-10-28 RX ADMIN — SODIUM CHLORIDE 1000 ML: 900 INJECTION, SOLUTION INTRAVENOUS at 15:49

## 2021-10-28 NOTE — ED PROVIDER NOTES
72-year-old lady presents with concerns about being in DKA. She says that she has a history of diabetes and despite being on insulin pump she feels like it is out of control. She notes she had several episodes of vomiting over the last 24 hours and when she last checked her blood sugar it was over 500. She says her emesis has been nonbloody nonbilious and she had no diarrhea. She denies any fevers or chills and has had no cough or shortness of breath. She has some mild crampy abdominal pain that started after her vomiting. No other associated symptoms. Elements of this note were created using speech recognition software. As such, errors of speech recognition may be present.            Past Medical History:   Diagnosis Date    Anxiety     Arthritis     Asthma     CAD (coronary artery disease) 2019    mi  stent x 1-- followed by dr Nicci Casas Cervical spondylosis with myelopathy 2015    Chronic obstructive pulmonary disease (HCC)     daily inhaler    Chronic pain     back  - and all over pain     Depression     Diabetes ([facility] [address]     type 1, sqbs am avg 240-250-- hypo at 60's -- uses insulin pump    Diverticulosis     resolved at present - sees DR Good Alicia when needed    DJD (degenerative joint disease)     Fibromyalgia     all over     Former smoker     quit     GERD (gastroesophageal reflux disease)     CONTROLLED WITH MED    Hormone replacement therapy     Hypertension     controlled with med    Insomnia     Left knee pain     Migraine     Psychiatric diagnosis     bipolar    RA (rheumatoid arthritis) ([facility] [address] dx yrs ago    does not see a rhuematologist    Unspecified adverse effect of anesthesia     \"woke up\"during  surgery on toe    Vitamin B 12 deficiency     Vitamin D deficiency        Past Surgical History:   Procedure Laterality Date    HX CATARACT REMOVAL Bilateral     HX  SECTION      times 2    HX COLONOSCOPY      HX HYSTERECTOMY N/A     total     HX ORTHOPAEDIC      left knee scope    HX ORTHOPAEDIC      right foot \"bone removal\"     HX ORTHOPAEDIC Bilateral     shoulder scopes    HX ORTHOPAEDIC Right     shaved down bone in great toe     HX ORTHOPAEDIC Right     right rotator cuff     HX ORTHOPAEDIC N/A     neck ( plate and screws was put in )     HX OTHER SURGICAL      insulin pump    NEUROLOGICAL PROCEDURE UNLISTED  2019    lower back         Family History:   Problem Relation Age of Onset    Cancer Father     Lung Disease Father     Cancer Maternal Aunt     Heart Disease Paternal Grandmother     Heart Disease Paternal Grandfather        Social History     Socioeconomic History    Marital status:      Spouse name: Not on file    Number of children: Not on file    Years of education: Not on file    Highest education level: Not on file   Occupational History    Not on file   Tobacco Use    Smoking status: Former Smoker     Packs/day: 1.00     Years: 30.00     Pack years: 30.00     Types: Cigarettes     Quit date: 6/10/2002     Years since quittin.3    Smokeless tobacco: Never Used   Substance and Sexual Activity    Alcohol use:  Yes     Alcohol/week: 11.0 standard drinks     Types: 4 Glasses of wine, 7 Cans of beer per week    Drug use: No    Sexual activity: Not on file   Other Topics Concern     Service Not Asked    Blood Transfusions Not Asked    Caffeine Concern Not Asked    Occupational Exposure Not Asked    Hobby Hazards Not Asked    Sleep Concern Not Asked    Stress Concern Not Asked    Weight Concern Not Asked    Special Diet Not Asked    Back Care Not Asked    Exercise Not Asked    Bike Helmet Not Asked    [address] Not Asked    Self-Exams Not Asked   Social History Narrative    Not on file     Social Determinants of Health     Financial Resource Strain:     Difficulty of Paying Living Expenses:    Food Insecurity:     Worried About Running Out of Food in the Last Year:     [employer] in the Last Year:    Transportation Needs:     Lack of Transportation (Medical):  Lack of Transportation (Non-Medical):    Physical Activity:     Days of Exercise per Week:     Minutes of Exercise per Session:    Stress:     Feeling of Stress :    Social Connections:     Frequency of Communication with Friends and Family:     Frequency of Social Gatherings with Friends and Family:     Attends Sabianism Services:     Active Member of Clubs or Organizations:     Attends Club or Organization Meetings:     Marital Status:    Intimate Partner Violence:     Fear of Current or Ex-Partner:     Emotionally Abused:     Physically Abused:     Sexually Abused: ALLERGIES: Doxepin, Nortriptyline, Other medication, Plaquenil [hydroxychloroquine], Tizanidine, and Vistaril [hydroxyzine pamoate]    Review of Systems   Constitutional: Positive for appetite change and fatigue. Negative for chills, diaphoresis and fever. HENT: Negative for congestion, rhinorrhea and sore throat. Eyes: Negative for redness and visual disturbance. Respiratory: Negative for cough, chest tightness, shortness of breath and wheezing. Cardiovascular: Negative for chest pain and palpitations. Gastrointestinal: Positive for nausea and vomiting. Negative for abdominal pain, blood in stool and diarrhea. Endocrine: Positive for polydipsia and polyuria. Genitourinary: Negative for dysuria and hematuria. Musculoskeletal: Negative for arthralgias, myalgias and neck stiffness. Skin: Negative for rash. Allergic/Immunologic: Negative for environmental allergies and food allergies. Neurological: Negative for dizziness, weakness and headaches. Hematological: Negative for adenopathy. Does not bruise/bleed easily. Psychiatric/Behavioral: Negative for confusion and sleep disturbance. The patient is not nervous/anxious.         Vitals:    10/28/21 1455   BP: 93/61   Pulse: 88   Resp: 18   Temp: 97.7 °F (36.5 °C)   SpO2: 99% Weight: 81.6 kg (180 lb)   Height: 5' 5\" (1.651 m)            Physical Exam  Vitals and nursing note reviewed. Constitutional:       General: She is not in acute distress. Appearance: She is well-developed. She is not toxic-appearing. HENT:      Head: Normocephalic and atraumatic. Eyes:      General: No scleral icterus. Right eye: No discharge. Left eye: No discharge. Conjunctiva/sclera: Conjunctivae normal.      Pupils: Pupils are equal, round, and reactive to light. Cardiovascular:      Rate and Rhythm: Normal rate and regular rhythm. Heart sounds: Normal heart sounds. Pulmonary:      Effort: Pulmonary effort is normal. No respiratory distress. Breath sounds: Normal breath sounds. No wheezing or rales. Chest:      Chest wall: No tenderness. Abdominal:      General: Bowel sounds are normal. There is no distension. Palpations: Abdomen is soft. Tenderness: There is no abdominal tenderness. There is no guarding or rebound. Musculoskeletal:         General: No tenderness. Normal range of motion. Cervical back: Normal range of motion. No rigidity. Lymphadenopathy:      Cervical: No cervical adenopathy. Skin:     General: Skin is warm and dry. Neurological:      General: No focal deficit present. Mental Status: She is alert and oriented to person, place, and time. Psychiatric:         Mood and Affect: Mood normal.         Behavior: Behavior normal.          MDM  Number of Diagnoses or Management Options  Diagnosis management comments: I will check her basic blood work. Fluid was initially ordered in triage. I will also order some nausea medicine.     ED Course as of Oct 28 1605   Thu Oct 28, 2021   1521 EKG review by ER doctor:Normal sinus rhythmNo acute ischemic ST segment changesNo QTC prolongationRate of: 80    [AC]      ED Course User Index  [AC] Jackie Shannon MD       Procedures

## 2021-10-28 NOTE — PROGRESS NOTES
Problem: Falls - Risk of  Goal: *Absence of Falls  Description: Document Jose Hall Fall Risk and appropriate interventions in the flowsheet.   Outcome: Progressing Towards Goal  Note: Fall Risk Interventions:                                Problem: Patient Education: Go to Patient Education Activity  Goal: Patient/Family Education  Outcome: Progressing Towards Goal     Problem: Patient Education: Go to Patient Education Activity  Goal: Patient/Family Education  Outcome: Progressing Towards Goal     Problem: DKA: Day 1  Goal: Off Pathway (Use only if patient is Off Pathway)  Outcome: Progressing Towards Goal  Goal: Activity/Safety  Outcome: Progressing Towards Goal  Goal: Consults, if ordered  Outcome: Progressing Towards Goal  Goal: Diagnostic Tests/Procedures, if Ordered  Outcome: Progressing Towards Goal  Goal: Nutrition/Diet  Outcome: Progressing Towards Goal  Goal: Discharge Planning  Outcome: Progressing Towards Goal  Goal: Medications  Outcome: Progressing Towards Goal  Goal: Respiratory  Outcome: Progressing Towards Goal  Goal: Treatments/Interventions/Procedures  Outcome: Progressing Towards Goal  Goal: Psychosocial  Outcome: Progressing Towards Goal  Goal: *Hemodynamically stable  Outcome: Progressing Towards Goal  Goal: *Blood glucose falling 50 to 100 mg/dl/hr  Outcome: Progressing Towards Goal  Goal: *Potassium normalizing  Outcome: Progressing Towards Goal     Problem: DKA: Day 2  Goal: Off Pathway (Use only if patient is Off Pathway)  Outcome: Progressing Towards Goal  Goal: Activity/Safety  Outcome: Progressing Towards Goal  Goal: Consults, if ordered  Outcome: Progressing Towards Goal  Goal: Diagnostic Test/Procedures  Outcome: Progressing Towards Goal  Goal: Nutrition/Diet  Outcome: Progressing Towards Goal  Goal: Discharge Planning  Outcome: Progressing Towards Goal  Goal: Medications  Outcome: Progressing Towards Goal  Goal: Respiratory  Outcome: Progressing Towards Goal  Goal: Treatments/Interventions/Procedures  Outcome: Progressing Towards Goal  Goal: Psychosocial  Outcome: Progressing Towards Goal  Goal: *Acidosis resolved  Outcome: Progressing Towards Goal  Goal: *Tolerating diet  Outcome: Progressing Towards Goal  Goal: *Demonstrates progressive activity  Outcome: Progressing Towards Goal  Goal: *Blood glucose 80 to 180 mg/dl  Outcome: Progressing Towards Goal     Problem: DKA: Day 3  Goal: Off Pathway (Use only if patient is Off Pathway)  Outcome: Progressing Towards Goal  Goal: Activity/Safety  Outcome: Progressing Towards Goal  Goal: Diagnostic Test/Procedures  Outcome: Progressing Towards Goal  Goal: Nutrition/Diet  Outcome: Progressing Towards Goal  Goal: Discharge Planning  Outcome: Progressing Towards Goal  Goal: Medications  Outcome: Progressing Towards Goal  Goal: Treatments/Interventions/Procedures  Outcome: Progressing Towards Goal  Goal: Psychosocial  Outcome: Progressing Towards Goal     Problem: DKA: Discharge Outcomes  Goal: *Ambulates and performs ADL's  Outcome: Progressing Towards Goal  Goal: *Describes follow-up/return visits to physicians, diabetes treatment coordinator and other resources  Outcome: Progressing Towards Goal  Goal: *Blood glucose at patient's target range  Outcome: Progressing Towards Goal  Goal: *Acidosis resolved  Outcome: Progressing Towards Goal  Goal: *Tolerating diet  Outcome: Progressing Towards Goal  Goal: *Verbalizes understanding and describes prescribed diet  Outcome: Progressing Towards Goal  Goal: *Describes blood glucose goals, monitoring, sick day rules, hypo/hyperglycemia  Outcome: Progressing Towards Goal  Goal: *Describes available resources and support systems  Outcome: Progressing Towards Goal  Goal: *Verbalizes name, dosage, time, side effects, and number of days to continue medications  Outcome: Progressing Towards Goal  Goal: *Demonstrates ability to self-administer insulin  Outcome: Progressing Towards Goal

## 2021-10-28 NOTE — Clinical Note
Status[de-identified] INPATIENT [101]   Type of Bed: Telemetry [19]   Cardiac Monitoring Required?: Yes   Inpatient Hospitalization Certified Necessary for the Following Reasons: 4.  Patient requires ICU level of care interventions (further clarification in H&P documentation)   Admitting Diagnosis: [facility]diabetic ketoacidosis) [facility] [950528]   Admitting Diagnosis: ADRIENNE (acute kidney injury) [facility]) [[id number]]   Admitting Diagnosis: Hyperkalemia [536426]   Admitting Physician: Immanuel Stoll   Attending Physician: Immanuel Stoll   Estimated Length of Stay: 2 Midnights   Discharge Plan[de-identified] [facility]

## 2021-10-28 NOTE — PROGRESS NOTES
Patient admitted to ICU from ED. Patient alert and oriented x4, NICHOLE. She has been oriented to room and instructed to use the call light. Dual skin assessment completed and skin intact.

## 2021-10-28 NOTE — ED TRIAGE NOTES
Pt with vomiting and high blood sugar for the last couple of days. Per pt , pt blood sugar has been running over 500 for the past 3 days.

## 2021-10-28 NOTE — ED NOTES
TRANSFER - IN REPORT:    Verbal report received from Alessandra Lawler 90 (name) on Oracio Julia  being received from 376(unit) for routine progression of care      Report consisted of patients Situation, Background, Assessment and   Recommendations(SBAR). Information from the following report(s) SBAR, ED Summary, [facility] and Recent Results was reviewed with the receiving nurse. Opportunity for questions and clarification was provided. Assessment completed upon patients arrival to unit and care assumed.

## 2021-10-29 ENCOUNTER — APPOINTMENT (OUTPATIENT)
Dept: GENERAL RADIOLOGY | Age: 66
DRG: 637 | End: 2021-10-29
Attending: FAMILY MEDICINE
Payer: COMMERCIAL

## 2021-10-29 PROBLEM — E83.42 HYPOMAGNESEMIA: Status: ACTIVE | Noted: 2021-10-29

## 2021-10-29 LAB
ADMINISTERED INITIALS, ADMINIT: NORMAL
ANION GAP SERPL CALC-SCNC: 3 MMOL/L (ref 7–16)
ANION GAP SERPL CALC-SCNC: 8 MMOL/L (ref 7–16)
BASOPHILS # BLD: 0 K/UL (ref 0–0.2)
BASOPHILS NFR BLD: 0 % (ref 0–2)
BUN SERPL-MCNC: 14 MG/DL (ref 8–23)
BUN SERPL-MCNC: 19 MG/DL (ref 8–23)
CALCIUM SERPL-MCNC: 7.5 MG/DL (ref 8.3–10.4)
CALCIUM SERPL-MCNC: 7.5 MG/DL (ref 8.3–10.4)
CHLORIDE SERPL-SCNC: 110 MMOL/L (ref 98–107)
CHLORIDE SERPL-SCNC: 110 MMOL/L (ref 98–107)
CO2 SERPL-SCNC: 18 MMOL/L (ref 21–32)
CO2 SERPL-SCNC: 22 MMOL/L (ref 21–32)
CREAT SERPL-MCNC: 0.84 MG/DL (ref 0.6–1)
CREAT SERPL-MCNC: 1.02 MG/DL (ref 0.6–1)
D50 ADMINISTERED, D50ADM: 0 ML
D50 ORDER, D50ORD: 0 ML
DIFFERENTIAL METHOD BLD: ABNORMAL
EOSINOPHIL # BLD: 0.1 K/UL (ref 0–0.8)
EOSINOPHIL NFR BLD: 1 % (ref 0.5–7.8)
ERYTHROCYTE [DISTWIDTH] IN BLOOD BY AUTOMATED COUNT: 14.4 % (ref 11.9–14.6)
EST. AVERAGE GLUCOSE BLD GHB EST-MCNC: 183 MG/DL
GLSCOM COMMENTS: NORMAL
GLUCOSE BLD STRIP.AUTO-MCNC: 112 MG/DL (ref 65–100)
GLUCOSE BLD STRIP.AUTO-MCNC: 113 MG/DL (ref 65–100)
GLUCOSE BLD STRIP.AUTO-MCNC: 127 MG/DL (ref 65–100)
GLUCOSE BLD STRIP.AUTO-MCNC: 134 MG/DL (ref 65–100)
GLUCOSE BLD STRIP.AUTO-MCNC: 142 MG/DL (ref 65–100)
GLUCOSE BLD STRIP.AUTO-MCNC: 146 MG/DL (ref 65–100)
GLUCOSE BLD STRIP.AUTO-MCNC: 146 MG/DL (ref 65–100)
GLUCOSE BLD STRIP.AUTO-MCNC: 150 MG/DL (ref 65–100)
GLUCOSE BLD STRIP.AUTO-MCNC: 156 MG/DL (ref 65–100)
GLUCOSE BLD STRIP.AUTO-MCNC: 221 MG/DL (ref 65–100)
GLUCOSE BLD STRIP.AUTO-MCNC: 314 MG/DL (ref 65–100)
GLUCOSE BLD STRIP.AUTO-MCNC: 333 MG/DL (ref 65–100)
GLUCOSE SERPL-MCNC: 126 MG/DL (ref 65–100)
GLUCOSE SERPL-MCNC: 151 MG/DL (ref 65–100)
GLUCOSE, GLC: 112 MG/DL
GLUCOSE, GLC: 113 MG/DL
GLUCOSE, GLC: 127 MG/DL
GLUCOSE, GLC: 134 MG/DL
GLUCOSE, GLC: 142 MG/DL
GLUCOSE, GLC: 146 MG/DL
GLUCOSE, GLC: 150 MG/DL
GLUCOSE, GLC: 156 MG/DL
HBA1C MFR BLD: 8 % (ref 4.2–6.3)
HCT VFR BLD AUTO: 32.5 % (ref 35.8–46.3)
HGB BLD-MCNC: 10.8 G/DL (ref 11.7–15.4)
HIGH TARGET, HITG: 180 MG/DL
IMM GRANULOCYTES # BLD AUTO: 0 K/UL (ref 0–0.5)
IMM GRANULOCYTES NFR BLD AUTO: 1 % (ref 0–5)
INR PPP: 1
INSULIN ADMINSTERED, INSADM: 1.2 UNITS/HOUR
INSULIN ADMINSTERED, INSADM: 1.5 UNITS/HOUR
INSULIN ADMINSTERED, INSADM: 1.5 UNITS/HOUR
INSULIN ADMINSTERED, INSADM: 1.6 UNITS/HOUR
INSULIN ADMINSTERED, INSADM: 1.7 UNITS/HOUR
INSULIN ADMINSTERED, INSADM: 2 UNITS/HOUR
INSULIN ADMINSTERED, INSADM: 2.1 UNITS/HOUR
INSULIN ADMINSTERED, INSADM: 2.5 UNITS/HOUR
INSULIN ORDER, INSORD: 1.2 UNITS/HOUR
INSULIN ORDER, INSORD: 1.5 UNITS/HOUR
INSULIN ORDER, INSORD: 1.5 UNITS/HOUR
INSULIN ORDER, INSORD: 1.6 UNITS/HOUR
INSULIN ORDER, INSORD: 1.7 UNITS/HOUR
INSULIN ORDER, INSORD: 2 UNITS/HOUR
INSULIN ORDER, INSORD: 2.1 UNITS/HOUR
INSULIN ORDER, INSORD: 2.5 UNITS/HOUR
LACTATE SERPL-SCNC: 1.1 MMOL/L (ref 0.4–2)
LACTATE SERPL-SCNC: 2.6 MMOL/L (ref 0.4–2)
LOW TARGET, LOT: 140 MG/DL
LYMPHOCYTES # BLD: 1.2 K/UL (ref 0.5–4.6)
LYMPHOCYTES NFR BLD: 16 % (ref 13–44)
MAGNESIUM SERPL-MCNC: 1.7 MG/DL (ref 1.8–2.4)
MCH RBC QN AUTO: 31.5 PG (ref 26.1–32.9)
MCHC RBC AUTO-ENTMCNC: 33.2 G/DL (ref 31.4–35)
MCV RBC AUTO: 94.8 FL (ref 79.6–97.8)
MINUTES UNTIL NEXT BG, NBG: 60 MIN
MONOCYTES # BLD: 0.6 K/UL (ref 0.1–1.3)
MONOCYTES NFR BLD: 8 % (ref 4–12)
MULTIPLIER, MUL: 0.02
MULTIPLIER, MUL: 0.03
MULTIPLIER, MUL: 0.04
MULTIPLIER, MUL: 0.05
NEUTS SEG # BLD: 5.4 K/UL (ref 1.7–8.2)
NEUTS SEG NFR BLD: 75 % (ref 43–78)
NRBC # BLD: 0 K/UL (ref 0–0.2)
ORDER INITIALS, ORDINIT: NORMAL
PLATELET # BLD AUTO: 160 K/UL (ref 150–450)
PMV BLD AUTO: 11.9 FL (ref 9.4–12.3)
POTASSIUM SERPL-SCNC: 3.4 MMOL/L (ref 3.5–5.1)
POTASSIUM SERPL-SCNC: 3.6 MMOL/L (ref 3.5–5.1)
PROTHROMBIN TIME: 13.2 SEC (ref 12.6–14.5)
RBC # BLD AUTO: 3.43 M/UL (ref 4.05–5.2)
SERVICE CMNT-IMP: ABNORMAL
SODIUM SERPL-SCNC: 135 MMOL/L (ref 136–145)
SODIUM SERPL-SCNC: 136 MMOL/L (ref 136–145)
WBC # BLD AUTO: 7.3 K/UL (ref 4.3–11.1)

## 2021-10-29 PROCEDURE — 85025 COMPLETE CBC W/AUTO DIFF WBC: CPT

## 2021-10-29 PROCEDURE — 74018 RADEX ABDOMEN 1 VIEW: CPT

## 2021-10-29 PROCEDURE — 83735 ASSAY OF MAGNESIUM: CPT

## 2021-10-29 PROCEDURE — 83036 HEMOGLOBIN GLYCOSYLATED A1C: CPT

## 2021-10-29 PROCEDURE — 85610 PROTHROMBIN TIME: CPT

## 2021-10-29 PROCEDURE — 2709999900 HC NON-CHARGEABLE SUPPLY

## 2021-10-29 PROCEDURE — 74011250636 HC RX REV CODE- 250/636: Performed by: FAMILY MEDICINE

## 2021-10-29 PROCEDURE — 74011636637 HC RX REV CODE- 636/637: Performed by: FAMILY MEDICINE

## 2021-10-29 PROCEDURE — 94640 AIRWAY INHALATION TREATMENT: CPT

## 2021-10-29 PROCEDURE — 74011250637 HC RX REV CODE- 250/637: Performed by: FAMILY MEDICINE

## 2021-10-29 PROCEDURE — 65610000001 HC ROOM ICU GENERAL

## 2021-10-29 PROCEDURE — 74011000258 HC RX REV CODE- 258: Performed by: FAMILY MEDICINE

## 2021-10-29 PROCEDURE — 36415 COLL VENOUS BLD VENIPUNCTURE: CPT

## 2021-10-29 PROCEDURE — 83605 ASSAY OF LACTIC ACID: CPT

## 2021-10-29 PROCEDURE — 94760 N-INVAS EAR/PLS OXIMETRY 1: CPT

## 2021-10-29 PROCEDURE — 82962 GLUCOSE BLOOD TEST: CPT

## 2021-10-29 PROCEDURE — 80048 BASIC METABOLIC PNL TOTAL CA: CPT

## 2021-10-29 PROCEDURE — 74011000258 HC RX REV CODE- 258: Performed by: EMERGENCY MEDICINE

## 2021-10-29 RX ORDER — MAGNESIUM SULFATE HEPTAHYDRATE 40 MG/ML
2 INJECTION, SOLUTION INTRAVENOUS ONCE
Status: COMPLETED | OUTPATIENT
Start: 2021-10-29 | End: 2021-10-29

## 2021-10-29 RX ORDER — INSULIN GLARGINE 100 [IU]/ML
20 INJECTION, SOLUTION SUBCUTANEOUS DAILY
Status: DISCONTINUED | OUTPATIENT
Start: 2021-10-29 | End: 2021-10-30

## 2021-10-29 RX ORDER — MORPHINE SULFATE 2 MG/ML
0.5 INJECTION, SOLUTION INTRAMUSCULAR; INTRAVENOUS
Status: DISCONTINUED | OUTPATIENT
Start: 2021-10-29 | End: 2021-10-31 | Stop reason: HOSPADM

## 2021-10-29 RX ORDER — INSULIN LISPRO 100 [IU]/ML
INJECTION, SOLUTION INTRAVENOUS; SUBCUTANEOUS
Status: DISCONTINUED | OUTPATIENT
Start: 2021-10-29 | End: 2021-10-31 | Stop reason: HOSPADM

## 2021-10-29 RX ORDER — DEXTROSE 50 % IN WATER (D50W) INTRAVENOUS SYRINGE
25-50 AS NEEDED
Status: DISCONTINUED | OUTPATIENT
Start: 2021-10-29 | End: 2021-10-31 | Stop reason: HOSPADM

## 2021-10-29 RX ORDER — SODIUM CHLORIDE, SODIUM LACTATE, POTASSIUM CHLORIDE, CALCIUM CHLORIDE 600; 310; 30; 20 MG/100ML; MG/100ML; MG/100ML; MG/100ML
75 INJECTION, SOLUTION INTRAVENOUS CONTINUOUS
Status: DISCONTINUED | OUTPATIENT
Start: 2021-10-29 | End: 2021-10-29

## 2021-10-29 RX ORDER — POTASSIUM CHLORIDE 20 MEQ/1
40 TABLET, EXTENDED RELEASE ORAL ONCE
Status: COMPLETED | OUTPATIENT
Start: 2021-10-29 | End: 2021-10-29

## 2021-10-29 RX ORDER — NOREPINEPHRINE BIT/0.9 % NACL 4MG/250ML
.5-16 PLASTIC BAG, INJECTION (ML) INTRAVENOUS
Status: DISCONTINUED | OUTPATIENT
Start: 2021-10-29 | End: 2021-10-30

## 2021-10-29 RX ORDER — INSULIN LISPRO 100 [IU]/ML
3 INJECTION, SOLUTION INTRAVENOUS; SUBCUTANEOUS
Status: DISCONTINUED | OUTPATIENT
Start: 2021-10-29 | End: 2021-10-31 | Stop reason: HOSPADM

## 2021-10-29 RX ORDER — DEXTROSE 40 %
15 GEL (GRAM) ORAL AS NEEDED
Status: DISCONTINUED | OUTPATIENT
Start: 2021-10-29 | End: 2021-10-31 | Stop reason: HOSPADM

## 2021-10-29 RX ADMIN — ATORVASTATIN CALCIUM 80 MG: 40 TABLET, FILM COATED ORAL at 21:20

## 2021-10-29 RX ADMIN — BUDESONIDE AND FORMOTEROL FUMARATE DIHYDRATE 2 PUFF: 160; 4.5 AEROSOL RESPIRATORY (INHALATION) at 20:00

## 2021-10-29 RX ADMIN — DEXTROSE MONOHYDRATE AND SODIUM CHLORIDE 150 ML/HR: 5; .9 INJECTION, SOLUTION INTRAVENOUS at 00:32

## 2021-10-29 RX ADMIN — ASPIRIN 81 MG CHEWABLE TABLET 81 MG: 81 TABLET CHEWABLE at 09:03

## 2021-10-29 RX ADMIN — CEFTRIAXONE 1 G: 1 INJECTION, POWDER, FOR SOLUTION INTRAMUSCULAR; INTRAVENOUS at 19:29

## 2021-10-29 RX ADMIN — TOPIRAMATE 50 MG: 50 TABLET, FILM COATED ORAL at 09:03

## 2021-10-29 RX ADMIN — DEXTROSE MONOHYDRATE AND SODIUM CHLORIDE 150 ML/HR: 5; .9 INJECTION, SOLUTION INTRAVENOUS at 07:40

## 2021-10-29 RX ADMIN — GABAPENTIN 300 MG: 300 CAPSULE ORAL at 09:03

## 2021-10-29 RX ADMIN — MAGNESIUM SULFATE HEPTAHYDRATE 2 G: 40 INJECTION, SOLUTION INTRAVENOUS at 10:53

## 2021-10-29 RX ADMIN — INSULIN LISPRO 3 UNITS: 100 INJECTION, SOLUTION INTRAVENOUS; SUBCUTANEOUS at 13:18

## 2021-10-29 RX ADMIN — BUDESONIDE AND FORMOTEROL FUMARATE DIHYDRATE 2 PUFF: 160; 4.5 AEROSOL RESPIRATORY (INHALATION) at 08:40

## 2021-10-29 RX ADMIN — INSULIN LISPRO 8 UNITS: 100 INJECTION, SOLUTION INTRAVENOUS; SUBCUTANEOUS at 11:57

## 2021-10-29 RX ADMIN — GABAPENTIN 300 MG: 300 CAPSULE ORAL at 16:39

## 2021-10-29 RX ADMIN — ALPRAZOLAM 1 MG: 0.5 TABLET ORAL at 21:20

## 2021-10-29 RX ADMIN — POTASSIUM CHLORIDE 40 MEQ: 20 TABLET, EXTENDED RELEASE ORAL at 09:03

## 2021-10-29 RX ADMIN — SODIUM CHLORIDE 1000 ML: 900 INJECTION, SOLUTION INTRAVENOUS at 21:21

## 2021-10-29 RX ADMIN — INSULIN LISPRO 6 UNITS: 100 INJECTION, SOLUTION INTRAVENOUS; SUBCUTANEOUS at 21:27

## 2021-10-29 RX ADMIN — SODIUM CHLORIDE 1000 ML: 900 INJECTION, SOLUTION INTRAVENOUS at 23:41

## 2021-10-29 RX ADMIN — ARIPIPRAZOLE 5 MG: 5 TABLET ORAL at 21:20

## 2021-10-29 RX ADMIN — GABAPENTIN 300 MG: 300 CAPSULE ORAL at 21:20

## 2021-10-29 RX ADMIN — MORPHINE SULFATE 0.5 MG: 2 INJECTION, SOLUTION INTRAMUSCULAR; INTRAVENOUS at 19:50

## 2021-10-29 RX ADMIN — SODIUM CHLORIDE, SODIUM LACTATE, POTASSIUM CHLORIDE, AND CALCIUM CHLORIDE 75 ML/HR: 600; 310; 30; 20 INJECTION, SOLUTION INTRAVENOUS at 19:29

## 2021-10-29 RX ADMIN — INSULIN GLARGINE 20 UNITS: 100 INJECTION, SOLUTION SUBCUTANEOUS at 09:04

## 2021-10-29 RX ADMIN — INSULIN LISPRO 12 UNITS: 100 INJECTION, SOLUTION INTRAVENOUS; SUBCUTANEOUS at 16:39

## 2021-10-29 RX ADMIN — SODIUM CHLORIDE, SODIUM LACTATE, POTASSIUM CHLORIDE, AND CALCIUM CHLORIDE 75 ML/HR: 600; 310; 30; 20 INJECTION, SOLUTION INTRAVENOUS at 09:03

## 2021-10-29 RX ADMIN — INSULIN LISPRO 3 UNITS: 100 INJECTION, SOLUTION INTRAVENOUS; SUBCUTANEOUS at 16:38

## 2021-10-29 RX ADMIN — SODIUM CHLORIDE, SODIUM LACTATE, POTASSIUM CHLORIDE, AND CALCIUM CHLORIDE 500 ML: 600; 310; 30; 20 INJECTION, SOLUTION INTRAVENOUS at 11:58

## 2021-10-29 RX ADMIN — DULOXETINE HYDROCHLORIDE 60 MG: 60 CAPSULE, DELAYED RELEASE ORAL at 09:03

## 2021-10-29 NOTE — DIABETES MGMT
Patient has a [employer] 630G minimed serial number: [id number]. Spoke with [address] [employer] patient does have a recall on her insulin pump. Patient needs to call [phone] this AM to request her new pump as per [employer] staff it may be able to arrive by tomorrow, if not pump will arrive Monday. Primary RN updated.

## 2021-10-29 NOTE — PROGRESS NOTES
[facility]ist Progress Note     Name:  Juventino Morales  Age:66 y.o. Sex:female   :  1955    MRN:  [id number]     Admit Date:  10/28/2021    Reason for Admission:  DKA (diabetic ketoacidosis) ([facility] [address] [E11.10]  ADRIENNE (acute kidney injury) ([facility] [address] [N17.9]  Hyperkalemia [E87.5]    Assessment & Plan   -DKA  Ordered insulin drip, BMP every 4 hourly, venous pH every 4 hourly  Already ordered 2 more liters of normal saline bolus and then normal saline at 150 cc/h.  N.p.o. except for meds  Will DC patient insulin pump  10/29/2021  Gap resolved  Will dc insulin drip and start on sliding scale and lantus  Start on full liquids    - episodes of low bp  Close watch,may need bolus of ivf and if no improvement may need pressors    - hypokalemia and hypomagnesemia  Will replace with po kcl and 2 gm mag sulphate     -Acute kidney injury/hyperkalemia  Continue IV fluids  Patient on insulin drip should help her hyperkalemia  -10/29/2021  resolved     -Coronary disease  Continue metoprolol and continue aspirin     -Fibromyalgia  Continue home medication     -COPD  As needed nebs          Diet:  DIET ADULT  DVT PPx:lovenox  Code: DNR        Hospital Course/Subjective:   Juventino Morales is a 73 y.o. female with medical history of diabetes mellitus type 1 on insulin pump, hypertension, hyperlipidemia, fibromyalgia, diabetic neuropathy, anxiety disorder, COPD and coronary disease.     I went to  patient on Monday to change her insulin pump but since then noticed elevated glucose levels, since yesterday had multiple episodes of vomiting and later noticed mid abdominal pain, intermittent, dull in nature with no radiation.  also felt patient mildly confused.   Patient was brought to emergency room for further evaluation.     In the emergency room patient awake alert oriented x3, mild forgetful in terms of which stated she had changed her insulin pump when and how high her sugars were in her mid episodes of vomiting Subjective/24 hr Events (10/29/21):  Pt says no vomiting since admission. Asking when she could eat  On insulin drip   Resolved gap,yolis and hyperkalemia  Episodes of low bp        Review of Systems: 14 point review of systems is otherwise negative with the exception of the elements mentioned above.     Objective:     Patient Vitals for the past 24 hrs:   Temp Pulse Resp BP SpO2   10/29/21 1540  90 16 107/60 96 %   10/29/21 1520  90 18 (!) 103/59 95 %   10/29/21 1503 97.7 °F (36.5 °C) 91 19 112/65 96 %   10/29/21 1411  95 27 120/61 98 %   10/29/21 1357  96 (!) 31 110/70 99 %   10/29/21 1330  90 (!) 32 (!) 83/59 100 %   10/29/21 1300  96 26 117/71 100 %   10/29/21 1249  96 22 120/65 99 %   10/29/21 1230  95 25 124/63 96 %   10/29/21 1200  94 21 (!) 99/57 99 %   10/29/21 1130  96 24 (!) 86/51 97 %   10/29/21 1127  94 21 (!) 88/50 96 %   10/29/21 1115 98.1 °F (36.7 °C) 96 (!) 35 (!) 83/47 95 %   10/29/21 1100  96 17 (!) 73/46 97 %   10/29/21 1055  95 24 (!) 78/45 94 %   10/29/21 1053  94 25 (!) 76/47 94 %   10/29/21 0841     97 %   10/29/21 0839  74 16 (!) 96/52 97 %   10/29/21 0830  75 (!) 43 (!) 72/46 98 %   10/29/21 0800  74 20 (!) 98/56 99 %   10/29/21 0748  76      10/29/21 0738  75 17 (!) 87/59 97 %   10/29/21 0730  73 17 (!) 86/49 98 %   10/29/21 0701 98.1 °F (36.7 °C) 74 17 (!) 85/49 97 %   10/29/21 0700  78 22 (!) 85/49 98 %   10/29/21 0600  75 16 (!) 79/44 94 %   10/29/21 0530  77 22 (!) 91/46 (!) 88 %   10/29/21 0500  76 21 (!) 97/48 94 %   10/29/21 0400  73 17 (!) 93/53 95 %   10/29/21 0330 98.4 °F (36.9 °C) 82 27 (!) 86/51 99 %   10/29/21 0130  80 25 (!) 82/53 99 %   10/29/21 0100  78 26 (!) 92/53 99 %   10/29/21 0030  77 30 (!) 97/51 99 %   10/29/21 0000  74 (!) 36 (!) 105/58 100 %   10/28/21 2330 98.2 °F (36.8 °C) 72 (!) 31 109/60 98 %   10/28/21 2309  71 (!) 40 124/77 100 %   10/28/21 2308     100 %   10/28/21 2213  76 (!) 37 100/76 100 %   10/28/21 2130  74 (!) 39 (!) 114/56 99 %   10/28/21 2100  69 26 (!) 109/59 100 %   10/28/21 2036  72 19 (!) 103/57 99 %   10/28/21 2021  74 30 (!) 105/57 99 %   10/28/21 2006  75 (!) 46 112/64 (!) 82 %   10/28/21 2000  72      10/28/21 1959  73 (!) 39 (!) 110/55 (!) 87 %   10/28/21 1938  73 19 (!) 97/53 100 %   10/28/21 1925 97.9 °F (36.6 °C) 72 22 (!) 75/49      Oxygen Therapy  O2 Sat (%): 96 % (10/29/21 1540)  Pulse via Oximetry: 91 beats per minute (10/29/21 1540)  O2 Device: None (Room air) (10/29/21 0841)    Body mass index is 29.95 kg/m². Physical Exam:   General:     alert, awake, no acute distress. HENT:   normocephalic, atraumatic. Oropharynx clear with moist mucous membranes and normal hard/soft palate  Eyes:   anicteric sclerae, moist conjunctiva, PERRL, EOMI  Neck:    supple, non-tender. Trachea midline. Lungs:   CTAB, no wheezing, rhonchi, rales  Cardiac:   RRR, Normal S1 and S2. No S3, S4 or murmurs. Abdomen:   Mild epigastric tenderness  Extremities:   Warm, dry. No edema , pedal pulses present, no digital cyanosis  Skin:   Warn, dry, normnal turgor and texture; no rash, ulcers or nodules appreciated  Neuro:   AAOx3.  No gross focal neurological deficit,  Cranial nerves II-XII grossly intact  Psychiatric:  No anxiety, calm, cooperative      Data Review:  I have reviewed all labs, meds, and studies from the last 24 hours:    Labs:  Recent Results (from the past 24 hour(s))   CULTURE, BLOOD    Collection Time: 10/28/21  7:12 PM    Specimen: Blood   Result Value Ref Range    Special Requests: LEFT  Antecubital        Culture result: NO GROWTH AFTER 14 HOURS     LACTIC ACID    Collection Time: 10/28/21  7:12 PM   Result Value Ref Range    Lactic acid 2.1 (H) 0.4 - 2.0 MMOL/L   GLUCOSE, POC    Collection Time: 10/28/21  7:20 PM   Result Value Ref Range    Glucose (POC) 360 (H) 65 - 100 mg/dL    Performed by Mandi Mojica    Collection Time: 10/28/21  7:21 PM   Result Value Ref Range    Glucose 360 mg/dL    Insulin order 9.0 units/hour    Insulin adminstered 9.0 units/hour    Multiplier 0.030     Low target 140 mg/dL    High target 180 mg/dL    D50 order 0.0 ml    D50 administered 0.00 ml    Minutes until next BG 60 min    Order initials MRT     Administered initials MRT     GLSCOM Comments     CULTURE, BLOOD    Collection Time: 10/28/21  7:54 PM    Specimen: Blood   Result Value Ref Range    Special Requests: LEFT  Antecubital        Culture result: NO GROWTH AFTER 14 HOURS     GLUCOSE, POC    Collection Time: 10/28/21  8:21 PM   Result Value Ref Range    Glucose (POC) 286 (H) 65 - 100 mg/dL    Performed by [employer]    Collection Time: 10/28/21  8:21 PM   Result Value Ref Range    Glucose 286 mg/dL    Insulin order 9.0 units/hour    Insulin adminstered 9.0 units/hour    Multiplier 0.040     Low target 140 mg/dL    High target 180 mg/dL    D50 order 0.0 ml    D50 administered 0.00 ml    Minutes until next BG 60 min    Order initials MRT     Administered initials MRT     GLSCOM Comments     GLUCOSE, POC    Collection Time: 10/28/21  9:22 PM   Result Value Ref Range    Glucose (POC) 229 (H) 65 - 100 mg/dL    Performed by [employer]    Collection Time: 10/28/21  9:23 PM   Result Value Ref Range    Glucose 229 mg/dL    Insulin order 8.5 units/hour    Insulin adminstered 8.5 units/hour    Multiplier 0.050     Low target 140 mg/dL    High target 180 mg/dL    D50 order 0.0 ml    D50 administered 0.00 ml    Minutes until next BG 60 min    Order initials MRT     Administered initials MRT     GLSCOM Comments     GLUCOSE, POC    Collection Time: 10/28/21 10:26 PM   Result Value Ref Range    Glucose (POC) 191 (H) 65 - 100 mg/dL    Performed by [facility]    GLUCOSTABILIZER    Collection Time: 10/28/21 10:26 PM   Result Value Ref Range    Glucose 191 mg/dL    Insulin order 7.9 units/hour    Insulin adminstered 7.9 units/hour    Multiplier 0.060 Low target 140 mg/dL    High target 180 mg/dL    D50 order 0.0 ml    D50 administered 0.00 ml    Minutes until next BG 60 min    Order initials MRT     Administered initials MRT     GLSCOM Comments     METABOLIC PANEL, BASIC    Collection Time: 10/28/21 10:37 PM   Result Value Ref Range    Sodium 137 136 - 145 mmol/L    Potassium 4.1 3.5 - 5.1 mmol/L    Chloride 110 (H) 98 - 107 mmol/L    CO2 15 (L) 21 - 32 mmol/L    Anion gap 12 7 - 16 mmol/L    Glucose 133 (H) 65 - 100 mg/dL    BUN 21 8 - 23 MG/DL    Creatinine 1.15 (H) 0.6 - 1.0 MG/DL    GFR est AA >60 >60 ml/min/1.73m2    GFR est non-AA 50 (L) >60 ml/min/1.73m2    Calcium 7.7 (L) 8.3 - 10.4 MG/DL   LACTIC ACID    Collection Time: 10/28/21 10:37 PM   Result Value Ref Range    Lactic acid 3.4 (H) 0.4 - 2.0 MMOL/L   GLUCOSE, POC    Collection Time: 10/28/21 11:29 PM   Result Value Ref Range    Glucose (POC) 154 (H) 65 - 100 mg/dL    Performed by [facility]     Collection Time: 10/28/21 11:29 PM   Result Value Ref Range    Glucose 154 mg/dL    Insulin order 5.6 units/hour    Insulin adminstered 5.6 units/hour    Multiplier 0.060     Low target 140 mg/dL    High target 180 mg/dL    D50 order 0.0 ml    D50 administered 0.00 ml    Minutes until next BG 60 min    Order initials MRT     Administered initials MRT     GLSCOM Comments     GLUCOSE, POC    Collection Time: 10/29/21 12:34 AM   Result Value Ref Range    Glucose (POC) 113 (H) 65 - 100 mg/dL    Performed by [facility]     Collection Time: 10/29/21 12:35 AM   Result Value Ref Range    Glucose 113 mg/dL    Insulin order 2.5 units/hour    Insulin adminstered 2.5 units/hour    Multiplier 0.048     Low target 140 mg/dL    High target 180 mg/dL    D50 order 0.0 ml    D50 administered 0.00 ml    Minutes until next BG 60 min    Order initials MRT     Administered initials MRT     GLSCOM Comments     GLUCOSE, POC    Collection Time: 10/29/21  1:39 AM   Result Value Ref Range Glucose (POC) 112 (H) 65 - 100 mg/dL    Performed by Laura    GLUCOSTABILIZER    Collection Time: 10/29/21  1:40 AM   Result Value Ref Range    Glucose 112 mg/dL    Insulin order 2.0 units/hour    Insulin adminstered 2.0 units/hour    Multiplier 0.038     Low target 140 mg/dL    High target 180 mg/dL    D50 order 0.0 ml    D50 administered 0.00 ml    Minutes until next BG 60 min    Order initials MRT     Administered initials MRT     GLSCOM Comments     HEMOGLOBIN A1C WITH EAG    Collection Time: 10/29/21  2:38 AM   Result Value Ref Range    Hemoglobin A1c 8.0 (H) 4.2 - 6.3 %    Est. average glucose 770 mg/dL   METABOLIC PANEL, BASIC    Collection Time: 10/29/21  2:38 AM   Result Value Ref Range    Sodium 136 136 - 145 mmol/L    Potassium 3.6 3.5 - 5.1 mmol/L    Chloride 110 (H) 98 - 107 mmol/L    CO2 18 (L) 21 - 32 mmol/L    Anion gap 8 7 - 16 mmol/L    Glucose 126 (H) 65 - 100 mg/dL    BUN 19 8 - 23 MG/DL    Creatinine 1.02 (H) 0.6 - 1.0 MG/DL    GFR est AA >60 >60 ml/min/1.73m2    GFR est non-AA 58 (L) >60 ml/min/1.73m2    Calcium 7.5 (L) 8.3 - 10.4 MG/DL   LACTIC ACID    Collection Time: 10/29/21  2:38 AM   Result Value Ref Range    Lactic acid 2.6 (H) 0.4 - 2.0 MMOL/L   GLUCOSE, POC    Collection Time: 10/29/21  2:53 AM   Result Value Ref Range    Glucose (POC) 134 (H) 65 - 100 mg/dL    Performed by Sudha Gonzalez    Collection Time: 10/29/21  2:54 AM   Result Value Ref Range    Glucose 134 mg/dL    Insulin order 2.1 units/hour    Insulin adminstered 2.1 units/hour    Multiplier 0.028     Low target 140 mg/dL    High target 180 mg/dL    D50 order 0.0 ml    D50 administered 0.00 ml    Minutes until next BG 60 min    Order initials MRT     Administered initials MRT     GLSCOM Comments     GLUCOSE, POC    Collection Time: 10/29/21  3:58 AM   Result Value Ref Range    Glucose (POC) 127 (H) 65 - 100 mg/dL    Performed by Julian Alvarado    Collection Time: 10/29/21 3:59 AM   Result Value Ref Range    Glucose 127 mg/dL    Insulin order 1.2 units/hour    Insulin adminstered 1.2 units/hour    Multiplier 0.018     Low target 140 mg/dL    High target 180 mg/dL    D50 order 0.0 ml    D50 administered 0.00 ml    Minutes until next BG 60 min    Order initials MRT     Administered initials MRT     GLSCOM Comments     GLUCOSE, POC    Collection Time: 10/29/21  5:03 AM   Result Value Ref Range    Glucose (POC) 150 (H) 65 - 100 mg/dL    Performed by Rosy Mcgregor    Collection Time: 10/29/21  5:04 AM   Result Value Ref Range    Glucose 150 mg/dL    Insulin order 1.6 units/hour    Insulin adminstered 1.6 units/hour    Multiplier 0.018     Low target 140 mg/dL    High target 180 mg/dL    D50 order 0.0 ml    D50 administered 0.00 ml    Minutes until next BG 60 min    Order initials tdw     Administered initials tdw     GLSCOM Comments     GLUCOSE, POC    Collection Time: 10/29/21  6:09 AM   Result Value Ref Range    Glucose (POC) 146 (H) 65 - 100 mg/dL    Performed by Zay Valadez    Collection Time: 10/29/21  6:09 AM   Result Value Ref Range    Glucose 146 mg/dL    Insulin order 1.5 units/hour    Insulin adminstered 1.5 units/hour    Multiplier 0.018     Low target 140 mg/dL    High target 180 mg/dL    D50 order 0.0 ml    D50 administered 0.00 ml    Minutes until next BG 60 min    Order initials MRT     Administered initials MRT     GLSCOM Comments     GLUCOSE, POC    Collection Time: 10/29/21  7:17 AM   Result Value Ref Range    Glucose (POC) 156 (H) 65 - 100 mg/dL    Performed by Zay Valadez    Collection Time: 10/29/21  7:18 AM   Result Value Ref Range    Glucose 156 mg/dL    Insulin order 1.7 units/hour    Insulin adminstered 1.7 units/hour    Multiplier 0.018     Low target 140 mg/dL    High target 180 mg/dL    D50 order 0.0 ml    D50 administered 0.00 ml    Minutes until next BG 60 min    Order initials AP     Administered initials AP     GLSCOM Comments     CBC WITH AUTOMATED DIFF    Collection Time: 10/29/21  7:50 AM   Result Value Ref Range    WBC 7.3 4.3 - 11.1 K/uL    RBC 3.43 (L) 4.05 - 5.2 M/uL    HGB 10.8 (L) 11.7 - 15.4 g/dL    HCT 32.5 (L) 35.8 - 46.3 %    MCV 94.8 79.6 - 97.8 FL    MCH 31.5 26.1 - 32.9 PG    MCHC 33.2 31.4 - 35.0 g/dL    RDW 14.4 11.9 - 14.6 %    PLATELET 959 958 - 589 K/uL    MPV 11.9 9.4 - 12.3 FL    ABSOLUTE NRBC 0.00 0.0 - 0.2 K/uL    DF AUTOMATED      NEUTROPHILS 75 43 - 78 %    LYMPHOCYTES 16 13 - 44 %    MONOCYTES 8 4.0 - 12.0 %    EOSINOPHILS 1 0.5 - 7.8 %    BASOPHILS 0 0.0 - 2.0 %    IMMATURE GRANULOCYTES 1 0.0 - 5.0 %    ABS. NEUTROPHILS 5.4 1.7 - 8.2 K/UL    ABS. LYMPHOCYTES 1.2 0.5 - 4.6 K/UL    ABS. MONOCYTES 0.6 0.1 - 1.3 K/UL    ABS. EOSINOPHILS 0.1 0.0 - 0.8 K/UL    ABS. BASOPHILS 0.0 0.0 - 0.2 K/UL    ABS. IMM.  GRANS. 0.0 0.0 - 0.5 K/UL   METABOLIC PANEL, BASIC    Collection Time: 10/29/21  7:52 AM   Result Value Ref Range    Sodium 135 (L) 136 - 145 mmol/L    Potassium 3.4 (L) 3.5 - 5.1 mmol/L    Chloride 110 (H) 98 - 107 mmol/L    CO2 22 21 - 32 mmol/L    Anion gap 3 (L) 7 - 16 mmol/L    Glucose 151 (H) 65 - 100 mg/dL    BUN 14 8 - 23 MG/DL    Creatinine 0.84 0.6 - 1.0 MG/DL    GFR est AA >60 >60 ml/min/1.73m2    GFR est non-AA >60 >60 ml/min/1.73m2    Calcium 7.5 (L) 8.3 - 10.4 MG/DL   LACTIC ACID    Collection Time: 10/29/21  7:52 AM   Result Value Ref Range    Lactic acid 1.1 0.4 - 2.0 MMOL/L   MAGNESIUM    Collection Time: 10/29/21  7:52 AM   Result Value Ref Range    Magnesium 1.7 (L) 1.8 - 2.4 mg/dL   GLUCOSE, POC    Collection Time: 10/29/21  8:20 AM   Result Value Ref Range    Glucose (POC) 142 (H) 65 - 100 mg/dL    Performed by [employer]\"NATHALY\"RNBSN    GLUCOSTABILIZER    Collection Time: 10/29/21  8:21 AM   Result Value Ref Range    Glucose 142 mg/dL    Insulin order 1.5 units/hour    Insulin adminstered 1.5 units/hour    Multiplier 0.018     Low target 140 mg/dL    High target 180 mg/dL    D50 order 0.0 ml    D50 administered 0.00 ml    Minutes until next BG 60 min    Order initials AP     Administered initials AP     GLSCOM Comments     GLUCOSE, POC    Collection Time: 10/29/21  9:07 AM   Result Value Ref Range    Glucose (POC) 146 (H) 65 - 100 mg/dL    Performed by PhillipsCandace\"NATHALY\"RNBSN    GLUCOSE, POC    Collection Time: 10/29/21 11:23 AM   Result Value Ref Range    Glucose (POC) 333 (H) 65 - 100 mg/dL    Performed by PhillipsCandace\"NATHALY\"RNBSN    PROTHROMBIN TIME + INR    Collection Time: 10/29/21  2:40 PM   Result Value Ref Range    Prothrombin time 13.2 12.6 - 14.5 sec    INR 1.0     GLUCOSE, POC    Collection Time: 10/29/21  4:00 PM   Result Value Ref Range    Glucose (POC) 314 (H) 65 - 100 mg/dL    Performed by PhillipsCandace\"NATHALY\"RNBSN        All Micro Results     Procedure Component Value Units Date/Time    CULTURE, BLOOD [[id number]] Collected: 10/28/21 1912    Order Status: Completed Specimen: Blood Updated: 10/29/21 1004     Special Requests: --        LEFT  Antecubital       Culture result: NO GROWTH AFTER 14 HOURS       CULTURE, BLOOD [[id number]] Collected: 10/28/21 1954    Order Status: Completed Specimen: Blood Updated: 10/29/21 1004     Special Requests: --        LEFT  Antecubital       Culture result: NO GROWTH AFTER 14 HOURS             EKG Results     Procedure 720 Value Units Date/Time    EKG 12 LEAD INITIAL [[id number]] Collected: 10/28/21 1519    Order Status: Completed Updated: 10/28/21 1922     Ventricular Rate 82 BPM      Atrial Rate 82 BPM      P-R Interval 200 ms      QRS Duration 92 ms      Q-T Interval 410 ms      QTC Calculation (Bezet) 479 ms      Calculated P Axis 75 degrees      Calculated R Axis 87 degrees      Calculated T Axis 67 degrees      Diagnosis --     Normal sinus rhythm  Normal ECG  When compared with ECG of 28-FEB-2021 17:33,  Incomplete right bundle branch block is no longer Present  Confirmed by Tawanna Menard MD (), THAO ([id number]) on 10/28/2021 7:22:39 PM            Other Studies:  XR ABD (KUB)    Result Date: 10/29/2021  KUB HISTORY: Abdominal pain COMPARISON: CT abdomen and pelvis September 11, 2019 FINDINGS: EKG leads are present. Gas is scattered throughout the bowel. No acute abdominal findings.       Current Meds:   Current Facility-Administered Medications   Medication Dose Route Frequency    dextrose 40% (GLUTOSE) oral gel 1 Tube  15 g Oral PRN    glucagon (GLUCAGEN) injection 1 mg  1 mg IntraMUSCular PRN    dextrose (D50W) injection syrg 12.5-25 g  25-50 mL IntraVENous PRN    insulin lispro (HUMALOG) injection   SubCUTAneous AC&HS    insulin glargine (LANTUS) injection 20 Units  20 Units SubCUTAneous DAILY    lactated Ringers infusion  75 mL/hr IntraVENous CONTINUOUS    NUTRITIONAL SUPPORT ELECTROLYTE PRN ORDERS   Does Not Apply PRN    NOREPINephrine (LEVOPHED) 4 mg/250 mL (16 mcg/mL) in NS infusion  0.5-16 mcg/min IntraVENous TITRATE    morphine injection 0.5 mg  0.5 mg IntraVENous Q4H PRN    insulin lispro (HUMALOG) injection 3 Units  3 Units SubCUTAneous TIDAC    ALPRAZolam (XANAX) tablet 1 mg  1 mg Oral TID PRN    ARIPiprazole (ABILIFY) tablet 5 mg  5 mg Oral QHS    aspirin chewable tablet 81 mg  81 mg Oral DAILY    DULoxetine (CYMBALTA) capsule 60 mg  60 mg Oral DAILY    budesonide-formoteroL (SYMBICORT) 160-4.5 mcg/actuation HFA inhaler 2 Puff  2 Puff Inhalation BID RT    metoprolol succinate (TOPROL-XL) XL tablet 50 mg  50 mg Oral DAILY    nitroglycerin (NITROSTAT) tablet 0.4 mg  0.4 mg SubLINGual PRN    SUMAtriptan (IMITREX) tablet 50 mg  50 mg Oral DAILY PRN    topiramate (TOPAMAX) tablet 50 mg  50 mg Oral DAILY    atorvastatin (LIPITOR) tablet 80 mg  80 mg Oral QHS    cefTRIAXone (ROCEPHIN) 1 g in 0.9% sodium chloride (MBP/ADV) 50 mL MBP  1 g IntraVENous Q24H    gabapentin (NEURONTIN) capsule 300 mg  300 mg Oral TID    ondansetron (ZOFRAN) injection 4 mg  4 mg IntraVENous Q6H PRN       Problem List:  Hospital Problems as of 10/29/2021 Date Reviewed: 1/29/2021        Codes Class Noted - Resolved POA    Hypomagnesemia ICD-10-CM: E83.42  ICD-9-CM: 275.2  10/29/2021 - Present Unknown        * (Principal) DKA (diabetic ketoacidosis) ([facility] ICD-10-CM: E11.10  ICD-9-CM: 250.12  10/28/2021 - Present Unknown        Hypokalemia ICD-10-CM: E87.6  ICD-9-CM: 276.8  2/28/2021 - Present Unknown        Hyperkalemia ICD-10-CM: E87.5  ICD-9-CM: 276.7  2/28/2021 - Present Unknown        Coronary artery disease involving native coronary artery of native heart with angina pectoris ([facility] ICD-10-CM: I25.119  ICD-9-CM: 414.01, 413.9  5/31/2019 - Present Yes        ADRIENNE (acute kidney injury) ([facility] ICD-10-CM: N17.9  ICD-9-CM: 584.9  5/7/2019 - Present Unknown        Fibromyalgia ICD-10-CM: M79.7  ICD-9-CM: 729.1  Unknown - Present Yes        Hypertension ICD-10-CM: I10  ICD-9-CM: 401.9  Unknown - Present Yes        Chronic obstructive pulmonary disease ([facility] ICD-10-CM: J44.9  ICD-9-CM: 186  Unknown - Present Yes                   Signed By: Mag Puga MD   [employer] [facility]    October 29, 2021

## 2021-10-29 NOTE — PROGRESS NOTES
Dr. Jass Wellington notified of recall on patient's inulin pump according to Diabetes Management RN.

## 2021-10-29 NOTE — PROGRESS NOTES
Care Management Interventions  PCP Verified by CM: Yes (Hong Prado MD)  Mode of Transport at Discharge:  (family)  Transition of Care Consult (CM Consult): Discharge Planning  Support Systems: Spouse/Significant Other (Spouse/Carlos)  Confirm Follow Up Transport: Family  The Patient and/or Patient Representative was Provided with a Choice of Provider and Agrees with the Discharge Plan?: Yes  Freedom of Choice List was Provided with Basic Dialogue that Supports the Patient's Individualized Plan of Care/Goals, Treatment Preferences and Shares the Quality Data Associated with the Providers?: Yes  Discharge Location  Discharge Placement: Home  Visited with pt to establish prior to admission baseline and discuss their anticipated goals at time of d/c. PT came in c/o BS in the 500's for the past 3 days. She is a known diabetic and also claims to have fibromyalgia. Which is why she is on disability. She lives at home with spouse/Carlos, inde with ADL's, denies any needs. Does have a cane, but does not use. Last PCP visit was 2 months ago. Her Rx are filled at [facility] on [address]. DM educator to see, and pt's goal is to return home upon d/c. CM to follow. @[phone] Per DM educator pt insulin pump form [employer] has a recall. Pt to call for replacement and will be replaced by Monday at the latest.

## 2021-10-29 NOTE — DIABETES MGMT
Patient admitted 10/28/21 with DKA. History of type 1 DM (pt states diagnosed in her 29's), MI with stent, gastroparesis, HTN, hypercholesterolemia, COPD, anxiety disorder, fibromyalgia,  and neuropathy. Blood glucose 446 on admission. HbA1c 8%. Anion gap: 23. CO2:12: Pt admitted to the ICU and started on an insulin drip-Glucostabilizer. Insulin pump discontinued. Pt has been seen by the diabetes management team during a previous admission. Pt states that she had been using a Medtronic 670G insulin pump, but was frustrated with the CGM alarms and switched back to her Medtronic Minimed 630G and was doing finger stick blood glucose checks. Pt states that her blood glucose had been running higher for the past few days prior to admission and she changed her insulin pump site and tubing, but continued to have hyperglycemia. Pt states that she started to have nausea and vomiting, was positive for ketones and came to the ER. Pt states that she did not take any subcutaneous insulin. Pt has a Medtronic Minimed 630G serial number [id number], which has a recall. Company has been contacted. Pt is currently on a full liquid diet. Educated pt regarding current hospital insulin regimen: Lantus 20 units, Humalog 3 units 3x/day ac, and Humalog correctional scale coverage 4x/day ac and hs. Patient educated regarding sick day management. Discussed the importance of determining a sick day plan with PCP. Educated patient on the importance of frequent blood glucose checks (every 2 to 4 hours) while sick. Also discussed the importance of recording all test results. Educated patient that if blood glucose is greater than 240mg/dL patient should check urine for ketones every 4 hours using ketone urine test strips. Pt states that she has a working blood glucose meter and all necessary supplies.  Educated patient that if ketones are moderate to large they should call their PCP as they may need medical treatment including extra fluids or insulin. Educated patient to continue taking diabetes medications unless otherwise instructed by their PCP. Encouraged patient to discuss insulin use while sick with their PCP as they may need less or more insulin when they are sick. Discussed the importance of hydration while sick and encouraged patient to drink 8 ounces of sugar-free fluids every hour. Adults should aim to have about 45-50g of carbohydrates every 3-4hrs or 15g of carbohydrates every hour. Patient instructed to call PCP for fever >101, persistent nausea/vomiting/diarrhea, shortness of breath, inability to eat or drink for > 4 hours, or persistent hyperglycemia >240mg/dL. Stressed the importance of follow up care with endocrinologist. Pt states that she plans to follow up with Mariluz Rivas, of [facility] and [address] [place] [address] regarding recalled insulin pump and new CGM. Pt states that she has Lantus pens and novolog at home to use until she receives a new insulin pump and has a follow up appointment with endocrinology. Pt has no further questions regarding diabetes management at this time.

## 2021-10-29 NOTE — PROGRESS NOTES
Blood pressure improved on fluids and again in low 80s. will start on levophed. [place] for [facility],  Spoke the pt and nursing staff.

## 2021-10-29 NOTE — PROGRESS NOTES
Physician Progress Note      PATIENT:               Inez Jones  CSN #:                  [id number]  :                       1955  ADMIT DATE:       10/28/2021 2:58 PM  [address] [place] DATE:  RESPONDING  PROVIDER #:        Galen Oglesby MD          QUERY TEXT:    Pt admitted with N/V, abd pain, noted with DKA, ADRIENNE Hyperkalemia. PN 10/29 notes, Per MD hold metoprolol due to hypotension. If possible, please document in the progress notes and discharge summary if you are evaluating and/or treating any of the following: The medical record reflects the following:  Risk Factors: Diabetes  Clinical Indicators: 10/28-Na 128, K+ 5.9,  BUN 32, Creat 1.71, , LA 2.1, repeat LA 3.4; 10/29- WBC 7.3, H/H 10.8/32.5  Na 135, K+ 3.4, , LA 2.6, repeat LA 1.1; VS- T 97.7- HR 88- R 18-35,  B/P 73/46- 93/61- Sat 96-99% R/A. 10/28-Per ED -\"several episodes of vomiting over the last 24 hours \". IM PN 10/28- \"ICU, getting her third liter of fluid, mildly hypotensive systolic around 80 mmHg, improved on IV fluids\"  Treatment: N/S IV Bolus, N/S IV infusion  Options provided:  -- Hypovolemic Shock  -- Hypovolemia without Shock  -- Hypotension without Shock  -- Other - I will add my own diagnosis  -- Disagree - Not applicable / Not valid  -- Disagree - Clinically unable to determine / Unknown  -- Refer to Clinical Documentation Reviewer    PROVIDER RESPONSE TEXT:    This patient has Hypovolemic Shock.     Query created by: shalom rivera on 10/29/2021 12:48 PM      Electronically signed by:  Galen Oglesby MD 10/29/2021 1:17 PM

## 2021-10-29 NOTE — PROGRESS NOTES
Problem: Falls - Risk of  Goal: *Absence of Falls  Description: Document Marisa Blood Fall Risk and appropriate interventions in the flowsheet.   Outcome: Progressing Towards Goal  Note: Fall Risk Interventions:  Mobility Interventions: Bed/chair exit alarm, Communicate number of staff needed for ambulation/transfer, Patient to call before getting OOB    Mentation Interventions: Bed/chair exit alarm, Door open when patient unattended, Increase mobility, Room close to nurse's station, Toileting rounds, Update white board         Elimination Interventions: Call light in reach, Bed/chair exit alarm, Patient to call for help with toileting needs, Stay With Me (per policy), Toilet paper/wipes in reach, Toileting schedule/hourly rounds              Problem: DKA: Day 1  Goal: Nutrition/Diet  Outcome: Progressing Towards Goal  Goal: Medications  Outcome: Progressing Towards Goal  Goal: *Hemodynamically stable  Outcome: Progressing Towards Goal  Variance Patient Condition  Impact: Moderate

## 2021-10-29 NOTE — PROGRESS NOTES
Dr. Yari Garcia notified of sustained hypotnesion. Orders for bolus placed, levophed ordered for systolic pressure below 90.

## 2021-10-29 NOTE — PROGRESS NOTES
Bedside and Verbal shift change report given to [facility], RNs (oncoming nurse) by William Galeana RN (offgoing nurse). Report included the following information SBAR, Kardex, ED Summary, Procedure Summary, Intake/Output, Recent Results, Cardiac Rhythm SR and Alarm Parameters . Dual verification of insulin gtt.  Last gap 8

## 2021-10-30 LAB
ANION GAP SERPL CALC-SCNC: 7 MMOL/L (ref 7–16)
BASOPHILS # BLD: 0 K/UL (ref 0–0.2)
BASOPHILS NFR BLD: 1 % (ref 0–2)
BUN SERPL-MCNC: 5 MG/DL (ref 8–23)
CALCIUM SERPL-MCNC: 8.1 MG/DL (ref 8.3–10.4)
CHLORIDE SERPL-SCNC: 108 MMOL/L (ref 98–107)
CO2 SERPL-SCNC: 25 MMOL/L (ref 21–32)
CREAT SERPL-MCNC: 0.57 MG/DL (ref 0.6–1)
DIFFERENTIAL METHOD BLD: ABNORMAL
EOSINOPHIL # BLD: 0.1 K/UL (ref 0–0.8)
EOSINOPHIL NFR BLD: 2 % (ref 0.5–7.8)
ERYTHROCYTE [DISTWIDTH] IN BLOOD BY AUTOMATED COUNT: 14.6 % (ref 11.9–14.6)
GLUCOSE BLD STRIP.AUTO-MCNC: 122 MG/DL (ref 65–100)
GLUCOSE BLD STRIP.AUTO-MCNC: 129 MG/DL (ref 65–100)
GLUCOSE BLD STRIP.AUTO-MCNC: 309 MG/DL (ref 65–100)
GLUCOSE BLD STRIP.AUTO-MCNC: 337 MG/DL (ref 65–100)
GLUCOSE SERPL-MCNC: 66 MG/DL (ref 65–100)
HCT VFR BLD AUTO: 30.5 % (ref 35.8–46.3)
HGB BLD-MCNC: 10.2 G/DL (ref 11.7–15.4)
IMM GRANULOCYTES # BLD AUTO: 0 K/UL (ref 0–0.5)
IMM GRANULOCYTES NFR BLD AUTO: 1 % (ref 0–5)
LYMPHOCYTES # BLD: 1.1 K/UL (ref 0.5–4.6)
LYMPHOCYTES NFR BLD: 27 % (ref 13–44)
MAGNESIUM SERPL-MCNC: 1.7 MG/DL (ref 1.8–2.4)
MCH RBC QN AUTO: 31.3 PG (ref 26.1–32.9)
MCHC RBC AUTO-ENTMCNC: 33.4 G/DL (ref 31.4–35)
MCV RBC AUTO: 93.6 FL (ref 79.6–97.8)
MONOCYTES # BLD: 0.3 K/UL (ref 0.1–1.3)
MONOCYTES NFR BLD: 8 % (ref 4–12)
NEUTS SEG # BLD: 2.6 K/UL (ref 1.7–8.2)
NEUTS SEG NFR BLD: 62 % (ref 43–78)
NRBC # BLD: 0 K/UL (ref 0–0.2)
PLATELET # BLD AUTO: 135 K/UL (ref 150–450)
PMV BLD AUTO: 11.9 FL (ref 9.4–12.3)
POTASSIUM SERPL-SCNC: 3.3 MMOL/L (ref 3.5–5.1)
RBC # BLD AUTO: 3.26 M/UL (ref 4.05–5.2)
SERVICE CMNT-IMP: ABNORMAL
SODIUM SERPL-SCNC: 140 MMOL/L (ref 136–145)
WBC # BLD AUTO: 4.2 K/UL (ref 4.3–11.1)

## 2021-10-30 PROCEDURE — 94640 AIRWAY INHALATION TREATMENT: CPT

## 2021-10-30 PROCEDURE — 74011000258 HC RX REV CODE- 258: Performed by: FAMILY MEDICINE

## 2021-10-30 PROCEDURE — 2709999900 HC NON-CHARGEABLE SUPPLY

## 2021-10-30 PROCEDURE — 82962 GLUCOSE BLOOD TEST: CPT

## 2021-10-30 PROCEDURE — 36415 COLL VENOUS BLD VENIPUNCTURE: CPT

## 2021-10-30 PROCEDURE — 74011250636 HC RX REV CODE- 250/636: Performed by: FAMILY MEDICINE

## 2021-10-30 PROCEDURE — 80048 BASIC METABOLIC PNL TOTAL CA: CPT

## 2021-10-30 PROCEDURE — 65660000000 HC RM CCU STEPDOWN

## 2021-10-30 PROCEDURE — 74011250637 HC RX REV CODE- 250/637: Performed by: FAMILY MEDICINE

## 2021-10-30 PROCEDURE — 85025 COMPLETE CBC W/AUTO DIFF WBC: CPT

## 2021-10-30 PROCEDURE — 83735 ASSAY OF MAGNESIUM: CPT

## 2021-10-30 PROCEDURE — 74011636637 HC RX REV CODE- 636/637: Performed by: FAMILY MEDICINE

## 2021-10-30 PROCEDURE — 94760 N-INVAS EAR/PLS OXIMETRY 1: CPT

## 2021-10-30 RX ORDER — MAGNESIUM SULFATE HEPTAHYDRATE 40 MG/ML
2 INJECTION, SOLUTION INTRAVENOUS ONCE
Status: DISCONTINUED | OUTPATIENT
Start: 2021-10-30 | End: 2021-10-30 | Stop reason: SDUPTHER

## 2021-10-30 RX ORDER — MAGNESIUM SULFATE HEPTAHYDRATE 40 MG/ML
2 INJECTION, SOLUTION INTRAVENOUS ONCE
Status: DISCONTINUED | OUTPATIENT
Start: 2021-10-30 | End: 2021-10-30

## 2021-10-30 RX ORDER — MAGNESIUM SULFATE HEPTAHYDRATE 40 MG/ML
2 INJECTION, SOLUTION INTRAVENOUS ONCE
Status: COMPLETED | OUTPATIENT
Start: 2021-10-30 | End: 2021-10-30

## 2021-10-30 RX ORDER — INSULIN GLARGINE 100 [IU]/ML
30 INJECTION, SOLUTION SUBCUTANEOUS DAILY
Status: DISCONTINUED | OUTPATIENT
Start: 2021-10-30 | End: 2021-10-31 | Stop reason: HOSPADM

## 2021-10-30 RX ORDER — LISINOPRIL 5 MG/1
10 TABLET ORAL DAILY
Status: DISCONTINUED | OUTPATIENT
Start: 2021-10-30 | End: 2021-10-31 | Stop reason: HOSPADM

## 2021-10-30 RX ORDER — INSULIN LISPRO 100 [IU]/ML
5 INJECTION, SOLUTION INTRAVENOUS; SUBCUTANEOUS ONCE
Status: COMPLETED | OUTPATIENT
Start: 2021-10-30 | End: 2021-10-30

## 2021-10-30 RX ORDER — POTASSIUM CHLORIDE 20 MEQ/1
40 TABLET, EXTENDED RELEASE ORAL EVERY 4 HOURS
Status: COMPLETED | OUTPATIENT
Start: 2021-10-30 | End: 2021-10-30

## 2021-10-30 RX ADMIN — GABAPENTIN 300 MG: 300 CAPSULE ORAL at 15:45

## 2021-10-30 RX ADMIN — INSULIN LISPRO 3 UNITS: 100 INJECTION, SOLUTION INTRAVENOUS; SUBCUTANEOUS at 15:45

## 2021-10-30 RX ADMIN — POTASSIUM CHLORIDE 40 MEQ: 20 TABLET, EXTENDED RELEASE ORAL at 12:39

## 2021-10-30 RX ADMIN — TOPIRAMATE 50 MG: 50 TABLET, FILM COATED ORAL at 08:30

## 2021-10-30 RX ADMIN — INSULIN LISPRO 3 UNITS: 100 INJECTION, SOLUTION INTRAVENOUS; SUBCUTANEOUS at 08:31

## 2021-10-30 RX ADMIN — ALPRAZOLAM 1 MG: 0.5 TABLET ORAL at 20:21

## 2021-10-30 RX ADMIN — INSULIN GLARGINE 30 UNITS: 100 INJECTION, SOLUTION SUBCUTANEOUS at 08:32

## 2021-10-30 RX ADMIN — BUDESONIDE AND FORMOTEROL FUMARATE DIHYDRATE 2 PUFF: 160; 4.5 AEROSOL RESPIRATORY (INHALATION) at 20:00

## 2021-10-30 RX ADMIN — GABAPENTIN 300 MG: 300 CAPSULE ORAL at 21:14

## 2021-10-30 RX ADMIN — INSULIN LISPRO 8 UNITS: 100 INJECTION, SOLUTION INTRAVENOUS; SUBCUTANEOUS at 12:39

## 2021-10-30 RX ADMIN — MORPHINE SULFATE 0.5 MG: 2 INJECTION, SOLUTION INTRAMUSCULAR; INTRAVENOUS at 15:51

## 2021-10-30 RX ADMIN — METOPROLOL SUCCINATE 50 MG: 50 TABLET, EXTENDED RELEASE ORAL at 08:30

## 2021-10-30 RX ADMIN — POTASSIUM CHLORIDE 40 MEQ: 20 TABLET, EXTENDED RELEASE ORAL at 08:30

## 2021-10-30 RX ADMIN — DULOXETINE HYDROCHLORIDE 60 MG: 60 CAPSULE, DELAYED RELEASE ORAL at 08:30

## 2021-10-30 RX ADMIN — ARIPIPRAZOLE 5 MG: 5 TABLET ORAL at 20:21

## 2021-10-30 RX ADMIN — INSULIN LISPRO 3 UNITS: 100 INJECTION, SOLUTION INTRAVENOUS; SUBCUTANEOUS at 12:39

## 2021-10-30 RX ADMIN — ALPRAZOLAM 1 MG: 0.5 TABLET ORAL at 02:57

## 2021-10-30 RX ADMIN — GABAPENTIN 300 MG: 300 CAPSULE ORAL at 08:30

## 2021-10-30 RX ADMIN — ASPIRIN 81 MG CHEWABLE TABLET 81 MG: 81 TABLET CHEWABLE at 08:30

## 2021-10-30 RX ADMIN — LISINOPRIL 10 MG: 5 TABLET ORAL at 12:39

## 2021-10-30 RX ADMIN — BUDESONIDE AND FORMOTEROL FUMARATE DIHYDRATE 2 PUFF: 160; 4.5 AEROSOL RESPIRATORY (INHALATION) at 09:37

## 2021-10-30 RX ADMIN — ATORVASTATIN CALCIUM 80 MG: 40 TABLET, FILM COATED ORAL at 21:14

## 2021-10-30 RX ADMIN — INSULIN LISPRO 5 UNITS: 100 INJECTION, SOLUTION INTRAVENOUS; SUBCUTANEOUS at 18:41

## 2021-10-30 RX ADMIN — CEFTRIAXONE 1 G: 1 INJECTION, POWDER, FOR SOLUTION INTRAMUSCULAR; INTRAVENOUS at 20:21

## 2021-10-30 RX ADMIN — MORPHINE SULFATE 0.5 MG: 2 INJECTION, SOLUTION INTRAMUSCULAR; INTRAVENOUS at 20:21

## 2021-10-30 RX ADMIN — MAGNESIUM SULFATE HEPTAHYDRATE 2 G: 40 INJECTION, SOLUTION INTRAVENOUS at 05:38

## 2021-10-30 RX ADMIN — MORPHINE SULFATE 0.5 MG: 2 INJECTION, SOLUTION INTRAMUSCULAR; INTRAVENOUS at 00:09

## 2021-10-30 RX ADMIN — INSULIN LISPRO 8 UNITS: 100 INJECTION, SOLUTION INTRAVENOUS; SUBCUTANEOUS at 15:45

## 2021-10-30 NOTE — PROGRESS NOTES
Bedside and Verbal shift change report given to ÁNGEL Martin (oncoming nurse) by Jacques Oliveira RN  (offgoing nurse). Report included the following information SBAR, Kardex, ED Summary, Procedure Summary, Intake/Output, Recent Results, Cardiac Rhythm SR and Alarm Parameters .

## 2021-10-30 NOTE — PROGRESS NOTES
Went to place picc line pt stated that she did not feel like the picc was necessary   that she planned to go home tomorrow and she has 2 working piv's explained to pt that if her blood pressure dropped she would need medication that would need to go through a central line and if the piv's did not hold up until we get back tomorrow the staff would have to attempt to get her another piv she stated that she understood that and did not want the picc.

## 2021-10-30 NOTE — PROGRESS NOTES
Problem: Falls - Risk of  Goal: *Absence of Falls  Description: Document Donya Neeru Fall Risk and appropriate interventions in the flowsheet. Outcome: Progressing Towards Goal  Note: Fall Risk Interventions:  Mobility Interventions: Communicate number of staff needed for ambulation/transfer, Patient to call before getting OOB, PT Consult for mobility concerns, Strengthening exercises (ROM-active/passive)    Mentation Interventions: Adequate sleep, hydration, pain control, Update white board, Toileting rounds, Room close to nurse's station, Reorient patient, More frequent rounding, Evaluate medications/consider consulting pharmacy, Door open when patient unattended    Medication Interventions: Bed/chair exit alarm, Evaluate medications/consider consulting pharmacy, Patient to call before getting OOB, Teach patient to arise slowly    Elimination Interventions:  Toileting schedule/hourly rounds, Patient to call for help with toileting needs, Call light in reach, Elevated toilet seat              Problem: Patient Education: Go to Patient Education Activity  Goal: Patient/Family Education  Outcome: Progressing Towards Goal     Problem: Patient Education: Go to Patient Education Activity  Goal: Patient/Family Education  Outcome: Progressing Towards Goal     Problem: Patient Education: Go to Patient Education Activity  Goal: Patient/Family Education  Outcome: Progressing Towards Goal     Problem: DKA: Day 1  Goal: Off Pathway (Use only if patient is Off Pathway)  Outcome: Progressing Towards Goal  Goal: Activity/Safety  Outcome: Progressing Towards Goal  Goal: Consults, if ordered  Outcome: Progressing Towards Goal  Goal: Diagnostic Tests/Procedures, if Ordered  Outcome: Progressing Towards Goal  Goal: Nutrition/Diet  Outcome: Progressing Towards Goal  Goal: Discharge Planning  Outcome: Progressing Towards Goal  Goal: Medications  Outcome: Progressing Towards Goal  Goal: Respiratory  Outcome: Progressing Towards Goal  Goal: Treatments/Interventions/Procedures  Outcome: Progressing Towards Goal  Goal: Psychosocial  Outcome: Progressing Towards Goal  Goal: *Hemodynamically stable  Outcome: Progressing Towards Goal  Goal: *Blood glucose falling 50 to 100 mg/dl/hr  Outcome: Progressing Towards Goal  Goal: *Potassium normalizing  Outcome: Progressing Towards Goal     Problem: DKA: Day 2  Goal: Off Pathway (Use only if patient is Off Pathway)  Outcome: Progressing Towards Goal  Goal: Activity/Safety  Outcome: Progressing Towards Goal  Goal: Consults, if ordered  Outcome: Progressing Towards Goal  Goal: Diagnostic Test/Procedures  Outcome: Progressing Towards Goal  Goal: Nutrition/Diet  Outcome: Progressing Towards Goal  Goal: Discharge Planning  Outcome: Progressing Towards Goal  Goal: Medications  Outcome: Progressing Towards Goal  Goal: Respiratory  Outcome: Progressing Towards Goal  Goal: Treatments/Interventions/Procedures  Outcome: Progressing Towards Goal  Goal: Psychosocial  Outcome: Progressing Towards Goal  Goal: *Acidosis resolved  Outcome: Progressing Towards Goal  Goal: *Tolerating diet  Outcome: Progressing Towards Goal  Goal: *Demonstrates progressive activity  Outcome: Progressing Towards Goal  Goal: *Blood glucose 80 to 180 mg/dl  Outcome: Progressing Towards Goal     Problem: DKA: Day 3  Goal: Off Pathway (Use only if patient is Off Pathway)  Outcome: Progressing Towards Goal  Goal: Activity/Safety  Outcome: Progressing Towards Goal  Goal: Diagnostic Test/Procedures  Outcome: Progressing Towards Goal  Goal: Nutrition/Diet  Outcome: Progressing Towards Goal  Goal: Discharge Planning  Outcome: Progressing Towards Goal  Goal: Medications  Outcome: Progressing Towards Goal  Goal: Treatments/Interventions/Procedures  Outcome: Progressing Towards Goal  Goal: Psychosocial  Outcome: Progressing Towards Goal     Problem: DKA: Discharge Outcomes  Goal: *Ambulates and performs ADL's  Outcome: Progressing Towards Goal  Goal: *Describes follow-up/return visits to physicians, diabetes treatment coordinator and other resources  Outcome: Progressing Towards Goal  Goal: *Blood glucose at patient's target range  Outcome: Progressing Towards Goal  Goal: *Acidosis resolved  Outcome: Progressing Towards Goal  Goal: *Tolerating diet  Outcome: Progressing Towards Goal  Goal: *Verbalizes understanding and describes prescribed diet  Outcome: Progressing Towards Goal  Goal: *Describes blood glucose goals, monitoring, sick day rules, hypo/hyperglycemia  Outcome: Progressing Towards Goal  Goal: *Describes available resources and support systems  Outcome: Progressing Towards Goal  Goal: *Verbalizes name, dosage, time, side effects, and number of days to continue medications  Outcome: Progressing Towards Goal  Goal: *Demonstrates ability to self-administer insulin  Outcome: Progressing Towards Goal     Problem: Diabetes Self-Management  Goal: *Disease process and treatment process  Description: Define diabetes and identify own type of diabetes; list 3 options for treating diabetes. Outcome: Progressing Towards Goal  Goal: *Incorporating nutritional management into lifestyle  Description: Describe effect of type, amount and timing of food on blood glucose; list 3 methods for planning meals. Outcome: Progressing Towards Goal  Goal: *Incorporating physical activity into lifestyle  Description: State effect of exercise on blood glucose levels. Outcome: Progressing Towards Goal  Goal: *Developing strategies to promote health/change behavior  Description: Define the ABC's of diabetes; identify appropriate screenings, schedule and personal plan for screenings. Outcome: Progressing Towards Goal  Goal: *Using medications safely  Description: State effect of diabetes medications on diabetes; name diabetes medication taking, action and side effects.   Outcome: Progressing Towards Goal  Goal: *Monitoring blood glucose, interpreting and using results  Description: Identify recommended blood glucose targets  and personal targets. Outcome: Progressing Towards Goal  Goal: *Prevention, detection, treatment of acute complications  Description: List symptoms of hyper- and hypoglycemia; describe how to treat low blood sugar and actions for lowering  high blood glucose level. Outcome: Progressing Towards Goal  Goal: *Prevention, detection and treatment of chronic complications  Description: Define the natural course of diabetes and describe the relationship of blood glucose levels to long term complications of diabetes.   Outcome: Progressing Towards Goal  Goal: *Developing strategies to address psychosocial issues  Description: Describe feelings about living with diabetes; identify support needed and support network  Outcome: Progressing Towards Goal  Goal: *Insulin pump training  Outcome: Progressing Towards Goal  Goal: *Sick day guidelines  Outcome: Progressing Towards Goal  Goal: *Patient Specific Goal (EDIT GOAL, INSERT TEXT)  Outcome: Progressing Towards Goal     Problem: Patient Education: Go to Patient Education Activity  Goal: Patient/Family Education  Outcome: Progressing Towards Goal     Problem: Delirium Treatment  Goal: *Level of consciousness restored to baseline  Outcome: Progressing Towards Goal  Goal: *Level of environmental perceptions restored to baseline  Outcome: Progressing Towards Goal  Goal: *Sensory perception restored to baseline  Outcome: Progressing Towards Goal  Goal: *Emotional stability restored to baseline  Outcome: Progressing Towards Goal  Goal: *Functional assessment restored to baseline  Outcome: Progressing Towards Goal  Goal: *Absence of falls  Outcome: Progressing Towards Goal  Goal: *Will remain free of delirium, CAM Score negative  Outcome: Progressing Towards Goal  Goal: *Cognitive status will be restored to baseline  Outcome: Progressing Towards Goal  Goal: Interventions  Outcome: Progressing Towards Goal     Problem: Patient Education: Go to Patient Education Activity  Goal: Patient/Family Education  Outcome: Progressing Towards Goal

## 2021-10-30 NOTE — PROGRESS NOTES
[facility]ist Progress Note     Name:  Hillary Addison  Age:66 y.o. Sex:female   :  1955    MRN:  [id number]     Admit Date:  10/28/2021    Reason for Admission:  DKA (diabetic ketoacidosis) ([facility] [E11.10]  ADRIENNE (acute kidney injury) ([facility] [N17.9]  Hyperkalemia [E87.5]    Assessment & Plan   -DKA  Ordered insulin drip, BMP every 4 hourly, venous pH every 4 hourly  Already ordered 2 more liters of normal saline bolus and then normal saline at 150 cc/h.  N.p.o. except for meds  Will DC patient insulin pump  10/29/2021  Gap resolved  Will dc insulin drip and start on sliding scale and lantus  Start on full liquids  10/30/2021  Glucose improving, patient says her insulin pump is not recalled.  is going to get patient insulin today, will start her on her insulin pump later on today.   Advance diet to diabetic    - episodes of low bp  Close watch,may need bolus of ivf and if no improvement may need pressors  10/30/2021  Patient did not require any pressors  Blood pressure improved  We will start on a low-dose of lisinopril    - hypokalemia and hypomagnesemia  Will replace with po kcl and 2 gm mag sulphate  10/30/2021  Replace hypokalemia with 40 mg of potassium chloride every 4 hourly x2  Replace hypomagnesemia with 2 g EBM sulfate IV x1     -Acute kidney injury/hyperkalemia  Continue IV fluids  Patient on insulin drip should help her hyperkalemia  -10/29/2021  resolved     -Coronary disease  Continue metoprolol and continue aspirin     -Fibromyalgia  Continue home medication     -COPD  As needed nebs          Diet:  DIET ADULT  DVT PPx:lovenox  Code: DNR        Hospital Course/Subjective:   Hillary Addison is a 73 y.o. female with medical history of diabetes mellitus type 1 on insulin pump, hypertension, hyperlipidemia, fibromyalgia, diabetic neuropathy, anxiety disorder, COPD and coronary disease.     I went to  patient on Monday to change her insulin pump but since then noticed elevated glucose levels, since yesterday had multiple episodes of vomiting and later noticed mid abdominal pain, intermittent, dull in nature with no radiation.  also felt patient mildly confused. Patient was brought to emergency room for further evaluation.     In the emergency room patient awake alert oriented x3, mild forgetful in terms of which stated she had changed her insulin pump when and how high her sugars were in her mid episodes of vomiting         Subjective/24 hr Events (10/30/21):  10/29/2021  Pt says no vomiting since admission. Asking when she could eat  On insulin drip   Resolved gap,yolis and hyperkalemia  Episodes of low bp    10/30/2021  Blood pressure improved  Patient did not require any pressors since admission  On labs glucose of 66, as per staff when they went and did a fingerstick her glucose was around 120s  Mild hypokalemia and hypomagnesemia        Review of Systems: 14 point review of systems is otherwise negative with the exception of the elements mentioned above.     Objective:     Patient Vitals for the past 24 hrs:   Temp Pulse Resp BP SpO2   10/30/21 0735 98 °F (36.7 °C) 92 27 (!) 151/89 93 %   10/30/21 0658  96 21 (!) 153/86 90 %   10/30/21 0558  86 22 132/74 93 %   10/30/21 0458  85 26 120/72 91 %   10/30/21 0359  81 16 115/66 90 %   10/30/21 0300 98.2 °F (36.8 °C) 84 20 119/77 94 %   10/30/21 0230  81 23 (!) 111/59 90 %   10/30/21 0200  80 23 126/71 98 %   10/30/21 0130  78 19 119/64 96 %   10/30/21 0115  76 28  94 %   10/30/21 0100  79 26 121/61 (!) 89 %   10/30/21 0030 97.8 °F (36.6 °C) 82 26 125/70 93 %   10/29/21 2330 98.2 °F (36.8 °C) 88 29 115/68 96 %   10/29/21 2321  82 21 (!) 102/59 97 %   10/29/21 2240  84 23 117/67 92 %   10/29/21 2220  84 18 118/66 97 %   10/29/21 2200  80 18 120/68 96 %   10/29/21 2140  88 26 130/69 97 %   10/29/21 2120  84 21 107/63 (!) 76 %   10/29/21 2105  79 (!) 37 114/67 99 %   10/29/21 2047     100 %   10/29/21 2040  77 23 119/74 99 %   10/29/21 2039  76 24 119/78 98 %   10/29/21 2000  90 (!) 38 107/84 94 %   10/29/21 1900 98 °F (36.7 °C) 90 21 110/62 99 %   10/29/21 1800  93 20 122/72 99 %   10/29/21 1540  90 16 107/60 96 %   10/29/21 1520  90 18 (!) 103/59 95 %   10/29/21 1503 97.7 °F (36.5 °C) 91 19 112/65 96 %   10/29/21 1411  95 27 120/61 98 %   10/29/21 1357  96 (!) 31 110/70 99 %   10/29/21 1330  90 (!) 32 (!) 83/59 100 %   10/29/21 1300  96 26 117/71 100 %   10/29/21 1249  96 22 120/65 99 %   10/29/21 1230  95 25 124/63 96 %   10/29/21 1200  94 21 (!) 99/57 99 %   10/29/21 1130  96 24 (!) 86/51 97 %   10/29/21 1127  94 21 (!) 88/50 96 %   10/29/21 1115 98.1 °F (36.7 °C) 96 (!) 35 (!) 83/47 95 %   10/29/21 1100  96 17 (!) 73/46 97 %   10/29/21 1055  95 24 (!) 78/45 94 %   10/29/21 1053  94 25 (!) 76/47 94 %   10/29/21 0841     97 %   10/29/21 0839  74 16 (!) 96/52 97 %   10/29/21 0830  75 (!) 43 (!) 72/46 98 %     Oxygen Therapy  O2 Sat (%): 93 % (10/30/21 0735)  Pulse via Oximetry: 98 beats per minute (10/30/21 0658)  O2 Device: None (Room air) (10/30/21 0300)  Skin Assessment: Clean, dry, & intact (10/30/21 0100)  Skin Protection for O2 Device: No (10/30/21 0100)  O2 Flow Rate (L/min): 2 l/min (10/30/21 0100)    Body mass index is 30.07 kg/m². Physical Exam:   General:     alert, awake, no acute distress. Chronic pain from fibromyalgia. HENT:   normocephalic, atraumatic. Oropharynx clear with moist mucous membranes and normal hard/soft palate  Eyes:   anicteric sclerae, moist conjunctiva, PERRL, EOMI  Neck:    supple, non-tender. Trachea midline. Lungs:   CTAB, no wheezing, rhonchi, rales  Cardiac:   RRR, Normal S1 and S2. No S3, S4 or murmurs. Abdomen:   Abdomen soft nontender no organomegaly  Extremities:   Warm, dry. No edema , pedal pulses present, no digital cyanosis  Skin:   Warn, dry, normnal turgor and texture; no rash, ulcers or nodules appreciated  Neuro:   AAOx3.  No gross focal neurological deficit,  Cranial nerves II-XII grossly intact  Psychiatric:  No anxiety, calm, cooperative      Data Review:  I have reviewed all labs, meds, and studies from the last 24 hours:    Labs:  Recent Results (from the past 24 hour(s))   GLUCOSE, POC    Collection Time: 10/29/21  9:07 AM   Result Value Ref Range    Glucose (POC) 146 (H) 65 - 100 mg/dL    Performed by [employer]\"NATHALY\"RNBSN    GLUCOSE, POC    Collection Time: 10/29/21 11:23 AM   Result Value Ref Range    Glucose (POC) 333 (H) 65 - 100 mg/dL    Performed by [employer]NATHALY\"RNBSN    PROTHROMBIN TIME + INR    Collection Time: 10/29/21  2:40 PM   Result Value Ref Range    Prothrombin time 13.2 12.6 - 14.5 sec    INR 1.0     GLUCOSE, POC    Collection Time: 10/29/21  4:00 PM   Result Value Ref Range    Glucose (POC) 314 (H) 65 - 100 mg/dL    Performed by [employer]NATHALY\"RNBSN    GLUCOSE, POC    Collection Time: 10/29/21  7:27 PM   Result Value Ref Range    Glucose (POC) 221 (H) 65 - 100 mg/dL    Performed by Delroy    CBC WITH AUTOMATED DIFF    Collection Time: 10/30/21  2:55 AM   Result Value Ref Range    WBC 4.2 (L) 4.3 - 11.1 K/uL    RBC 3.26 (L) 4.05 - 5.2 M/uL    HGB 10.2 (L) 11.7 - 15.4 g/dL    HCT 30.5 (L) 35.8 - 46.3 %    MCV 93.6 79.6 - 97.8 FL    MCH 31.3 26.1 - 32.9 PG    MCHC 33.4 31.4 - 35.0 g/dL    RDW 14.6 11.9 - 14.6 %    PLATELET 262 (L) 550 - 450 K/uL    MPV 11.9 9.4 - 12.3 FL    ABSOLUTE NRBC 0.00 0.0 - 0.2 K/uL    DF AUTOMATED      NEUTROPHILS 62 43 - 78 %    LYMPHOCYTES 27 13 - 44 %    MONOCYTES 8 4.0 - 12.0 %    EOSINOPHILS 2 0.5 - 7.8 %    BASOPHILS 1 0.0 - 2.0 %    IMMATURE GRANULOCYTES 1 0.0 - 5.0 %    ABS. NEUTROPHILS 2.6 1.7 - 8.2 K/UL    ABS. LYMPHOCYTES 1.1 0.5 - 4.6 K/UL    ABS. MONOCYTES 0.3 0.1 - 1.3 K/UL    ABS. EOSINOPHILS 0.1 0.0 - 0.8 K/UL    ABS. BASOPHILS 0.0 0.0 - 0.2 K/UL    ABS. IMM.  GRANS. 0.0 0.0 - 0.5 K/UL   METABOLIC PANEL, BASIC    Collection Time: 10/30/21  2:55 AM   Result Value Ref Range    Sodium 140 136 - 145 mmol/L    Potassium 3.3 (L) 3.5 - 5.1 mmol/L    Chloride 108 (H) 98 - 107 mmol/L    CO2 25 21 - 32 mmol/L    Anion gap 7 7 - 16 mmol/L    Glucose 66 65 - 100 mg/dL    BUN 5 (L) 8 - 23 MG/DL    Creatinine 0.57 (L) 0.6 - 1.0 MG/DL    GFR est AA >60 >60 ml/min/1.73m2    GFR est non-AA >60 >60 ml/min/1.73m2    Calcium 8.1 (L) 8.3 - 10.4 MG/DL   MAGNESIUM    Collection Time: 10/30/21  2:55 AM   Result Value Ref Range    Magnesium 1.7 (L) 1.8 - 2.4 mg/dL   GLUCOSE, POC    Collection Time: 10/30/21  6:28 AM   Result Value Ref Range    Glucose (POC) 122 (H) 65 - 100 mg/dL    Performed by Delroy        All Micro Results     Procedure Component Value Units Date/Time    CULTURE, BLOOD [[id number]] Collected: 10/28/21 1912    Order Status: Completed Specimen: Blood Updated: 10/30/21 0721     Special Requests: --        LEFT  Antecubital       Culture result: NO GROWTH 2 DAYS       CULTURE, BLOOD [[id number]] Collected: 10/28/21 1954    Order Status: Completed Specimen: Blood Updated: 10/30/21 0721     Special Requests: --        LEFT  Antecubital       Culture result: NO GROWTH 2 DAYS             EKG Results     Procedure 720 Value Units Date/Time    EKG 12 LEAD INITIAL [[id number]] Collected: 10/28/21 1519    Order Status: Completed Updated: 10/28/21 1922     Ventricular Rate 82 BPM      Atrial Rate 82 BPM      P-R Interval 200 ms      QRS Duration 92 ms      Q-T Interval 410 ms      QTC Calculation (Bezet) 479 ms      Calculated P Axis 75 degrees      Calculated R Axis 87 degrees      Calculated T Axis 67 degrees      Diagnosis --     Normal sinus rhythm  Normal ECG  When compared with ECG of 28-FEB-2021 17:33,  Incomplete right bundle branch block is no longer Present  Confirmed by Mello Albright MD (), TAHO ([id number]) on 10/28/2021 7:22:39 PM            Other Studies:  XR ABD (KUB)    Result Date: 10/29/2021  KUB HISTORY: Abdominal pain COMPARISON: CT abdomen and pelvis September 11, 2019 FINDINGS: EKG leads are present. Gas is scattered throughout the bowel. No acute abdominal findings.       Current Meds:   Current Facility-Administered Medications   Medication Dose Route Frequency    potassium bicarb-citric acid (EFFER-K) tablet 40 mEq  40 mEq Oral BID    insulin glargine (LANTUS) injection 30 Units  30 Units SubCUTAneous DAILY    magnesium sulfate 2 g/50 ml IVPB (premix or compounded)  2 g IntraVENous ONCE    potassium chloride (K-DUR, KLOR-CON) SR tablet 40 mEq  40 mEq Oral Q4H    dextrose 40% (GLUTOSE) oral gel 1 Tube  15 g Oral PRN    glucagon (GLUCAGEN) injection 1 mg  1 mg IntraMUSCular PRN    dextrose (D50W) injection syrg 12.5-25 g  25-50 mL IntraVENous PRN    insulin lispro (HUMALOG) injection   SubCUTAneous AC&HS    NUTRITIONAL SUPPORT ELECTROLYTE PRN ORDERS   Does Not Apply PRN    NOREPINephrine (LEVOPHED) 4 mg/250 mL (16 mcg/mL) in NS infusion  0.5-16 mcg/min IntraVENous TITRATE    morphine injection 0.5 mg  0.5 mg IntraVENous Q4H PRN    insulin lispro (HUMALOG) injection 3 Units  3 Units SubCUTAneous TIDAC    ALPRAZolam (XANAX) tablet 1 mg  1 mg Oral TID PRN    ARIPiprazole (ABILIFY) tablet 5 mg  5 mg Oral QHS    aspirin chewable tablet 81 mg  81 mg Oral DAILY    DULoxetine (CYMBALTA) capsule 60 mg  60 mg Oral DAILY    budesonide-formoteroL (SYMBICORT) 160-4.5 mcg/actuation HFA inhaler 2 Puff  2 Puff Inhalation BID RT    metoprolol succinate (TOPROL-XL) XL tablet 50 mg  50 mg Oral DAILY    nitroglycerin (NITROSTAT) tablet 0.4 mg  0.4 mg SubLINGual PRN    SUMAtriptan (IMITREX) tablet 50 mg  50 mg Oral DAILY PRN    topiramate (TOPAMAX) tablet 50 mg  50 mg Oral DAILY    atorvastatin (LIPITOR) tablet 80 mg  80 mg Oral QHS    cefTRIAXone (ROCEPHIN) 1 g in 0.9% sodium chloride (MBP/ADV) 50 mL MBP  1 g IntraVENous Q24H    gabapentin (NEURONTIN) capsule 300 mg  300 mg Oral TID    ondansetron (ZOFRAN) injection 4 mg  4 mg IntraVENous Q6H PRN       Problem List:  Hospital Problems as of 10/30/2021 Date Reviewed: 1/29/2021        Codes Class Noted - Resolved POA    Hypomagnesemia ICD-10-CM: E83.42  ICD-9-CM: 275.2  10/29/2021 - Present Unknown        * (Principal) DKA (diabetic ketoacidosis) ([facility] ICD-10-CM: E11.10  ICD-9-CM: 250.12  10/28/2021 - Present Unknown        Hypokalemia ICD-10-CM: E87.6  ICD-9-CM: 276.8  2/28/2021 - Present Unknown        Hyperkalemia ICD-10-CM: E87.5  ICD-9-CM: 276.7  2/28/2021 - Present Unknown        Coronary artery disease involving native coronary artery of native heart with angina pectoris ([facility] ICD-10-CM: I25.119  ICD-9-CM: 414.01, 413.9  5/31/2019 - Present Yes        ADRIENNE (acute kidney injury) ([facility] ICD-10-CM: N17.9  ICD-9-CM: 584.9  5/7/2019 - Present Unknown        Fibromyalgia ICD-10-CM: M79.7  ICD-9-CM: 729.1  Unknown - Present Yes        Hypertension ICD-10-CM: I10  ICD-9-CM: 401.9  Unknown - Present Yes        Chronic obstructive pulmonary disease ([facility] ICD-10-CM: J44.9  ICD-9-CM: 409  Unknown - Present Yes                   Signed By: Kell Reagan MD   [employer] [facility]    October 30, 2021

## 2021-10-30 NOTE — PROGRESS NOTES
MD gave verbal order that patient ok to be restarted on current insulin pump. However it has been noted that patient current insulin pump had a recall. Upon discussion with patient, she stated she had called the phone number provided by diabetic education and provided them with serial numbers and was informed that there was NOT a recall on her pump. I have not noted any further notes/information from diabetic educator or MD that further investigation of pump has been completed and that pump was safe for use. MD notified via perfect serve of this RN concern regarding safety of using current insulin pump. *this patient has a note stating that her insulin pump had a recall on it. I have no further notes from Diabetic educator or anywhere else stating further investigation of current insulin pump, that there is no recall, and that current pump is safe to use. I am very hesitant to restart her on a pump that may be malfunctioning? *     Phone number provided by diabetic educator called by this RN at this time for verification of insulin pump safety. Spoke with Gene, received confirmation that the patient's pump HAS been recalled and assisted patient with set up of receiving new insulin pump. MD notified of new information and patient's need for RX for insulin and lancets to cover her until new pump arrives.

## 2021-10-31 VITALS
OXYGEN SATURATION: 96 % | WEIGHT: 180.7 LBS | DIASTOLIC BLOOD PRESSURE: 82 MMHG | BODY MASS INDEX: 30.1 KG/M2 | SYSTOLIC BLOOD PRESSURE: 135 MMHG | RESPIRATION RATE: 26 BRPM | HEART RATE: 74 BPM | HEIGHT: 65 IN | TEMPERATURE: 98.3 F

## 2021-10-31 LAB
ANION GAP SERPL CALC-SCNC: 6 MMOL/L (ref 7–16)
BUN SERPL-MCNC: 5 MG/DL (ref 8–23)
CALCIUM SERPL-MCNC: 8.7 MG/DL (ref 8.3–10.4)
CHLORIDE SERPL-SCNC: 109 MMOL/L (ref 98–107)
CO2 SERPL-SCNC: 24 MMOL/L (ref 21–32)
CREAT SERPL-MCNC: 0.6 MG/DL (ref 0.6–1)
GLUCOSE BLD STRIP.AUTO-MCNC: 154 MG/DL (ref 65–100)
GLUCOSE BLD STRIP.AUTO-MCNC: 198 MG/DL (ref 65–100)
GLUCOSE SERPL-MCNC: 154 MG/DL (ref 65–100)
MAGNESIUM SERPL-MCNC: 2.1 MG/DL (ref 1.8–2.4)
POTASSIUM SERPL-SCNC: 4.2 MMOL/L (ref 3.5–5.1)
SERVICE CMNT-IMP: ABNORMAL
SERVICE CMNT-IMP: ABNORMAL
SODIUM SERPL-SCNC: 139 MMOL/L (ref 136–145)

## 2021-10-31 PROCEDURE — 80048 BASIC METABOLIC PNL TOTAL CA: CPT

## 2021-10-31 PROCEDURE — 82962 GLUCOSE BLOOD TEST: CPT

## 2021-10-31 PROCEDURE — 94640 AIRWAY INHALATION TREATMENT: CPT

## 2021-10-31 PROCEDURE — 94760 N-INVAS EAR/PLS OXIMETRY 1: CPT

## 2021-10-31 PROCEDURE — 74011636637 HC RX REV CODE- 636/637: Performed by: FAMILY MEDICINE

## 2021-10-31 PROCEDURE — 97161 PT EVAL LOW COMPLEX 20 MIN: CPT

## 2021-10-31 PROCEDURE — 83735 ASSAY OF MAGNESIUM: CPT

## 2021-10-31 PROCEDURE — 74011250637 HC RX REV CODE- 250/637: Performed by: FAMILY MEDICINE

## 2021-10-31 PROCEDURE — 36415 COLL VENOUS BLD VENIPUNCTURE: CPT

## 2021-10-31 RX ORDER — INSULIN GLARGINE 100 [IU]/ML
INJECTION, SOLUTION SUBCUTANEOUS
Qty: 1 ML | Refills: 0 | Status: SHIPPED
Start: 2021-11-01

## 2021-10-31 RX ORDER — LISINOPRIL 40 MG/1
20 TABLET ORAL DAILY
Qty: 1 TABLET | Refills: 0 | Status: SHIPPED
Start: 2021-10-31 | End: 2022-04-28 | Stop reason: DRUGHIGH

## 2021-10-31 RX ORDER — LANCETS
EACH MISCELLANEOUS
Qty: 1 EACH | Refills: 11 | Status: SHIPPED | OUTPATIENT
Start: 2021-10-31

## 2021-10-31 RX ORDER — INSULIN ASPART 100 [IU]/ML
INJECTION, SOLUTION INTRAVENOUS; SUBCUTANEOUS
Qty: 1 ADJUSTABLE DOSE PRE-FILLED PEN SYRINGE | Refills: 0 | Status: SHIPPED
Start: 2021-10-31

## 2021-10-31 RX ADMIN — LISINOPRIL 10 MG: 5 TABLET ORAL at 08:55

## 2021-10-31 RX ADMIN — METOPROLOL SUCCINATE 50 MG: 50 TABLET, EXTENDED RELEASE ORAL at 08:54

## 2021-10-31 RX ADMIN — INSULIN GLARGINE 30 UNITS: 100 INJECTION, SOLUTION SUBCUTANEOUS at 08:55

## 2021-10-31 RX ADMIN — INSULIN LISPRO 3 UNITS: 100 INJECTION, SOLUTION INTRAVENOUS; SUBCUTANEOUS at 07:39

## 2021-10-31 RX ADMIN — ASPIRIN 81 MG CHEWABLE TABLET 81 MG: 81 TABLET CHEWABLE at 08:54

## 2021-10-31 RX ADMIN — GABAPENTIN 300 MG: 300 CAPSULE ORAL at 08:55

## 2021-10-31 RX ADMIN — TOPIRAMATE 50 MG: 50 TABLET, FILM COATED ORAL at 08:54

## 2021-10-31 RX ADMIN — DULOXETINE HYDROCHLORIDE 60 MG: 60 CAPSULE, DELAYED RELEASE ORAL at 08:55

## 2021-10-31 RX ADMIN — INSULIN LISPRO 3 UNITS: 100 INJECTION, SOLUTION INTRAVENOUS; SUBCUTANEOUS at 07:38

## 2021-10-31 RX ADMIN — BUDESONIDE AND FORMOTEROL FUMARATE DIHYDRATE 2 PUFF: 160; 4.5 AEROSOL RESPIRATORY (INHALATION) at 09:47

## 2021-10-31 NOTE — DISCHARGE SUMMARY
Hospitalist Discharge Summary     Admit Date:  10/28/2021  2:58 PM   Name:  Mayte Deleon   Age:  73 y.o.  :  1955   MRN:  [id number]   PCP:  Renaldo Lipscomb MD  Treatment Team: Attending Provider: Gail Arenas MD; Utilization Review: Katharine Dc, RN; Care Manager: Grayson Greenwood, RN; Physical Therapist: Yves Iraheta PT; Primary Nurse:  Pauline Francis RN; Primary Nurse: Arlene Grimaldo RN; Primary Nurse: Rakel Henson    Problem List for this Hospitalization:  Hospital Problems as of 10/31/2021 Date Reviewed: 2021        Codes Class Noted - Resolved POA    Hypomagnesemia ICD-10-CM: E83.42  ICD-9-CM: 275.2  10/29/2021 - Present Unknown        * (Principal) DKA (diabetic ketoacidosis) ([facility] ICD-10-CM: E11.10  ICD-9-CM: 250.12  10/28/2021 - Present Unknown        Hypokalemia ICD-10-CM: E87.6  ICD-9-CM: 276.8  2021 - Present Unknown        Hyperkalemia ICD-10-CM: E87.5  ICD-9-CM: 276.7  2021 - Present Unknown        Coronary artery disease involving native coronary artery of native heart with angina pectoris ([facility] ICD-10-CM: I25.119  ICD-9-CM: 414.01, 413.9  2019 - Present Yes        ADRIENNE (acute kidney injury) ([facility] ICD-10-CM: N17.9  ICD-9-CM: 584.9  2019 - Present Unknown        Fibromyalgia ICD-10-CM: M79.7  ICD-9-CM: 729.1  Unknown - Present Yes        Hypertension ICD-10-CM: I10  ICD-9-CM: 401.9  Unknown - Present Yes        DKA, type 1 ([facility] ICD-10-CM: E10.10  ICD-9-CM: 250.13  Unknown - Present Yes    Overview Addendum 2018  1:27 PM by Renaldo Lipscomb MD     type 1, uses insulin pump--  Dr. Dedrick Sumner             Chronic obstructive pulmonary disease [facility]) ICD-10-CM: J44.9  ICD-9-CM: 496  Unknown - Present Yes                Admission HPI from 10/28/2021:    Mayte Deleon is a 73 y.o. female with medical history of diabetes mellitus type 1 on insulin pump, hypertension, hyperlipidemia, fibromyalgia, diabetic neuropathy, anxiety disorder, COPD and coronary disease.     I went to  patient on Monday to change her insulin pump but since then noticed elevated glucose levels, since yesterday had multiple episodes of vomiting and later noticed mid abdominal pain, intermittent, dull in nature with no radiation.  also felt patient mildly confused. Patient was brought to emergency room for further evaluation.     In the emergency room patient awake alert oriented x3, mild forgetful in terms of which stated she had changed her insulin pump when and how high her sugars were in her mid episodes of vomiting she had. Hospital Course:  -DKA  Patient is type I diabetic, uses insulin pump. We found that her insulin pump was on recall. For DKA initially started on insulin drip, once anion gap was resolved, changed to sliding scale insulin, with meals insulin and Lantus. At discharge patient was discharged on Lantus 40 units in the morning, 10 units in the evening. Continue NovoLog and sliding scale and also use NovoLog 2 units with meals. Advised to follow-up with endocrinologist.    -Acute kidney injury, hyperkalemia, hypokalemia, hypomagnesemia  Patient was continued on fluids, kidney functions back to normal.  Electrolyte imbalances with hyperkalemia initially and later on hypokalemia and hypomagnesemia resolved. -Hypotension probably secondary to ADRIENNE resolved. At discharge patient was sent home on 20 mg of lisinopril, continue her home dose of Toprol.    -Fibromyalgia  Continue home medication    -Coronary disease  Continue aspirin and metoprolol    -COPD  Stable    Patient is clinically stable for discharge. Discharge instruction discussed with the patient. Patient said she has NovoLog and Lantus at home. Only wanted the lancets, sent as a prescription to the pharmacy. Follow up instructions below. Plan was discussed with patient. All questions answered.   Patient was stable at time of discharge and was instructed to call or return if there are any concerns or recurrence of symptoms. Diagnostic Imaging/Tests:   XR ABD (KUB)    Result Date: 10/29/2021  KUB HISTORY: Abdominal pain COMPARISON: CT abdomen and pelvis September 11, 2019 FINDINGS: EKG leads are present. Gas is scattered throughout the bowel. No acute abdominal findings. XR CHEST PORT    Result Date: 10/28/2021  EXAMINATION: CHEST RADIOGRAPH 10/28/2021 3:28 PM ACCESSION NUMBER: [id number] INDICATION: Nausea and vomiting COMPARISON: Chest x-ray 3/1/2021, chest CT 5/17/2019 TECHNIQUE: A single AP view of the chest was obtained. FINDINGS: Support Lines and Tubes: None Cardiac Silhouette: Within normal limits in size. Lungs: No focal airspace disease. Pleura: No pleural effusion. No pneumothorax. Osseous Structures: Cervical spine fixation hardware. Bony anchors in the right humeral head. Chronic healed fracture of the posterior left fourth rib. Upper Abdomen: Unremarkable. No evidence of acute cardiopulmonary disease. VOICE DICTATED BY: Dr. Christie Mora      Echocardiogram results:  No results found for this visit on 10/28/21.       All Micro Results     Procedure Component Value Units Date/Time    CULTURE, BLOOD [[id number]] Collected: 10/28/21 1912    Order Status: Completed Specimen: Blood Updated: 10/31/21 0852     Special Requests: --        LEFT  Antecubital       Culture result: NO GROWTH 3 DAYS       CULTURE, BLOOD [[id number]] Collected: 10/28/21 1954    Order Status: Completed Specimen: Blood Updated: 10/31/21 0852     Special Requests: --        LEFT  Antecubital       Culture result: NO GROWTH 3 DAYS             Labs: Results:       BMP, Mg, Phos Recent Labs     10/31/21  0406 10/30/21  0255 10/29/21  0752 10/28/21  2237 10/28/21  1517    140 135*   < > 128*   K 4.2 3.3* 3.4*   < > 5.9*   * 108* 110*   < > 93*   CO2 24 25 22   < > 12*   AGAP 6* 7 3*   < > 23*   BUN 5* 5* 14   < > 32*   CREA 0.60 0.57* 0.84   < > 1.71*   CA 8.7 8.1* 7.5*   < > 9.0   * 66 151*   < > 446*   MG 2.1 1.7* 1.7*  --  2.2   PHOS  --   --   --   --  8.6*    < > = values in this interval not displayed.       CBC Recent Labs     10/30/21  0255 10/29/21  0750 10/28/21  1517   WBC 4.2* 7.3 13.7*   RBC 3.26* 3.43* 4.72   HGB 10.2* 10.8* 14.9   HCT 30.5* 32.5* 46.3   * 160 240   GRANS 62 75 91*   LYMPH 27 16 5*   EOS 2 1 0*   MONOS 8 8 2*   BASOS 1 0 0   IG 1 1 1   ANEU 2.6 5.4 12.5*   ABL 1.1 1.2 0.7   GLENROY 0.1 0.1 0.0   ABM 0.3 0.6 0.3   ABB 0.0 0.0 0.1   AIG 0.0 0.0 0.2      LFT Recent Labs     10/28/21  1517   ALT 45   AP 92   TP 8.1   ALB 4.1   GLOB 4.0*   AGRAT 1.0*      Cardiac Testing No results found for: BNPP, BNP, CPK, RCK1, RCK2, RCK3, RCK4, CKMB, CKNDX, CKND1, TROPT, TROIQ   Coagulation Tests Lab Results   Component Value Date/Time    Prothrombin time 13.2 10/29/2021 02:40 PM    Prothrombin time 12.8 03/01/2021 06:15 AM    INR 1.0 10/29/2021 02:40 PM    INR 0.9 03/01/2021 06:15 AM    aPTT 25.9 02/28/2021 05:30 PM      A1c Lab Results   Component Value Date/Time    Hemoglobin A1c 8.0 (H) 10/29/2021 02:38 AM    Hemoglobin A1c 8.9 (H) 02/28/2021 05:30 PM    Hemoglobin A1c 8.4 (H) 11/30/2020 10:15 AM      Lipid Panel Lab Results   Component Value Date/Time    Cholesterol, total 161 11/30/2020 10:15 AM    HDL Cholesterol 81 11/30/2020 10:15 AM    LDL, calculated 66 11/30/2020 10:15 AM    LDL, calculated 51 11/13/2019 09:23 AM    VLDL, calculated 14 11/30/2020 10:15 AM    VLDL, calculated 15 11/13/2019 09:23 AM    Triglyceride 74 11/30/2020 10:15 AM      Thyroid Panel Lab Results   Component Value Date/Time    TSH 1.820 03/03/2021 02:50 PM    TSH 0.967 03/15/2018 09:19 AM        Most Recent UA Lab Results   Component Value Date/Time    Color YELLOW 02/28/2021 07:07 PM    Appearance CLEAR 02/28/2021 07:07 PM    Specific gravity 1.018 06/10/2015 08:01 AM    pH (UA) 5.0 02/28/2021 07:07 PM    Protein Negative 02/28/2021 07:07 PM    Glucose GREATER THAN/EQUAL TO 2000 (A) 02/28/2021 07:07 PM    Ketone 80 (A) 02/28/2021 07:07 PM    Bilirubin Negative 02/28/2021 07:07 PM    Blood Negative 02/28/2021 07:07 PM    Urobilinogen 0.2 02/28/2021 07:07 PM    Nitrites Negative 02/28/2021 07:07 PM    Leukocyte Esterase Negative 02/28/2021 07:07 PM        Allergies   Allergen Reactions    Doxepin Hives and Nausea and Vomiting    Nortriptyline Hives    Other Medication Hives and Nausea and Vomiting     elavil    Plaquenil [Hydroxychloroquine] Hives    Tizanidine Other (comments)     Sleep walking and  nocturnal eating     Vistaril [Hydroxyzine Pamoate] Other (comments)     Insomnia         Immunization History   Administered Date(s) Administered    COVID-19, PFIZER, MRNA, LNP-S, PF, 30MCG/0.3ML DOSE 03/23/2021, 04/21/2021    Influenza Vaccine 09/01/2016    Influenza Vaccine (Quad) PF (>6 Mo Flulaval, Fluarix, and >3 Yrs Afluria, Fluzone 20107) 12/05/2017    Pneumococcal Polysaccharide (PPSV-23) 11/18/2016    Tdap 03/24/2017    Zoster Vaccine, Live 09/01/2016       All Labs from Last 24 Hrs:  Recent Results (from the past 24 hour(s))   GLUCOSE, POC    Collection Time: 10/30/21 12:09 PM   Result Value Ref Range    Glucose (POC) 309 (H) 65 - 100 mg/dL    Performed by [facility]    GLUCOSE, POC    Collection Time: 10/30/21  3:11 PM   Result Value Ref Range    Glucose (POC) 337 (H) 65 - 100 mg/dL    Performed by [facility]    GLUCOSE, POC    Collection Time: 10/30/21  7:57 PM   Result Value Ref Range    Glucose (POC) 129 (H) 65 - 100 mg/dL    Performed by Mj Ba    METABOLIC PANEL, BASIC    Collection Time: 10/31/21  4:06 AM   Result Value Ref Range    Sodium 139 136 - 145 mmol/L    Potassium 4.2 3.5 - 5.1 mmol/L    Chloride 109 (H) 98 - 107 mmol/L    CO2 24 21 - 32 mmol/L    Anion gap 6 (L) 7 - 16 mmol/L    Glucose 154 (H) 65 - 100 mg/dL    BUN 5 (L) 8 - 23 MG/DL    Creatinine 0.60 0.6 - 1.0 MG/DL    GFR est AA >60 >60 ml/min/1.73m2    GFR est non-AA >60 >60 ml/min/1.73m2    Calcium 8.7 8.3 - 10.4 MG/DL   MAGNESIUM    Collection Time: 10/31/21  4:06 AM   Result Value Ref Range    Magnesium 2.1 1.8 - 2.4 mg/dL   GLUCOSE, POC    Collection Time: 10/31/21  7:23 AM   Result Value Ref Range    Glucose (POC) 198 (H) 65 - 100 mg/dL    Performed by Artemio        Discharge Exam:  Patient Vitals for the past 24 hrs:   Temp Pulse Resp BP SpO2   10/31/21 0948     99 %   10/31/21 0800  100      10/31/21 0730  88 29 136/75    10/31/21 0726 98.4 °F (36.9 °C) 94 23 136/75 96 %   10/31/21 0600  77 18     10/31/21 0500  76 14     10/31/21 0400 98.1 °F (36.7 °C) 74 15     10/31/21 0300  75 14     10/31/21 0200  73 12     10/31/21 0100  74 12     10/31/21 0000 98.3 °F (36.8 °C) 86 19     10/30/21 2300  76 17     10/30/21 2200  75 22     10/30/21 2100  93 28     10/30/21 2001  73 15 114/70    10/30/21 2000 98.2 °F (36.8 °C) 78 (!) 33 114/70    10/30/21 1958 98.2 °F (36.8 °C)       10/30/21 1900  81 25     10/30/21 1800  85 21     10/30/21 1700  75 25     10/30/21 1600  84 27     10/30/21 1520  80 26 (!) 133/93    10/30/21 1519 98.1 °F (36.7 °C) 84 20 (!) 133/93 98 %   10/30/21 1500  81 21     10/30/21 1400  88 (!) 56     10/30/21 1300  93 22     10/30/21 1206 98.3 °F (36.8 °C) 81 19 132/86 96 %   10/30/21 1200  81      10/30/21 1100  83 (!) 34       Oxygen Therapy  O2 Sat (%): 99 % (10/31/21 0948)  Pulse via Oximetry: 76 beats per minute (10/31/21 0948)  O2 Device: None (Room air) (10/31/21 0948)  Skin Assessment: Clean, dry, & intact (10/31/21 0400)  Skin Protection for O2 Device: N/A (10/31/21 0726)  O2 Flow Rate (L/min): 0 l/min (10/30/21 0937)  No intake or output data in the 24 hours ending 10/31/21 1046    General:    Well nourished. Alert. No distress. Eyes:   Normal sclera. Extraocular movements intact. ENT:  Normocephalic, atraumatic.   Moist mucous membranes  CV:   Regular rate and rhythm. No murmur, rub, or gallop. Lungs:  Clear to auscultation bilaterally. No wheezing, rhonchi, or rales. Abdomen: Soft, nontender, nondistended. Bowel sounds normal.   Extremities: Warm and dry. No cyanosis or edema. Neurologic: CN II-XII grossly intact. Sensation intact. Skin:     No rashes or jaundice. Psych:  Normal mood and affect. Discharge Info:   Current Discharge Medication List      START taking these medications    Details   insulin glargine (LANTUS) 100 unit/mL injection Lantus 40 units sq in am and 10 units sq in pm.  Qty: 1 mL, Refills: 0  Start date: 11/1/2021      insulin aspart U-100 (NOVOLOG) 100 unit/mL (3 mL) inpn For Blood Sugar (mg/dL) of:              Less than 150 =   0 units  151 -199 =   2 units  200 -249 =   4 units  250 -299 =   6 units  300 -349 =   8 units  350 and above =   10 units  Qty: 1 Adjustable Dose Pre-filled Pen Syringe, Refills: 0  Start date: 10/31/2021      lancets misc Check glucose ac and qhs. (E 10.10.)  Qty: 1 Each, Refills: 11  Start date: 10/31/2021         CONTINUE these medications which have CHANGED    Details   lisinopriL (PRINIVIL, ZESTRIL) 40 mg tablet Take 0.5 Tablets by mouth daily. Qty: 1 Tablet, Refills: 0  Start date: 10/31/2021    Associated Diagnoses: Essential hypertension         CONTINUE these medications which have NOT CHANGED    Details   metoprolol succinate (TOPROL-XL) 50 mg XL tablet Take 1 Tablet by mouth daily. Qty: 30 Tablet, Refills: 3    Associated Diagnoses: Hypertension, unspecified type; Low blood pressure reading      meloxicam (MOBIC) 7.5 mg tablet Take  by mouth daily. atorvastatin (LIPITOR) 80 mg tablet Take 1 Tab by mouth daily. Qty: 90 Tab, Refills: 3      furosemide (LASIX) 20 mg tablet TAKE 1 TABLET BY MOUTH DAILY  Qty: 90 Tab, Refills: 3    Associated Diagnoses: Hypertension, unspecified type      omeprazole (PRILOSEC) 20 mg capsule Take 1 Cap by mouth daily.   Qty: 30 Cap, Refills: 1 Associated Diagnoses: Dysphagia, unspecified type      DULoxetine (CYMBALTA) 60 mg capsule Take 1 Cap by mouth daily. Qty: 90 Cap, Refills: 0    Associated Diagnoses: Depression, major, recurrent, mild (HCC)      tiotropium bromide (SPIRIVA RESPIMAT) 1.25 mcg/actuation inhaler Take 2 Puffs by inhalation daily. Qty: 1 Inhaler, Refills: 6    Associated Diagnoses: Cough      fluconazole (DIFLUCAN) 150 mg tablet Take 150 mg by mouth every thirty (30) days. FDA advises cautious prescribing of oral fluconazole in pregnancy. monthly      gabapentin (NEURONTIN) 300 mg capsule Take 300 mg by mouth three (3) times daily. SUMAtriptan (IMITREX) 100 mg tablet Take 50 mg by mouth once as needed for Migraine. ALPRAZolam (XANAX) 2 mg tablet Take 2 mg by mouth three (3) times daily as needed for Anxiety. Usually takes 3 times daily      topiramate (TOPAMAX) 50 mg tablet Take 50 mg by mouth daily. fluticasone propion-salmeteroL (ADVAIR/WIXELA) 250-50 mcg/dose diskus inhaler Take 1 Puff by inhalation two (2) times a day. Qty: 1 Inhaler, Refills: 9    Associated Diagnoses: Chronic obstructive pulmonary disease with acute lower respiratory infection (HCC)      aspirin 81 mg chewable tablet Take 1 Tab by mouth daily. Qty: 30 Tab, Refills: 5      ARIPiprazole (ABILIFY) 10 mg tablet Take 5 mg by mouth nightly. nitroglycerin (NITROSTAT) 0.4 mg SL tablet 1 Tab by SubLINGual route as needed for Chest Pain. Up to 3 doses. Qty: 1 Bottle, Refills: 3      ondansetron hcl (ZOFRAN) 4 mg tablet Take 1 Tab by mouth every eight (8) hours as needed for Nausea. Qty: 20 Tab, Refills: 0    Associated Diagnoses: Nausea      albuterol (PROVENTIL HFA, VENTOLIN HFA, PROAIR HFA) 90 mcg/actuation inhaler Take 1 Puff by inhalation every six (6) hours as needed for Wheezing. Qty: 1 Inhaler, Refills: 6    Associated Diagnoses: Cough      Syringe with Needle, Disp, 1 mL 25 gauge x 5/8\" syrg 1,000 mcg by Does Not Apply route.       Syringe with Needle, Safety 3 mL 25 gauge x 1\" syrg 1 mL by IntraMUSCular route. Disposition: home  Activity: As tolerated. Diet: ADULT DIET Regular; 4 carb choices (60 gm/meal)    Follow-up Appointments   Procedures    FOLLOW UP VISIT Appointment in: One Week Follow up with pcp in a week with bmp and magnesium. Advised to take 20 mg of lisinopril daily. Pt says she has novolog and Lantus at home. Advised to take Novolog with sliding scale and also take 2 units b. .. Follow up with pcp in a week with bmp and magnesium. Advised to take 20 mg of lisinopril daily. Pt says she has novolog and Lantus at home. Advised to take Novolog with sliding scale and also take 2 units before meals. Standing Status:   Standing     Number of Occurrences:   1     Order Specific Question:   Appointment in     Answer: One Week         Follow-up Information     Follow up With Specialties Details Why Contact Info    Kimberli Valles MD Internal Medicine   [address] [place] [address] [address] [id number] [phone]            Time spent in patient discharge planning and coordination 35 minutes.     Signed:  Swapna Mariscal MD

## 2021-10-31 NOTE — PROGRESS NOTES
Problem: Mobility Impaired (Adult and Pediatric)  Goal: *Acute Goals and Plan of Care (Insert Text)  Outcome: Resolved/Met     PHYSICAL THERAPY: Initial Assessment, Discharge, and AM 10/31/2021  INPATIENT:    Payor: Vini Aguilar / Plan: [place] [address] / Product Type: PPO /       NAME/AGE/GENDER: Ирина Abraham is a 73 y.o. female   PRIMARY DIAGNOSIS: DKA (diabetic ketoacidosis) ([facility] [E11.10]  ADRIENNE (acute kidney injury) ([facility] [N17.9]  Hyperkalemia [E87.5] DKA (diabetic ketoacidosis) ([facility] DKA (diabetic ketoacidosis) ([facility]        ICD-10: Treatment Diagnosis:    Difficulty in walking, Not elsewhere classified (R26.2)   Precaution/Allergies:  Doxepin, Nortriptyline, Other medication, Plaquenil [hydroxychloroquine], Tizanidine, and Vistaril [hydroxyzine pamoate]      ASSESSMENT:     Ms. Lexis Paniagua presents in ICU admitted with above diagnosis. She lives at home with her spouse where she was independent without assistive devices. On assessment in hospital she is independent with mobility without assistive devices. Steady on her feet without loss of balance. No deficits that warrant skilled PT interventions and no follow up recommended for therapy on discharge from the hospital. Will discharge PT services at this time. This section established at most recent assessment   PROBLEM LIST (Impairments causing functional limitations):  none   INTERVENTIONS PLANNED: (Benefits and precautions of physical therapy have been discussed with the patient.)  none     TREATMENT PLAN: Frequency/Duration: one time visit for assessment       REHAB RECOMMENDATIONS (at time of discharge pending progress):    Placement: It is my opinion, based on this patient's performance to date, that Ms. Lexis Paniagua may benefit from being discharged with NO further skilled therapy due to a proven ability to function at baseline.   Equipment:   None at this time              HISTORY:   History of Present Injury/Illness (Reason for Referral):  Copied from MD note: Ирина Abraham is a 73 y.o. female with medical history of diabetes mellitus type 1 on insulin pump, hypertension, hyperlipidemia, fibromyalgia, diabetic neuropathy, anxiety disorder, COPD and coronary disease. I went to  patient on Monday to change her insulin pump but since then noticed elevated glucose levels, since yesterday had multiple episodes of vomiting and later noticed mid abdominal pain, intermittent, dull in nature with no radiation.  also felt patient mildly confused. Patient was brought to emergency room for further evaluation. In the emergency room patient awake alert oriented x3, mild forgetful in terms of which stated she had changed her insulin pump when and how high her sugars were in her mid episodes of vomiting she had. Past Medical History/Comorbidities:   Ms. Lexis Paniagua  has a past medical history of Anxiety, Arthritis, Asthma, CAD (coronary artery disease) (2019), Cervical spondylosis with myelopathy (2015), Chronic obstructive pulmonary disease ([address] Chronic pain, Depression, Diabetes ([address] Diverticulosis, DJD (degenerative joint disease), Fibromyalgia, Former smoker, GERD (gastroesophageal reflux disease), Hormone replacement therapy, Hypertension, Insomnia, Left knee pain, Migraine, Psychiatric diagnosis, RA (rheumatoid arthritis) ([address] (dx yrs ago), Unspecified adverse effect of anesthesia, Vitamin B 12 deficiency, and Vitamin D deficiency.   Ms. Lexis Paniagua  has a past surgical history that includes hx colonoscopy; hx cataract removal (Bilateral); hx  section; hx hysterectomy (N/A, ); hx orthopaedic; hx orthopaedic; hx orthopaedic (Bilateral); hx orthopaedic (Right); hx orthopaedic (Right); hx orthopaedic (N/A); neurological procedure unlisted (); and hx other surgical.  Social History/Living Environment:   Home Environment: Private residence  # Steps to Enter: 0 (ramp)  One/Two Story Residence: One story  Living Alone: No  Support Systems: Spouse/Significant Other  Patient Expects to be Discharged to[de-identified] House  Current DME Used/Available at Home: Cane, quad  Prior Level of Function/Work/Activity:  Pt lives with spouse, independent without assistive devices     Number of Personal Factors/Comorbidities that affect the Plan of Care: 0: LOW COMPLEXITY   EXAMINATION:   Most Recent Physical Functioning:   Gross Assessment:  AROM: Within functional limits  Strength: Within functional limits               Posture:  Posture (WDL): Within defined limits  Balance:  Sitting: Intact  Standing: Intact Bed Mobility:  Supine to Sit: Independent  Sit to Supine: Independent  Wheelchair Mobility:     Transfers:  Sit to Stand: Independent  Stand to Sit: Independent  Gait:            Body Structures Involved:  None Body Functions Affected:  None Activities and Participation Affected:  None   Number of elements that affect the Plan of Care: 1-2: LOW COMPLEXITY   CLINICAL PRESENTATION:   Presentation: Stable and uncomplicated: LOW COMPLEXITY   CLINICAL DECISION MAKIN[address] [place] [address] AM-PAC 6 Clicks   Basic Mobility Inpatient Short Form  How much difficulty does the patient currently have. .. Unable A Lot A Little None   1. Turning over in bed (including adjusting bedclothes, sheets and blankets)? [] 1   [] 2   [] 3   [x] 4   2. Sitting down on and standing up from a chair with arms ( e.g., wheelchair, bedside commode, etc.)   [] 1   [] 2   [] 3   [x] 4   3. Moving from lying on back to sitting on the side of the bed? [] 1   [] 2   [] 3   [x] 4   How much help from another person does the patient currently need. .. Total A Lot A Little None   4. Moving to and from a bed to a chair (including a wheelchair)? [] 1   [] 2   [] 3   [x] 4   5. Need to walk in hospital room? [] 1   [] 2   [] 3   [x] 4   6. Climbing 3-5 steps with a railing?    [] 1   [] 2   [] 3   [x] 4   © , Trustees of [address] [place], [place] [id number], under license to [employer]. All rights reserved      Score:  Initial: 24 Most Recent: X (Date: -- )    Interpretation of Tool:  Represents activities that are increasingly more difficult (i.e. Bed mobility, Transfers, Gait). Medical Necessity:     none. Reason for Services/Other Comments:  none.    Use of outcome tool(s) and clinical judgement create a POC that gives a: Clear prediction of patient's progress: LOW COMPLEXITY            TREATMENT:   (In addition to Assessment/Re-Assessment sessions the following treatments were rendered)   Pre-treatment Symptoms/Complaints:  none  Pain: Initial:   Pain Intensity 1: 0  Post Session:  0/10     Assessment/Reassessment only, no treatment provided today    Braces/Orthotics/Lines/Etc:   O2 Device: None (Room air)  Treatment/Session Assessment:    Response to Treatment:  pt doing well, hopes to go home today  Interdisciplinary Collaboration:   Registered Nurse  After treatment position/precautions:   Supine in bed  Bed/Chair-wheels locked  Bed in low position  Call light within reach     Recommendations/Intent for next treatment session: will discharge PT at this time  Total Treatment Duration:  PT Patient Time In/Time Out  Time In: 1053  Time Out: Evonne [address], PT

## 2021-10-31 NOTE — PROGRESS NOTES
Problem: Falls - Risk of  Goal: *Absence of Falls  Description: Document Maximino Daly Fall Risk and appropriate interventions in the flowsheet.   Outcome: Resolved/Met  Note: Fall Risk Interventions:  Mobility Interventions: Communicate number of staff needed for ambulation/transfer, Bed/chair exit alarm, Patient to call before getting OOB, Strengthening exercises (ROM-active/passive)    Mentation Interventions: Adequate sleep, hydration, pain control, Door open when patient unattended, Evaluate medications/consider consulting pharmacy, Familiar objects from home, Increase mobility, Reorient patient, Update white board    Medication Interventions: Bed/chair exit alarm, Teach patient to arise slowly, Patient to call before getting OOB    Elimination Interventions: Bed/chair exit alarm, Call light in reach, Patient to call for help with toileting needs, Stay With Me (per policy), Toilet paper/wipes in reach, Toileting schedule/hourly rounds              Problem: Patient Education: Go to Patient Education Activity  Goal: Patient/Family Education  Outcome: Resolved/Met     Problem: Patient Education: Go to Patient Education Activity  Goal: Patient/Family Education  Outcome: Resolved/Met     Problem: DKA: Day 1  Goal: Off Pathway (Use only if patient is Off Pathway)  Outcome: Resolved/Met  Goal: Activity/Safety  Outcome: Resolved/Met  Goal: Consults, if ordered  Outcome: Resolved/Met  Goal: Diagnostic Tests/Procedures, if Ordered  Outcome: Resolved/Met  Goal: Nutrition/Diet  Outcome: Resolved/Met  Goal: Discharge Planning  Outcome: Resolved/Met  Goal: Medications  Outcome: Resolved/Met  Goal: Respiratory  Outcome: Resolved/Met  Goal: Treatments/Interventions/Procedures  Outcome: Resolved/Met  Goal: Psychosocial  Outcome: Resolved/Met  Goal: *Hemodynamically stable  Outcome: Resolved/Met  Goal: *Blood glucose falling 50 to 100 mg/dl/hr  Outcome: Resolved/Met  Goal: *Potassium normalizing  Outcome: Resolved/Met     Problem: DKA: Day 2  Goal: Off Pathway (Use only if patient is Off Pathway)  Outcome: Resolved/Met  Goal: Activity/Safety  Outcome: Resolved/Met  Goal: Consults, if ordered  Outcome: Resolved/Met  Goal: Diagnostic Test/Procedures  Outcome: Resolved/Met  Goal: Nutrition/Diet  Outcome: Resolved/Met  Goal: Discharge Planning  Outcome: Resolved/Met  Goal: Medications  Outcome: Resolved/Met  Goal: Respiratory  Outcome: Resolved/Met  Goal: Treatments/Interventions/Procedures  Outcome: Resolved/Met  Goal: Psychosocial  Outcome: Resolved/Met  Goal: *Acidosis resolved  Outcome: Resolved/Met  Goal: *Tolerating diet  Outcome: Resolved/Met  Goal: *Demonstrates progressive activity  Outcome: Resolved/Met  Goal: *Blood glucose 80 to 180 mg/dl  Outcome: Resolved/Met     Problem: DKA: Day 3  Goal: Off Pathway (Use only if patient is Off Pathway)  Outcome: Resolved/Met  Goal: Activity/Safety  Outcome: Resolved/Met  Goal: Diagnostic Test/Procedures  Outcome: Resolved/Met  Goal: Nutrition/Diet  Outcome: Resolved/Met  Goal: Discharge Planning  Outcome: Resolved/Met  Goal: Medications  Outcome: Resolved/Met  Goal: Treatments/Interventions/Procedures  Outcome: Resolved/Met  Goal: Psychosocial  Outcome: Resolved/Met     Problem: DKA: Discharge Outcomes  Goal: *Ambulates and performs ADL's  Outcome: Resolved/Met  Goal: *Describes follow-up/return visits to physicians, diabetes treatment coordinator and other resources  Outcome: Resolved/Met  Goal: *Blood glucose at patient's target range  Outcome: Resolved/Met  Goal: *Acidosis resolved  Outcome: Resolved/Met  Goal: *Tolerating diet  Outcome: Resolved/Met  Goal: *Verbalizes understanding and describes prescribed diet  Outcome: Resolved/Met  Goal: *Describes blood glucose goals, monitoring, sick day rules, hypo/hyperglycemia  Outcome: Resolved/Met  Goal: *Describes available resources and support systems  Outcome: Resolved/Met  Goal: *Verbalizes name, dosage, time, side effects, and number of days to continue medications  Outcome: Resolved/Met  Goal: *Demonstrates ability to self-administer insulin  Outcome: Resolved/Met     Problem: Diabetes Self-Management  Goal: *Disease process and treatment process  Description: Define diabetes and identify own type of diabetes; list 3 options for treating diabetes. Outcome: Resolved/Met  Goal: *Incorporating nutritional management into lifestyle  Description: Describe effect of type, amount and timing of food on blood glucose; list 3 methods for planning meals. Outcome: Resolved/Met  Goal: *Incorporating physical activity into lifestyle  Description: State effect of exercise on blood glucose levels. Outcome: Resolved/Met  Goal: *Developing strategies to promote health/change behavior  Description: Define the ABC's of diabetes; identify appropriate screenings, schedule and personal plan for screenings. Outcome: Resolved/Met  Goal: *Using medications safely  Description: State effect of diabetes medications on diabetes; name diabetes medication taking, action and side effects. Outcome: Resolved/Met  Goal: *Monitoring blood glucose, interpreting and using results  Description: Identify recommended blood glucose targets  and personal targets. Outcome: Resolved/Met  Goal: *Prevention, detection, treatment of acute complications  Description: List symptoms of hyper- and hypoglycemia; describe how to treat low blood sugar and actions for lowering  high blood glucose level. Outcome: Resolved/Met  Goal: *Prevention, detection and treatment of chronic complications  Description: Define the natural course of diabetes and describe the relationship of blood glucose levels to long term complications of diabetes.   Outcome: Resolved/Met  Goal: *Developing strategies to address psychosocial issues  Description: Describe feelings about living with diabetes; identify support needed and support network  Outcome: Resolved/Met  Goal: *Insulin pump training  Outcome: Resolved/Met  Goal: *Sick day guidelines  Outcome: Resolved/Met  Goal: *Patient Specific Goal (EDIT GOAL, INSERT TEXT)  Outcome: Resolved/Met     Problem: Patient Education: Go to Patient Education Activity  Goal: Patient/Family Education  Outcome: Resolved/Met     Problem: Delirium Treatment  Goal: *Level of consciousness restored to baseline  Outcome: Resolved/Met  Goal: *Level of environmental perceptions restored to baseline  Outcome: Resolved/Met  Goal: *Sensory perception restored to baseline  Outcome: Resolved/Met  Goal: *Emotional stability restored to baseline  Outcome: Resolved/Met  Goal: *Functional assessment restored to baseline  Outcome: Resolved/Met  Goal: *Absence of falls  Outcome: Resolved/Met  Goal: *Will remain free of delirium, CAM Score negative  Outcome: Resolved/Met  Goal: *Cognitive status will be restored to baseline  Outcome: Resolved/Met  Goal: Interventions  Outcome: Resolved/Met     Problem: Patient Education: Go to Patient Education Activity  Goal: Patient/Family Education  Outcome: Resolved/Met

## 2021-10-31 NOTE — DISCHARGE INSTRUCTIONS
Follow up with pcp in a week with bmp and magnesium. Advised to take 20 mg of lisinopril daily. Pt says she has novolog and Lantus at home. Advised to take Novolog with sliding scale and also take 2 units before meals.

## 2021-10-31 NOTE — PROGRESS NOTES
Patient is anticipated to discharge today. Plan for discharge is as follows:    Care Management Interventions  PCP Verified by CM: Yes (Mony Morse MD)  Mode of Transport at Discharge:  (family)  Transition of Care Consult (CM Consult): Discharge Planning  Support Systems: Spouse/Significant Other (Spouse/Carlos)  Confirm Follow Up Transport: Family  The Patient and/or Patient Representative was Provided with a Choice of Provider and Agrees with the Discharge Plan?: Yes  Freedom of Choice List was Provided with Basic Dialogue that Supports the Patient's Individualized Plan of Care/Goals, Treatment Preferences and Shares the Quality Data Associated with the Providers?: Yes  Discharge Location  Discharge Placement: Home    Milestones and interventions have been entered.      Brenna Cedillo LMSW    [facility]    * [email or web address]

## 2021-10-31 NOTE — PROGRESS NOTES
Discharge instructions were reviewed with patient and spouse. An opportunity was given for questions. All medications were reviewed, and information was given on the new medications. Patient and spouse verbalized understanding, and have no questions at this time.

## 2021-10-31 NOTE — PROGRESS NOTES
Bedside and Verbal shift change report given to [facility], RNs (oncoming nurse) by Kurt Funes  (offgoing nurse). Report included the following information SBAR, Kardex, ED Summary, Procedure Summary, Intake/Output, Recent Results, Cardiac Rhythm SR and Alarm Parameters .

## 2021-10-31 NOTE — PROGRESS NOTES
Goals for shift: Ease pain, manage blood sugar and blood pressure. Problem: Falls - Risk of  Goal: *Absence of Falls  Description: Document Reji Padilla Fall Risk and appropriate interventions in the flowsheet.   Outcome: Progressing Towards Goal  Note: Fall Risk Interventions:  Mobility Interventions: Assess mobility with egress test, Patient to call before getting OOB, Strengthening exercises (ROM-active/passive)    Mentation Interventions: Adequate sleep, hydration, pain control, Door open when patient unattended, Evaluate medications/consider consulting pharmacy, Familiar objects from home, Increase mobility, Reorient patient, Update white board    Medication Interventions: Assess postural VS orthostatic hypotension, Evaluate medications/consider consulting pharmacy, Patient to call before getting OOB, Teach patient to arise slowly    Elimination Interventions: Call light in reach, Patient to call for help with toileting needs, Toileting schedule/hourly rounds, Toilet paper/wipes in reach              Problem: Patient Education: Go to Patient Education Activity  Goal: Patient/Family Education  Outcome: Progressing Towards Goal     Problem: Patient Education: Go to Patient Education Activity  Goal: Patient/Family Education  Outcome: Progressing Towards Goal     Problem: DKA: Day 1  Goal: Off Pathway (Use only if patient is Off Pathway)  Outcome: Progressing Towards Goal  Goal: Activity/Safety  Outcome: Progressing Towards Goal  Goal: Consults, if ordered  Outcome: Progressing Towards Goal  Goal: Diagnostic Tests/Procedures, if Ordered  Outcome: Progressing Towards Goal  Goal: Nutrition/Diet  Outcome: Progressing Towards Goal  Goal: Discharge Planning  Outcome: Progressing Towards Goal  Goal: Medications  Outcome: Progressing Towards Goal  Goal: Respiratory  Outcome: Progressing Towards Goal  Goal: Treatments/Interventions/Procedures  Outcome: Progressing Towards Goal  Goal: Psychosocial  Outcome: Progressing Towards Goal  Goal: *Hemodynamically stable  Outcome: Progressing Towards Goal  Goal: *Blood glucose falling 50 to 100 mg/dl/hr  Outcome: Progressing Towards Goal  Goal: *Potassium normalizing  Outcome: Progressing Towards Goal     Problem: DKA: Day 2  Goal: Off Pathway (Use only if patient is Off Pathway)  Outcome: Progressing Towards Goal  Goal: Activity/Safety  Outcome: Progressing Towards Goal  Goal: Consults, if ordered  Outcome: Progressing Towards Goal  Goal: Diagnostic Test/Procedures  Outcome: Progressing Towards Goal  Goal: Nutrition/Diet  Outcome: Progressing Towards Goal  Goal: Discharge Planning  Outcome: Progressing Towards Goal  Goal: Medications  Outcome: Progressing Towards Goal  Goal: Respiratory  Outcome: Progressing Towards Goal  Goal: Treatments/Interventions/Procedures  Outcome: Progressing Towards Goal  Goal: Psychosocial  Outcome: Progressing Towards Goal  Goal: *Acidosis resolved  Outcome: Progressing Towards Goal  Goal: *Tolerating diet  Outcome: Progressing Towards Goal  Goal: *Demonstrates progressive activity  Outcome: Progressing Towards Goal  Goal: *Blood glucose 80 to 180 mg/dl  Outcome: Progressing Towards Goal     Problem: DKA: Day 3  Goal: Off Pathway (Use only if patient is Off Pathway)  Outcome: Progressing Towards Goal  Goal: Activity/Safety  Outcome: Progressing Towards Goal  Goal: Diagnostic Test/Procedures  Outcome: Progressing Towards Goal  Goal: Nutrition/Diet  Outcome: Progressing Towards Goal  Goal: Discharge Planning  Outcome: Progressing Towards Goal  Goal: Medications  Outcome: Progressing Towards Goal  Goal: Treatments/Interventions/Procedures  Outcome: Progressing Towards Goal  Goal: Psychosocial  Outcome: Progressing Towards Goal     Problem: DKA: Discharge Outcomes  Goal: *Ambulates and performs ADL's  Outcome: Progressing Towards Goal  Goal: *Describes follow-up/return visits to physicians, diabetes treatment coordinator and other resources  Outcome: Progressing Towards Goal  Goal: *Blood glucose at patient's target range  Outcome: Progressing Towards Goal  Goal: *Acidosis resolved  Outcome: Progressing Towards Goal  Goal: *Tolerating diet  Outcome: Progressing Towards Goal  Goal: *Verbalizes understanding and describes prescribed diet  Outcome: Progressing Towards Goal  Goal: *Describes blood glucose goals, monitoring, sick day rules, hypo/hyperglycemia  Outcome: Progressing Towards Goal  Goal: *Describes available resources and support systems  Outcome: Progressing Towards Goal  Goal: *Verbalizes name, dosage, time, side effects, and number of days to continue medications  Outcome: Progressing Towards Goal  Goal: *Demonstrates ability to self-administer insulin  Outcome: Progressing Towards Goal     Problem: Diabetes Self-Management  Goal: *Disease process and treatment process  Description: Define diabetes and identify own type of diabetes; list 3 options for treating diabetes. Outcome: Progressing Towards Goal  Goal: *Incorporating nutritional management into lifestyle  Description: Describe effect of type, amount and timing of food on blood glucose; list 3 methods for planning meals. Outcome: Progressing Towards Goal  Goal: *Incorporating physical activity into lifestyle  Description: State effect of exercise on blood glucose levels. Outcome: Progressing Towards Goal  Goal: *Developing strategies to promote health/change behavior  Description: Define the ABC's of diabetes; identify appropriate screenings, schedule and personal plan for screenings. Outcome: Progressing Towards Goal  Goal: *Using medications safely  Description: State effect of diabetes medications on diabetes; name diabetes medication taking, action and side effects. Outcome: Progressing Towards Goal  Goal: *Monitoring blood glucose, interpreting and using results  Description: Identify recommended blood glucose targets  and personal targets.   Outcome: Progressing Towards Goal  Goal: *Prevention, detection, treatment of acute complications  Description: List symptoms of hyper- and hypoglycemia; describe how to treat low blood sugar and actions for lowering  high blood glucose level. Outcome: Progressing Towards Goal  Goal: *Prevention, detection and treatment of chronic complications  Description: Define the natural course of diabetes and describe the relationship of blood glucose levels to long term complications of diabetes.   Outcome: Progressing Towards Goal  Goal: *Developing strategies to address psychosocial issues  Description: Describe feelings about living with diabetes; identify support needed and support network  Outcome: Progressing Towards Goal  Goal: *Insulin pump training  Outcome: Progressing Towards Goal  Goal: *Sick day guidelines  Outcome: Progressing Towards Goal  Goal: *Patient Specific Goal (EDIT GOAL, INSERT TEXT)  Outcome: Progressing Towards Goal     Problem: Patient Education: Go to Patient Education Activity  Goal: Patient/Family Education  Outcome: Progressing Towards Goal     Problem: Delirium Treatment  Goal: *Level of consciousness restored to baseline  Outcome: Progressing Towards Goal  Goal: *Level of environmental perceptions restored to baseline  Outcome: Progressing Towards Goal  Goal: *Sensory perception restored to baseline  Outcome: Progressing Towards Goal  Goal: *Emotional stability restored to baseline  Outcome: Progressing Towards Goal  Goal: *Functional assessment restored to baseline  Outcome: Progressing Towards Goal  Goal: *Absence of falls  Outcome: Progressing Towards Goal  Goal: *Will remain free of delirium, CAM Score negative  Outcome: Progressing Towards Goal  Goal: *Cognitive status will be restored to baseline  Outcome: Progressing Towards Goal  Goal: Interventions  Outcome: Progressing Towards Goal     Problem: Patient Education: Go to Patient Education Activity  Goal: Patient/Family Education  Outcome: Progressing Towards Goal

## 2021-12-02 ENCOUNTER — HOSPITAL ENCOUNTER (EMERGENCY)
Age: 66
Discharge: HOME | End: 2021-12-03
Payer: COMMERCIAL

## 2021-12-02 DIAGNOSIS — E86.0 DEHYDRATION: ICD-10-CM

## 2021-12-02 DIAGNOSIS — R73.9 HYPERGLYCEMIA: Primary | ICD-10-CM

## 2021-12-02 LAB
ALBUMIN SERPL-MCNC: 3.9 G/DL (ref 3.2–4.6)
ALBUMIN/GLOB SERPL: 1 {RATIO} (ref 1.2–3.5)
ALP SERPL-CCNC: 76 U/L (ref 50–136)
ALT SERPL-CCNC: 26 U/L (ref 12–65)
AMPHET UR QL SCN: NEGATIVE
ANION GAP SERPL CALC-SCNC: 12 MMOL/L (ref 7–16)
AST SERPL-CCNC: 12 U/L (ref 15–37)
BARBITURATES UR QL SCN: NEGATIVE
BASOPHILS # BLD: 0.1 K/UL (ref 0–0.2)
BASOPHILS NFR BLD: 1 % (ref 0–2)
BENZODIAZ UR QL: POSITIVE
BILIRUB SERPL-MCNC: 1.2 MG/DL (ref 0.2–1.1)
BUN SERPL-MCNC: 47 MG/DL (ref 8–23)
CALCIUM SERPL-MCNC: 10.1 MG/DL (ref 8.3–10.4)
CANNABINOIDS UR QL SCN: NEGATIVE
CHLORIDE SERPL-SCNC: 91 MMOL/L (ref 98–107)
CO2 SERPL-SCNC: 25 MMOL/L (ref 21–32)
COCAINE UR QL SCN: NEGATIVE
CREAT SERPL-MCNC: 2.03 MG/DL (ref 0.6–1)
DIFFERENTIAL METHOD BLD: NORMAL
EOSINOPHIL # BLD: 0.1 K/UL (ref 0–0.8)
EOSINOPHIL NFR BLD: 1 % (ref 0.5–7.8)
ERYTHROCYTE [DISTWIDTH] IN BLOOD BY AUTOMATED COUNT: 12.7 % (ref 11.9–14.6)
ETHANOL SERPL-MCNC: <3 MG/DL
GLOBULIN SER CALC-MCNC: 3.8 G/DL (ref 2.3–3.5)
GLUCOSE BLD STRIP.AUTO-MCNC: 250 MG/DL (ref 65–100)
GLUCOSE SERPL-MCNC: 218 MG/DL (ref 65–100)
HCT VFR BLD AUTO: 41.6 % (ref 35.8–46.3)
HGB BLD-MCNC: 14.4 G/DL (ref 11.7–15.4)
IMM GRANULOCYTES # BLD AUTO: 0 K/UL (ref 0–0.5)
IMM GRANULOCYTES NFR BLD AUTO: 0 % (ref 0–5)
LIPASE SERPL-CCNC: 61 U/L (ref 73–393)
LYMPHOCYTES # BLD: 2 K/UL (ref 0.5–4.6)
LYMPHOCYTES NFR BLD: 27 % (ref 13–44)
MCH RBC QN AUTO: 31.2 PG (ref 26.1–32.9)
MCHC RBC AUTO-ENTMCNC: 34.6 G/DL (ref 31.4–35)
MCV RBC AUTO: 90 FL (ref 79.6–97.8)
METHADONE UR QL: NEGATIVE
MONOCYTES # BLD: 0.5 K/UL (ref 0.1–1.3)
MONOCYTES NFR BLD: 6 % (ref 4–12)
NEUTS SEG # BLD: 4.9 K/UL (ref 1.7–8.2)
NEUTS SEG NFR BLD: 65 % (ref 43–78)
NRBC # BLD: 0 K/UL (ref 0–0.2)
OPIATES UR QL: NEGATIVE
PCP UR QL: NEGATIVE
PLATELET # BLD AUTO: 237 K/UL (ref 150–450)
PMV BLD AUTO: 11.3 FL (ref 9.4–12.3)
POTASSIUM SERPL-SCNC: 3.8 MMOL/L (ref 3.5–5.1)
PROT SERPL-MCNC: 7.7 G/DL (ref 6.3–8.2)
RBC # BLD AUTO: 4.62 M/UL (ref 4.05–5.2)
SERVICE CMNT-IMP: ABNORMAL
SODIUM SERPL-SCNC: 128 MMOL/L (ref 136–145)
WBC # BLD AUTO: 7.5 K/UL (ref 4.3–11.1)

## 2021-12-02 PROCEDURE — 99285 EMERGENCY DEPT VISIT HI MDM: CPT

## 2021-12-02 PROCEDURE — 80053 COMPREHEN METABOLIC PANEL: CPT

## 2021-12-02 PROCEDURE — 82077 ASSAY SPEC XCP UR&BREATH IA: CPT

## 2021-12-02 PROCEDURE — 80307 DRUG TEST PRSMV CHEM ANLYZR: CPT

## 2021-12-02 PROCEDURE — 82962 GLUCOSE BLOOD TEST: CPT

## 2021-12-02 PROCEDURE — 81003 URINALYSIS AUTO W/O SCOPE: CPT

## 2021-12-02 PROCEDURE — 85025 COMPLETE CBC W/AUTO DIFF WBC: CPT

## 2021-12-02 PROCEDURE — 96360 HYDRATION IV INFUSION INIT: CPT

## 2021-12-02 PROCEDURE — 74011250636 HC RX REV CODE- 250/636

## 2021-12-02 PROCEDURE — 83690 ASSAY OF LIPASE: CPT

## 2021-12-02 RX ORDER — SODIUM CHLORIDE 0.9 % (FLUSH) 0.9 %
5-10 SYRINGE (ML) INJECTION AS NEEDED
Status: DISCONTINUED | OUTPATIENT
Start: 2021-12-02 | End: 2021-12-03 | Stop reason: HOSPADM

## 2021-12-02 RX ORDER — SODIUM CHLORIDE 0.9 % (FLUSH) 0.9 %
5-10 SYRINGE (ML) INJECTION EVERY 8 HOURS
Status: DISCONTINUED | OUTPATIENT
Start: 2021-12-02 | End: 2021-12-03 | Stop reason: HOSPADM

## 2021-12-02 RX ADMIN — SODIUM CHLORIDE 1000 ML: 900 INJECTION, SOLUTION INTRAVENOUS at 23:39

## 2021-12-02 RX ADMIN — SODIUM CHLORIDE 1000 ML: 900 INJECTION, SOLUTION INTRAVENOUS at 22:48

## 2021-12-03 VITALS
TEMPERATURE: 97.5 F | HEIGHT: 65 IN | DIASTOLIC BLOOD PRESSURE: 75 MMHG | WEIGHT: 180.78 LBS | SYSTOLIC BLOOD PRESSURE: 117 MMHG | BODY MASS INDEX: 30.12 KG/M2 | HEART RATE: 82 BPM | OXYGEN SATURATION: 93 % | RESPIRATION RATE: 20 BRPM

## 2021-12-03 LAB
CREAT SERPL-MCNC: 1.11 MG/DL (ref 0.6–1)
GLUCOSE BLD STRIP.AUTO-MCNC: 86 MG/DL (ref 65–100)
GLUCOSE BLD STRIP.AUTO-MCNC: 91 MG/DL (ref 65–100)
SERVICE CMNT-IMP: NORMAL
SERVICE CMNT-IMP: NORMAL

## 2021-12-03 PROCEDURE — 96361 HYDRATE IV INFUSION ADD-ON: CPT

## 2021-12-03 PROCEDURE — 82565 ASSAY OF CREATININE: CPT

## 2021-12-03 PROCEDURE — 74011250636 HC RX REV CODE- 250/636

## 2021-12-03 PROCEDURE — 82962 GLUCOSE BLOOD TEST: CPT

## 2021-12-03 RX ORDER — SODIUM CHLORIDE 9 MG/ML
1000 INJECTION, SOLUTION INTRAVENOUS ONCE
Status: COMPLETED | OUTPATIENT
Start: 2021-12-03 | End: 2021-12-03

## 2021-12-03 RX ADMIN — SODIUM CHLORIDE 1000 ML: 900 INJECTION, SOLUTION INTRAVENOUS at 01:36

## 2021-12-03 NOTE — ED TRIAGE NOTES
Pt states that she has had abd pain with nausea and vomiting that began on Tuesday. Has had increased blood sugars over the past week. Blood Sugar was over 600 this morning. Now, . Did take 30 units approx 1 hr before coming in. Does have an insulin pump.   Masked on arrival

## 2021-12-03 NOTE — ED PROVIDER NOTES
71-year-old female complaining of elevated blood sugar. Patient is a type I diabetic recently had insulin pump continues to have elevated blood sugars wax and wane all day. Brought to the ER right 10 PM for an elevated blood sugar. Yesterday the blood sugar was greater than 500 all day. High Blood Sugar   This is a recurrent problem. The current episode started more than 1 week ago. The problem occurs constantly. The problem has not changed since onset. The pain is associated with an unknown factor. The pain is at a severity of 0/10. The patient is experiencing no pain. Associated symptoms include nausea and vomiting. Nothing worsens the pain. The pain is relieved by nothing. The patient's surgical history non-contributory.        Past Medical History:   Diagnosis Date    Anxiety     Arthritis     Asthma     CAD (coronary artery disease) 2019    mi  stent x 1-- followed by dr Yolie Nixon Cervical spondylosis with myelopathy 2015    Chronic obstructive pulmonary disease (HCC)     daily inhaler    Chronic pain     back  - and all over pain     Depression     Diabetes (HCC)     type 1, sqbs am avg 240-250-- hypo at 60's -- uses insulin pump    Diverticulosis     resolved at present - sees DR Gee Amaya when needed    DJD (degenerative joint disease)     Fibromyalgia     all over     Former smoker     quit     GERD (gastroesophageal reflux disease)     CONTROLLED WITH MED    Hormone replacement therapy     Hypertension     controlled with med    Insomnia     Left knee pain     Migraine     Psychiatric diagnosis     bipolar    RA (rheumatoid arthritis) ([facility] [address] dx yrs ago    does not see a rhuematologist    Unspecified adverse effect of anesthesia     \"woke up\"during  surgery on toe    Vitamin B 12 deficiency     Vitamin D deficiency        Past Surgical History:   Procedure Laterality Date    HX CATARACT REMOVAL Bilateral     HX  SECTION      times 2    HX COLONOSCOPY PCU rounds -  Patient remains on dobutamine drip. Room Air  [facility] has accepted. REQUIRES AUTH. Auth has not been started yet due to patient still on dobutamine drip. Please inform [facility] when patient is medically stable and they can start auth.     Tarik Diaz RN CM  Ext [phone]  HX HYSTERECTOMY N/A 1984    total     HX ORTHOPAEDIC      left knee scope    HX ORTHOPAEDIC      right foot \"bone removal\"     HX ORTHOPAEDIC Bilateral     shoulder scopes    HX ORTHOPAEDIC Right     shaved down bone in great toe     HX ORTHOPAEDIC Right     right rotator cuff     HX ORTHOPAEDIC N/A     neck ( plate and screws was put in )     HX OTHER SURGICAL      insulin pump    NEUROLOGICAL PROCEDURE UNLISTED  2019    lower back         Family History:   Problem Relation Age of Onset    Cancer Father     Lung Disease Father     Cancer Maternal Aunt     Heart Disease Paternal Grandmother     Heart Disease Paternal Grandfather        Social History     Socioeconomic History    Marital status:      Spouse name: Not on file    Number of children: Not on file    Years of education: Not on file    Highest education level: Not on file   Occupational History    Not on file   Tobacco Use    Smoking status: Former Smoker     Packs/day: 1.00     Years: 30.00     Pack years: 30.00     Types: Cigarettes     Quit date: 6/10/2002     Years since quittin.4    Smokeless tobacco: Never Used   Substance and Sexual Activity    Alcohol use:  Yes     Alcohol/week: 11.0 standard drinks     Types: 4 Glasses of wine, 7 Cans of beer per week    Drug use: No    Sexual activity: Not on file   Other Topics Concern     Service Not Asked    Blood Transfusions Not Asked    Caffeine Concern Not Asked    Occupational Exposure Not Asked    Hobby Hazards Not Asked    Sleep Concern Not Asked    Stress Concern Not Asked    Weight Concern Not Asked    Special Diet Not Asked    Back Care Not Asked    Exercise Not Asked    Bike Helmet Not Asked    [address] Not Asked    Self-Exams Not Asked   Social History Narrative    Not on file     Social Determinants of Health     Financial Resource Strain:     Difficulty of Paying Living Expenses: Not on file   Food Insecurity:     Worried About Running Out of Food in the Last Year: Not on file    Ran Out of Food in the Last Year: Not on file   Transportation Needs:     Lack of Transportation (Medical): Not on file    Lack of Transportation (Non-Medical): Not on file   Physical Activity:     Days of Exercise per Week: Not on file    Minutes of Exercise per Session: Not on file   Stress:     Feeling of Stress : Not on file   Social Connections:     Frequency of Communication with Friends and Family: Not on file    Frequency of Social Gatherings with Friends and Family: Not on file    Attends Gnosticism Services: Not on file    Active Member of [address] [place] [address] [employer] Media or Organizations: Not on file    Attends Club or Organization Meetings: Not on file    Marital Status: Not on file   Intimate Partner Violence:     Fear of Current or Ex-Partner: Not on file    Emotionally Abused: Not on file    Physically Abused: Not on file    Sexually Abused: Not on file   Housing Stability:     Unable to Pay for Housing in the Last Year: Not on file    Number of [place] in the Last Year: Not on file    Unstable Housing in the Last Year: Not on file         ALLERGIES: Doxepin, Nortriptyline, Other medication, Plaquenil [hydroxychloroquine], Tizanidine, and Vistaril [hydroxyzine pamoate]    Review of Systems   Constitutional: Negative. Negative for activity change. HENT: Negative. Eyes: Negative. Respiratory: Negative. Cardiovascular: Negative. Gastrointestinal: Positive for nausea and vomiting. Genitourinary: Negative. Musculoskeletal: Negative. Skin: Negative. Neurological: Negative. Psychiatric/Behavioral: Negative. All other systems reviewed and are negative. Vitals:    12/02/21 2233   BP: (!) 74/54   Pulse: (!) 104   Resp: 20   Temp: 97.5 °F (36.4 °C)   SpO2: 98%   Weight: 82 kg (180 lb 12.4 oz)   Height: 5' 5\" (1.651 m)            Physical Exam  Vitals and nursing note reviewed.    Constitutional:       General: She is not in acute distress. Appearance: She is well-developed. HENT:      Head: Normocephalic and atraumatic. Right Ear: External ear normal.      Left Ear: External ear normal.      Nose: Nose normal.      Mouth/Throat:      Mouth: Mucous membranes are dry. Eyes:      General: No scleral icterus. Right eye: No discharge. Left eye: No discharge. Conjunctiva/sclera: Conjunctivae normal.      Pupils: Pupils are equal, round, and reactive to light. Cardiovascular:      Rate and Rhythm: Regular rhythm. Pulmonary:      Effort: Pulmonary effort is normal. No respiratory distress. Breath sounds: Normal breath sounds. No stridor. No wheezing or rales. Abdominal:      General: Bowel sounds are normal. There is no distension. Palpations: Abdomen is soft. Tenderness: There is no abdominal tenderness. Musculoskeletal:         General: Normal range of motion. Cervical back: Normal range of motion. Skin:     General: Skin is warm and dry. Findings: No rash. Neurological:      Mental Status: She is alert and oriented to person, place, and time. Motor: No abnormal muscle tone. Coordination: Coordination normal.   Psychiatric:         Behavior: Behavior normal.          MDM  Number of Diagnoses or Management Options  Dehydration  Hyperglycemia  Diagnosis management comments: Differential diagnosis: DKA, hyperglycemia, medication noncompliance, dietary indiscretions. Occult illness, occult infection    Patient initially had hyperglycemia and she is given herself a large dose of insulin prior to coming to the ED she was started on insulin drip and and rehydrated. Patient's blood sugar came down slowly. She is she became more alert and back to her baseline. She was observed for several hours. Her creatinine was initially elevated however after 2 L of fluid was back to normal.  She was back to her baseline.   Can be discharged with close follow-up with her primary care doctor. I wore appropriate PPE throughout this patient's ED visit.  Alexia Simms MD, 6:55 AM               Procedures

## 2021-12-03 NOTE — ED NOTES
I have reviewed discharge instructions with the patient. The patient verbalized understanding. Patient left ED via Discharge Method: ambulatory to Home with  family, self). Opportunity for questions and clarification provided. Patient given 0 scripts. To continue your aftercare when you leave the hospital, you may receive an automated call from our care team to check in on how you are doing. This is a free service and part of our promise to provide the best care and service to meet your aftercare needs.  If you have questions, or wish to unsubscribe from this service please call [phone]. Thank you for Choosing our [facility] Department.

## 2021-12-03 NOTE — ED NOTES
Recheck POC BGL 86. Dr. Juan Manuel Rivera notified. Pt given marlin crackers/peanut butter and orange juice per MD. Pt alert and oriented.

## 2022-03-18 PROBLEM — M48.062 LUMBAR STENOSIS WITH NEUROGENIC CLAUDICATION: Status: ACTIVE | Noted: 2018-09-12

## 2022-03-18 PROBLEM — F33.0 DEPRESSION, MAJOR, RECURRENT, MILD (HCC): Status: ACTIVE | Noted: 2018-03-08

## 2022-03-18 PROBLEM — N63.10 BREAST MASS, RIGHT: Status: ACTIVE | Noted: 2020-06-03

## 2022-03-18 PROBLEM — D72.829 LEUKOCYTOSIS: Status: ACTIVE | Noted: 2021-02-28

## 2022-03-18 PROBLEM — E87.6 HYPOKALEMIA: Status: ACTIVE | Noted: 2021-02-28

## 2022-03-18 PROBLEM — E87.5 HYPERKALEMIA: Status: ACTIVE | Noted: 2021-02-28

## 2022-03-18 PROBLEM — I87.2 VENOUS INSUFFICIENCY: Status: ACTIVE | Noted: 2018-03-08

## 2022-03-18 PROBLEM — E53.8 VITAMIN B12 DEFICIENCY: Status: ACTIVE | Noted: 2018-03-08

## 2022-03-18 PROBLEM — E11.10 DKA (DIABETIC KETOACIDOSIS) (HCC): Status: ACTIVE | Noted: 2021-10-28

## 2022-03-18 PROBLEM — N17.9 AKI (ACUTE KIDNEY INJURY) (HCC): Status: ACTIVE | Noted: 2019-05-07

## 2022-03-19 PROBLEM — A41.9 SEPTIC SHOCK (HCC): Status: ACTIVE | Noted: 2021-02-28

## 2022-03-19 PROBLEM — G43.509 PERSISTENT MIGRAINE AURA WITHOUT CEREBRAL INFARCTION AND WITHOUT STATUS MIGRAINOSUS, NOT INTRACTABLE: Status: ACTIVE | Noted: 2018-03-08

## 2022-03-19 PROBLEM — E83.42 HYPOMAGNESEMIA: Status: ACTIVE | Noted: 2021-10-29

## 2022-03-19 PROBLEM — E87.20 METABOLIC ACIDEMIA: Status: ACTIVE | Noted: 2021-02-28

## 2022-03-19 PROBLEM — R76.8 ELEVATED RHEUMATOID FACTOR: Status: ACTIVE | Noted: 2018-03-20

## 2022-03-19 PROBLEM — I25.119 CORONARY ARTERY DISEASE INVOLVING NATIVE CORONARY ARTERY OF NATIVE HEART WITH ANGINA PECTORIS (HCC): Status: ACTIVE | Noted: 2019-05-31

## 2022-03-19 PROBLEM — E87.1 HYPONATREMIA: Status: ACTIVE | Noted: 2021-02-28

## 2022-03-19 PROBLEM — R65.21 SEPTIC SHOCK (HCC): Status: ACTIVE | Noted: 2021-02-28

## 2022-03-19 PROBLEM — E55.9 VITAMIN D DEFICIENCY: Status: ACTIVE | Noted: 2018-03-20

## 2022-03-20 PROBLEM — Z95.820 S/P ANGIOPLASTY WITH STENT: Status: ACTIVE | Noted: 2021-01-29

## 2022-03-20 PROBLEM — E78.00 PURE HYPERCHOLESTEROLEMIA: Status: ACTIVE | Noted: 2019-06-28

## 2022-06-06 ENCOUNTER — HOSPITAL ENCOUNTER (OUTPATIENT)
Dept: SURGERY | Age: 67
Discharge: HOME | End: 2022-06-09
Payer: MEDICARE

## 2022-06-06 ENCOUNTER — HOSPITAL ENCOUNTER (OUTPATIENT)
Dept: REHABILITATION | Age: 67
Discharge: HOME | End: 2022-06-09
Payer: MEDICARE

## 2022-06-06 VITALS
WEIGHT: 178.8 LBS | DIASTOLIC BLOOD PRESSURE: 86 MMHG | HEART RATE: 71 BPM | RESPIRATION RATE: 16 BRPM | HEIGHT: 63 IN | BODY MASS INDEX: 31.68 KG/M2 | OXYGEN SATURATION: 98 % | SYSTOLIC BLOOD PRESSURE: 133 MMHG | TEMPERATURE: 97.6 F

## 2022-06-06 LAB
ALBUMIN SERPL-MCNC: 3.7 G/DL (ref 3.2–4.6)
ANION GAP SERPL CALC-SCNC: 2 MMOL/L (ref 7–16)
APTT PPP: 25.1 SEC (ref 24.1–35.1)
BASOPHILS # BLD: 0 K/UL (ref 0–0.2)
BASOPHILS NFR BLD: 1 % (ref 0–2)
BUN SERPL-MCNC: 9 MG/DL (ref 8–23)
CALCIUM SERPL-MCNC: 9.6 MG/DL (ref 8.3–10.4)
CHLORIDE SERPL-SCNC: 97 MMOL/L (ref 98–107)
CO2 SERPL-SCNC: 30 MMOL/L (ref 21–32)
CREAT SERPL-MCNC: 0.77 MG/DL (ref 0.6–1)
DIFFERENTIAL METHOD BLD: NORMAL
EOSINOPHIL # BLD: 0.1 K/UL (ref 0–0.8)
EOSINOPHIL NFR BLD: 1 % (ref 0.5–7.8)
ERYTHROCYTE [DISTWIDTH] IN BLOOD BY AUTOMATED COUNT: 14.2 % (ref 11.9–14.6)
EST. AVERAGE GLUCOSE BLD GHB EST-MCNC: 157 MG/DL
GLUCOSE SERPL-MCNC: 250 MG/DL (ref 65–100)
HBA1C MFR BLD: 7.1 % (ref 4.2–6.3)
HCT VFR BLD AUTO: 38.3 % (ref 35.8–46.3)
HGB BLD-MCNC: 12.9 G/DL (ref 11.7–15.4)
IMM GRANULOCYTES # BLD AUTO: 0 K/UL (ref 0–0.5)
IMM GRANULOCYTES NFR BLD AUTO: 0 % (ref 0–5)
INR PPP: 0.9
LYMPHOCYTES # BLD: 1 K/UL (ref 0.5–4.6)
LYMPHOCYTES NFR BLD: 18 % (ref 13–44)
MCH RBC QN AUTO: 30.6 PG (ref 26.1–32.9)
MCHC RBC AUTO-ENTMCNC: 33.7 G/DL (ref 31.4–35)
MCV RBC AUTO: 90.8 FL (ref 79.6–97.8)
MONOCYTES # BLD: 0.4 K/UL (ref 0.1–1.3)
MONOCYTES NFR BLD: 7 % (ref 4–12)
NEUTS SEG # BLD: 4.1 K/UL (ref 1.7–8.2)
NEUTS SEG NFR BLD: 73 % (ref 43–78)
NRBC # BLD: 0 K/UL (ref 0–0.2)
PLATELET # BLD AUTO: 202 K/UL (ref 150–450)
PMV BLD AUTO: 11.7 FL (ref 9.4–12.3)
POTASSIUM SERPL-SCNC: 4.4 MMOL/L (ref 3.5–5.1)
PROTHROMBIN TIME: 12.2 SEC (ref 12.6–14.5)
RBC # BLD AUTO: 4.22 M/UL (ref 4.05–5.2)
SODIUM SERPL-SCNC: 129 MMOL/L (ref 136–145)
WBC # BLD AUTO: 5.6 K/UL (ref 4.3–11.1)

## 2022-06-06 PROCEDURE — 80048 BASIC METABOLIC PNL TOTAL CA: CPT

## 2022-06-06 PROCEDURE — 83036 HEMOGLOBIN GLYCOSYLATED A1C: CPT

## 2022-06-06 PROCEDURE — 82040 ASSAY OF SERUM ALBUMIN: CPT

## 2022-06-06 PROCEDURE — 85610 PROTHROMBIN TIME: CPT

## 2022-06-06 PROCEDURE — 85025 COMPLETE CBC W/AUTO DIFF WBC: CPT

## 2022-06-06 PROCEDURE — G0480 DRUG TEST DEF 1-7 CLASSES: HCPCS

## 2022-06-06 PROCEDURE — 98960 EDU&TRN PT SELF-MGMT NQHP 1: CPT

## 2022-06-06 PROCEDURE — 80323 ALKALOIDS NOS: CPT

## 2022-06-06 PROCEDURE — 85730 THROMBOPLASTIN TIME PARTIAL: CPT

## 2022-06-06 PROCEDURE — 97161 PT EVAL LOW COMPLEX 20 MIN: CPT

## 2022-06-06 PROCEDURE — 94760 N-INVAS EAR/PLS OXIMETRY 1: CPT

## 2022-06-06 RX ORDER — ACETAMINOPHEN 160 MG
TABLET,DISINTEGRATING ORAL DAILY
COMMUNITY

## 2022-06-06 RX ORDER — BUSPIRONE HYDROCHLORIDE 10 MG/1
10 TABLET ORAL 2 TIMES DAILY
COMMUNITY
Start: 2022-02-23

## 2022-06-06 RX ORDER — INSULIN ASPART 100 [IU]/ML
INJECTION, SOLUTION INTRAVENOUS; SUBCUTANEOUS AS NEEDED
COMMUNITY

## 2022-06-06 RX ORDER — POTASSIUM CHLORIDE 20 MEQ/1
20 TABLET, EXTENDED RELEASE ORAL DAILY
COMMUNITY
Start: 2022-04-27

## 2022-06-06 ASSESSMENT — KOOS JR
TWISING OR PIVOTING ON KNEE: 3
STANDING UPRIGHT: 3
HOW SEVERE IS YOUR KNEE STIFFNESS AFTER FIRST WAKING IN MORNING: 4
KOOS JR TOTAL INTERVAL SCORE: 31.307
STRAIGHTENING KNEE FULLY: 3
RISING FROM SITTING: 3
GOING UP OR DOWN STAIRS: 3
BENDING TO THE FLOOR TO PICK UP OBJECT: 3

## 2022-06-06 ASSESSMENT — PAIN DESCRIPTION - LOCATION: LOCATION: KNEE

## 2022-06-06 ASSESSMENT — PAIN DESCRIPTION - ORIENTATION: ORIENTATION: LEFT

## 2022-06-06 ASSESSMENT — PAIN SCALES - GENERAL: PAINLEVEL_OUTOF10: 5

## 2022-06-06 NOTE — PROGRESS NOTES
Results for Randi Chakraborty (MRN [id number]) as of 6/6/2022 13:49   Ref.  Range 6/6/2022 12:05   Sodium Latest Ref Range: 136 - 145 mmol/L 129 (L)   Potassium Latest Ref Range: 3.5 - 5.1 mmol/L 4.4   Chloride Latest Ref Range: 98 - 107 mmol/L 97 (L)   CO2 Latest Ref Range: 21 - 32 mmol/L 30   BUN,BUNPL Latest Ref Range: 8 - 23 MG/DL 9   Creatinine Latest Ref Range: 0.6 - 1.0 MG/DL 0.77   Anion Gap Latest Ref Range: 7 - 16 mmol/L 2 (L)   GFR Non- Latest Ref Range: >60 ml/min/1.73m2 >60   GFR African American Latest Ref Range: >60 ml/min/1.73m2 >60   GLUCOSE, FASTING,GF Latest Ref Range: 65 - 100 mg/dL 250 (H)   CALCIUM, SERUM, 607435 Latest Ref Range: 8.3 - 10.4 MG/DL 9.6   Albumin Latest Ref Range: 3.2 - 4.6 g/dL 3.7   Hemoglobin A1C Latest Ref Range: 4.20 - 6.30 % 7.1 (H)   eAG (mg/dL) Latest Units: mg/dL 157   WBC Latest Ref Range: 4.3 - 11.1 K/uL 5.6   RBC Latest Ref Range: 4.05 - 5.2 M/uL 4.22   Hemoglobin Quant Latest Ref Range: 11.7 - 15.4 g/dL 12.9   Hematocrit Latest Ref Range: 35.8 - 46.3 % 38.3   MCV Latest Ref Range: 79.6 - 97.8 FL 90.8   MCH Latest Ref Range: 26.1 - 32.9 PG 30.6   MCHC Latest Ref Range: 31.4 - 35.0 g/dL 33.7   MPV Latest Ref Range: 9.4 - 12.3 FL 11.7   RDW Latest Ref Range: 11.9 - 14.6 % 14.2   Platelet Count Latest Ref Range: 150 - 450 K/uL 202   Absolute Mono # Latest Ref Range: 0.1 - 1.3 K/UL 0.4   Eosinophils % Latest Ref Range: 0.5 - 7.8 % 1   Basophils Absolute Latest Ref Range: 0.0 - 0.2 K/UL 0.0   Differential Type Latest Units:   AUTOMATED   Seg Neutrophils Latest Ref Range: 43 - 78 % 73   Segs Absolute Latest Ref Range: 1.7 - 8.2 K/UL 4.1   Lymphocytes Latest Ref Range: 13 - 44 % 18   Absolute Lymph # Latest Ref Range: 0.5 - 4.6 K/UL 1.0   Monocytes Latest Ref Range: 4.0 - 12.0 % 7   Absolute Eos # Latest Ref Range: 0.0 - 0.8 K/UL 0.1   Basophils Latest Ref Range: 0.0 - 2.0 % 1   Immature Granulocytes Latest Ref Range: 0.0 - 5.0 % 0   Nucleated Red Blood Cells Latest Ref Range: 0.0 - 0.2 K/uL 0.00   Prothrombin Time Latest Ref Range: 12.6 - 14.5 sec 12.2 (L)   INR Latest Units:   0.9   PTT Latest Ref Range: 24.1 - 35.1 SEC 25.1   Absolute Immature Granulocyte Latest Ref Range: 0.0 - 0.5 K/UL 0.0

## 2022-06-06 NOTE — PROGRESS NOTES
PLEASE CONTINUE TAKING ALL PRESCRIPTION MEDICATIONS UP TO THE DAY OF SURGERY UNLESS OTHERWISE DIRECTED BELOW. DISCONTINUE all vitamins and supplements 21 days prior to surgery. DISCONTINUE Non-Steriodal Anti-Inflammatory (NSAIDS) such as Advil and Aleve 5 days prior to surgery. Home Medications to take  the day of surgery    Atorvastatin, Gabapentin, Xanax, Metoprolol, Cymbalta, Aspirin, Topamax, Potassium, Buspirone   Use/bring:  Spiriva Respimat   Continue basal rate on insulin pump     Home Medications   to Hold           Comments       On the day before surgery please take Acetaminophen 1000mg in the morning and then again before bed. You may substitute for Tylenol 650 mg. Please do not bring home medications with you on the day of surgery unless otherwise directed by your nurse. If you are instructed to bring home medications, please give them to your nurse as they will be administered by the nursing staff. If you have any questions, please call [facility] Yazmin Liu  [phone] or [facility] [place]  [phone]. A copy of this note was provided to the patient for reference.

## 2022-06-06 NOTE — PROGRESS NOTES
Mupirocin 2% ointment    Prevention of Infection after surgery is a goal of your care at [facility]. Infection prevention is a team effort between you, your doctor, and the staff at [facility]. We can help prevent infection after surgery by good hygiene, hand washing and use of mupirocin ointment. You will need to use Mupirocin ointment in your nose twice daily for five days, please carefully read the instructions below and follow ALL of them exactly. How to use Mupirocin ointment:   This medicine has been approved for use in the nose. You do not need to use it anywhere else.  Do not get any of it in your eyes or on your skin. If it does get on these areas, rinse it off right away.  Before using the medicine, gently blow your nose to clear the nostrils. Apply inside each nostril with a cotton tipped applicator.  This medicine is not for long term use.  Make sure your doctor knows if you are pregnant or breast feeding.  This medicine should not be used in children under 15years old, unless prescribed by your doctor. Mupirocin 2% ointment: Apply to each nostril, twice daily: once in the morning and once in the evening, for five days. If you have any questions, please call the Preassessment Department where you had your appointment. Please call between the hours of 8:30 am and 4:30 pm, Monday through Friday, excluding Holidays. Christine Dodson [phone], [address] [place] [address] [phone]. Please check off on the chart below each and every time that you use the ointment. All doses of the medication need to be to completed the night before your surgery date. Start date: _________6/17/22____     Day #     Date Morning dose Afternoon dose    One 6/17     Two 6/18     Three 6/19     Four 6/20     Five 6/21          It is important to bring this completed sheet to the hospital the day of surgery. Give it to the nurse who gets you ready for surgery.    If you forget to bring the sheet, the 10 doses will be started over   DO NOT bring the tube of Mupirocin with you. If you have not completed all 10 doses, it will be   given to you by your nurses in the hospital    Thank you! FREQUENTLY ASKED QUESTIONS:    What is Staph and MRSA? Staphylococcus aureus or Staph germs are very common. About 1 out of every 3 people (about 30%) carries Staph on their skin or in their nose. Methicillin-resistant Staphylococcus aureus or MRSA germs are a type of Staph germ that are very resistant to antibiotics. About 2 out of every 100 people (about 2%) carry MRSA on their skin or in their nose. Does this mean I have a Staph or MRSA infection in my nose? Being colonized or a carrier means you carry the germ but have no active signs or symptoms of infection. You are not sick with a MRSA or Staph infection. How did I get Staph or MRSA? Anyone can get Staph or MRSA on their body by either having contact with an infected wound or sharing personal items that have touched infected skin. People at higher risk for contacting MRSA or Staph are those in close contact with shared equipment or supplies. For example, athletes,  and school students, nursing home residents and those receiving inpatient medical care are at higher risk. Can my family or friends get Staph or MRSA from me? The chance of family or friends getting Staph or MRSA from you is very low. Bathing frequently, frequent hand washing and not sharing personal items like towels or razors will help prevent spreading Staph or MRSA to others. Your family or friends should wash hands with soap and water or use antibacterial gel before and after visiting with you. As long as family and friends, including children, are healthyit is okay to visit with your loved ones. Will this cancel my surgery? No, being colonized with Staph or MRSA will not cancel your surgery.  However, an infection can start if Staph or MRSA germs enter a break in

## 2022-06-06 NOTE — PROGRESS NOTES
How to Use Your Incentive Spirometer       About Your Incentive Spirometer  An incentive spirometer is a device that will expand your lungs by helping you to breathe more deeply and fully. The parts of your incentive spirometer are labeled in Figure 1. Using your incentive spirometer  When you're using your incentive spirometer, make sure to breathe through your mouth. If you breathe through your nose, the incentive spirometer won't work properly. You can hold your nose if you have trouble. DO NOT BLOW INTO THE DEVICE. If you feel dizzy at any time, stop and rest. Try again at a later time.  Sit upright in a chair or in bed. Hold the incentive spirometer at eye level.  Put the mouthpiece in your mouth and close your lips tightly around it. Slowly breathe out (exhale) completely.  Breathe in (inhale) slowly through your mouth as deeply as you can. As you take the breath, you will see the piston rise inside the large column. While the piston rises, the indicator on the right should move upwards. It should stay in between the 2 arrows (see Figure 1).  Try to get the piston as high as you can, while keeping the indicator between the arrows. If the indicator doesn't stay between the arrows, you're breathing either too fast or too slow.  When you get it as high as you can, hold your breath for 10 seconds, or as long as possible. While you're holding your breath, the piston will slowly fall to the base of the spirometer.  Once the piston reaches the bottom of the spirometer, breathe out slowly through your mouth. Rest for a few seconds.  Repeat 10 times. Try to get the piston to the same level with each breath.  After each set of 10 breaths, try to cough as coughing will help loosen or clear any mucus in your lungs.  Put the marker at the level the piston reached on your incentive spirometer. This will be your goal next time. Repeat these steps every hour that you're awake.   Cover the mouthpiece of the incentive spirometer when you aren't using it

## 2022-06-06 NOTE — PROGRESS NOTES
Patient verified name and . Order for consent  found in EHR and matches case posting; patient verified. Type 3 surgery, joint camp assessment complete. Labs per surgeon: CBC,BMP, PT/PTT, Albumin, Hgb A1c:  Sodium is 129~ Jude Cabral NP made aware via  message on voicemail; Glucose 250~letter sent to surgeon on anesthesia behalf per protocol; other results are within anesthesia guidelines and routed via fax to surgeon, Dr. Tin Garcia for review. nicotine ; results pending  Labs per anesthesia protocol: no additional  EKG:completed 22 and within anesthesia guidelines; ECHO 3/1/21; stress 20; cardio office note/preop exam per Dr. Alejandro Alaniz 22~all notes available for anesthesia reference in Chart Review. Hospital approved surgical skin cleanser and instructions to return bottle on DOS given per hospital policy. Patient provided with handouts including Guide to Surgery, Pain Management, Hand Hygiene, Blood Transfusion Education, and [facility] [place] [facility] Brochure. Patient answered medical/surgical history questions at their best of ability. All prior to admission medications documented in [facility]. Original medication prescription bottle not visualized during patient appointment. Patient instructed to hold all vitamins 3 weeks prior to surgery and NSAIDS 5 days prior to surgery. Patient teach back successful and patient demonstrates knowledge of instruction.

## 2022-06-06 NOTE — PROGRESS NOTES
Prescription for Mupirocin ointment 22g tube, apply intranasally to both nostrils BID x5 days pre-operatively or for a total of 10 doses; called to  Pooja  at  [phone]

## 2022-06-06 NOTE — PROGRESS NOTES
Stefani Mcelroy  : 1955  Primary: Medicare Part A And B  Secondary: [place] at [address] [facility]  [address] , [facility] [address]  [phone]      Physical Therapy Prehab Evaluation Summary:2022   Time In/Out   PT Charge Capture  Episode     MEDICAL/REFERRING DIAGNOSIS: Unilateral primary osteoarthritis, left knee [M17.12]  Unspecified acquired deformity of left lower leg [M21.962]  REFERRING PHYSICIAN: Jacki Chatman MD    ICD-10: Treatment Diagnosis:   · Pain in Left Knee (M25.562)  · Stiffness of Left Knee, Not elsewhere classified (L44.984)    DATE OF SURGERY: 22  Assessment:   COMMENTS:  Scheduled for L TKA. Independent with gait and ADLs. PROBLEM LIST:   (Impacting functional limitations):  Ms. Wan presents with the following lower extremity(s) problems:  1. Gait  2. Strength  3. Range of Motion  4. Home Exercise Program  5. Pain INTERVENTIONS PLANNED:   (Benefits and precautions of physical therapy have been discussed with the patient.)  1. Home Exercise Program  2. Educational Discussion       GOALS: (Goals have been discussed and agreed upon with patient.)  Discharge Goals: Time Frame: 1 Day  1. Patient will demonstrate independence with a home exercise program designed to increase functional technique and pain control to minimize functional deficits and optimize patient for total joint replacement.     Subjective:   Past Medical History/Comorbidities:   Ms. Wan  has a past medical history of Anxiety, Arthritis, Asthma, CAD (coronary artery disease), Cervical spondylosis with myelopathy, Chronic obstructive pulmonary disease ([address] Chronic pain, Depression, Diabetes ([address] Diverticulosis, DJD (degenerative joint disease), Fibromyalgia, Former smoker, GERD (gastroesophageal reflux disease), History of blood transfusion, Hormone replacement therapy, Hyperlipidemia, Hypertension, Insomnia, Left knee pain, Migraine, Mitral valve regurgitation, Psychiatric diagnosis, RA (rheumatoid arthritis) ([facility] Suicide attempt ([facility] Unspecified adverse effect of anesthesia, Vitamin B 12 deficiency, and Vitamin D deficiency. Ms. Julio C Walker  has a past surgical history that includes orthopedic surgery (N/A); orthopedic surgery (Right); orthopedic surgery (Right); orthopedic surgery (Bilateral); orthopedic surgery; orthopedic surgery; Hysterectomy (N/A, );  section; Cataract removal (Bilateral); other surgical history; neurological surgery (); Colonoscopy; and Cardiac catheterization (2019).     Allergies:  Hydroxychloroquine, Hydroxyzine pamoate, Nortriptyline, Other, Tizanidine, Adhesive tape, Doxepin, Elavil [amitriptyline], Temazepam, and Trazodone    Current Medications:  Refer to pre-assessment nursing note    Home Set-Up/Prior Level of Function:  Lives With: Spouse  Type of Home: Mobile home  Home Layout: One level  Home Access: Ramped entrance  Bathroom Shower/Tub: Tub/Shower unit  Bathroom Equipment: Shower chair  Home Equipment: Cane    Dominant Side:  Dominant Hand: : Right      Current Functional Status:   Ambulation:  [x] Independent  [] Walk Indoors Only  [] Walk Outdoors  [x] Use Assistive Device-rarely uses cane  [] Use Wheelchair Only Dressing:  [x] Independent  Requires Assistance from Someone for:  [] Sock/Shoes  [] Pants  [] Everything   Bathing/Showering:   [x] Independent  [] Requires Assistance from Someone  [] [address]:  [] Routine house and yard work  [x] Light Housework Only  [] None     Objective:   PAIN:   Pre-Treatment Pain  Pain Assessment: 0-10  Pain Level: 5  Pain Location: Knee  Pain Orientation: Left    Post Treatment: 5    Outcome Measure:   HOOS-JR:       KOOS-JR:  KOOS, Jr. Knee Survey Score: 22    LOWER EXTREMITY GROSS EVALUATION:  RIGHT LE   Within Functional Limits   Abnormal   Comments   Strength [x] []     Range of Motion [x] []        LEFT LE Within Functional Limits   Abnormal   Comments   Strength [] [] Strength LLE  L Knee Flexion: 4-/5  L Knee Extension: 4-/5   Range of Motion [] [] AROM LLE (degrees)  L Knee Flexion 0-145: 120  L Knee Extension 0: -5     UPPER EXTREMITY GROSS EVALUATION:     Within Functional Limits   Abnormal   Comments   Strength [] []    Range of Motion [] []      SENSATION  Sensation []       COGNITION/  PERCEPTION: Intact Impaired (Comments):   Orientation []     Vision []     Hearing []     Cognition  []       TRANSFERS: I Mod I S SBA CGA Min Mod Max Total  NT x2 Comments:   Sit to Stand [x] [] [] [] [] [] [] [] [] [] []    Stand to Sit [x] [] [] [] [] [] [] [] [] [] []    Stand pivot [x] [] [] [] [] [] [] [] [] [] []     [] [] [] [] [] [] [] [] [] [] []    I=Independent, Mod I=Modified Independent, S=Supervision, SBA=Standby Assistance, CGA=Contact Guard Assistance,   Min=Minimal Assistance, Mod=Moderate Assistance, Max=Maximal Assistance, Total=Total Assistance, NT=Not Tested    BALANCE: Good Fair+ Fair Fair- Poor NT Comments   Sitting Static [x] [] [] [] [] []    Sitting Dynamic [x] [] [] [] [] []              Standing Static [x] [] [] [] [] []    Standing Dynamic [] [x] [] [] [] []      Postural Assessment: Forward Head  Rounded Shoulders    GAIT: I Mod I S SBA CGA Min Mod Max Total  NT x2 Comments:   Level of Assistance [x] [] [] [] [] [] [] [] [] [] []    Weightbearing Status       Distance  150 feet    Gait Quality Antalgic and Decreased stance    DME None     Stairs      Ramp     I=Independent, Mod I=Modified Independent, S=Supervision, SBA=Standby Assistance, CGA=Contact Guard Assistance,   Min=Minimal Assistance, Mod=Moderate Assistance, Max=Maximal Assistance, Total=Total Assistance, NT=Not Tested    TREATMENT:   EVALUATION: LOW COMPLEXITY: (Untimed Charge)    TREATMENT PLAN:   Effective Dates: 6/6/2022 TO 6/6/2022. Treatment/Session Assessment:  Patient was instructed in PT- HEP to increase strength and ROM in LEs.   Answered all questions. Frequency/Duration: Patient to continue to perform home exercise program at least twice per day up until her surgery. EDUCATION:      MEDICAL NECESSITY: Ms. Jocelyn Wood is expected to optimize herlower extremity strength and ROM in preparation for joint replacement surgery. REASON FOR CONTINUED SERVICES: Achieve baseline assesment of musculoskeletal system, functional mobility and home environment. , educate in PT HEP in preparation for surgery, educate in hospital plan of care. COMPLIANCE WITH PROGRAM/EXERCISE: Anticipate compliance. Will assess as treatment progresses. TOTAL TREATMENT DURATION:  Time In: 1300  Time Out: 1330  Minutes: 30    Regarding Kurtis Peoples's therapy, I certify that the treatment plan above will be carried out by a therapist or under their direction.   Thank you for this referral,  Ann Marie Dent, PT

## 2022-06-06 NOTE — PROGRESS NOTES
Dr Daysi Ugarte:      Patient: Alejandro Pascual, [address] 6/22/22    During a recent visit to the surgical preadmission testing center, the above mentioned patient was found to have a non-fasting blood glucose level of 250 mg/dL. This may indicate inadequate diabetic management and raises concerns that the patient is not medically optimized for surgery. It is our standard practice to postpone elective surgery for patients who present fasting blood glucose level >300 mg/dL on the day of their procedure. The patient has been advised of this policy and counseled on the importance of glucose control. We feel that this patient is at increased risk of cancellation; however, their blood glucose may be in acceptable range when they are NPO. Therefore, we will leave the decision to you whether to delay the surgery and refer the patient to their primary care provider or keep them as currently scheduled. Our goal is to prevent as many delays and cancellations as possible while ensuring patient safety.     Sincerely,    Jayesh [facility]

## 2022-06-06 NOTE — CARE COORDINATION
[facility] Camp Case Management note:  Patient screened in Prehab for discharge planning needs. Patient scheduled for a future total joint replacement. We discussed Home Health and equipment needed after surgery. List of Home Health providers offered. Patient lives in [facility]. He is requesting we use [facility] .   He will need a walker and 3-1 BSC

## 2022-06-07 ENCOUNTER — TELEPHONE (OUTPATIENT)
Dept: ORTHOPEDIC SURGERY | Age: 67
End: 2022-06-07

## 2022-06-07 NOTE — OR NURSING
Total Joint Surgery Preoperative Chart Review      Patient ID:  Dangelo Castaneda  [id number]  46 y.o.  1955  Surgeon: Dr. Franklyn Thurston  Date of Surgery: 6/22/2022  Procedure: Total Left Knee Arthroplasty  Primary Care Physician: Shaan Lo MD None  Specialty Physician(s):      Subjective:   Dangelo Castaneda is a 78 y.o. White (non-) female who presents for preoperative evaluation for Total Left Knee arthroplasty. This is a preoperative chart review note based on data collected by the nurse at the surgical Pre-Assessment visit. Past Medical History:   Diagnosis Date    Anxiety     Arthritis     Asthma     uses inhaler daily    CAD (coronary artery disease) 04/24/2019    mi  stent x 1-- followed by dr Sadia Crews Cervical spondylosis with myelopathy 06/17/2015    Chronic obstructive pulmonary disease (HCC)     daily inhaler    Chronic pain     back  - and all over pain     Depression     Diabetes ([address]     type 1; followed by Endocrinology; sqbs am avg -- hypo at 60's -- uses insulin pump; Hgb A1c 7.5 on 3/14/22    Diverticulosis     resolved at present - sees DR Spencer Glover when needed    DJD (degenerative joint disease)     Fibromyalgia     all over     Former smoker     quit 2002    GERD (gastroesophageal reflux disease)     CONTROLLED WITH MED    History of blood transfusion     after delivery of her son    Hormone replacement therapy     Hyperlipidemia     Hypertension     controlled with med    Insomnia     Left knee pain     Migraine     Mitral valve regurgitation     echo 3/1/21: There was mild to moderate regurgitation.     Psychiatric diagnosis     bipolar~pt states resolved    RA (rheumatoid arthritis) ([address] dx yrs ago    does not see a rheumatologist    Suicide attempt ([address]     x 2    Unspecified adverse effect of anesthesia     \"woke up\"during  surgery on toe    Vitamin B 12 deficiency     Vitamin D deficiency       Past Surgical History:   Procedure Laterality Date    CARDIAC CATHETERIZATION  2019    cadiac stent x1    CATARACT REMOVAL Bilateral      SECTION      times 2    COLONOSCOPY      HYSTERECTOMY N/A 1984    total     NEUROLOGICAL SURGERY  2019    lower back    ORTHOPEDIC SURGERY N/A     neck ( plate and screws was put in )     ORTHOPEDIC SURGERY Right     right rotator cuff     ORTHOPEDIC SURGERY Right     shaved down bone in great toe     ORTHOPEDIC SURGERY Bilateral     shoulder scopes    ORTHOPEDIC SURGERY      right foot \"bone removal\"     ORTHOPEDIC SURGERY      left knee scope    OTHER SURGICAL HISTORY      insulin pump     Family History   Problem Relation Age of Onset    Heart Disease Paternal Grandfather     Cancer Father     Lung Disease Father     Cancer Maternal Aunt     Heart Disease Paternal Grandmother       Social History     Tobacco Use    Smoking status: Former Smoker     Packs/day: 1.00     Years: 15.00     Pack years: 15.00     Types: Cigarettes     Quit date: 6/10/2002     Years since quittin.0    Smokeless tobacco: Never Used   Substance Use Topics    Alcohol use: Yes     Alcohol/week: 3.0 standard drinks     Types: 1 Glasses of wine, 1 Cans of beer, 1 Shots of liquor per week     Comment: daily~a beer; liquor occasionally       Prior to Admission medications    Medication Sig Start Date End Date Taking?  Authorizing Provider   Injection Device for Insulin JULIANE Novolog insulin on insulin pump 3/23/22  Yes Historical Provider, MD   busPIRone (BUSPAR) 10 MG tablet Take 10 mg by mouth 2 times daily 22  Yes Historical Provider, MD   potassium chloride (KLOR-CON M) 20 MEQ extended release tablet Take 20 mEq by mouth daily 22  Yes Historical Provider, MD   insulin aspart (NOVOLOG) 100 UNIT/ML injection vial Inject into the skin as needed for High Blood Sugar Pt checks sqbs 5 times/daily:    Blood sugar less than 150=0 units; 151-199= 2 units; 200-249 = 4 units; 250-299= 6 units; 300-349= 8 units; 340 and above 10 units   Yes Historical Provider, MD   Cholecalciferol (VITAMIN D3) 50 MCG (2000 UT) CAPS Take by mouth daily   Yes Historical Provider, MD   Lancets MISC Check glucose ac and qhs. (E 10.10.) 10/31/21   Ar Automatic Reconciliation   albuterol sulfate  (90 Base) MCG/ACT inhaler Inhale 1 puff into the lungs every 6 hours as needed 3/20/18   Ar Automatic Reconciliation   ALPRAZolam (XANAX) 2 MG tablet Take 2 mg by mouth 3 times daily as needed. Ar Automatic Reconciliation   ARIPiprazole (ABILIFY) 10 MG tablet Take 5 mg by mouth    Ar Automatic Reconciliation   aspirin 81 MG chewable tablet 81 mg daily CHEW AND SWALLOW 1 TABLET BY MOUTH DAILY 11/18/21   Ar Automatic Reconciliation   atorvastatin (LIPITOR) 80 MG tablet Take 80 mg by mouth daily TAKE 1 TABLET BY MOUTH DAILY 3/23/22   Ar Automatic Reconciliation   DULoxetine (CYMBALTA) 60 MG extended release capsule Take 60 mg by mouth 2 times daily  6/4/20   Ar Automatic Reconciliation   fluconazole (DIFLUCAN) 150 MG tablet Take 150 mg by mouth every 30 days    Ar Automatic Reconciliation   fluticasone-salmeterol (ADVAIR) 250-50 MCG/DOSE AEPB Inhale 1 puff into the lungs 2 times daily 8/6/20   Ar Automatic Reconciliation   furosemide (LASIX) 20 MG tablet Take 20 mg by mouth daily 2/4/22   Ar Automatic Reconciliation   gabapentin (NEURONTIN) 300 MG capsule Take 300 mg by mouth 3 times daily.     Ar Automatic Reconciliation   insulin aspart (NOVOLOG) 100 UNIT/ML injection pen For Blood Sugar (mg/dL) of: Less than 150 = 0 [id number] -199 = 2 ixmpr505 -249 = 4 dxgem140 -299 = 6 jnltf545 -349 = 8 ekami137 and above = 10 units 10/31/21   Ar Automatic Reconciliation   insulin glargine (LANTUS) 100 UNIT/ML injection vial Lantus 40 units sq in am and 10 units sq in pm. 11/1/21   Ar Automatic Reconciliation   lisinopril (PRINIVIL;ZESTRIL) 40 MG tablet Take 20 mg by mouth daily 10/31/21   Ar Automatic Reconciliation   meloxicam (MOBIC) 7.5 MG tablet Take by mouth daily    Ar Automatic Reconciliation   metoprolol succinate (TOPROL XL) 50 MG extended release tablet Take 50 mg by mouth daily 2/4/22   Ar Automatic Reconciliation   nitroGLYCERIN (NITROSTAT) 0.4 MG SL tablet Place 0.4 mg under the tongue as needed 6/15/19   Ar Automatic Reconciliation   omeprazole (PRILOSEC) 20 MG delayed release capsule Take 20 mg by mouth daily 8/14/20   Ar Automatic Reconciliation   ondansetron (ZOFRAN) 4 MG tablet Take 4 mg by mouth every 8 hours as needed 5/31/19   Ar Automatic Reconciliation   SUMAtriptan (IMITREX) 100 MG tablet Take 50 mg by mouth once as needed    Ar Automatic Reconciliation   tiotropium (SPIRIVA RESPIMAT) 1.25 MCG/ACT AERS inhaler Inhale 2 puffs into the lungs daily 12/3/19   Ar Automatic Reconciliation   topiramate (TOPAMAX) 50 MG tablet Take 50 mg by mouth daily    Ar Automatic Reconciliation     Allergies   Allergen Reactions    Hydroxychloroquine Hives    Hydroxyzine Pamoate Other (See Comments)     Insomnia     Nortriptyline Hives    Other     Tizanidine Other (See Comments)     Sleep walking and  nocturnal eating     Adhesive Tape Hives and Nausea And Vomiting     elavil    Doxepin Hives and Nausea And Vomiting    Elavil [Amitriptyline] Hives and Nausea And Vomiting     elavil    Temazepam Hives and Nausea And Vomiting    Trazodone Nausea And Vomiting          Objective:     Physical Exam:   Patient Vitals for the past 24 hrs:   Temp Pulse Resp BP SpO2   06/06/22 1300 -- 71 -- -- 98 %   06/06/22 1207 97.6 °F (36.4 °C) 69 16 133/86 100 %       ECG:    No results found for this or any previous visit (from the past 4464 hour(s)).     Data Review:   Labs:   Recent Results (from the past 24 hour(s))   CBC with Auto Differential    Collection Time: 06/06/22 12:05 PM   Result Value Ref Range    WBC 5.6 4.3 - 11.1 K/uL    RBC 4.22 4.05 - 5.2 M/uL    Hemoglobin 12.9 11.7 - 15.4 g/dL    Hematocrit 38.3 35.8 - 46.3 %    MCV 90.8 79.6 - 97.8 FL    MCH 30.6 26.1 - 32.9 PG    MCHC 33.7 31.4 - 35.0 g/dL    RDW 14.2 11.9 - 14.6 %    Platelets 784 101 - 901 K/uL    MPV 11.7 9.4 - 12.3 FL    nRBC 0.00 0.0 - 0.2 K/uL    Differential Type AUTOMATED      Seg Neutrophils 73 43 - 78 %    Lymphocytes 18 13 - 44 %    Monocytes 7 4.0 - 12.0 %    Eosinophils % 1 0.5 - 7.8 %    Basophils 1 0.0 - 2.0 %    Immature Granulocytes 0 0.0 - 5.0 %    Segs Absolute 4.1 1.7 - 8.2 K/UL    Absolute Lymph # 1.0 0.5 - 4.6 K/UL    Absolute Mono # 0.4 0.1 - 1.3 K/UL    Absolute Eos # 0.1 0.0 - 0.8 K/UL    Basophils Absolute 0.0 0.0 - 0.2 K/UL    Absolute Immature Granulocyte 0.0 0.0 - 0.5 K/UL   Basic Metabolic Panel    Collection Time: 06/06/22 12:05 PM   Result Value Ref Range    Sodium 129 (L) 136 - 145 mmol/L    Potassium 4.4 3.5 - 5.1 mmol/L    Chloride 97 (L) 98 - 107 mmol/L    CO2 30 21 - 32 mmol/L    Anion Gap 2 (L) 7 - 16 mmol/L    Glucose 250 (H) 65 - 100 mg/dL    BUN 9 8 - 23 MG/DL    CREATININE 0.77 0.6 - 1.0 MG/DL    GFR African American >60 >60 ml/min/1.73m2    GFR Non- >60 >60 ml/min/1.73m2    Calcium 9.6 8.3 - 10.4 MG/DL   APTT    Collection Time: 06/06/22 12:05 PM   Result Value Ref Range    PTT 25.1 24.1 - 35.1 SEC   Protime-INR    Collection Time: 06/06/22 12:05 PM   Result Value Ref Range    Protime 12.2 (L) 12.6 - 14.5 sec    INR 0.9     Hemoglobin A1C    Collection Time: 06/06/22 12:05 PM   Result Value Ref Range    Hemoglobin A1C 7.1 (H) 4.20 - 6.30 %    eAG 157 mg/dL   Albumin    Collection Time: 06/06/22 12:05 PM   Result Value Ref Range    Albumin 3.7 3.2 - 4.6 g/dL         Problem List:  )  Patient Active Problem List   Diagnosis    Vitamin B12 deficiency    Hyperkalemia    Hypokalemia    Depression, major, recurrent, mild (HCC)    DKA, type 1 ([facility]    Venous insufficiency    DKA (diabetic ketoacidosis) ([facility]    BIANCA (acute kidney injury) ([facility]    Leukocytosis    Lumbar stenosis with neurogenic claudication    Breast mass, right    Septic shock (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 O2 prn per respiratory protocol.      Signed By: JUSTIN Persaud - CNP-C    June 7, 2022

## 2022-06-07 NOTE — PROGRESS NOTES
22 1300   Oxygen Therapy/Pulse Ox   O2 Therapy Room air   Heart Rate 71   SpO2 98 %     Initial respiratory Assessment completed with pt. Pt was interviewed and evaluated in Joint camp prior to surgery. Patient ID:  Winnie Deng  [id number]  89 y.o.  1955  Surgeon: Rupali Ocampo  Date of Surgery: [email or web address]  Procedure: Total Left Knee Arthroplasty  Primary Care Physician: Mila Colin MD None  Specialists:       SAT   99 %  HR 71    FEV1:1.61  % PREDICTED:  70 %    BS:CLEAR      Pt taught proper COUGH technique  IS REVIEWED WITH PT AS WELL AS BENEFITS OF USING IS IN SEDENTARY PTS. DIAPHRAGMATIC BREATHING EXERCISE INSTRUCTIONS GIVEN    History of smokin PPD FOR 30 YEARS                 Quit date:       6/10/2002  Secondhand smoke:PARENTS    Past procedures with Oxygen desaturation or delayed awakening:DENIES    Past Medical History:   Diagnosis Date    Anxiety     Arthritis     Asthma     uses inhaler daily    CAD (coronary artery disease) 2019    mi  stent x 1-- followed by dr Nguyen Jung Cervical spondylosis with myelopathy 2015    Chronic obstructive pulmonary disease (HCC)     daily inhaler    Chronic pain     back  - and all over pain     Depression     Diabetes ([address]     type 1; followed by Endocrinology; sqbs am avg -- hypo at 60's -- uses insulin pump; Hgb A1c 7.5 on 3/14/22    Diverticulosis     resolved at present - sees DR Richard Younger when needed    DJD (degenerative joint disease)     Fibromyalgia     all over     Former smoker     quit     GERD (gastroesophageal reflux disease)     CONTROLLED WITH MED    History of blood transfusion     after delivery of her son    Hormone replacement therapy     Hyperlipidemia     Hypertension     controlled with med    Insomnia     Left knee pain     Migraine     Mitral valve regurgitation     echo 3/1/21: There was mild to moderate regurgitation.     Psychiatric diagnosis     bipolar~pt states resolved    RA (rheumatoid arthritis) ([facility] dx yrs ago    does not see a rheumatologist    Suicide attempt ([facility]     x 2    Unspecified adverse effect of anesthesia     \"woke up\"during  surgery on toe    Vitamin B 12 deficiency     Vitamin D deficiency       COPD- COPD ED DONE,ASTHMA  HX OF PNA  Respiratory history:DENIES SOB                                                                  Respiratory meds:  SPIRIVA - NO RESCUE MDI'S    FAMILY PRESENT:           YES    PAST SLEEP STUDY:                     YES- IN LAB STUDY 2 YEARS AGO  HX OF LEONCIO:                                       DENIES  LEONCIO assessment:     DANGERS OF UNTREATED LEONCIO EXPLAINED TO PT.                                          SLEEPS ON SIDE         PHYSICAL EXAM   Body mass index is 31.67 kg/m². Vitals:    06/06/22 1300   BP:    Pulse: (P) 71   Resp:    Temp:    SpO2: (P) 98%     Neck circumference:   37.5   cm    Loud snoring:                                             SOME  Witnessed apnea or wakening gasping or choking:        DENIES        Awakens with headaches:                                               DENIES  Morning or daytime tiredness/ sleepiness:                          TIRED  Dry mouth or sore throat in morning:            YES                                             Cavazos stage:  3                                   SACS score:9  Stop Bang                                CS HS  RESPIRATORY ASSESSMENT Q SHIFT   O2 PRN    ALBUTEROL  NEBULIZER Q6 PRN WHEEZING                                                Referrals:  DECLINED  Pt.  Phone Number:

## 2022-06-07 NOTE — TELEPHONE ENCOUNTER
Do you show  Pt  Having  A  CT  On  6/16  At  [address]?   She is  Not  Sure  Of this appt  And  If it is related to her upcoming surgery  Please call

## 2022-06-07 NOTE — TELEPHONE ENCOUNTER
I called the patient back and gave her the number to reach [facility] to check her appointment date. These appointments do not show up in our system.

## 2022-06-09 LAB
COTININE SERPL-MCNC: <1 NG/ML
NICOTINE SERPL-MCNC: <1 NG/ML

## 2022-06-13 NOTE — PERIOP NOTE
Nicotine and Metabolites  Order: [id number]   Status: Final result     Visible to patient: Yes (not seen)     Next appt: 07/14/2022 at 09:25 AM in Orthopedic Surgery Prince Benito MD)     0 Result Notes     Ref Range & Units 6/6/22 1205   Nicotine ng/mL <1.0    Comment: (NOTE)   This test was developed and its performance characteristics   determined by [facility]. It has not been cleared or approved   by the Food and Drug Administration. Nicotine levels greater than 2.0 are consistent with the use of   tobacco or tobacco cessation products. Cotinine ng/mL <1.0    Comment: (NOTE)   This test was developed and its performance characteristics   determined by [facility]. It has not been cleared or approved   by the Food and Drug Administration. Cotinine levels greater than 20.0 are consistent with the use of   tobacco or tobacco cessation products.    Performed At: [facility] & [address] [id number]   Clayton Cardenas MD QV:[id number]    [facility]              Specimen Collected: 06/06/22 12:05 Last Resulted: 06/09/22 16:36

## 2022-06-15 ENCOUNTER — TELEPHONE (OUTPATIENT)
Dept: ORTHOPEDIC SURGERY | Age: 67
End: 2022-06-15

## 2022-06-15 DIAGNOSIS — M17.12 PRIMARY OSTEOARTHRITIS OF LEFT KNEE: ICD-10-CM

## 2022-06-15 DIAGNOSIS — M21.962 ACQUIRED DEFORMITY OF LEFT KNEE: Primary | ICD-10-CM

## 2022-06-15 NOTE — TELEPHONE ENCOUNTER
I called and spoke to the patient in regards to same day discharge for her surgery. She would not like to go home the same day.

## 2022-06-17 DIAGNOSIS — G89.18 POSTOPERATIVE PAIN: Primary | ICD-10-CM

## 2022-06-17 DIAGNOSIS — M21.962 ACQUIRED DEFORMITY OF LEFT KNEE: ICD-10-CM

## 2022-06-17 DIAGNOSIS — M17.12 PRIMARY OSTEOARTHRITIS OF LEFT KNEE: ICD-10-CM

## 2022-06-17 RX ORDER — MELOXICAM 7.5 MG/1
7.5 TABLET ORAL DAILY
Qty: 30 TABLET | Refills: 2 | Status: SHIPPED | OUTPATIENT
Start: 2022-06-17

## 2022-06-17 RX ORDER — HYDROMORPHONE HYDROCHLORIDE 2 MG/1
2 TABLET ORAL EVERY 4 HOURS PRN
Qty: 40 TABLET | Refills: 0 | Status: SHIPPED | OUTPATIENT
Start: 2022-06-17 | End: 2022-06-24

## 2022-06-17 RX ORDER — ACETAMINOPHEN 500 MG
1000 TABLET ORAL EVERY 6 HOURS PRN
Qty: 180 TABLET | Refills: 2 | Status: SHIPPED | OUTPATIENT
Start: 2022-06-17

## 2022-06-17 RX ORDER — ASPIRIN 81 MG/1
81 TABLET ORAL 2 TIMES DAILY
Qty: 60 TABLET | Refills: 0 | Status: SHIPPED | OUTPATIENT
Start: 2022-06-17

## 2022-06-17 RX ORDER — CYCLOBENZAPRINE HCL 5 MG
5 TABLET ORAL 3 TIMES DAILY PRN
Qty: 30 TABLET | Refills: 0 | Status: SHIPPED | OUTPATIENT
Start: 2022-06-17 | End: 2022-06-27

## 2022-06-17 RX ORDER — SENNA PLUS 8.6 MG/1
1 TABLET ORAL 2 TIMES DAILY
Qty: 30 TABLET | Refills: 2 | Status: SHIPPED | OUTPATIENT
Start: 2022-06-17

## 2022-06-17 RX ORDER — ZOLPIDEM TARTRATE 10 MG/1
10 TABLET ORAL NIGHTLY PRN
Qty: 30 TABLET | Refills: 0 | Status: SHIPPED | OUTPATIENT
Start: 2022-06-17 | End: 2022-07-17

## 2022-06-17 RX ORDER — ONDANSETRON 4 MG/1
4 TABLET, FILM COATED ORAL 3 TIMES DAILY PRN
Qty: 30 TABLET | Refills: 1 | Status: SHIPPED | OUTPATIENT
Start: 2022-06-17

## 2022-06-22 ENCOUNTER — ANESTHESIA (OUTPATIENT)
Dept: SURGERY | Age: 67
DRG: 470 | End: 2022-06-22
Payer: MEDICARE

## 2022-06-22 ENCOUNTER — ANESTHESIA EVENT (OUTPATIENT)
Dept: SURGERY | Age: 67
DRG: 470 | End: 2022-06-22
Payer: MEDICARE

## 2022-06-22 ENCOUNTER — HOSPITAL ENCOUNTER (INPATIENT)
Age: 67
LOS: 2 days | Discharge: HOME HEALTH | DRG: 470 | End: 2022-06-24
Attending: ORTHOPAEDIC SURGERY | Admitting: ORTHOPAEDIC SURGERY
Payer: MEDICARE

## 2022-06-22 DIAGNOSIS — M17.12 OSTEOARTHRITIS OF LEFT KNEE, UNSPECIFIED OSTEOARTHRITIS TYPE: Primary | ICD-10-CM

## 2022-06-22 LAB
GLUCOSE BLD STRIP.AUTO-MCNC: 147 MG/DL (ref 65–100)
GLUCOSE BLD STRIP.AUTO-MCNC: 194 MG/DL (ref 65–100)
GLUCOSE BLD STRIP.AUTO-MCNC: 245 MG/DL (ref 65–100)
GLUCOSE BLD STRIP.AUTO-MCNC: 329 MG/DL (ref 65–100)
GLUCOSE BLD STRIP.AUTO-MCNC: 96 MG/DL (ref 65–100)
POTASSIUM SERPL-SCNC: 4.7 MMOL/L (ref 3.5–5.1)
SERVICE CMNT-IMP: ABNORMAL
SERVICE CMNT-IMP: NORMAL

## 2022-06-22 PROCEDURE — 6370000000 HC RX 637 (ALT 250 FOR IP): Performed by: NURSE PRACTITIONER

## 2022-06-22 PROCEDURE — 6360000002 HC RX W HCPCS: Performed by: NURSE ANESTHETIST, CERTIFIED REGISTERED

## 2022-06-22 PROCEDURE — 64447 NJX AA&/STRD FEMORAL NRV IMG: CPT | Performed by: ANESTHESIOLOGY

## 2022-06-22 PROCEDURE — 2580000003 HC RX 258: Performed by: NURSE PRACTITIONER

## 2022-06-22 PROCEDURE — 0SRD0J9 REPLACEMENT OF LEFT KNEE JOINT WITH SYNTHETIC SUBSTITUTE, CEMENTED, OPEN APPROACH: ICD-10-PCS | Performed by: ORTHOPAEDIC SURGERY

## 2022-06-22 PROCEDURE — 6360000002 HC RX W HCPCS: Performed by: NURSE PRACTITIONER

## 2022-06-22 PROCEDURE — 97161 PT EVAL LOW COMPLEX 20 MIN: CPT

## 2022-06-22 PROCEDURE — 6370000000 HC RX 637 (ALT 250 FOR IP): Performed by: ANESTHESIOLOGY

## 2022-06-22 PROCEDURE — 27447 TOTAL KNEE ARTHROPLASTY: CPT | Performed by: ORTHOPAEDIC SURGERY

## 2022-06-22 PROCEDURE — 2500000003 HC RX 250 WO HCPCS: Performed by: NURSE ANESTHETIST, CERTIFIED REGISTERED

## 2022-06-22 PROCEDURE — 2720000010 HC SURG SUPPLY STERILE: Performed by: ORTHOPAEDIC SURGERY

## 2022-06-22 PROCEDURE — 94762 N-INVAS EAR/PLS OXIMTRY CONT: CPT

## 2022-06-22 PROCEDURE — 1100000000 HC RM PRIVATE

## 2022-06-22 PROCEDURE — 7100000001 HC PACU RECOVERY - ADDTL 15 MIN: Performed by: ORTHOPAEDIC SURGERY

## 2022-06-22 PROCEDURE — 6360000002 HC RX W HCPCS: Performed by: ANESTHESIOLOGY

## 2022-06-22 PROCEDURE — 6360000002 HC RX W HCPCS: Performed by: ORTHOPAEDIC SURGERY

## 2022-06-22 PROCEDURE — 94760 N-INVAS EAR/PLS OXIMETRY 1: CPT

## 2022-06-22 PROCEDURE — 97535 SELF CARE MNGMENT TRAINING: CPT

## 2022-06-22 PROCEDURE — 2709999900 HC NON-CHARGEABLE SUPPLY: Performed by: ORTHOPAEDIC SURGERY

## 2022-06-22 PROCEDURE — C1776 JOINT DEVICE (IMPLANTABLE): HCPCS | Performed by: ORTHOPAEDIC SURGERY

## 2022-06-22 PROCEDURE — 2580000003 HC RX 258: Performed by: ANESTHESIOLOGY

## 2022-06-22 PROCEDURE — 2580000003 HC RX 258: Performed by: NURSE ANESTHETIST, CERTIFIED REGISTERED

## 2022-06-22 PROCEDURE — 2500000003 HC RX 250 WO HCPCS: Performed by: NURSE PRACTITIONER

## 2022-06-22 PROCEDURE — 3600000015 HC SURGERY LEVEL 5 ADDTL 15MIN: Performed by: ORTHOPAEDIC SURGERY

## 2022-06-22 PROCEDURE — 3600000005 HC SURGERY LEVEL 5 BASE: Performed by: ORTHOPAEDIC SURGERY

## 2022-06-22 PROCEDURE — 82962 GLUCOSE BLOOD TEST: CPT

## 2022-06-22 PROCEDURE — 3700000000 HC ANESTHESIA ATTENDED CARE: Performed by: ORTHOPAEDIC SURGERY

## 2022-06-22 PROCEDURE — 84132 ASSAY OF SERUM POTASSIUM: CPT

## 2022-06-22 PROCEDURE — 20985 CPTR-ASST DIR MS PX: CPT | Performed by: ORTHOPAEDIC SURGERY

## 2022-06-22 PROCEDURE — 97530 THERAPEUTIC ACTIVITIES: CPT

## 2022-06-22 PROCEDURE — 97165 OT EVAL LOW COMPLEX 30 MIN: CPT

## 2022-06-22 PROCEDURE — 3700000001 HC ADD 15 MINUTES (ANESTHESIA): Performed by: ORTHOPAEDIC SURGERY

## 2022-06-22 PROCEDURE — 8E0Y0CZ ROBOTIC ASSISTED PROCEDURE OF LOWER EXTREMITY, OPEN APPROACH: ICD-10-PCS | Performed by: ORTHOPAEDIC SURGERY

## 2022-06-22 PROCEDURE — 7100000000 HC PACU RECOVERY - FIRST 15 MIN: Performed by: ORTHOPAEDIC SURGERY

## 2022-06-22 PROCEDURE — 27447 TOTAL KNEE ARTHROPLASTY: CPT | Performed by: NURSE PRACTITIONER

## 2022-06-22 DEVICE — TIBIAL COMPONENT
Type: IMPLANTABLE DEVICE | Site: KNEE | Status: FUNCTIONAL
Brand: TRIATHLON

## 2022-06-22 DEVICE — CRUCIATE RETAINING FEMORAL
Type: IMPLANTABLE DEVICE | Site: KNEE | Status: FUNCTIONAL
Brand: TRIATHLON

## 2022-06-22 DEVICE — COMPONENT TOT KNEE CAPPED PRIMARY K2STRYKER] STRYKER CORP]: Type: IMPLANTABLE DEVICE | Status: FUNCTIONAL

## 2022-06-22 DEVICE — INSERT TIB SZ 3 THK9MM KNEE X3 CNDYL STBL BEAR TECHNOLOGY: Type: IMPLANTABLE DEVICE | Site: KNEE | Status: FUNCTIONAL

## 2022-06-22 RX ORDER — SODIUM CHLORIDE, SODIUM LACTATE, POTASSIUM CHLORIDE, CALCIUM CHLORIDE 600; 310; 30; 20 MG/100ML; MG/100ML; MG/100ML; MG/100ML
100 INJECTION, SOLUTION INTRAVENOUS CONTINUOUS
Status: DISCONTINUED | OUTPATIENT
Start: 2022-06-22 | End: 2022-06-22 | Stop reason: HOSPADM

## 2022-06-22 RX ORDER — SODIUM CHLORIDE 0.9 % (FLUSH) 0.9 %
5-40 SYRINGE (ML) INJECTION EVERY 12 HOURS SCHEDULED
Status: DISCONTINUED | OUTPATIENT
Start: 2022-06-22 | End: 2022-06-22 | Stop reason: HOSPADM

## 2022-06-22 RX ORDER — ATORVASTATIN CALCIUM 40 MG/1
80 TABLET, FILM COATED ORAL DAILY
Status: DISCONTINUED | OUTPATIENT
Start: 2022-06-23 | End: 2022-06-24 | Stop reason: HOSPADM

## 2022-06-22 RX ORDER — FENTANYL CITRATE 50 UG/ML
100 INJECTION, SOLUTION INTRAMUSCULAR; INTRAVENOUS
Status: COMPLETED | OUTPATIENT
Start: 2022-06-22 | End: 2022-06-22

## 2022-06-22 RX ORDER — HYDROMORPHONE HYDROCHLORIDE 1 MG/ML
1 INJECTION, SOLUTION INTRAMUSCULAR; INTRAVENOUS; SUBCUTANEOUS
Status: DISCONTINUED | OUTPATIENT
Start: 2022-06-22 | End: 2022-06-24 | Stop reason: HOSPADM

## 2022-06-22 RX ORDER — OXYCODONE HCL 10 MG/1
10 TABLET, FILM COATED, EXTENDED RELEASE ORAL EVERY 12 HOURS SCHEDULED
Status: DISCONTINUED | OUTPATIENT
Start: 2022-06-22 | End: 2022-06-24 | Stop reason: HOSPADM

## 2022-06-22 RX ORDER — SUMATRIPTAN 50 MG/1
50 TABLET, FILM COATED ORAL
Status: ACTIVE | OUTPATIENT
Start: 2022-06-22 | End: 2022-06-22

## 2022-06-22 RX ORDER — SODIUM CHLORIDE 0.9 % (FLUSH) 0.9 %
5-40 SYRINGE (ML) INJECTION PRN
Status: DISCONTINUED | OUTPATIENT
Start: 2022-06-22 | End: 2022-06-22 | Stop reason: HOSPADM

## 2022-06-22 RX ORDER — DIPHENHYDRAMINE HCL 25 MG
25 CAPSULE ORAL EVERY 6 HOURS PRN
Status: DISCONTINUED | OUTPATIENT
Start: 2022-06-22 | End: 2022-06-24 | Stop reason: HOSPADM

## 2022-06-22 RX ORDER — TRANEXAMIC ACID 100 MG/ML
INJECTION, SOLUTION INTRAVENOUS PRN
Status: DISCONTINUED | OUTPATIENT
Start: 2022-06-22 | End: 2022-06-22 | Stop reason: SDUPTHER

## 2022-06-22 RX ORDER — INSULIN LISPRO 100 [IU]/ML
1 INJECTION, SOLUTION INTRAVENOUS; SUBCUTANEOUS
Status: DISCONTINUED | OUTPATIENT
Start: 2022-06-22 | End: 2022-06-22

## 2022-06-22 RX ORDER — LISINOPRIL 20 MG/1
40 TABLET ORAL DAILY
Status: DISCONTINUED | OUTPATIENT
Start: 2022-06-23 | End: 2022-06-24 | Stop reason: HOSPADM

## 2022-06-22 RX ORDER — ACETAMINOPHEN 500 MG
1000 TABLET ORAL EVERY 6 HOURS
Status: DISCONTINUED | OUTPATIENT
Start: 2022-06-22 | End: 2022-06-24 | Stop reason: HOSPADM

## 2022-06-22 RX ORDER — PROPOFOL 10 MG/ML
INJECTION, EMULSION INTRAVENOUS CONTINUOUS PRN
Status: DISCONTINUED | OUTPATIENT
Start: 2022-06-22 | End: 2022-06-22 | Stop reason: SDUPTHER

## 2022-06-22 RX ORDER — MELOXICAM 7.5 MG/1
7.5 TABLET ORAL 2 TIMES DAILY
Status: DISCONTINUED | OUTPATIENT
Start: 2022-06-26 | End: 2022-06-24 | Stop reason: HOSPADM

## 2022-06-22 RX ORDER — SODIUM CHLORIDE 9 MG/ML
INJECTION, SOLUTION INTRAVENOUS CONTINUOUS
Status: DISCONTINUED | OUTPATIENT
Start: 2022-06-22 | End: 2022-06-24 | Stop reason: HOSPADM

## 2022-06-22 RX ORDER — HYDROMORPHONE HYDROCHLORIDE 2 MG/ML
1 INJECTION, SOLUTION INTRAMUSCULAR; INTRAVENOUS; SUBCUTANEOUS
Status: DISCONTINUED | OUTPATIENT
Start: 2022-06-22 | End: 2022-06-22

## 2022-06-22 RX ORDER — ONDANSETRON 2 MG/ML
4 INJECTION INTRAMUSCULAR; INTRAVENOUS EVERY 6 HOURS PRN
Status: DISCONTINUED | OUTPATIENT
Start: 2022-06-22 | End: 2022-06-24 | Stop reason: HOSPADM

## 2022-06-22 RX ORDER — ACETAMINOPHEN 500 MG
1000 TABLET ORAL ONCE
Status: DISCONTINUED | OUTPATIENT
Start: 2022-06-22 | End: 2022-06-22 | Stop reason: HOSPADM

## 2022-06-22 RX ORDER — INSULIN ASPART 100 [IU]/ML
1 INJECTION, SOLUTION INTRAVENOUS; SUBCUTANEOUS AS NEEDED
Status: DISCONTINUED | OUTPATIENT
Start: 2022-06-22 | End: 2022-06-22

## 2022-06-22 RX ORDER — ONDANSETRON 4 MG/1
8 TABLET, ORALLY DISINTEGRATING ORAL EVERY 8 HOURS PRN
Status: DISCONTINUED | OUTPATIENT
Start: 2022-06-22 | End: 2022-06-22

## 2022-06-22 RX ORDER — ZOLPIDEM TARTRATE 5 MG/1
5 TABLET ORAL NIGHTLY PRN
Status: DISCONTINUED | OUTPATIENT
Start: 2022-06-22 | End: 2022-06-24 | Stop reason: HOSPADM

## 2022-06-22 RX ORDER — PANTOPRAZOLE SODIUM 40 MG/1
40 TABLET, DELAYED RELEASE ORAL
Status: DISCONTINUED | OUTPATIENT
Start: 2022-06-23 | End: 2022-06-24 | Stop reason: HOSPADM

## 2022-06-22 RX ORDER — KETOROLAC TROMETHAMINE 30 MG/ML
15 INJECTION, SOLUTION INTRAMUSCULAR; INTRAVENOUS EVERY 8 HOURS
Status: DISCONTINUED | OUTPATIENT
Start: 2022-06-22 | End: 2022-06-24 | Stop reason: HOSPADM

## 2022-06-22 RX ORDER — GABAPENTIN 300 MG/1
300 CAPSULE ORAL 2 TIMES DAILY
Status: DISCONTINUED | OUTPATIENT
Start: 2022-06-22 | End: 2022-06-24 | Stop reason: HOSPADM

## 2022-06-22 RX ORDER — ARIPIPRAZOLE 5 MG/1
5 TABLET ORAL ONCE
Status: DISCONTINUED | OUTPATIENT
Start: 2022-06-22 | End: 2022-06-24 | Stop reason: HOSPADM

## 2022-06-22 RX ORDER — SODIUM CHLORIDE 0.9 % (FLUSH) 0.9 %
5-40 SYRINGE (ML) INJECTION EVERY 12 HOURS SCHEDULED
Status: DISCONTINUED | OUTPATIENT
Start: 2022-06-22 | End: 2022-06-24 | Stop reason: HOSPADM

## 2022-06-22 RX ORDER — LIDOCAINE HYDROCHLORIDE 10 MG/ML
1 INJECTION, SOLUTION EPIDURAL; INFILTRATION; INTRACAUDAL; PERINEURAL
Status: DISCONTINUED | OUTPATIENT
Start: 2022-06-22 | End: 2022-06-22 | Stop reason: HOSPADM

## 2022-06-22 RX ORDER — VANCOMYCIN HYDROCHLORIDE 1 G/20ML
INJECTION, POWDER, LYOPHILIZED, FOR SOLUTION INTRAVENOUS PRN
Status: DISCONTINUED | OUTPATIENT
Start: 2022-06-22 | End: 2022-06-22 | Stop reason: ALTCHOICE

## 2022-06-22 RX ORDER — BUSPIRONE HYDROCHLORIDE 5 MG/1
10 TABLET ORAL 2 TIMES DAILY
Status: DISCONTINUED | OUTPATIENT
Start: 2022-06-22 | End: 2022-06-24 | Stop reason: HOSPADM

## 2022-06-22 RX ORDER — ASPIRIN 81 MG/1
81 TABLET ORAL 2 TIMES DAILY
Status: DISCONTINUED | OUTPATIENT
Start: 2022-06-22 | End: 2022-06-24 | Stop reason: HOSPADM

## 2022-06-22 RX ORDER — NALOXONE HYDROCHLORIDE 0.4 MG/ML
0.4 INJECTION, SOLUTION INTRAMUSCULAR; INTRAVENOUS; SUBCUTANEOUS PRN
Status: DISCONTINUED | OUTPATIENT
Start: 2022-06-22 | End: 2022-06-24 | Stop reason: HOSPADM

## 2022-06-22 RX ORDER — SENNA AND DOCUSATE SODIUM 50; 8.6 MG/1; MG/1
1 TABLET, FILM COATED ORAL 2 TIMES DAILY
Status: DISCONTINUED | OUTPATIENT
Start: 2022-06-22 | End: 2022-06-24 | Stop reason: HOSPADM

## 2022-06-22 RX ORDER — POTASSIUM CHLORIDE 20 MEQ/1
20 TABLET, EXTENDED RELEASE ORAL DAILY
Status: DISCONTINUED | OUTPATIENT
Start: 2022-06-22 | End: 2022-06-24 | Stop reason: HOSPADM

## 2022-06-22 RX ORDER — SODIUM CHLORIDE 0.9 % (FLUSH) 0.9 %
5-40 SYRINGE (ML) INJECTION PRN
Status: DISCONTINUED | OUTPATIENT
Start: 2022-06-22 | End: 2022-06-24 | Stop reason: HOSPADM

## 2022-06-22 RX ORDER — ALPRAZOLAM 0.5 MG/1
2 TABLET ORAL 3 TIMES DAILY PRN
Status: DISCONTINUED | OUTPATIENT
Start: 2022-06-22 | End: 2022-06-24 | Stop reason: HOSPADM

## 2022-06-22 RX ORDER — INSULIN LISPRO 100 [IU]/ML
0-16 INJECTION, SOLUTION INTRAVENOUS; SUBCUTANEOUS
Status: DISCONTINUED | OUTPATIENT
Start: 2022-06-22 | End: 2022-06-24

## 2022-06-22 RX ORDER — KETOROLAC TROMETHAMINE 30 MG/ML
INJECTION, SOLUTION INTRAMUSCULAR; INTRAVENOUS PRN
Status: DISCONTINUED | OUTPATIENT
Start: 2022-06-22 | End: 2022-06-22 | Stop reason: ALTCHOICE

## 2022-06-22 RX ORDER — HYDROMORPHONE HYDROCHLORIDE 1 MG/ML
0.5 INJECTION, SOLUTION INTRAMUSCULAR; INTRAVENOUS; SUBCUTANEOUS EVERY 5 MIN PRN
Status: DISCONTINUED | OUTPATIENT
Start: 2022-06-22 | End: 2022-06-22 | Stop reason: HOSPADM

## 2022-06-22 RX ORDER — CELECOXIB 100 MG/1
100 CAPSULE ORAL ONCE
Status: COMPLETED | OUTPATIENT
Start: 2022-06-22 | End: 2022-06-22

## 2022-06-22 RX ORDER — SODIUM CHLORIDE 9 MG/ML
INJECTION, SOLUTION INTRAVENOUS PRN
Status: DISCONTINUED | OUTPATIENT
Start: 2022-06-22 | End: 2022-06-24 | Stop reason: HOSPADM

## 2022-06-22 RX ORDER — DIPHENHYDRAMINE HYDROCHLORIDE 50 MG/ML
25 INJECTION INTRAMUSCULAR; INTRAVENOUS EVERY 6 HOURS PRN
Status: DISCONTINUED | OUTPATIENT
Start: 2022-06-22 | End: 2022-06-24 | Stop reason: HOSPADM

## 2022-06-22 RX ORDER — TOPIRAMATE 25 MG/1
25 TABLET ORAL DAILY
Status: DISCONTINUED | OUTPATIENT
Start: 2022-06-23 | End: 2022-06-24 | Stop reason: HOSPADM

## 2022-06-22 RX ORDER — PROCHLORPERAZINE EDISYLATE 5 MG/ML
5 INJECTION INTRAMUSCULAR; INTRAVENOUS
Status: DISCONTINUED | OUTPATIENT
Start: 2022-06-22 | End: 2022-06-22 | Stop reason: HOSPADM

## 2022-06-22 RX ORDER — DULOXETIN HYDROCHLORIDE 60 MG/1
60 CAPSULE, DELAYED RELEASE ORAL 2 TIMES DAILY
Status: DISCONTINUED | OUTPATIENT
Start: 2022-06-22 | End: 2022-06-24 | Stop reason: HOSPADM

## 2022-06-22 RX ORDER — FUROSEMIDE 20 MG/1
20 TABLET ORAL DAILY
Status: DISCONTINUED | OUTPATIENT
Start: 2022-06-23 | End: 2022-06-24 | Stop reason: HOSPADM

## 2022-06-22 RX ORDER — ROPIVACAINE HYDROCHLORIDE 2 MG/ML
INJECTION, SOLUTION EPIDURAL; INFILTRATION; PERINEURAL PRN
Status: DISCONTINUED | OUTPATIENT
Start: 2022-06-22 | End: 2022-06-22 | Stop reason: ALTCHOICE

## 2022-06-22 RX ORDER — OXYCODONE HYDROCHLORIDE 5 MG/1
5 TABLET ORAL
Status: DISCONTINUED | OUTPATIENT
Start: 2022-06-22 | End: 2022-06-22 | Stop reason: HOSPADM

## 2022-06-22 RX ORDER — HYDROMORPHONE HYDROCHLORIDE 2 MG/1
2 TABLET ORAL EVERY 4 HOURS PRN
Status: DISCONTINUED | OUTPATIENT
Start: 2022-06-22 | End: 2022-06-24 | Stop reason: HOSPADM

## 2022-06-22 RX ORDER — TRANEXAMIC ACID 650 1/1
1300 TABLET ORAL
Status: COMPLETED | OUTPATIENT
Start: 2022-06-22 | End: 2022-06-22

## 2022-06-22 RX ORDER — EPHEDRINE SULFATE/0.9% NACL/PF 50 MG/5 ML
SYRINGE (ML) INTRAVENOUS PRN
Status: DISCONTINUED | OUTPATIENT
Start: 2022-06-22 | End: 2022-06-22 | Stop reason: SDUPTHER

## 2022-06-22 RX ORDER — SODIUM CHLORIDE 9 MG/ML
INJECTION, SOLUTION INTRAVENOUS PRN
Status: DISCONTINUED | OUTPATIENT
Start: 2022-06-22 | End: 2022-06-22 | Stop reason: HOSPADM

## 2022-06-22 RX ORDER — ONDANSETRON 4 MG/1
4 TABLET, ORALLY DISINTEGRATING ORAL EVERY 8 HOURS PRN
Status: DISCONTINUED | OUTPATIENT
Start: 2022-06-22 | End: 2022-06-24 | Stop reason: HOSPADM

## 2022-06-22 RX ORDER — ALBUTEROL SULFATE 90 UG/1
1 AEROSOL, METERED RESPIRATORY (INHALATION) EVERY 6 HOURS PRN
Status: DISCONTINUED | OUTPATIENT
Start: 2022-06-22 | End: 2022-06-24 | Stop reason: HOSPADM

## 2022-06-22 RX ORDER — NITROGLYCERIN 0.4 MG/1
0.4 TABLET SUBLINGUAL EVERY 5 MIN PRN
Status: DISCONTINUED | OUTPATIENT
Start: 2022-06-22 | End: 2022-06-24 | Stop reason: HOSPADM

## 2022-06-22 RX ORDER — METOPROLOL SUCCINATE 25 MG/1
50 TABLET, EXTENDED RELEASE ORAL DAILY
Status: DISCONTINUED | OUTPATIENT
Start: 2022-06-22 | End: 2022-06-24 | Stop reason: HOSPADM

## 2022-06-22 RX ADMIN — CELECOXIB 100 MG: 100 CAPSULE ORAL at 07:57

## 2022-06-22 RX ADMIN — FENTANYL CITRATE 100 MCG: 50 INJECTION INTRAMUSCULAR; INTRAVENOUS at 08:39

## 2022-06-22 RX ADMIN — ACETAMINOPHEN 1000 MG: 500 TABLET, FILM COATED ORAL at 17:39

## 2022-06-22 RX ADMIN — Medication 15 MG: at 10:37

## 2022-06-22 RX ADMIN — TRANEXAMIC ACID 1300 MG: 650 TABLET ORAL at 12:49

## 2022-06-22 RX ADMIN — ACETAMINOPHEN 1000 MG: 500 TABLET, FILM COATED ORAL at 12:49

## 2022-06-22 RX ADMIN — HYDROMORPHONE HYDROCHLORIDE 2 MG: 2 TABLET ORAL at 12:49

## 2022-06-22 RX ADMIN — MEPIVACAINE HYDROCHLORIDE 60 MG: 20 INJECTION, SOLUTION EPIDURAL; INFILTRATION at 09:39

## 2022-06-22 RX ADMIN — Medication 3 AMPULE: at 08:02

## 2022-06-22 RX ADMIN — SODIUM CHLORIDE, POTASSIUM CHLORIDE, SODIUM LACTATE AND CALCIUM CHLORIDE 100 ML/HR: 600; 310; 30; 20 INJECTION, SOLUTION INTRAVENOUS at 07:56

## 2022-06-22 RX ADMIN — ALPRAZOLAM 2 MG: 0.5 TABLET ORAL at 20:50

## 2022-06-22 RX ADMIN — HYDROMORPHONE HYDROCHLORIDE 1 MG: 1 INJECTION, SOLUTION INTRAMUSCULAR; INTRAVENOUS; SUBCUTANEOUS at 17:36

## 2022-06-22 RX ADMIN — SODIUM CHLORIDE, PRESERVATIVE FREE 10 ML: 5 INJECTION INTRAVENOUS at 20:54

## 2022-06-22 RX ADMIN — HYDROMORPHONE HYDROCHLORIDE 2 MG: 2 TABLET ORAL at 20:52

## 2022-06-22 RX ADMIN — Medication 10 MG: at 10:09

## 2022-06-22 RX ADMIN — DOCUSATE SODIUM 50MG AND SENNOSIDES 8.6MG 1 TABLET: 8.6; 5 TABLET, FILM COATED ORAL at 20:52

## 2022-06-22 RX ADMIN — ASPIRIN 81 MG: 81 TABLET, COATED ORAL at 20:52

## 2022-06-22 RX ADMIN — DULOXETINE HYDROCHLORIDE 60 MG: 60 CAPSULE, DELAYED RELEASE ORAL at 20:51

## 2022-06-22 RX ADMIN — PHENYLEPHRINE HYDROCHLORIDE 50 MCG: 10 INJECTION INTRAVENOUS at 10:43

## 2022-06-22 RX ADMIN — Medication 10 MG: at 09:41

## 2022-06-22 RX ADMIN — TRANEXAMIC ACID 1000 MG: 100 INJECTION, SOLUTION INTRAVENOUS at 10:27

## 2022-06-22 RX ADMIN — KETOROLAC TROMETHAMINE 15 MG: 30 INJECTION, SOLUTION INTRAMUSCULAR at 17:37

## 2022-06-22 RX ADMIN — Medication 5 MG: at 10:03

## 2022-06-22 RX ADMIN — BUSPIRONE HYDROCHLORIDE 10 MG: 5 TABLET ORAL at 20:52

## 2022-06-22 RX ADMIN — GABAPENTIN 300 MG: 300 CAPSULE ORAL at 20:52

## 2022-06-22 RX ADMIN — TRANEXAMIC ACID 1000 MG: 100 INJECTION, SOLUTION INTRAVENOUS at 09:33

## 2022-06-22 RX ADMIN — CEFAZOLIN SODIUM 2000 MG: 10 INJECTION, POWDER, FOR SOLUTION INTRAVENOUS at 17:37

## 2022-06-22 RX ADMIN — HYDROMORPHONE HYDROCHLORIDE 2 MG: 2 TABLET ORAL at 16:21

## 2022-06-22 RX ADMIN — OXYCODONE HYDROCHLORIDE 10 MG: 10 TABLET, FILM COATED, EXTENDED RELEASE ORAL at 20:52

## 2022-06-22 RX ADMIN — Medication 2000 MG: at 09:30

## 2022-06-22 RX ADMIN — PROPOFOL 100 MCG/KG/MIN: 10 INJECTION, EMULSION INTRAVENOUS at 09:48

## 2022-06-22 RX ADMIN — Medication 10 MG: at 10:24

## 2022-06-22 RX ADMIN — TRANEXAMIC ACID 1300 MG: 650 TABLET ORAL at 15:13

## 2022-06-22 ASSESSMENT — LIFESTYLE VARIABLES: SMOKING_STATUS: 1

## 2022-06-22 ASSESSMENT — KOOS JR
STRAIGHTENING KNEE FULLY: 1
RISING FROM SITTING: 1
STANDING UPRIGHT: 1
HOW SEVERE IS YOUR KNEE STIFFNESS AFTER FIRST WAKING IN MORNING: 1
GOING UP OR DOWN STAIRS: 1
KOOS JR TOTAL INTERVAL SCORE: 68.284
TWISING OR PIVOTING ON KNEE: 1
BENDING TO THE FLOOR TO PICK UP OBJECT: 1

## 2022-06-22 ASSESSMENT — PAIN SCALES - GENERAL
PAINLEVEL_OUTOF10: 8
PAINLEVEL_OUTOF10: 9
PAINLEVEL_OUTOF10: 2
PAINLEVEL_OUTOF10: 4
PAINLEVEL_OUTOF10: 6
PAINLEVEL_OUTOF10: 5
PAINLEVEL_OUTOF10: 4
PAINLEVEL_OUTOF10: 5
PAINLEVEL_OUTOF10: 6

## 2022-06-22 ASSESSMENT — PAIN DESCRIPTION - LOCATION
LOCATION: KNEE
LOCATION: KNEE

## 2022-06-22 ASSESSMENT — PAIN DESCRIPTION - DESCRIPTORS
DESCRIPTORS: ACHING

## 2022-06-22 ASSESSMENT — PAIN - FUNCTIONAL ASSESSMENT: PAIN_FUNCTIONAL_ASSESSMENT: 0-10

## 2022-06-22 ASSESSMENT — PAIN DESCRIPTION - ORIENTATION
ORIENTATION: LEFT
ORIENTATION: LEFT

## 2022-06-22 ASSESSMENT — COPD QUESTIONNAIRES: CAT_SEVERITY: MILD

## 2022-06-22 NOTE — H&P
Patient ID:  Neal Marcelo  [id number]  49 y.o.  1955    Today: June 22, 2022          CC:  Left  Knee pain    HPI:   The patient has end stage arthritis of the left knee. The patient was evaluated and examined during consultation prior to today. The patient complains of knee pain with activities, reports pain as mostly occurring along the joint lines, reports stiffness of the knee after inactivity, and swelling/pain at the end of the day and after increased physical activity. The pain affects the patients activities of daily living and quality of life. The patient has attempted and exhausted conservative treatment. There have been no changes to the patient's orthopedic condition since the last office visit. Past Medical History:  Past Medical History:   Diagnosis Date    Anxiety     Arthritis     Asthma     uses inhaler daily    CAD (coronary artery disease) 04/24/2019    mi  stent x 1-- followed by dr Mohini Joyce Cervical spondylosis with myelopathy 06/17/2015    Chronic obstructive pulmonary disease (HCC)     daily inhaler    Chronic pain     back  - and all over pain     Depression     Diabetes ([facility]     type 1; followed by Endocrinology; sqbs am avg -- hypo at 60's -- uses insulin pump; Hgb A1c 7.5 on 3/14/22    Diverticulosis     resolved at present - sees DR Kunal Edmond when needed    DJD (degenerative joint disease)     Fibromyalgia     all over     Former smoker     quit 2002    GERD (gastroesophageal reflux disease)     CONTROLLED WITH MED    History of blood transfusion     after delivery of her son    Hormone replacement therapy     Hyperlipidemia     Hypertension     controlled with med    Insomnia     Left knee pain     Migraine     Mitral valve regurgitation     echo 3/1/21: There was mild to moderate regurgitation.     Psychiatric diagnosis     bipolar~pt states resolved    RA (rheumatoid arthritis) ([facility] dx yrs ago    does not see a rheumatologist    Suicide attempt ([address]     x 2    Unspecified adverse effect of anesthesia     \"woke up\"during  surgery on toe    Vitamin B 12 deficiency     Vitamin D deficiency        Past Surgical History:  Past Surgical History:   Procedure Laterality Date    CARDIAC CATHETERIZATION      cadiac stent x1    CATARACT REMOVAL Bilateral      SECTION      times 2    COLONOSCOPY      HYSTERECTOMY N/A 1984    total     NEUROLOGICAL SURGERY  2019    lower back    ORTHOPEDIC SURGERY N/A     neck ( plate and screws was put in )     ORTHOPEDIC SURGERY Right     right rotator cuff     ORTHOPEDIC SURGERY Right     shaved down bone in great toe     ORTHOPEDIC SURGERY Bilateral     shoulder scopes    ORTHOPEDIC SURGERY      right foot \"bone removal\"     ORTHOPEDIC SURGERY      left knee scope    OTHER SURGICAL HISTORY      insulin pump        Medications:     Prior to Admission medications    Medication Sig Start Date End Date Taking? Authorizing Provider   acetaminophen (TYLENOL) 500 MG tablet Take 2 tablets by mouth every 6 hours as needed for Pain 22   Emi Comer MD   zolpidem (AMBIEN) 10 MG tablet Take 1 tablet by mouth nightly as needed for Sleep for up to 30 days. Take 1-2 tabs as needed for insomnia postop. Start with one tab and if still unable to sleep after 1 hour can take second tablet.  22  Emi Comer MD   aspirin EC 81 MG EC tablet Take 1 tablet by mouth in the morning and at bedtime 22   Emi Comer MD   cyclobenzaprine (FLEXERIL) 5 MG tablet Take 1 tablet by mouth 3 times daily as needed for Muscle spasms 22  Emi Comer MD   meloxicam (MOBIC) 7.5 MG tablet Take 1 tablet by mouth daily 22   Emi Comer MD   ondansetron (ZOFRAN) 4 MG tablet Take 1 tablet by mouth 3 times daily as needed for Nausea or Vomiting 22   Emi Comer MD   senna (SENOKOT) 8.6 MG tablet Take 1 tablet by mouth 2 times daily 22   Emi Comer MD   HYDROmorphone (DILAUDID) 2 MG tablet Take 1 tablet by mouth every 4 hours as needed for Pain for up to 7 days. 6/17/22 6/24/22  Andi Reyna MD   Injection Device for Insulin JULIANE Novolog insulin on insulin pump 3/23/22   Historical Provider, MD   busPIRone (BUSPAR) 10 MG tablet Take 10 mg by mouth 2 times daily 2/23/22   Historical Provider, MD   potassium chloride (KLOR-CON M) 20 MEQ extended release tablet Take 20 mEq by mouth daily 4/27/22   Historical Provider, MD   insulin aspart (NOVOLOG) 100 UNIT/ML injection vial Inject into the skin as needed for High Blood Sugar Pt checks sqbs 5 times/daily:    Blood sugar less than 150=0 units; 151-199= 2 units; 200-249 = 4 units; 250-299= 6 units; 300-349= 8 units; 340 and above 10 units    Historical Provider, MD   Cholecalciferol (VITAMIN D3) 50 MCG (2000 UT) CAPS Take by mouth daily    Historical Provider, MD   Lancets MISC Check glucose ac and qhs. (E 10.10.) 10/31/21   Ar Automatic Reconciliation   albuterol sulfate  (90 Base) MCG/ACT inhaler Inhale 1 puff into the lungs every 6 hours as needed 3/20/18   Ar Automatic Reconciliation   ALPRAZolam (XANAX) 2 MG tablet Take 2 mg by mouth 3 times daily as needed.     Ar Automatic Reconciliation   ARIPiprazole (ABILIFY) 10 MG tablet Take 5 mg by mouth    Ar Automatic Reconciliation   aspirin 81 MG chewable tablet 81 mg daily CHEW AND SWALLOW 1 TABLET BY MOUTH DAILY 11/18/21   Ar Automatic Reconciliation   atorvastatin (LIPITOR) 80 MG tablet Take 80 mg by mouth daily TAKE 1 TABLET BY MOUTH DAILY 3/23/22   Ar Automatic Reconciliation   DULoxetine (CYMBALTA) 60 MG extended release capsule Take 60 mg by mouth 2 times daily  6/4/20   Ar Automatic Reconciliation   fluconazole (DIFLUCAN) 150 MG tablet Take 150 mg by mouth every 30 days    Ar Automatic Reconciliation   fluticasone-salmeterol (ADVAIR) 250-50 MCG/DOSE AEPB Inhale 1 puff into the lungs 2 times daily 8/6/20   Ar Automatic Reconciliation   furosemide (LASIX) 20 MG tablet Take 20 mg by mouth daily 2/4/22   Ar Automatic Reconciliation   gabapentin (NEURONTIN) 300 MG capsule Take 300 mg by mouth 3 times daily.     Ar Automatic Reconciliation   insulin aspart (NOVOLOG) 100 UNIT/ML injection pen For Blood Sugar (mg/dL) of: Less than 150 = 0 [id number] -199 = 2 qqlbo985 -249 = 4 avvun195 -299 = 6 uplhw425 -349 = 8 [id number] and above = 10 units 10/31/21   Ar Automatic Reconciliation   insulin glargine (LANTUS) 100 UNIT/ML injection vial Lantus 40 units sq in am and 10 units sq in pm. 11/1/21   Ar Automatic Reconciliation   lisinopril (PRINIVIL;ZESTRIL) 40 MG tablet Take 20 mg by mouth daily 10/31/21   Ar Automatic Reconciliation   meloxicam (MOBIC) 7.5 MG tablet Take by mouth daily    Ar Automatic Reconciliation   metoprolol succinate (TOPROL XL) 50 MG extended release tablet Take 50 mg by mouth daily 2/4/22   Ar Automatic Reconciliation   nitroGLYCERIN (NITROSTAT) 0.4 MG SL tablet Place 0.4 mg under the tongue as needed 6/15/19   Ar Automatic Reconciliation   omeprazole (PRILOSEC) 20 MG delayed release capsule Take 20 mg by mouth daily 8/14/20   Ar Automatic Reconciliation   ondansetron (ZOFRAN) 4 MG tablet Take 4 mg by mouth every 8 hours as needed 5/31/19   Ar Automatic Reconciliation   SUMAtriptan (IMITREX) 100 MG tablet Take 50 mg by mouth once as needed    Ar Automatic Reconciliation   tiotropium (SPIRIVA RESPIMAT) 1.25 MCG/ACT AERS inhaler Inhale 2 puffs into the lungs daily 12/3/19   Ar Automatic Reconciliation   topiramate (TOPAMAX) 50 MG tablet Take 50 mg by mouth daily    Ar Automatic Reconciliation       Family History:     Family History   Problem Relation Age of Onset    Heart Disease Paternal Grandfather     Cancer Father     Lung Disease Father     Cancer Maternal Aunt     Heart Disease Paternal Grandmother        Social History:      Social History     Tobacco Use    Smoking status: Former Smoker     Packs/day: 1.00     Years: 15.00     Pack years: 15.00     Types: Cigarettes     Quit date: 6/10/2002     Years since quittin.0    Smokeless tobacco: Never Used   Substance Use Topics    Alcohol use: Yes     Alcohol/week: 3.0 standard drinks     Types: 1 Glasses of wine, 1 Cans of beer, 1 Shots of liquor per week     Comment: daily~a beer; liquor occasionally         Allergies: Allergies   Allergen Reactions    Hydroxychloroquine Hives    Hydroxyzine Pamoate Other (See Comments)     Insomnia     Nortriptyline Hives    Other     Tizanidine Other (See Comments)     Sleep walking and  nocturnal eating     Adhesive Tape Hives and Nausea And Vomiting     elavil    Doxepin Hives and Nausea And Vomiting    Elavil [Amitriptyline] Hives and Nausea And Vomiting     elavil    Temazepam Hives and Nausea And Vomiting    Trazodone Nausea And Vomiting        Vitals: There were no vitals taken for this visit. Objective:         General: No Acute distress                   HEENT: Normocephalic/atramatic                   Lungs:  Breathing non-labored                   Heart:   RRR                    Abdomen: soft       Extremities:  Prior exam done in office has been consistent with end-stage knee arthritis. The patient has noted pain with ROM of the left knee. Trace effusion. Crepitus   present. Distally the patient shows no neurologic deficit. Antalgic gait appreciated. Meds:   No current facility-administered medications for this encounter. Current Outpatient Medications   Medication Sig    acetaminophen (TYLENOL) 500 MG tablet Take 2 tablets by mouth every 6 hours as needed for Pain    zolpidem (AMBIEN) 10 MG tablet Take 1 tablet by mouth nightly as needed for Sleep for up to 30 days. Take 1-2 tabs as needed for insomnia postop. Start with one tab and if still unable to sleep after 1 hour can take second tablet.     aspirin EC 81 MG EC tablet Take 1 tablet by mouth in the morning and at bedtime    cyclobenzaprine (FLEXERIL) 5 MG tablet Take 1 tablet by mouth 3 times daily as needed for Muscle spasms    meloxicam (MOBIC) 7.5 MG tablet Take 1 tablet by mouth daily    ondansetron (ZOFRAN) 4 MG tablet Take 1 tablet by mouth 3 times daily as needed for Nausea or Vomiting    senna (SENOKOT) 8.6 MG tablet Take 1 tablet by mouth 2 times daily    HYDROmorphone (DILAUDID) 2 MG tablet Take 1 tablet by mouth every 4 hours as needed for Pain for up to 7 days.  Injection Device for Insulin JULIANE Novolog insulin on insulin pump    busPIRone (BUSPAR) 10 MG tablet Take 10 mg by mouth 2 times daily    potassium chloride (KLOR-CON M) 20 MEQ extended release tablet Take 20 mEq by mouth daily    insulin aspart (NOVOLOG) 100 UNIT/ML injection vial Inject into the skin as needed for High Blood Sugar Pt checks sqbs 5 times/daily:    Blood sugar less than 150=0 units; 151-199= 2 units; 200-249 = 4 units; 250-299= 6 units; 300-349= 8 units; 340 and above 10 units    Cholecalciferol (VITAMIN D3) 50 MCG (2000 UT) CAPS Take by mouth daily    Lancets MISC Check glucose ac and qhs. (E 10.10.)    albuterol sulfate  (90 Base) MCG/ACT inhaler Inhale 1 puff into the lungs every 6 hours as needed    ALPRAZolam (XANAX) 2 MG tablet Take 2 mg by mouth 3 times daily as needed.  ARIPiprazole (ABILIFY) 10 MG tablet Take 5 mg by mouth    aspirin 81 MG chewable tablet 81 mg daily CHEW AND SWALLOW 1 TABLET BY MOUTH DAILY    atorvastatin (LIPITOR) 80 MG tablet Take 80 mg by mouth daily TAKE 1 TABLET BY MOUTH DAILY    DULoxetine (CYMBALTA) 60 MG extended release capsule Take 60 mg by mouth 2 times daily     fluconazole (DIFLUCAN) 150 MG tablet Take 150 mg by mouth every 30 days    fluticasone-salmeterol (ADVAIR) 250-50 MCG/DOSE AEPB Inhale 1 puff into the lungs 2 times daily    furosemide (LASIX) 20 MG tablet Take 20 mg by mouth daily    gabapentin (NEURONTIN) 300 MG capsule Take 300 mg by mouth 3 times daily.     insulin aspart (NOVOLOG) 100 UNIT/ML injection pen For Blood Sugar (mg/dL) of: Less than 150 = 0 [id number] -199 = 2 omecm591 -249 = 4 gmllf654 -299 = 6 fxpkw704 -349 = 8 [id number] and above = 10 units    insulin glargine (LANTUS) 100 UNIT/ML injection vial Lantus 40 units sq in am and 10 units sq in pm.    lisinopril (PRINIVIL;ZESTRIL) 40 MG tablet Take 20 mg by mouth daily    meloxicam (MOBIC) 7.5 MG tablet Take by mouth daily    metoprolol succinate (TOPROL XL) 50 MG extended release tablet Take 50 mg by mouth daily    nitroGLYCERIN (NITROSTAT) 0.4 MG SL tablet Place 0.4 mg under the tongue as needed    omeprazole (PRILOSEC) 20 MG delayed release capsule Take 20 mg by mouth daily    ondansetron (ZOFRAN) 4 MG tablet Take 4 mg by mouth every 8 hours as needed    SUMAtriptan (IMITREX) 100 MG tablet Take 50 mg by mouth once as needed    tiotropium (SPIRIVA RESPIMAT) 1.25 MCG/ACT AERS inhaler Inhale 2 puffs into the lungs daily    topiramate (TOPAMAX) 50 MG tablet Take 50 mg by mouth daily       Patient Active Problem List   Diagnosis    Vitamin B12 deficiency    Hyperkalemia    Hypokalemia    Depression, major, recurrent, mild (HCC)    [facility]    Venous insufficiency    DKA (diabetic ketoacidosis) ([facility])    BIANCA [facility]acute kidney injury) ([facility]    Leukocytosis    Lumbar stenosis with neurogenic claudication    Breast mass, right    Septic shock ([facility])    Metabolic acidemia    Persistent migraine aura without cerebral infarction and without status migrainosus, not intractable    Hyponatremia    Vitamin D deficiency    Hypomagnesemia    Elevated rheumatoid factor    Hypertension    Fibromyalgia    Coronary artery disease involving native coronary artery of native heart with angina pectoris (HCC)    Chronic obstructive pulmonary disease (HCC)    S/P angioplasty with stent    Pure hypercholesterolemia       Assessment:   1.  Arthritis of the Left knee    Plan:   The patient has failed previous conservative treatment for this condition including anti-inflammatories, injections and lifestyle modifications. The necessity for joint replacement is present. Risks, benefits, alternatives and possible complications of left knee arthroplasty have been discussed with the patient including but not limited to potential for infection, bleeding, damage to nerves and/or blood vessels, stiffness of the knee after surgery, knee instability after surgery, patellar maltracking, potential for fracture both intra-op and post-op, polyethylene wear, implant loosening, and risk for revision surgery secondary to but not limited to these discussed risks. Further, we have discussed potential for venous thrombo-embolism including deep vein thrombosis and pulmonary embolism despite being on prophylaxis. Additionally, we have discussed potential for continued pain after surgery and patient has voiced understanding that I can make no guarantees regarding the pain relief of outcomes after surgery. We have also previously discussed the potential of morbidity and mortality associated with, but not limited to, the actual surgical procedure, anesthesia, prior medical conditions, and/or the administration of medications perioperatively. The patient has been given the opportunity to ask questions all of which have been answered and the patient wishes to proceed. The patient will be admitted the day of surgery for left total knee replacement. The patient was counseled at length about the risks of juno Covid-19 during their perioperative period and any recovery window from their procedure. The patient was made aware that juno Covid-19  may worsen their prognosis for recovering from their procedure and lend to a higher morbidity and/or mortality risk. All material risks, benefits, and reasonable alternatives including postponing the procedure were discussed. The patient does  wish to proceed with the procedure at this time.        Signed By: Jovani Rae MD  June 22, 2022

## 2022-06-22 NOTE — ANESTHESIA POSTPROCEDURE EVALUATION
Department of Anesthesiology  Postprocedure Note    Patient: Lupillo Persaud  MRN: [id number]  YOB: 1955  Date of evaluation: 6/22/2022      Procedure Summary     Date: 06/22/22 [facility]    Anesthesia Start: 0926 Anesthesia Stop: 1108    Procedure: LEFT SUAD KNEE TOTAL ARTHROPLASTY ROBOTIC ASIF (Left Knee) Diagnosis:       Primary osteoarthritis of left knee      Knee deformity, acquired, left      (Knee deformity, acquired, left [M21.962])    Surgeons: Ciara Cortes MD Responsible Provider: Daksha Beckman MD    Anesthesia Type: TIVA ASA Status: 3          Anesthesia Type: No value filed. Britt Phase I: Britt Score: 8    Britt Phase II:      Last vitals:   Vitals Value Taken Time   /60 06/22/22 1135   Temp 98.6 °F (37 °C) 06/22/22 1107   Pulse 67 06/22/22 1136   Resp 25 06/22/22 1136   SpO2 99 % 06/22/22 1136   Vitals shown include unvalidated device data.       Anesthesia Post Evaluation    Patient location during evaluation: PACU  Patient participation: complete - patient participated  Level of consciousness: awake and alert  Airway patency: patent  Nausea & Vomiting: no nausea and no vomiting  Complications: no  Cardiovascular status: hemodynamically stable  Respiratory status: acceptable, nonlabored ventilation and spontaneous ventilation  Hydration status: euvolemic  Comments: /60   Pulse 84   Temp 98.6 °F (37 °C)   Resp 13   Ht 5' 3\" (1.6 m)   Wt 178 lb (80.7 kg)   SpO2 96%   BMI 31.53 kg/m²     Multimodal analgesia pain management approach

## 2022-06-22 NOTE — PERIOP NOTE
TRANSFER - OUT REPORT:    Verbal report given to Guille Arteaga RN on Guadalupe Hammond  being transferred to [facility] for routine post-op       Report consisted of patient's Situation, Background, Assessment and   Recommendations(SBAR). Information from the following report(s) Nurse Handoff Report, Intake/Output, MAR and Cardiac Rhythm NSR was reviewed with the receiving nurse. Lines:   Peripheral IV 06/22/22 Left;Dorsal Hand (Active)   Site Assessment Clean, dry & intact 06/22/22 1127   Line Status Infusing 06/22/22 [address] Connections checked and tightened 06/22/22 1127   Phlebitis Assessment No symptoms 06/22/22 1127   Infiltration Assessment 0 06/22/22 1127   Alcohol Cap Used Yes 06/22/22 1127   Dressing Status Clean, dry & intact 06/22/22 1127   Dressing Type Transparent 06/22/22 1127        Opportunity for questions and clarification was provided.       Patient transported with:  O2 @ 2lpm

## 2022-06-22 NOTE — OP NOTE
[facility]  Total Knee Arthroplasty  Patient:July Mckenzie   : 1955  Medical Record [id number]    Pre-operative Diagnosis: Knee deformity, acquired, left [M21.962]    Post-operative Diagnosis: Same    Location: [facility]    Date of Procedure: 2022    Surgeon: Dario Cuba MD    Assistant: Maria Vasquez CFA and Tesha Campa NP CFA    Anesthesia: Spinal + adductor nerve block    Procedure:  SUAD-Assisted Left Total Knee Arthroplasty    Tourniquet Time: Tourniquet Not Used    BMI: Body mass index is 31.53 kg/m². EBL: 145QE    Complications: None    Patient Condition upon Completion of Procedure: Stable    Implants:   Implant Name Type Inv. Item Serial No.  Lot No. LRB No. Used Action   COMPONENT FEM SZ 3 L KNEE CRUCE RET CEMENTLESS BEAD W/ RANJAN - [id number]  COMPONENT FEM SZ 3 L KNEE CRUCE RET CEMENTLESS BEAD W/ RANJAN  [employer] [id number] Left 1 Implanted   BASEPLATE TIB SZ 3 [id number] ML67MM KNEE TRITANIUM 4 CRUCFRM - [id number]  BASEPLATE TIB SZ 3 [id number] ML67MM KNEE TRITANIUM 4 CRUCFRM  [employer] [id number] Left 1 Implanted   INSERT TIB SZ 3 THK9MM KNEE X3 CNDYL STBL BEAR [employer]CHNOLOGY - [id number]  INSERT TIB SZ 3 THK9MM KNEE X3 CNDYL STBL BEAR TECHNOLOGY  [employer] [id number] Left 1 Implanted       Pre-Operative Plan/Implants:   - #3 Femoral Component   - #3 Tibial Component   - 9 mm Polyethylene Component    Intra-Operative Findings: Prior to bony resection we found that the patient's knee lacked approximately 5 degrees of extension. Also prior to bony resection we noted a neutral coronal deformity. Intra-operatively we noted that the articular surfaces were arthritic with cartilage loss of both the medial and lateral compartments. The patella was examined and found to be in good condition with minimal degenerative changes and was left unresurfaced. . With trial components in place we assessed knee motion and found that the knee was able to fully extend. In addition we felt we had achieved acceptable ligament balance between the medial and lateral side of the knee in both extension and flexion. Description of Procedure: The complexity of the total joint surgery requires the use of a first assistant. The above individual assisted with positioning, prepping, draping of the patient before the procedure and instrument manipulation, extremity repositioning and retraction during the procedure as well as wound closure, dressing application and brace application after the procedure (if applicable). No intern, resident, or other hospital staff was available to assist during the surgery. Wiley Presaud had undergone adductor canal block in the preoperative holding area. Wiley Persaud was brought to the operating room, positioned on the operating room table, and after appropriate identification underwent Spinal anesthesia. IV antibiotics were administered per proticol. The left leg was then prepped and draped in the usual sterile manner. Timeout was taken identifying the patient, procedure ,operative side and surgeon. Prior to incision one gram of IV transexamic acid was administered intravenously. An anterior longitudinal incision was made just medial to the tibial tubercle and extending proximal.  A subvastas capsular incision was performed. The medial capsular flap was elevated around to the midcoronal plane. The patella was subluxed laterally and patellar fat pat partially excised. The knee was flexed and externally rotated. The articular surface revealed cartilage loss with exposed bone and bone spurs throughout all three compartments. The anterior cruciate ligament was resected. We then placed both our femoral and tibial check points followed by the femoral and tibial array pins.  The femoral arrays pins were placed intra-incisional whereas the tibial pins were placed extra-incisional approximately 4-5cm below the tibial tubercle. Both arrays were placed and were verified to be visible to the SUAD sensory array. We then proceeded with point acquisition of both the femur and tibia. Finally, we captured stress views of the knee in extension and 90 degrees of flexion for our ligament balancing after medial and/or lateral osteophytes were removed. We then adjusted our planned femoral and tibial component placement parameters to obtain acceptable ligament balance while ensuring that we stayed within acceptable alignment criteria as well as resection depths/parameters for both the femur and tibia. Ultimately, we opted for a #3 femoral component, a #3 tibial component and a 9mm polyethylene component. Retractors were placed and we proceed with our bony preparation. We first addressed our distal femur and posterior chamfer cuts using the 90 degrees blade. We then performed our posterior condylar, anterior femoral, anterior chamfer, and proximal tibial cuts with the straight SUAD blade. Bony wedges were removed after these cuts as were any residual osteophytes. The PCL was assessed and felt to be intact and stable. We felt given this that we would be could proceed with a cruciate retaining implant. . Medial and lateral meniscus' were removed along with any redundant tissue. Any posterior osteophytes were removed. The posteromedial and posterolateral capsule as well as the medial and lateral periosteum of the distal femur and proximal tibia were injected with periarticular cocktail containing local anesthetic and toradol. Trial components were then placed. We then performed a trial of the knee with trial components in place. We felt that with a 9 mm polyethylene trial in place the knee had acceptable varus and valgus stability throughout arc of motion, was able to fully extend, was not too tight in flexion, and had acceptable stability with anterior  Drawer testing.  No additional surgical releases were required. The patella was then everted. The patella was examined and found to be in good condition with minimal degenerative changes and was left unresurfaced. At this point the trial polyethylene component and trial femoral component were removed. We again assessed our tibial tray and verified that we were satisfied with its position. We did not have to adjust its position. The proximal tibia was punched and drilled per protocol for the system. We irrigated and debrided all bony surfaces of residual tissue and bone. We then inserted the tibial and femoral components and noted them to be well seated. We then inserted our final polyethylene component which was a 9mm thick. Khalida Peoples's knee was placed through a range of motion and noted to be stable as mentioned above with the trial components. In additional we checked patellar tracking one last time which was felt to be appropriate. A lavage of Irrisept was allowed to soak in the wound after implanting of the prosthesis. During this time we removed the previously placed femoral and tibial sensors and array pins and finished injection our periarticular cocktail. The wound was irrigated with normal saline again before closure. Prior to capsular closure one gram of vancomycin powder was placed within the capsular layer. The capsular layer was closed using a combination of 0-Stratafix bidirectional barbed suture and #2 Fiberwire. The subcutaneous layer was closed using a 2-0 Monocril interrupted suture. One batch of Cellerate collagen powder was placed between the capsular layer closure and the subcutaneous layer closure. The skin was closed using staples. A sterile Prevena dressing was applied. The sponge count and needle counts were correct. Patient was transferred to the hospital stretcher and transported to recovery in stable condition.     Signed By: Orlando Dunn MD

## 2022-06-22 NOTE — PROGRESS NOTES
OCCUPATIONAL THERAPY Initial Assessment and PM      (Link to Caseload Tracking: OT Visit Days: 1  OT Orders   Time  OT Charge Capture  Rehab Caseload Tracker  Episode     Sebastián Pittman is a 78 y.o. female   PRIMARY DIAGNOSIS: Osteoarthritis of left knee  Primary osteoarthritis of left knee [M17.12]  Knee deformity, acquired, left [M21.962]  Osteoarthritis of left knee, unspecified osteoarthritis type [M17.12]  Procedure(s) (LRB):  LEFT SUAD KNEE TOTAL ARTHROPLASTY ROBOTIC ASIF (Left)  Day of Surgery  Reason for Referral: Pain in Left Knee (M25.562)  Stiffness of Left Knee, Not elsewhere classified (M25.662)  Generalized Muscle Weakness (M62.81)  Other lack of cordination (R27.8)  Difficulty in walking, Not elsewhere classified (R26.2)  Other abnormalities of gait and mobility (R26.89)  Inpatient: Payor: MEDICARE / Plan: MEDICARE PART A AND B / Product Type: *No Product type* /     ASSESSMENT:     REHAB RECOMMENDATIONS:   Recommendation to date pending progress:  [facility] Therapy    Equipment:     3 in 1 Bedside Commode   Rolling Walker     ASSESSMENT:  Ms. Gerardo Simon is s/p left TKA with small wound vac and presents with decreased independence with functional mobility and activities of daily living as compared to baseline level of function and safety. Patient would benefit from skilled Occupational Therapy to maximize independence and safety with self-care task and functional mobility. Patient donned clothes and transferred to EOB with min assist. She stood and took steps to reclienr with CGa. She rested and them ambulated to the bathroom. She toileted and washed her hands and returned to [address] [facility]. She plans to discharge home tomorrow. She lives with her . She was educated to call for assistance with getting out of recliner. She was educated in Friday [facility] with [facility] and [address] Will see in am for full ADL session. All needs in [facility] Daily Activity Inpatient Short Form:    AM-PAC Daily Activity Inpatient   How much help for putting on and taking off regular lower body clothing?: A Little  How much help for Bathing?: A Little  How much help for Toileting?: A Little  How much help for putting on and taking off regular upper body clothing?: None  How much help for taking care of personal grooming?: None  How much help for eating meals?: None  AM-[facility] Inpatient Daily Activity Raw Score: 21  AM-PAC Inpatient ADL T-Scale Score : 44.27  ADL Inpatient CMS 0-100% Score: 32.79  ADL Inpatient CMS G-Code Modifier : CJ     SUBJECTIVE:     Ms. Saad Booth stated she needed to use the restroom       Social/Functional   Lives With: Spouse  Type of Home: Mobile home  Home Layout: One level  Home Access: Ramped entrance  Bathroom Shower/Tub: Tub/Shower unit,Shower chair with back  Bathroom Toilet: Standard  ADL Assistance: Independent  Homemaking Assistance: Independent  Ambulation Assistance: Independent    OBJECTIVE:     Diana Maldonado / Saeed Powers / Deja Clubs: Wound Vac    RESTRICTIONS/PRECAUTIONS:   fall    PAIN: Dalila Gehrig / O2:   Pre Treatment:      4/10    Post Treatment: 4/10 iceman in place Vitals          Oxygen        GROSS EVALUATION: INTACT IMPAIRED   (See Comments)   UE AROM [x] []   UE PROM [] []   Strength [x]       Posture / Balance []  CGA standing and ambulating   Sensation []     Coordination [x]       Tone [x]       Edema []    Activity Tolerance [x]  FAIR WITH AMBULATION     Hand Dominance R [] L []      COGNITION/  PERCEPTION: INTACT IMPAIRED   (See Comments)   Orientation [x]     Vision [x]     Hearing [x]     Cognition  [x]     Perception [x]       MOBILITY: I Mod I S SBA CGA Min Mod Max Total  NT x2 Comments:   Bed Mobility    Rolling [] [] [] [] [] [] [] [] [] [] []    Supine to Sit [] [] [] [] [] [x] [] [] [] [] []    Scooting [] [] [] [] [x] [] [] [] [] [] []    Sit to Supine [] [] [] [] [] [] [] [] [] [] []    Transfers    Sit to Stand [] [] [] [] [x] [] [] [] [] [] [] Bed to Chair [] [] [] [] [x] [] [] [] [] [] []    Stand to Sit [] [] [] [] [x] [] [] [] [] [] []    Tub/Shower [] [] [] [] [] [] [] [] [] [] []       Toilet [] [] [] [] [x] [] [] [] [] [] []        [] [] [] [] [] [] [] [] [] [] []    I=Independent, Mod I=Modified Independent, S=Supervision/Setup, SBA=Standby Assistance, CGA=Contact Guard Assistance, Min=Minimal Assistance, Mod=Moderate Assistance, Max=Maximal Assistance, Total=Total Assistance, NT=Not Tested    ACTIVITIES OF DAILY LIVING: I Mod I S SBA CGA Min Mod Max Total NT Comments   BASIC ADLs:              Upper Body Bathing [] [] [] [] [] [] [] [] [] []    Lower Body Bathing [] [] [] [] [] [] [] [] [] []    Toileting [] [] [] [] [x] [] [] [] [] []    Upper Body Dressing [] [] [x] [] [] [] [] [] [] []    Lower Body Dressing [] [] [] [] [] [x] [] [] [] []    Feeding [] [] [] [] [] [] [] [] [] []    Grooming [] [] [x] [] [] [] [] [] [] []    Personal Device Care [] [] [] [] [] [] [] [] [] []    Functional Mobility [] [] [] [] [x] [] [] [] [] []    I=Independent, Mod I=Modified Independent, S=Supervision/Setup, SBA=Standby Assistance, CGA=Contact Guard Assistance, Min=Minimal Assistance, Mod=Moderate Assistance, Max=Maximal Assistance, Total=Total Assistance, NT=Not Tested    PLAN:     FREQUENCY/DURATION   OT Plan of Care: 1 time/week,2 times/week for duration of hospital stay or until stated goals are met, whichever comes first.    ACUTE OCCUPATIONAL THERAPY GOALS:   (Developed with and agreed upon by patient and/or caregiver.)    GOALS:   DISCHARGE GOALS (in preparation for going home/rehab):  3 days  1. Ms. Ford Lombardo will perform lower body dressing activity with minimal assist required to demonstrate improved functional mobility and safety. 2.  Ms. Ford Lombardo will perform bathing activity with minimal assist required to demonstrate improved functional mobility and safety.   3.  Ms. Ford Lombardo will perform toileting activity with  contact guard assist to demonstrate improved functional mobility and safety. 4.  Ms. Maryanne Marc will perform all functional transfers transfer with contact guard assist to demonstrate improved functional mobility and safety. PROBLEM LIST:   (Skilled intervention is medically necessary to address:)  Decreased ADL/Functional Activities  Decreased Activity Tolerance  Decreased Balance  Decreased Gait Ability  Decreased Safety Awareness  Decreased Strength  Decreased Transfer Abilities  Increased Pain   INTERVENTIONS PLANNED:   (Benefits and precautions of occupational therapy have been discussed with the patient.)  Self Care Training  Therapeutic Activity  Therapeutic Exercise/HEP  Neuromuscular Re-education  Manual Therapy  Education         TREATMENT:     EVALUATION: LOW COMPLEXITY: (Untimed Charge)    TREATMENT:    SELF CARE: (25minutes):   Procedure(s) (per grid) utilized to improve and/or restore self-care/home management as related to dressing, toileting, grooming and functional mobility and transfers. Required minimal visual, verbal, manual and tactile cueing to facilitate activities of daily living skills, compensatory activities and safety and OT poc.       AFTER TREATMENT PRECAUTIONS: Bed/Chair Locked, Call light within reach, Chair, Needs within reach and RN notified    INTERDISCIPLINARY COLLABORATION:  RN/ PCT, PT/ PTA and OT/ [facility]    EDUCATION:     [x] Safe And Effective Hygiene  [x] Fall Precautions  [] Hip Precautions  [] D/C Instruction Review [] Prosthesis Review  [x] Walker Management/Safety  [x] Adaptive Equipment as Needed  [] Therapeutic Resting Position of Joint       TOTAL TREATMENT DURATION AND TIME:  Time In: 1330  Time Out: 1400  Minutes: Gregory Sanchez OT

## 2022-06-22 NOTE — ANESTHESIA PROCEDURE NOTES
Peripheral Block    Patient location during procedure: pre-op  Reason for block: post-op pain management and at surgeon's request  Start time: 6/22/2022 8:38 AM  End time: 6/22/2022 8:41 AM  Staffing  Performed: anesthesiologist   Anesthesiologist: Iva Moreno MD  Preanesthetic Checklist  Completed: patient identified, IV checked, site marked, risks and benefits discussed, surgical/procedural consents, equipment checked, pre-op evaluation, timeout performed, anesthesia consent given, oxygen available and monitors applied/VS acknowledged  Peripheral Block   Patient position: supine  Prep: ChloraPrep  Provider prep: mask and sterile gloves  Patient monitoring: cardiac monitor, continuous pulse ox, frequent blood pressure checks, IV access, oxygen and responsive to questions  Block type: Femoral  Adductor canal  Laterality: left  Injection technique: single-shot  Guidance: ultrasound guided    Needle   Needle type: insulated echogenic nerve stimulator needle   Needle gauge: 20 G  Needle localization: ultrasound guidance  Needle length: 10 cm  Assessment   Injection assessment: negative aspiration for heme, no paresthesia on injection, no intravascular symptoms and local visualized surrounding nerve on ultrasound  Slow fractionated injection: yes  Hemodynamics: stable  Real-time US image taken/store: yes  Outcomes: patient tolerated procedure well and uncomplicated    Additional Notes  3 cc 1% lidocaine local at needle insertion site. Risks discussed including damage to muscle or nerve. Needle inserted and placed in close proximity to the nerve under real time ultrasound guidance. Ultrasound was used to visualize the spread of local anesthetic in close proximity to the nerve being blocked. All structures appeared anatomically normal and there were no abnormal findings. Ultrasound imaged printed and placed on chart. 20 cc 0.2% ropivacaine with 1:400K epinephrine and 4 mg decadron.

## 2022-06-22 NOTE — PROGRESS NOTES
PHYSICAL THERAPY JOINT CAMP: TOTAL KNEE ARTHROPLASTY Initial Assessment and PM  (Link to Caseload Tracking: PT Visit Days : 1  Acknowledge Orders  Time In/Out  PT Charge Capture  Rehab Caseload Tracker  Episode   Charlee Carter is a 78 y.o. female   PRIMARY DIAGNOSIS: Osteoarthritis of left knee  Primary osteoarthritis of left knee [M17.12]  Knee deformity, acquired, left [M21.962]  Osteoarthritis of left knee, unspecified osteoarthritis type [M17.12]  Procedure(s) (LRB):  LEFT SUAD KNEE TOTAL ARTHROPLASTY ROBOTIC ASIF (Left)  Day of Surgery  Reason for Referral: Pain in Left Knee (M25.562)  Stiffness of Left Knee, Not elsewhere classified (M25.662)  Difficulty in walking, Not elsewhere classified (R26.2)  Inpatient: Payor: [employer] / Plan: MEDICARE PART A AND B / Product Type: *No Product type* /     REHAB RECOMMENDATIONS:   Recommendation to date pending progress:  [address] [place] [address] Therapy    Equipment:     3 in 1 Bedside Commode   Rolling Walker     RANGE OF MOTION:   Left Knee Flexion:   grossly 60  Left Knee Extension:   grossly 5     GAIT: I Mod I S SBA CGA Min Mod Max Total  NT x2 Comments:   Level of Assistance [] [] [] [x] [x] [] [] [] [] [] []            Weightbearing Status  Right Lower Extremity Weight Bearing: Weight Bearing As Tolerated    Distance  250 feet    Gait Quality Antalgic, Decreased annmarie , Decreased step clearance, Decreased step length and Decreased stance    DME Rolling Walker     Stairs      Ramp     I=Independent, Mod I=Modified Independent, S=Supervision, SBA=Standby Assistance, CGA=Contact Guard Assistance,   Min=Minimal Assistance, Mod=Moderate Assistance, Max=Maximal Assistance, Total=Total Assistance, NT=Not Tested    ASSESSMENT:   ASSESSMENT:  Ms. Jimenez Carrier presents with decreased strength and range of motion left lower extremity and with decreased independence with functional mobility s/p left total knee arthroplasty.  Pt will benefit from skilled PT interventions to maximize independence with functional mobility and TKA 250management. Pt did well with assessment and is willing to get up with therapy. Today's treatment focused on transfer and gait training. She transferred out of the chair and ambulated in the halls 250 ft with RW and then returned to the room to perform therex. Pt practiced TKA exercises as below with verbal cues while reviewing HEP. Reviewed use of cryocuff as needed for pain and swelling. Pt instructed not to get up without assist. Pt plans to discharge to her home from the hospital with continued therapy for follow up. Patient is hopeful to spend the night to better prepare for safe discharge home. .     Outcome Measure:   KOOS-JR:  KOJr JOSE CARLOS. Knee Survey Score: 7    SUBJECTIVE:   Ms. Jun Barrett states, \"let's get up and walk and then I may need to go to the bathroom\"     Social/Functional Lives With: Spouse  Type of Home: Mobile home  Home Layout: One level  Home Access: Ramped entrance  Bathroom Shower/Tub: Tub/Shower unit,Shower chair with back  Bathroom Toilet: Standard  ADL Assistance: Independent  Homemaking Assistance: Independent  Ambulation Assistance: Independent    OBJECTIVE:     PAIN: VITAL SIGNS: LINES/DRAINS:   Pre Treatment:         Post Treatment: did not complain of pain Vitals        Oxygen    None    RESTRICTIONS/PRECAUTIONS:  Restrictions/Precautions: Weight Bearing  Right Lower Extremity Weight Bearing: Weight Bearing As Tolerated        Restrictions/Precautions: Weight Bearing        LOWER EXTREMITY GROSS EVALUATION:  RIGHT LE   Within Functional Limits   Abnormal   Comments   Strength [x] []     Range of Motion [x] []        LEFT LE Within Functional Limits   Abnormal   Comments   Strength [] []   grossly 3+/5   Range of Motion [] []   grossly 5-60     UPPER EXTREMITY GROSS EVALUATION:     Within Functional Limits   Abnormal   Comments   Strength [x] []    Range of Motion [x] []      SENSATION  Sensation [x] COGNITION/  PERCEPTION: Intact Impaired (Comments):   Orientation [x]     Vision [x]     Hearing [x]     Cognition  [x]       MOBILITY: I Mod I S SBA CGA Min Mod Max Total  NT x2 Comments:   Bed Mobility    Rolling [] [] [] [] [] [] [] [] [] [] []    Supine to Sit [] [] [] [] [] [] [] [] [] [] []    Scooting [] [] [] [x] [] [] [] [] [] [] []    Sit to Supine [] [] [] [] [] [] [] [] [] [] []    Transfers    Sit to Stand [] [] [] [x] [] [] [] [] [] [] []    Bed to Chair [] [] [] [x] [] [] [] [] [] [] []    Stand to Sit [] [] [] [x] [] [] [] [] [] [] []    Stand Pivot [] [] [] [] [] [] [] [] [] [] []    Toilet [] [] [] [] [] [] [] [] [] [] []     [] [] [] [] [] [] [] [] [] [] []    I=Independent, Mod I=Modified Independent, S=Supervision, SBA=Standby Assistance, CGA=Contact Guard Assistance,   Min=Minimal Assistance, Mod=Moderate Assistance, Max=Maximal Assistance, Total=Total Assistance, NT=Not Tested    BALANCE: Good Fair+ Fair Fair- Poor NT Comments   Sitting Static [x] [] [] [] [] []    Sitting Dynamic [x] [] [] [] [] []              Standing Static [] [x] [] [] [] []    Standing Dynamic [] [x] [] [] [] []      PLAN:   ACUTE PHYSICAL THERAPY GOALS:   (Developed with and agreed upon by patient and/or caregiver.)  GOALS (1-4 days):  (1.)Ms. Ford Lombardo will move from supine to sit and sit to supine  in bed with SUPERVISION. (2.)Ms. Ford Lombardo will transfer from bed to chair and chair to bed with SUPERVISION using the least restrictive device. (3.)Ms. Ford Lombardo will ambulate with STAND BY ASSIST for 300 feet with the least restrictive device. (4.)Ms. Ford Lombardo will ambulate up/down ramp with no railing with CONTACT GUARD ASSIST using rolling walker. (5.)Ms. Ford Lombardo will increase left knee ROM to 0-60°.  ________________________________________________________________________________________________    FREQUENCY AND DURATION: BID for duration of hospital stay or until stated goals are met, whichever comes first.    THERAPY PROGNOSIS: Excellent    PROBLEM LIST:   (Skilled intervention is medically necessary to address:)  Decreased Activity Tolerance  Decreased AROM/PROM  Decreased Gait Ability  Decreased Strength  Decreased Transfer Abilities  Increased Pain   INTERVENTIONS PLANNED:   (Benefits and precautions of physical therapy have been discussed with the patient.)  Therapeutic Activity  Therapeutic Exercise/HEP  Gait Training  Modalities  Education       TREATMENT:   EVALUATION: LOW COMPLEXITY: (Untimed Charge)    TREATMENT:   Therapeutic Activity (35 Minutes): Therapeutic activity included Scooting, Transfer Training, Ambulation on level ground, Sitting balance , Standing balance and LE therex to improve functional Activity tolerance, Balance, Mobility, Strength, ROM and LE therex. TREATMENT GRID:  THERAPEUTIC  EXERCISES: DATE:  6/22 DATE:   DATE:      AM PM AM PM AM PM    [] [] [] [] [] []   Ankle Pumps  10       Quad Sets  10       Gluteal Sets  10       Hip Abd/ADduction  10       Straight Leg Raises  10       Knee Slides  10       Short Arc Quads  10       Chair Slides                           B = bilateral; AA = active assistive; A = active; P = passive      EDUCATION:    EDUCATION:  [x] Home Exercises  [x] Fall Precautions  [x] No Pillow Under Knee  [] D/C Instruction Review [x] Cryocuff  [x] Walker Management/Safety  [] Adaptive Equipment as Needed     AFTER TREATMENT PRECAUTIONS: Bed/Chair Locked, Chair, Needs within reach and RN notified    INTERDISCIPLINARY COLLABORATION:  RN/ PCT, PT/ PTA and OT/ [facility]    COMPLIANCE WITH PROGRAM/EXERCISE: compliant all of the time, Will assess as treatment progresses. RECOMMENDATIONS/INTENT FOR NEXT TREATMENT SESSION: Treatment next visit will focus on increasing Ms. Peoples's independence with bed mobility, transfers, gait training, strength/ROM exercises, modalities for pain, and patient education.      TIME IN/OUT:  Time In: 1510  Time Out: 1550  Minutes: Orlando Faulkner [address]

## 2022-06-22 NOTE — ANESTHESIA PRE PROCEDURE
injection vial Inject into the skin as needed for High Blood Sugar Pt checks sqbs 5 times/daily:    Blood sugar less than 150=0 units; 151-199= 2 units; 200-249 = 4 units; 250-299= 6 units; 300-349= 8 units; 340 and above 10 units    Historical Provider, MD   Cholecalciferol (VITAMIN D3) 50 MCG (2000 UT) CAPS Take by mouth daily    Historical Provider, MD   Lancets MISC Check glucose ac and qhs. (E 10.10.) 10/31/21   Ar Automatic Reconciliation   albuterol sulfate  (90 Base) MCG/ACT inhaler Inhale 1 puff into the lungs every 6 hours as needed 3/20/18   Ar Automatic Reconciliation   ALPRAZolam (XANAX) 2 MG tablet Take 2 mg by mouth 3 times daily as needed. Ar Automatic Reconciliation   ARIPiprazole (ABILIFY) 10 MG tablet Take 5 mg by mouth    Ar Automatic Reconciliation   atorvastatin (LIPITOR) 80 MG tablet Take 80 mg by mouth daily TAKE 1 TABLET BY MOUTH DAILY 3/23/22   Ar Automatic Reconciliation   DULoxetine (CYMBALTA) 60 MG extended release capsule Take 60 mg by mouth 2 times daily  6/4/20   Ar Automatic Reconciliation   fluconazole (DIFLUCAN) 150 MG tablet Take 150 mg by mouth every 30 days    Ar Automatic Reconciliation   fluticasone-salmeterol (ADVAIR) 250-50 MCG/DOSE AEPB Inhale 1 puff into the lungs 2 times daily  Patient not taking: Reported on 6/22/2022 8/6/20   Ar Automatic Reconciliation   furosemide (LASIX) 20 MG tablet Take 20 mg by mouth daily 2/4/22   Ar Automatic Reconciliation   gabapentin (NEURONTIN) 300 MG capsule Take 300 mg by mouth 3 times daily.     Ar Automatic Reconciliation   insulin aspart (NOVOLOG) 100 UNIT/ML injection pen For Blood Sugar (mg/dL) of: Less than 150 = 0 mxopr460 -199 = 2 lwzwx761 -249 = 4 viube750 -299 = 6 orbul393 -349 = 8 ukbje310 and above = 10 units 10/31/21   Ar Automatic Reconciliation   insulin glargine (LANTUS) 100 UNIT/ML injection vial Lantus 40 units sq in am and 10 units sq in pm.  Patient not taking: Reported on 6/22/2022 11/1/21   Ar Automatic Reconciliation   lisinopril (PRINIVIL;ZESTRIL) 40 MG tablet Take 20 mg by mouth daily 10/31/21   Ar Automatic Reconciliation   metoprolol succinate (TOPROL XL) 50 MG extended release tablet Take 50 mg by mouth daily 2/4/22   Ar Automatic Reconciliation   nitroGLYCERIN (NITROSTAT) 0.4 MG SL tablet Place 0.4 mg under the tongue as needed 6/15/19   Ar Automatic Reconciliation   omeprazole (PRILOSEC) 20 MG delayed release capsule Take 20 mg by mouth daily 8/14/20   Ar Automatic Reconciliation   SUMAtriptan (IMITREX) 100 MG tablet Take 50 mg by mouth once as needed    Ar Automatic Reconciliation   tiotropium (SPIRIVA RESPIMAT) 1.25 MCG/ACT AERS inhaler Inhale 2 puffs into the lungs daily 12/3/19   Ar Automatic Reconciliation   topiramate (TOPAMAX) 50 MG tablet Take 50 mg by mouth daily    Ar Automatic Reconciliation       Current medications:    Current Facility-Administered Medications   Medication Dose Route Frequency Provider Last Rate Last Admin    ceFAZolin (ANCEF) 2000 mg in sterile water 20 mL IV syringe  2,000 mg IntraVENous On Call to [address] [place] [address], APRN - CNP        sodium chloride flush 0.9 % injection 5-40 mL  5-40 mL IntraVENous 2 times per day [place] Mais, APRN - CNP        sodium chloride flush 0.9 % injection 5-40 mL  5-40 mL IntraVENous PRN Lake Mais, APRN - CNP        0.9 % sodium chloride infusion   IntraVENous PRN Lake Mais, APRN - CNP        lidocaine PF 1 % injection 1 mL  1 mL IntraDERmal Once PRN Jayant Edwards MD        fentaNYL (SUBLIMAZE) injection 100 mcg  100 mcg IntraVENous Once PRN Jayant Edwards MD        lactated ringers infusion  100 mL/hr IntraVENous Continuous Conception MD Jerry 100 mL/hr at 06/22/22 0756 100 mL/hr at 06/22/22 0756       Allergies:     Allergies   Allergen Reactions    Hydroxychloroquine Hives    Hydroxyzine Pamoate Other (See Comments)     Insomnia     Nortriptyline Hives    Other     Tizanidine Other (See Comments) Sleep walking and  nocturnal eating     Adhesive Tape Hives and Nausea And Vomiting     elavil    Doxepin Hives and Nausea And Vomiting    Elavil [Amitriptyline] Hives and Nausea And Vomiting     elavil    Temazepam Hives and Nausea And Vomiting    Trazodone Nausea And Vomiting       Problem List:    Patient Active Problem List   Diagnosis Code    Vitamin B12 deficiency E53.8    Hyperkalemia E87.5    Hypokalemia E87.6    Depression, major, recurrent, mild ([facility]) F33.0    DKA, type 1 ([facility]) E10.10    Venous insufficiency I87.2    DKA (diabetic ketoacidosis) ([facility]) E11.10    BIANCA (acute kidney injury) ([facility] N17.9    Leukocytosis D72.829    Lumbar stenosis with neurogenic claudication M48.062    Breast mass, right N63.10    Septic shock ([facility]) A41.9, P28.75    Metabolic acidemia V25.9    Persistent migraine aura without cerebral infarction and without status migrainosus, not intractable G43.509    Hyponatremia E87.1    Vitamin D deficiency E55.9    Hypomagnesemia E83.42    Elevated rheumatoid factor R76.8    Hypertension I10    Fibromyalgia M79.7    Coronary artery disease involving native coronary artery of native heart with angina pectoris ([facility]) I25.119    Chronic obstructive pulmonary disease ([facility]) J44.9    S/P angioplasty with stent Z95.820    Pure hypercholesterolemia E78.00    Osteoarthritis of left knee M17.12       Past Medical History:        Diagnosis Date    Anxiety     Arthritis     Asthma     uses inhaler daily    CAD (coronary artery disease) 04/24/2019    mi  stent x 1-- followed by dr Abhijit Chang    Cervical spondylosis with myelopathy 06/17/2015    Chronic obstructive pulmonary disease ([facility])     daily inhaler    Chronic pain     back  - and all over pain     Depression     Diabetes ([facility]     type 1; followed by Endocrinology; sqbs am avg -- hypo at 60's -- uses insulin pump; Hgb A1c 7.5 on 3/14/22    Diverticulosis     resolved at present - sees DR Doin Allison when needed    DJD (degenerative joint disease)     Fibromyalgia     all over     Former smoker     quit     GERD (gastroesophageal reflux disease)     CONTROLLED WITH MED    History of blood transfusion     after delivery of her son    Hormone replacement therapy     Hyperlipidemia     Hypertension     controlled with med    Insomnia     Left knee pain     Migraine     Mitral valve regurgitation     echo 3/1/21: There was mild to moderate regurgitation.  Psychiatric diagnosis     bipolar~pt states resolved    RA (rheumatoid arthritis) ([facility] [address] dx yrs ago    does not see a rheumatologist    Suicide attempt ([facility] [address]     x 2    Unspecified adverse effect of anesthesia     \"woke up\"during  surgery on toe    Vitamin B 12 deficiency     Vitamin D deficiency        Past Surgical History:        Procedure Laterality Date    CARDIAC CATHETERIZATION      cadiac stent x1    CATARACT REMOVAL Bilateral      SECTION      times 2    COLONOSCOPY      HYSTERECTOMY (CERVIX STATUS UNKNOWN) N/A     total     NEUROLOGICAL SURGERY  2019    lower back    ORTHOPEDIC SURGERY N/A     neck ( plate and screws was put in )     ORTHOPEDIC SURGERY Right     right rotator cuff     ORTHOPEDIC SURGERY Right     shaved down bone in great toe     ORTHOPEDIC SURGERY Bilateral     shoulder scopes    ORTHOPEDIC SURGERY      right foot \"bone removal\"     ORTHOPEDIC SURGERY      left knee scope    OTHER SURGICAL HISTORY      insulin pump       Social History:    Social History     Tobacco Use    Smoking status: Former Smoker     Packs/day: 1.00     Years: 15.00     Pack years: 15.00     Types: Cigarettes     Quit date: 6/10/2002     Years since quittin.0    Smokeless tobacco: Never Used   Substance Use Topics    Alcohol use:  Yes     Alcohol/week: 3.0 standard drinks     Types: 1 Glasses of wine, 1 Cans of beer, 1 Shots of liquor per week     Comment: daily~a beer; liquor occasionally Counseling given: Not Answered      Vital Signs (Current):   Vitals:    06/22/22 0709   BP: 116/63   Pulse: 67   Resp: 16   Temp: 97.2 °F (36.2 °C)   TempSrc: Temporal   SpO2: 97%   Weight: 178 lb (80.7 kg)   Height: 5' 3\" (1.6 m)                                              BP Readings from Last 3 Encounters:   06/22/22 116/63   06/06/22 133/86       NPO Status: Time of last liquid consumption: 0500 (black coffee)                        Time of last solid consumption: 2100                        Date of last liquid consumption: 06/22/22                        Date of last solid food consumption: 06/21/22    BMI:   Wt Readings from Last 3 Encounters:   06/22/22 178 lb (80.7 kg)   06/06/22 178 lb 12.8 oz (81.1 kg)   12/30/21 181 lb 6.4 oz (82.3 kg)     Body mass index is 31.53 kg/m².     CBC:   Lab Results   Component Value Date    WBC 5.6 06/06/2022    RBC 4.22 06/06/2022    HGB 12.9 06/06/2022    HCT 38.3 06/06/2022    MCV 90.8 06/06/2022    RDW 14.2 06/06/2022     06/06/2022       CMP:   Lab Results   Component Value Date     06/06/2022    K 4.7 06/22/2022    CL 97 06/06/2022    CO2 30 06/06/2022    BUN 9 06/06/2022    CREATININE 0.77 06/06/2022    GFRAA >60 06/06/2022    AGRATIO 1.0 12/02/2021    LABGLOM >60 06/06/2022    GLUCOSE 250 06/06/2022    PROT 7.7 12/02/2021    CALCIUM 9.6 06/06/2022    BILITOT 1.2 12/02/2021    ALKPHOS 76 12/02/2021    AST 12 12/02/2021    ALT 26 12/02/2021       POC Tests:   Recent Labs     06/22/22  0811   POCGLU 96       Coags:   Lab Results   Component Value Date    PROTIME 12.2 06/06/2022    INR 0.9 06/06/2022    APTT 25.1 06/06/2022    APTT 25.9 02/28/2021       HCG (If Applicable): No results found for: PREGTESTUR, PREGSERUM, HCG, HCGQUANT     ABGs:   Lab Results   Component Value Date    PHART 6.98 02/28/2021    PO2ART 102 02/28/2021    NCE8YUI 27.5 02/28/2021    ZYR9NUP 6.4 02/28/2021        Type & Screen (If Applicable):  No results found for: LABABO, [facility]    Drug/Infectious Status (If Applicable):  No results found for: HIV, HEPCAB    COVID-19 Screening (If Applicable):   Lab Results   Component Value Date    COVID19 Not detected 03/01/2021    COVID19 Negative 03/01/2021    COVID19 Please find results under separate order 03/01/2021    COVID19 Not Detected 06/03/2020           Anesthesia Evaluation    Airway: Mallampati: III  TM distance: >3 FB   Neck ROM: full  Mouth opening: > = 3 FB   Dental: normal exam         Pulmonary:normal exam  breath sounds clear to auscultation  (+) COPD (stable, no home O2): mild,  asthma: current smoker (former)                           Cardiovascular:  Exercise tolerance: poor (<4 METS),   (+) hypertension:, valvular problems/murmurs: MR, past MI (2 yrs ago):, CAD:, CABG/stent (Stent 2 yrs ago):,         Rhythm: regular  Rate: normal                 ROS comment: Echo:   Left ventricle: Systolic function was normal. Ejection fraction was  estimated in the range of 60 % to 65 %. There were no regional wall motion  abnormalities.     -  Mitral valve: There was mild to moderate regurgitation.        Neuro/Psych:   (+) headaches:, psychiatric history:             ROS comment: LBP GI/Hepatic/Renal:   (+) GERD: well controlled,           Endo/Other:    (+) DiabetesType I DM, using insulin, : arthritis (RA):., .                 Abdominal:             Vascular: Other Findings:           Anesthesia Plan      TIVA     ASA 3     (Lengthy discussion of R/B/A given h/o LBP and awareness during sedation cases, pt agrees to SAB with TIVA. )  Induction: intravenous. Anesthetic plan and risks discussed with patient.               Post-op pain plan if not by surgeon: single peripheral nerve block            Talisha Owen MD   6/22/2022

## 2022-06-22 NOTE — PROGRESS NOTES
TRANSFER - IN REPORT:    Verbal report received from [facility]  on Beatriz Santana  being received from PACU for routine post-op      Report consisted of patient's Situation, Background, Assessment and   Recommendations(SBAR). Information from the following report(s) Nurse Handoff Report was reviewed with the receiving nurse. Opportunity for questions and clarification was provided.

## 2022-06-22 NOTE — CARE COORDINATION
Patient is a 78y.o. year old female admitted for Left TKA . Patient plans to return home on discharge. Order received to arrange home health. Pt lives in [facility]. She requested [facility]. Referral called and faxed to their [place] office. [phone]. RN added for prevena wd vac removal in 7 days. Patient requesting we arrange a walker and bedside commode. Referral sent to [address] delivered to the hospital room prior to discharge. Will follow until discharge. 06/22/22 0454   Service Assessment   Patient Orientation Alert and Oriented   Cognition Alert   Primary Caregiver Self   Social/Functional History   Lives With Spouse   Discharge Planning   DME Ordered? Bedside commode;Walker   Type of Home Care Services PT   Services At/After Discharge   Transition of Care Consult (CM Consult) Home Health   Internal Home Health No   Reason Outside Agency Chosen Out of service area  ([facility])   [address]   Mode of Transport at Discharge Self   Condition of Participation: Discharge Planning   The Plan for Transition of Care is related to the following treatment goals: improve mobility   The Patient and/or Patient Representative was provided with a Choice of Provider? Patient   The Patient and/Or Patient Representative agree with the Discharge Plan? Yes   Freedom of Choice list was provided with basic dialogue that supports the patient's individualized plan of care/goals, treatment preferences, and shares the quality data associated with the providers?   Yes

## 2022-06-23 PROBLEM — R40.4 TRANSIENT ALTERATION OF AWARENESS: Status: ACTIVE | Noted: 2022-06-23

## 2022-06-23 LAB
ALBUMIN SERPL-MCNC: 3.1 G/DL (ref 3.2–4.6)
ALBUMIN/GLOB SERPL: 1.1 {RATIO} (ref 1.2–3.5)
ALP SERPL-CCNC: 54 U/L (ref 50–136)
ALT SERPL-CCNC: 17 U/L (ref 12–65)
ANION GAP SERPL CALC-SCNC: 4 MMOL/L (ref 7–16)
AST SERPL-CCNC: 18 U/L (ref 15–37)
BILIRUB SERPL-MCNC: 0.7 MG/DL (ref 0.2–1.1)
BUN SERPL-MCNC: 19 MG/DL (ref 8–23)
CALCIUM SERPL-MCNC: 8.8 MG/DL (ref 8.3–10.4)
CHLORIDE SERPL-SCNC: 97 MMOL/L (ref 98–107)
CO2 SERPL-SCNC: 27 MMOL/L (ref 21–32)
CREAT SERPL-MCNC: 1.08 MG/DL (ref 0.6–1)
ERYTHROCYTE [DISTWIDTH] IN BLOOD BY AUTOMATED COUNT: 14.4 % (ref 11.9–14.6)
EST. AVERAGE GLUCOSE BLD GHB EST-MCNC: 174 MG/DL
GLOBULIN SER CALC-MCNC: 2.9 G/DL (ref 2.3–3.5)
GLUCOSE BLD STRIP.AUTO-MCNC: 110 MG/DL (ref 65–100)
GLUCOSE BLD STRIP.AUTO-MCNC: 236 MG/DL (ref 65–100)
GLUCOSE BLD STRIP.AUTO-MCNC: 292 MG/DL (ref 65–100)
GLUCOSE BLD STRIP.AUTO-MCNC: 330 MG/DL (ref 65–100)
GLUCOSE BLD STRIP.AUTO-MCNC: 343 MG/DL (ref 65–100)
GLUCOSE BLD STRIP.AUTO-MCNC: 70 MG/DL (ref 65–100)
GLUCOSE SERPL-MCNC: 348 MG/DL (ref 65–100)
HBA1C MFR BLD: 7.7 % (ref 4.2–6.3)
HCT VFR BLD AUTO: 30 % (ref 35.8–46.3)
HCT VFR BLD AUTO: 33.5 % (ref 35.8–46.3)
HGB BLD-MCNC: 10.2 G/DL (ref 11.7–15.4)
HGB BLD-MCNC: 11.4 G/DL (ref 11.7–15.4)
MCH RBC QN AUTO: 31.8 PG (ref 26.1–32.9)
MCHC RBC AUTO-ENTMCNC: 34 G/DL (ref 31.4–35)
MCV RBC AUTO: 93.5 FL (ref 79.6–97.8)
NRBC # BLD: 0 K/UL (ref 0–0.2)
PLATELET # BLD AUTO: 163 K/UL (ref 150–450)
PMV BLD AUTO: 12.1 FL (ref 9.4–12.3)
POTASSIUM SERPL-SCNC: 4.7 MMOL/L (ref 3.5–5.1)
POTASSIUM SERPL-SCNC: 5.2 MMOL/L (ref 3.5–5.1)
PROT SERPL-MCNC: 6 G/DL (ref 6.3–8.2)
RBC # BLD AUTO: 3.21 M/UL (ref 4.05–5.2)
SERVICE CMNT-IMP: ABNORMAL
SERVICE CMNT-IMP: NORMAL
SODIUM SERPL-SCNC: 128 MMOL/L (ref 136–145)
WBC # BLD AUTO: 9.6 K/UL (ref 4.3–11.1)

## 2022-06-23 PROCEDURE — 82962 GLUCOSE BLOOD TEST: CPT

## 2022-06-23 PROCEDURE — 6360000002 HC RX W HCPCS: Performed by: NURSE PRACTITIONER

## 2022-06-23 PROCEDURE — 85018 HEMOGLOBIN: CPT

## 2022-06-23 PROCEDURE — 83036 HEMOGLOBIN GLYCOSYLATED A1C: CPT

## 2022-06-23 PROCEDURE — 84132 ASSAY OF SERUM POTASSIUM: CPT

## 2022-06-23 PROCEDURE — 1100000000 HC RM PRIVATE

## 2022-06-23 PROCEDURE — 6370000000 HC RX 637 (ALT 250 FOR IP): Performed by: NURSE PRACTITIONER

## 2022-06-23 PROCEDURE — 2500000003 HC RX 250 WO HCPCS: Performed by: NURSE PRACTITIONER

## 2022-06-23 PROCEDURE — 97530 THERAPEUTIC ACTIVITIES: CPT

## 2022-06-23 PROCEDURE — 36415 COLL VENOUS BLD VENIPUNCTURE: CPT

## 2022-06-23 PROCEDURE — 94760 N-INVAS EAR/PLS OXIMETRY 1: CPT

## 2022-06-23 PROCEDURE — 99024 POSTOP FOLLOW-UP VISIT: CPT | Performed by: ORTHOPAEDIC SURGERY

## 2022-06-23 PROCEDURE — 80053 COMPREHEN METABOLIC PANEL: CPT

## 2022-06-23 PROCEDURE — 6370000000 HC RX 637 (ALT 250 FOR IP): Performed by: FAMILY MEDICINE

## 2022-06-23 PROCEDURE — 85027 COMPLETE CBC AUTOMATED: CPT

## 2022-06-23 RX ORDER — INSULIN GLARGINE 100 [IU]/ML
10 INJECTION, SOLUTION SUBCUTANEOUS NIGHTLY
Status: DISCONTINUED | OUTPATIENT
Start: 2022-06-23 | End: 2022-06-24 | Stop reason: HOSPADM

## 2022-06-23 RX ORDER — INSULIN LISPRO 100 [IU]/ML
10 INJECTION, SOLUTION INTRAVENOUS; SUBCUTANEOUS ONCE
Status: DISCONTINUED | OUTPATIENT
Start: 2022-06-23 | End: 2022-06-23

## 2022-06-23 RX ADMIN — OXYCODONE HYDROCHLORIDE 10 MG: 10 TABLET, FILM COATED, EXTENDED RELEASE ORAL at 20:31

## 2022-06-23 RX ADMIN — DOCUSATE SODIUM 50MG AND SENNOSIDES 8.6MG 1 TABLET: 8.6; 5 TABLET, FILM COATED ORAL at 20:32

## 2022-06-23 RX ADMIN — BUSPIRONE HYDROCHLORIDE 10 MG: 5 TABLET ORAL at 07:54

## 2022-06-23 RX ADMIN — ALPRAZOLAM 2 MG: 0.5 TABLET ORAL at 21:39

## 2022-06-23 RX ADMIN — DULOXETINE HYDROCHLORIDE 60 MG: 60 CAPSULE, DELAYED RELEASE ORAL at 20:32

## 2022-06-23 RX ADMIN — GABAPENTIN 300 MG: 300 CAPSULE ORAL at 20:32

## 2022-06-23 RX ADMIN — KETOROLAC TROMETHAMINE 15 MG: 30 INJECTION, SOLUTION INTRAMUSCULAR at 02:23

## 2022-06-23 RX ADMIN — OXYCODONE HYDROCHLORIDE 10 MG: 10 TABLET, FILM COATED, EXTENDED RELEASE ORAL at 07:54

## 2022-06-23 RX ADMIN — ATORVASTATIN CALCIUM 80 MG: 40 TABLET, FILM COATED ORAL at 07:59

## 2022-06-23 RX ADMIN — BUSPIRONE HYDROCHLORIDE 10 MG: 5 TABLET ORAL at 20:31

## 2022-06-23 RX ADMIN — DOCUSATE SODIUM 50MG AND SENNOSIDES 8.6MG 1 TABLET: 8.6; 5 TABLET, FILM COATED ORAL at 07:59

## 2022-06-23 RX ADMIN — ACETAMINOPHEN 1000 MG: 500 TABLET, FILM COATED ORAL at 05:22

## 2022-06-23 RX ADMIN — HYDROMORPHONE HYDROCHLORIDE 2 MG: 2 TABLET ORAL at 21:39

## 2022-06-23 RX ADMIN — INSULIN LISPRO 12 UNITS: 100 INJECTION, SOLUTION INTRAVENOUS; SUBCUTANEOUS at 17:10

## 2022-06-23 RX ADMIN — CEFAZOLIN SODIUM 2000 MG: 10 INJECTION, POWDER, FOR SOLUTION INTRAVENOUS at 02:23

## 2022-06-23 RX ADMIN — INSULIN LISPRO 12 UNITS: 100 INJECTION, SOLUTION INTRAVENOUS; SUBCUTANEOUS at 11:24

## 2022-06-23 RX ADMIN — ASPIRIN 81 MG: 81 TABLET, COATED ORAL at 20:32

## 2022-06-23 RX ADMIN — ACETAMINOPHEN 1000 MG: 500 TABLET, FILM COATED ORAL at 00:49

## 2022-06-23 RX ADMIN — SODIUM ZIRCONIUM CYCLOSILICATE 10 G: 10 POWDER, FOR SUSPENSION ORAL at 17:12

## 2022-06-23 RX ADMIN — ASPIRIN 81 MG: 81 TABLET, COATED ORAL at 07:53

## 2022-06-23 RX ADMIN — LISINOPRIL 40 MG: 20 TABLET ORAL at 07:57

## 2022-06-23 RX ADMIN — DULOXETINE HYDROCHLORIDE 60 MG: 60 CAPSULE, DELAYED RELEASE ORAL at 07:58

## 2022-06-23 RX ADMIN — PANTOPRAZOLE SODIUM 40 MG: 40 TABLET, DELAYED RELEASE ORAL at 05:23

## 2022-06-23 RX ADMIN — KETOROLAC TROMETHAMINE 15 MG: 30 INJECTION, SOLUTION INTRAMUSCULAR at 17:11

## 2022-06-23 RX ADMIN — TOPIRAMATE 25 MG: 25 TABLET, FILM COATED ORAL at 07:54

## 2022-06-23 RX ADMIN — HYDROMORPHONE HYDROCHLORIDE 2 MG: 2 TABLET ORAL at 00:53

## 2022-06-23 RX ADMIN — GABAPENTIN 300 MG: 300 CAPSULE ORAL at 07:54

## 2022-06-23 RX ADMIN — METOPROLOL SUCCINATE 50 MG: 25 TABLET, EXTENDED RELEASE ORAL at 07:57

## 2022-06-23 RX ADMIN — POTASSIUM CHLORIDE 20 MEQ: 20 TABLET, EXTENDED RELEASE ORAL at 07:58

## 2022-06-23 RX ADMIN — FUROSEMIDE 20 MG: 20 TABLET ORAL at 07:58

## 2022-06-23 ASSESSMENT — PAIN SCALES - GENERAL
PAINLEVEL_OUTOF10: 5
PAINLEVEL_OUTOF10: 3
PAINLEVEL_OUTOF10: 3
PAINLEVEL_OUTOF10: 6

## 2022-06-23 ASSESSMENT — PAIN DESCRIPTION - LOCATION
LOCATION: KNEE
LOCATION: KNEE

## 2022-06-23 ASSESSMENT — PAIN DESCRIPTION - DESCRIPTORS
DESCRIPTORS: ACHING
DESCRIPTORS: ACHING

## 2022-06-23 ASSESSMENT — PAIN DESCRIPTION - ORIENTATION
ORIENTATION: LEFT
ORIENTATION: LEFT

## 2022-06-23 NOTE — PROGRESS NOTES
OCCUPATIONAL THERAPY Initial Assessment and PM      (Link to Caseload Tracking: OT Visit Days: 2  OT Orders   Time  OT Charge Capture  Rehab Caseload Tracker  Episode     Belle Weber is a 78 y.o. female   PRIMARY DIAGNOSIS: Osteoarthritis of left knee  Primary osteoarthritis of left knee [M17.12]  Knee deformity, acquired, left [M21.962]  Osteoarthritis of left knee, unspecified osteoarthritis type [M17.12]  Procedure(s) (LRB):  LEFT SUAD KNEE TOTAL ARTHROPLASTY ROBOTIC ASIF (Left)  1 Day Post-Op  Reason for Referral: Pain in Left Knee (M25.562)  Stiffness of Left Knee, Not elsewhere classified (M25.662)  Generalized Muscle Weakness (M62.81)  Other lack of cordination (R27.8)  Difficulty in walking, Not elsewhere classified (R26.2)  Other abnormalities of gait and mobility (R26.89)  Inpatient: Payor: MEDICARE / Plan: MEDICARE PART A AND B / Product Type: *No Product type* /     ASSESSMENT:     REHAB RECOMMENDATIONS:   Recommendation to date pending progress:  Settin \[facility] [place] [facility]\"" Therapy    Equipment:     3 in 1 Bedside Commode   Rolling Walker     ASSESSMENT:  Ms. Coral Arriaga is s/p left TKA with small wound vac and presents with decreased independence with functional mobility and activities of daily living as compared to baseline level of function and safety. Patient would benefit from skilled Occupational Therapy to maximize independence and safety with self-care task and functional mobility. Patient donned clothes and transferred to EOB with min assist. She stood and took steps to reclienr with CGa. She rested and them ambulated to the bathroom. She toileted and washed her hands and returned to [address] [facility]. She plans to discharge home tomorrow. She lives with her . She was educated to call for assistance with getting out of recliner. She was educated in Friday [facility] and [facility] [address] Will see in am for full ADL session. All needs in reach. 22 attempted to see patient 3 times today.  Her insulin pump fell off last night and she does not feel well. She ambulated with PT. She is not discharging home today. She declined showering. OT to see her tomorrow for full ADL session. Will follow with OT poc.      [address] [facility] [address] [address] AM-PAC 6 Clicks Daily Activity Inpatient Short Form:    AM-PAC Daily Activity Inpatient   How much help for putting on and taking off regular lower body clothing?: A Little  How much help for Bathing?: A Little  How much help for Toileting?: A Little  How much help for putting on and taking off regular upper body clothing?: None  How much help for taking care of personal grooming?: None  How much help for eating meals?: None  AM-PAC Inpatient Daily Activity Raw Score: 21  AM-PAC Inpatient ADL T-Scale Score : 44.27  ADL Inpatient CMS 0-100% Score: 32.79  ADL Inpatient CMS G-Code Modifier : CJ     SUBJECTIVE:     Ms. Jocelyn Wood stated she did not want to shower today       Social/Functional   Lives With: Spouse  Type of Home: Mobile home  Home Layout: One level  Home Access: Ramped entrance  Bathroom Shower/Tub: Tub/Shower unit,Shower chair with back  Bathroom Toilet: Standard  ADL Assistance: Independent  Homemaking Assistance: Independent  Ambulation Assistance: Independent    OBJECTIVE:     Dorian Rea / Pablo Acuna / Daniel Sample: Wound Vac    RESTRICTIONS/PRECAUTIONS:  Restrictions/Precautions: Weight Bearing  Right Lower Extremity Weight Bearing: Weight Bearing As Toleratedfall    PAIN: VITALS / O2:   Pre Treatment:      0/10    Post Treatment: 0/10 iceman in place Vitals          Oxygen        GROSS EVALUATION: INTACT IMPAIRED   (See Comments)   UE AROM [x] []   UE PROM [] []   Strength [x]       Posture / Balance []  CGA standing and ambulating   Sensation []     Coordination [x]       Tone [x]       Edema []    Activity Tolerance [x]  FAIR WITH AMBULATION     Hand Dominance R [] L []      COGNITION/  PERCEPTION: INTACT IMPAIRED   (See Comments)   Orientation [x]     Vision [x]     Hearing [x] Cognition  [x]     Perception [x]       MOBILITY: I Mod I S SBA CGA Min Mod Max Total  NT x2 Comments:   Bed Mobility    Rolling [] [] [] [] [] [] [] [] [] [] []    Supine to Sit [] [] [] [] [] [x] [] [] [] [] []    Scooting [] [] [] [] [x] [] [] [] [] [] []    Sit to Supine [] [] [] [] [] [] [] [] [] [] []    Transfers    Sit to Stand [] [] [] [] [x] [] [] [] [] [] []    Bed to Chair [] [] [] [] [x] [] [] [] [] [] []    Stand to Sit [] [] [] [] [x] [] [] [] [] [] []    Tub/Shower [] [] [] [] [] [] [] [] [] [] []       Toilet [] [] [] [] [x] [] [] [] [] [] []        [] [] [] [] [] [] [] [] [] [] []    I=Independent, Mod I=Modified Independent, S=Supervision/Setup, SBA=Standby Assistance, CGA=Contact Guard Assistance, Min=Minimal Assistance, Mod=Moderate Assistance, Max=Maximal Assistance, Total=Total Assistance, NT=Not Tested    ACTIVITIES OF DAILY LIVING: I Mod I S SBA CGA Min Mod Max Total NT Comments   BASIC ADLs:              Upper Body Bathing [] [] [] [] [] [] [] [] [] []    Lower Body Bathing [] [] [] [] [] [] [] [] [] []    Toileting [] [] [] [] [x] [] [] [] [] []    Upper Body Dressing [] [] [x] [] [] [] [] [] [] []    Lower Body Dressing [] [] [] [] [] [x] [] [] [] []    Feeding [] [] [] [] [] [] [] [] [] []    Grooming [] [] [x] [] [] [] [] [] [] []    Personal Device Care [] [] [] [] [] [] [] [] [] []    Functional Mobility [] [] [] [] [x] [] [] [] [] []    I=Independent, Mod I=Modified Independent, S=Supervision/Setup, SBA=Standby Assistance, CGA=Contact Guard Assistance, Min=Minimal Assistance, Mod=Moderate Assistance, Max=Maximal Assistance, Total=Total Assistance, NT=Not Tested    PLAN:     FREQUENCY/DURATION   OT Plan of Care: 1 time/week,2 times/week for duration of hospital stay or until stated goals are met, whichever comes first.    ACUTE OCCUPATIONAL THERAPY GOALS:   (Developed with and agreed upon by patient and/or caregiver.)    GOALS:   DISCHARGE GOALS (in preparation for going home/rehab):  3 days  1. Ms. Miriam Thornton will perform lower body dressing activity with minimal assist required to demonstrate improved functional mobility and safety. 2.  Ms. Miriam Thornton will perform bathing activity with minimal assist required to demonstrate improved functional mobility and safety. 3.  Ms. Miriam Thornton will perform toileting activity with  contact guard assist to demonstrate improved functional mobility and safety. 4.  Ms. Miriam Thornton will perform all functional transfers transfer with contact guard assist to demonstrate improved functional mobility and safety.       PROBLEM LIST:   (Skilled intervention is medically necessary to address:)  Decreased ADL/Functional Activities  Decreased Activity Tolerance  Decreased Balance  Decreased Gait Ability  Decreased Safety Awareness  Decreased Strength  Decreased Transfer Abilities  Increased Pain   INTERVENTIONS PLANNED:   (Benefits and precautions of occupational therapy have been discussed with the patient.)  Self Care Training  Therapeutic Activity  Therapeutic Exercise/HEP  Neuromuscular Re-education  Manual Therapy  Education         TREATMENT:          TREATMENT:    no charge      AFTER TREATMENT PRECAUTIONS: Bed, Bed/Chair Locked, Call light within reach, Chair, Needs within reach and RN notified    INTERDISCIPLINARY COLLABORATION:  RN/ PCT, PT/ PTA and OT/ MORTON    EDUCATION:  Education Given To: Patient  Education Provided: Role of Therapy,Plan of Care,Precautions,ADL Adaptive Strategies,Transfer Training  Education Method: Demonstration,Verbal  Barriers to Learning: None  Education Outcome: Verbalized understanding  [x] Safe And Effective Hygiene  [x] Fall Precautions  [] Hip Precautions  [] D/C Instruction Review [] Prosthesis Review  [x] Walker Management/Safety  [x] Adaptive Equipment as Needed  [] Therapeutic Resting Position of Joint       TOTAL TREATMENT DURATION AND TIME:  Time In: 1145  Time Out: Hersdidiej 75  Minutes: 400 Deja Rocha OT

## 2022-06-23 NOTE — PROGRESS NOTES
PHYSICAL THERAPY JOINT CAMP: TOTAL KNEE ARTHROPLASTY Daily Note and PM  (Link to Caseload Tracking: PT Visit Days : 2  Acknowledge Orders  Time In/Out  PT Charge Capture  Rehab Caseload Tracker  Episode   Shasha Clement is a 78 y.o. female   PRIMARY DIAGNOSIS: Osteoarthritis of left knee  Primary osteoarthritis of left knee [M17.12]  Knee deformity, acquired, left [M21.962]  Osteoarthritis of left knee, unspecified osteoarthritis type [M17.12]  Procedure(s) (LRB):  LEFT SUAD KNEE TOTAL ARTHROPLASTY ROBOTIC ASIF (Left)  1 Day Post-Op  Reason for Referral: Pain in Left Knee (M25.562)  Stiffness of Left Knee, Not elsewhere classified (M25.662)  Difficulty in walking, Not elsewhere classified (R26.2)  Inpatient: Payor: MEDICARE / Plan: MEDICARE PART A AND B / Product Type: *No Product type* /     REHAB RECOMMENDATIONS:   Recommendation to date pending progress:  Settin[address] [place] [address] Therapy    Equipment:     3 in 1 Bedside Commode   Rolling Walker     RANGE OF MOTION:   Left Knee Flexion:   grossly 80  Left Knee Extension:   grossly 5     GAIT: I Mod I S SBA CGA Min Mod Max Total  NT x2 Comments:   Level of Assistance [] [] [] [x] [] [] [] [] [] [] []            Weightbearing Status  Right Lower Extremity Weight Bearing: Weight Bearing As Tolerated    Distance  260 feet    Gait Quality Antalgic, Decreased annmarie , Decreased step clearance, Decreased step length and Decreased stance    DME Rolling Walker     Stairs      Ramp     I=Independent, Mod I=Modified Independent, S=Supervision, SBA=Standby Assistance, CGA=Contact Guard Assistance,   Min=Minimal Assistance, Mod=Moderate Assistance, Max=Maximal Assistance, Total=Total Assistance, NT=Not Tested    ASSESSMENT:   ASSESSMENT:  Ms. Jocelyn Wood presents with decreased strength and range of motion left lower extremity and with decreased independence with functional mobility s/p left total knee arthroplasty.  Pt will benefit from skilled PT interventions to maximize independence with functional mobility and TKA 250management. Pt did well with assessment and is willing to get up with therapy. 6/23 PM- patient not going home today due to issues with high blood sugar. She was supine on contact and more coherent and awake this PM. She transferred out of bed and ambulated 260 ft with RW and SBA. She returned to the chair and performed LE therex. Left up in chair with needs in reach. She will need to practice the ramp tomorrow and go over discharge instructions.      Outcome Measure:   KOOS-JR:  KOJr JOSE CARLOS. Knee Survey Score: 7    SUBJECTIVE:   Ms. Jimenez Carrier states, \"I can't keep my eyes open\"     Social/Functional Lives With: Spouse  Type of Home: Mobile home  Home Layout: One level  Home Access: Ramped entrance  Bathroom Shower/Tub: Tub/Shower unit,Shower chair with back  Bathroom Toilet: Standard  ADL Assistance: Independent  Homemaking Assistance: Independent  Ambulation Assistance: Independent    OBJECTIVE:     PAIN: VITAL SIGNS: LINES/DRAINS:   Pre Treatment:         Post Treatment: did not complain of pain Vitals        Oxygen    None    RESTRICTIONS/PRECAUTIONS:  Restrictions/Precautions: Weight Bearing  Right Lower Extremity Weight Bearing: Weight Bearing As Tolerated        Restrictions/Precautions: Weight Bearing        LOWER EXTREMITY GROSS EVALUATION:  RIGHT LE   Within Functional Limits   Abnormal   Comments   Strength [x] []     Range of Motion [x] []        LEFT LE Within Functional Limits   Abnormal   Comments   Strength [] []   grossly 3+/5   Range of Motion [] []   grossly 5-80     UPPER EXTREMITY GROSS EVALUATION:     Within Functional Limits   Abnormal   Comments   Strength [x] []    Range of Motion [x] []      SENSATION  Sensation [x]       COGNITION/  PERCEPTION: Intact Impaired (Comments):   Orientation [x]     Vision [x]     Hearing [x]     Cognition  [x]       MOBILITY: I Mod I S SBA CGA Min Mod Max Total  NT x2 Comments:   Bed Mobility    Rolling [] [] [] [] [] [] [] [] [] [] []    Supine to Sit [] [x] [] [] [] [] [] [] [] [] []    Scooting [] [x] [] [] [] [] [] [] [] [] []    Sit to Supine [] [x] [] [] [] [] [] [] [] [] []    Transfers    Sit to Stand [] [] [] [x] [] [] [] [] [] [] []    Bed to Chair [] [] [] [x] [] [] [] [] [] [] []    Stand to Sit [] [] [] [x] [] [] [] [] [] [] []    Stand Pivot [] [] [] [] [] [] [] [] [] [] []    Toilet [] [] [] [] [] [] [] [] [] [] []     [] [] [] [] [] [] [] [] [] [] []    I=Independent, Mod I=Modified Independent, S=Supervision, SBA=Standby Assistance, CGA=Contact Guard Assistance,   Min=Minimal Assistance, Mod=Moderate Assistance, Max=Maximal Assistance, Total=Total Assistance, NT=Not Tested    BALANCE: Good Fair+ Fair Fair- Poor NT Comments   Sitting Static [x] [] [] [] [] []    Sitting Dynamic [x] [] [] [] [] []              Standing Static [] [x] [] [] [] []    Standing Dynamic [] [x] [] [] [] []      PLAN:   ACUTE PHYSICAL THERAPY GOALS:   (Developed with and agreed upon by patient and/or caregiver.)  GOALS (1-4 days):  (1.)Ms. Renetta Chen will move from supine to sit and sit to supine  in bed with SUPERVISION. -GOAL MET 6/23/22   (2.)Ms. Renetta Chen will transfer from bed to chair and chair to bed with SUPERVISION using the least restrictive device. (3.)Ms. Renetta Chen will ambulate with STAND BY ASSIST for 300 feet with the least restrictive device. (4.)Ms. Renetta Chen will ambulate up/down ramp with no railing with CONTACT GUARD ASSIST using rolling walker. (5.)Ms. Renetta Chen will increase left knee ROM to 0-60°.  ________________________________________________________________________________________________    FREQUENCY AND DURATION: BID for duration of hospital stay or until stated goals are met, whichever comes first.    THERAPY PROGNOSIS: Excellent    PROBLEM LIST:   (Skilled intervention is medically necessary to address:)  Decreased Activity Tolerance  Decreased AROM/PROM  Decreased Gait Ability  Decreased Strength  Decreased Transfer Abilities  Increased Pain   INTERVENTIONS PLANNED:   (Benefits and precautions of physical therapy have been discussed with the patient.)  Therapeutic Activity  Therapeutic Exercise/HEP  Gait Training  Modalities  Education       TREATMENT:       TREATMENT:   Therapeutic Activity (40 Minutes): Therapeutic activity included Supine to Sit, Sit to Supine, Scooting, Transfer Training, Ambulation on level ground, Sitting balance , Standing balance and LE therex to improve functional Activity tolerance, Balance, Mobility, Strength, ROM and LE therex. TREATMENT GRID:  THERAPEUTIC  EXERCISES: DATE:  6/22 DATE:  6/23 DATE:      AM PM AM PM AM PM    [] [] [] [] [] []   Ankle Pumps  10 15 15     Quad Sets  10 15 15     Gluteal Sets  10 15 15     Hip Abd/ADduction  10 15 15     Straight Leg Raises  10 15 15     Knee Slides  10 15 15     Short Arc Quads  10 15 15     Chair Slides                           B = bilateral; AA = active assistive; A = active; P = passive      EDUCATION:    EDUCATION:  [x] Home Exercises  [x] Fall Precautions  [x] No Pillow Under Knee  [] D/C Instruction Review [x] Cryocuff  [x] Walker Management/Safety  [] Adaptive Equipment as Needed     AFTER TREATMENT PRECAUTIONS: Bed/Chair Locked, Chair, Needs within reach and RN notified    INTERDISCIPLINARY COLLABORATION:  RN/ PCT, PT/ PTA and OT/ MORTON    COMPLIANCE WITH PROGRAM/EXERCISE: compliant all of the time, Will assess as treatment progresses. RECOMMENDATIONS/INTENT FOR NEXT TREATMENT SESSION: Treatment next visit will focus on increasing Ms. Peoples's independence with bed mobility, transfers, gait training, strength/ROM exercises, modalities for pain, and patient education.      TIME IN/OUT:  Time In: 7945  Time Out: 100[facility]  Minutes: 134 [facility], PT

## 2022-06-23 NOTE — PLAN OF CARE
Problem: Chronic Conditions and Co-morbidities  Goal: Patient's chronic conditions and co-morbidity symptoms are monitored and maintained or improved  6/23/2022 0128 by Roger Lozano RN  Outcome: Progressing  6/22/2022 2355 by Roger Lozano RN  Outcome: Progressing  6/22/2022 1209 by Randee Momin RN  Outcome: Progressing     Problem: Pain  Goal: Verbalizes/displays adequate comfort level or baseline comfort level  6/23/2022 0128 by Roger Lozano RN  Outcome: Progressing  6/22/2022 2355 by Roger Lozano RN  Outcome: Progressing  6/22/2022 1209 by Randee Momin RN  Outcome: Progressing     Problem: Discharge Planning  Goal: Discharge to home or other facility with appropriate resources  6/23/2022 0128 by Roger Lozano RN  Outcome: Progressing  6/22/2022 2355 by Roger Lozano RN  Outcome: Progressing  6/22/2022 1209 by Randee Momin RN  Outcome: Progressing     Problem: ABCDS Injury Assessment  Goal: Absence of physical injury  6/23/2022 0128 by Roger Lozano RN  Outcome: Progressing  6/22/2022 2355 by Roger Lozano RN  Outcome: Progressing  6/22/2022 1209 by Randee Momin RN  Outcome: Progressing     Problem: Safety - Adult  Goal: Free from fall injury  6/23/2022 0128 by Roger Lozano RN  Outcome: Progressing  6/22/2022 2355 by Roger Lozano RN  Outcome: Progressing

## 2022-06-23 NOTE — PROGRESS NOTES
2022         Post Op day: 1 Day Post-Op     Admit Date: 2022    Admit Diagnosis: Primary osteoarthritis of left knee [M17.12]  Knee deformity, acquired, left [M21.962]  Osteoarthritis of left knee, unspecified osteoarthritis type [M17.12]        Subjective: Patient stable. No acute events.       Objective:     Vitals:    22 0721   BP: 114/71   Pulse: 73   Resp: 18   Temp:    SpO2: 96%    Temp (24hrs), Av.1 °F (36.7 °C), Min:97.7 °F (36.5 °C), Max:98.6 °F (37 °C)      Lab Results   Component Value Date    HGB 11.4 2022       Patient Active Problem List   Diagnosis    Vitamin B12 deficiency    Hyperkalemia    Hypokalemia    Depression, major, recurrent, mild (HCC)    DKA, type 1 (HCC)    Venous insufficiency    [facility]osis[facility]    BIANCA (acute kidney injury) ([facility] [address]    Leukocytosis    Lumbar stenosis with neurogenic claudication    Breast mass, right    Septic shock ([facility])    Metabolic acidemia    Persistent migraine aura without cerebral infarction and without status migrainosus, not intractable    Hyponatremia    Vitamin D deficiency    Hypomagnesemia    Elevated rheumatoid factor    Hypertension    Fibromyalgia    Coronary artery disease involving native coronary artery of native heart with angina pectoris (HCC)    Chronic obstructive pulmonary disease (HCC)    S/P angioplasty with stent    Pure hypercholesterolemia    Osteoarthritis of left knee       Current Facility-Administered Medications   Medication Dose Route Frequency    albuterol sulfate HFA (PROVENTIL;VENTOLIN;PROAIR) 108 (90 Base) MCG/ACT inhaler 1 puff  1 puff Inhalation Q6H PRN    ALPRAZolam (XANAX) tablet 2 mg  2 mg Oral TID PRN    ARIPiprazole (ABILIFY) tablet 5 mg  5 mg Oral Once    atorvastatin (LIPITOR) tablet 80 mg  80 mg Oral Daily    busPIRone (BUSPAR) tablet 10 mg  10 mg Oral BID    DULoxetine (CYMBALTA) extended release capsule 60 mg  60 mg Oral BID    furosemide (LASIX) tablet 20 mg  20 mg Oral Daily    lisinopril (PRINIVIL;ZESTRIL) tablet 40 mg  40 mg Oral Daily    metoprolol succinate (TOPROL XL) extended release tablet 50 mg  50 mg Oral Daily    nitroGLYCERIN (NITROSTAT) SL tablet 0.4 mg  0.4 mg SubLINGual Q5 Min PRN    potassium chloride (KLOR-CON M) extended release tablet 20 mEq  20 mEq Oral Daily    tiotropium (SPIRIVA RESPIMAT) 2.5 MCG/ACT inhaler 1 puff  1 puff Inhalation Daily    topiramate (TOPAMAX) tablet 25 mg  25 mg Oral Daily    0.9 % sodium chloride infusion   IntraVENous Continuous    sodium chloride flush 0.9 % injection 5-40 mL  5-40 mL IntraVENous 2 times per day    sodium chloride flush 0.9 % injection 5-40 mL  5-40 mL IntraVENous PRN    0.9 % sodium chloride infusion   IntraVENous PRN    acetaminophen (TYLENOL) tablet 1,000 mg  1,000 mg Oral Q6H    HYDROmorphone (DILAUDID) tablet 2 mg  2 mg Oral Q4H PRN    ondansetron (ZOFRAN-ODT) disintegrating tablet 4 mg  4 mg Oral Q8H PRN    Or    ondansetron (ZOFRAN) injection 4 mg  4 mg IntraVENous Q6H PRN    sennosides-docusate sodium (SENOKOT-S) 8.6-50 MG tablet 1 tablet  1 tablet Oral BID    aspirin EC tablet 81 mg  81 mg Oral BID    diphenhydrAMINE (BENADRYL) capsule 25 mg  25 mg Oral Q6H PRN    Or    diphenhydrAMINE (BENADRYL) injection 25 mg  25 mg IntraVENous Q6H PRN    gabapentin (NEURONTIN) capsule 300 mg  300 mg Oral BID    ketorolac (TORADOL) injection 15 mg  15 mg IntraVENous q8h    [START ON 6/26/2022] meloxicam (MOBIC) tablet 7.5 mg  7.5 mg Oral BID    naloxone (NARCAN) injection 0.4 mg  0.4 mg IntraVENous PRN    oxyCODONE (OXYCONTIN) extended release tablet 10 mg  10 mg Oral 2 times per day    pantoprazole (PROTONIX) tablet 40 mg  40 mg Oral QAM AC    zolpidem (AMBIEN) tablet 5 mg  5 mg Oral Nightly PRN    insulin lispro (HUMALOG) injection vial 0-16 Units  0-16 Units SubCUTAneous TID WC    HYDROmorphone HCl PF (DILAUDID) injection 1 mg  1 mg IntraVENous Q3H PRN Extremity Exam  Dressing clean and dry   Tibialis Anterior and Gastroc-Soleus functioning normally left lower extremity  Sensation intact to light touch on operative limb  Extremity perfused  TEDS/SCDS in place  No sign of DVT     Assessment / Plan :  WBAT LLE  Continue PT/OT  Continue current DVT prophylaxis in house.  Discharge on ASA BID  DIspo-HH         Signed By: JUSTIN Padilla - CNP

## 2022-06-23 NOTE — CARE COORDINATION
Scot called [facility] at [address] [place] [address] (had to leave a voicemail) that pt was still hospitalized and Scot would contact them at discharge.  Kaylah Swann

## 2022-06-23 NOTE — PLAN OF CARE
Problem: Chronic Conditions and Co-morbidities  Goal: Patient's chronic conditions and co-morbidity symptoms are monitored and maintained or improved  6/22/2022 2355 by Lawrence Fregoso RN  Outcome: Progressing  6/22/2022 1209 by Ngozi Broussard RN  Outcome: Progressing     Problem: Pain  Goal: Verbalizes/displays adequate comfort level or baseline comfort level  6/22/2022 2355 by Lawrence Fregoso RN  Outcome: Progressing  6/22/2022 1209 by Ngozi Broussard RN  Outcome: Progressing     Problem: Discharge Planning  Goal: Discharge to home or other facility with appropriate resources  6/22/2022 2355 by Lawrence Fregoso RN  Outcome: Progressing  6/22/2022 1209 by Ngozi Broussard RN  Outcome: Progressing     Problem: ABCDS Injury Assessment  Goal: Absence of physical injury  6/22/2022 2355 by Lawrence Fregoso RN  Outcome: Progressing  6/22/2022 1209 by Ngozi Broussard RN  Outcome: Progressing     Problem: Safety - Adult  Goal: Free from fall injury  Outcome: Progressing

## 2022-06-23 NOTE — PROGRESS NOTES
PHYSICAL THERAPY JOINT CAMP: TOTAL KNEE ARTHROPLASTY Daily Note and AM  (Link to Caseload Tracking: PT Visit Days : 2  Acknowledge Orders  Time In/Out  PT Charge Capture  Rehab Caseload Tracker  Episode   Neal Marcelo is a 78 y.o. female   PRIMARY DIAGNOSIS: Osteoarthritis of left knee  Primary osteoarthritis of left knee [M17.12]  Knee deformity, acquired, left [M21.962]  Osteoarthritis of left knee, unspecified osteoarthritis type [M17.12]  Procedure(s) (LRB):  LEFT SUAD KNEE TOTAL ARTHROPLASTY ROBOTIC ASIF (Left)  1 Day Post-Op  Reason for Referral: Pain in Left Knee (M25.562)  Stiffness of Left Knee, Not elsewhere classified (M25.662)  Difficulty in walking, Not elsewhere classified (R26.2)  Inpatient: Payor: MEDICARE / Plan: MEDICARE PART A AND B / Product Type: *No Product type* /     REHAB RECOMMENDATIONS:   Recommendation to date pending progress:  [address] [place] [address] Therapy    Equipment:     3 in 1 Bedside Commode   Rolling Walker     RANGE OF MOTION:   Left Knee Flexion:   grossly 80  Left Knee Extension:   grossly 5     GAIT: I Mod I S SBA CGA Min Mod Max Total  NT x2 Comments:   Level of Assistance [] [] [] [x] [] [] [] [] [] [] []            Weightbearing Status  Right Lower Extremity Weight Bearing: Weight Bearing As Tolerated    Distance    feet    Gait Quality Antalgic, Decreased annmarie , Decreased step clearance, Decreased step length and Decreased stance    DME Rolling Walker     Stairs      Ramp     I=Independent, Mod I=Modified Independent, S=Supervision, SBA=Standby Assistance, CGA=Contact Guard Assistance,   Min=Minimal Assistance, Mod=Moderate Assistance, Max=Maximal Assistance, Total=Total Assistance, NT=Not Tested    ASSESSMENT:   ASSESSMENT:  Ms. Wilfredo Moura presents with decreased strength and range of motion left lower extremity and with decreased independence with functional mobility s/p left total knee arthroplasty.  Pt will benefit from skilled PT interventions to maximize independence with functional mobility and TKA 250management. Pt did well with assessment and is willing to get up with therapy. Today's treatment focused on transfer and gait training. She transferred out of the chair and ambulated in the halls 250 ft with RW and then returned to the room to perform therex. Pt practiced TKA exercises as below with verbal cues while reviewing HEP. Reviewed use of cryocuff as needed for pain and swelling. Pt instructed not to get up without assist. Pt plans to discharge to her home from the hospital with continued therapy for follow up. Patient is hopeful to spend the night to better prepare for safe discharge home. .  6/23- patient very lethargic this morning and difficult to keep awake. Her insulin pump came out last night and she stated that the symptoms she is having now are due to her BG being high. Alert and oriented and able to follow commands. She transferred sit to stand and ambulated in the halls with PT. Did not practice ramp yet due to lethargy. She returned to the room to do therex in the recliner and was left up with needs in reach. She may end up staying another night due to diabetic management but this is still to be determined.       Outcome Measure:   KOOS-JR:  KOOSJr. Knee Survey Score: 7    SUBJECTIVE:   Ms. Sofia Esqueda states, \"I can't keep my eyes open\"     Social/Functional Lives With: Spouse  Type of Home: Mobile home  Home Layout: One level  Home Access: Ramped entrance  Bathroom Shower/Tub: Tub/Shower unit,Shower chair with back  Bathroom Toilet: Standard  ADL Assistance: Independent  Homemaking Assistance: Independent  Ambulation Assistance: Independent    OBJECTIVE:     PAIN: VITAL SIGNS: LINES/DRAINS:   Pre Treatment:         Post Treatment: did not complain of pain Vitals        Oxygen    None    RESTRICTIONS/PRECAUTIONS:  Restrictions/Precautions: Weight Bearing  Right Lower Extremity Weight Bearing: Weight Bearing As Tolerated Restrictions/Precautions: Weight Bearing        LOWER EXTREMITY GROSS EVALUATION:  RIGHT LE   Within Functional Limits   Abnormal   Comments   Strength [x] []     Range of Motion [x] []        LEFT LE Within Functional Limits   Abnormal   Comments   Strength [] []   grossly 3+/5   Range of Motion [] []   grossly 5-80     UPPER EXTREMITY GROSS EVALUATION:     Within Functional Limits   Abnormal   Comments   Strength [x] []    Range of Motion [x] []      SENSATION  Sensation [x]       COGNITION/  PERCEPTION: Intact Impaired (Comments):   Orientation [x]     Vision [x]     Hearing [x]     Cognition  [x]       MOBILITY: I Mod I S SBA CGA Min Mod Max Total  NT x2 Comments:   Bed Mobility    Rolling [] [] [] [] [] [] [] [] [] [] []    Supine to Sit [] [x] [] [] [] [] [] [] [] [] []    Scooting [] [x] [] [] [] [] [] [] [] [] []    Sit to Supine [] [x] [] [] [] [] [] [] [] [] []    Transfers    Sit to Stand [] [] [] [x] [] [] [] [] [] [] []    Bed to Chair [] [] [] [x] [] [] [] [] [] [] []    Stand to Sit [] [] [] [x] [] [] [] [] [] [] []    Stand Pivot [] [] [] [] [] [] [] [] [] [] []    Toilet [] [] [] [] [] [] [] [] [] [] []     [] [] [] [] [] [] [] [] [] [] []    I=Independent, Mod I=Modified Independent, S=Supervision, SBA=Standby Assistance, CGA=Contact Guard Assistance,   Min=Minimal Assistance, Mod=Moderate Assistance, Max=Maximal Assistance, Total=Total Assistance, NT=Not Tested    BALANCE: Good Fair+ Fair Fair- Poor NT Comments   Sitting Static [x] [] [] [] [] []    Sitting Dynamic [x] [] [] [] [] []              Standing Static [] [x] [] [] [] []    Standing Dynamic [] [x] [] [] [] []      PLAN:   ACUTE PHYSICAL THERAPY GOALS:   (Developed with and agreed upon by patient and/or caregiver.)  GOALS (1-4 days):  (1.)Ms. Tony Nye will move from supine to sit and sit to supine  in bed with SUPERVISION. -GOAL MET 6/23/22   (2.)Ms. Tony Nye will transfer from bed to chair and chair to bed with SUPERVISION using the least restrictive device. (3.)Ms. Yoseph Horner will ambulate with STAND BY ASSIST for 300 feet with the least restrictive device. (4.)Ms. Yoseph Horner will ambulate up/down ramp with no railing with CONTACT GUARD ASSIST using rolling walker. (5.)Ms. Yoseph Horner will increase left knee ROM to 0-60°.  ________________________________________________________________________________________________    FREQUENCY AND DURATION: BID for duration of hospital stay or until stated goals are met, whichever comes first.    THERAPY PROGNOSIS: Excellent    PROBLEM LIST:   (Skilled intervention is medically necessary to address:)  Decreased Activity Tolerance  Decreased AROM/PROM  Decreased Gait Ability  Decreased Strength  Decreased Transfer Abilities  Increased Pain   INTERVENTIONS PLANNED:   (Benefits and precautions of physical therapy have been discussed with the patient.)  Therapeutic Activity  Therapeutic Exercise/HEP  Gait Training  Modalities  Education       TREATMENT:   EVALUATION:    TREATMENT:   Therapeutic Activity (30 Minutes): Therapeutic activity included Supine to Sit, Sit to Supine, Scooting, Transfer Training, Ambulation on level ground, Sitting balance , Standing balance and LE therex to improve functional Activity tolerance, Balance, Mobility, Strength, ROM and LE therex.     TREATMENT GRID:  THERAPEUTIC  EXERCISES: DATE:  6/22 DATE:  6/23 DATE:      AM PM AM PM AM PM    [] [] [] [] [] []   Ankle Pumps  10 15      Quad Sets  10 15      Gluteal Sets  10 15      Hip Abd/ADduction  10 15      Straight Leg Raises  10 15      Knee Slides  10 15      Short Arc Quads  10 15      Chair Slides                           B = bilateral; AA = active assistive; A = active; P = passive      EDUCATION:    EDUCATION:  [x] Home Exercises  [x] Fall Precautions  [x] No Pillow Under Knee  [] D/C Instruction Review [x] Cryocuff  [x] Walker Management/Safety  [] Adaptive Equipment as Needed     AFTER TREATMENT PRECAUTIONS: Bed/Chair Locked, Chair, Needs within reach and RN notified    INTERDISCIPLINARY COLLABORATION:  RN/ PCT, PT/ PTA and OT/ MORTON    COMPLIANCE WITH PROGRAM/EXERCISE: compliant all of the time, Will assess as treatment progresses. RECOMMENDATIONS/INTENT FOR NEXT TREATMENT SESSION: Treatment next visit will focus on increasing Ms. Peoples's independence with bed mobility, transfers, gait training, strength/ROM exercises, modalities for pain, and patient education.      TIME IN/OUT:  Time In: 1010  Time Out: 8[address]  Minutes: [address], PT

## 2022-06-23 NOTE — CONSULTS
[facility] Hospitalist Consult   Admit Date:  2022  6:46 AM   Name:  Nkechi Gutierres   Age:  78 y.o. Sex:  female  :  1955   MRN:  [id number]   Room:  [facility]/    Presenting Complaint: No chief complaint on file. Reason(s) for Admission: Primary osteoarthritis of left knee [M17.12]  Knee deformity, acquired, left [M21.962]  Osteoarthritis of left knee, unspecified osteoarthritis type [M17.12]     Hospitalists consulted by Hayes Oshea MD for: medical management    History of Presenting Illness:   Nkechi Gutierres is a 78 y.o. female with history of DMI, HTN who was admitted to Orthopedic team for L knee total arthroplasty which was performed on . Hospitalist consulted for medical management. Pt stated post-op pain is tolerable with current pain regimen. Exacerbating factor including movements. She is tolerating po well. She reported compliance to her home medications. Denies any needs at this time. She stated she was having confusion this AM but now much improved. She stated she thinks she just needs some sleep. Denies fever, chills, SOB, chest pain, abdominal pain, N/V, diarrhea or constipation. Review of Systems:  10 systems reviewed and negative except as noted in HPI. Assessment & Plan:     Osteoarthritis of left knee  S/p L knee total arthroplasty   Follow Hgb&Hct  PT/OT  Analgesics prn  DVT ppx per primary  Ortho primary    Transient AMS, resolved  Most likely d/t hypotension and also hypoglycemia this AM  Alert and oriented x3 now    Hyponatremia, mild  Na 128 but Corrected with BG to 132. Also in setting of Lasix use as well. Cont miVF  F/u BMP    Hyperkalemia  Holding Lisinopril and KCl supplement  Holding Lasix  Lokelma 1x  Repeat K     HTN  BP low normal. Hold antihypertensives--Lisinopril, Lasix, metoprolol    Anxiety  Cont home Bupar, ability, Cymbalta    CAD  Cont home ASA and lipitor    DMI  Follows endocrinology outpatient.  Uses insulin pump, which came off overnight   Cont SSI for now  Add Lantus  Change diet to diabetic diet  Consult diabetic educator      Discharge Planning:      Per primary    Diet:  ADULT DIET; Regular  DVT PPx: ASA  Code status: Full Code    Principal Problem:    Osteoarthritis of left knee  Resolved Problems:    * No resolved hospital problems. *      Past History:  Past Medical History:   Diagnosis Date    Anxiety     Arthritis     Asthma     uses inhaler daily    CAD (coronary artery disease) 2019    mi  stent x 1-- followed by dr Rahul Patrick Cervical spondylosis with myelopathy 2015    Chronic obstructive pulmonary disease (HCC)     daily inhaler    Chronic pain     back  - and all over pain     Depression     Diabetes ([facility] [address]     type 1; followed by Endocrinology; sqbs am avg -- hypo at 60's -- uses insulin pump; Hgb A1c 7.5 on 3/14/22    Diverticulosis     resolved at present - sees DR Bebeto Mcfadden when needed    DJD (degenerative joint disease)     Fibromyalgia     all over     Former smoker     quit     GERD (gastroesophageal reflux disease)     CONTROLLED WITH MED    History of blood transfusion     after delivery of her son    Hormone replacement therapy     Hyperlipidemia     Hypertension     controlled with med    Insomnia     Left knee pain     Migraine     Mitral valve regurgitation     echo 3/1/21: There was mild to moderate regurgitation.     Psychiatric diagnosis     bipolar~pt states resolved    RA (rheumatoid arthritis) ([facility] [address] dx yrs ago    does not see a rheumatologist    Suicide attempt ([facility] [address]     x 2    Unspecified adverse effect of anesthesia     \"woke up\"during  surgery on toe    Vitamin B 12 deficiency     Vitamin D deficiency       Past Surgical History:   Procedure Laterality Date    CARDIAC CATHETERIZATION      cadiac stent x1    CATARACT REMOVAL Bilateral      SECTION      times 2    COLONOSCOPY      HYSTERECTOMY (CERVIX STATUS UNKNOWN) N/A     total     NEUROLOGICAL SURGERY  2019    lower back    ORTHOPEDIC SURGERY N/A     neck ( plate and screws was put in )     ORTHOPEDIC SURGERY Right     right rotator cuff     ORTHOPEDIC SURGERY Right     shaved down bone in great toe     ORTHOPEDIC SURGERY Bilateral     shoulder scopes    ORTHOPEDIC SURGERY      right foot \"bone removal\"     ORTHOPEDIC SURGERY      left knee scope    OTHER SURGICAL HISTORY      insulin pump    TOTAL KNEE ARTHROPLASTY Left 2022    LEFT SUAD KNEE TOTAL ARTHROPLASTY ROBOTIC ASIF performed by Amauri Cortes MD at [facility]      Social History     Tobacco Use    Smoking status: Former Smoker     Packs/day: 1.00     Years: 15.00     Pack years: 15.00     Types: Cigarettes     Quit date: 6/10/2002     Years since quittin.0    Smokeless tobacco: Never Used   Substance Use Topics    Alcohol use: Yes     Alcohol/week: 3.0 standard drinks     Types: 1 Glasses of wine, 1 Cans of beer, 1 Shots of liquor per week     Comment: daily~a beer; liquor occasionally      Social History     Substance and Sexual Activity   Drug Use No     Family History   Problem Relation Age of Onset    Heart Disease Paternal Grandfather     Cancer Father     Lung Disease Father     Cancer Maternal Aunt     Heart Disease Paternal Grandmother       Family history reviewed and negative except as noted above.     Immunization History   Administered Date(s) Administered    COVID-19, Pfizer Purple top, DILUTE for use, 12+ yrs, 30mcg/0.3mL dose 2021, 2021    Influenza Trivalent 2016    Influenza, Quadv, IM, PF (6 mo and older Fluzone, Flulaval, Fluarix, and 3 yrs and older Afluria) 2017    Pneumococcal Polysaccharide (Fktqnwwyd21) 2016    Tdap (Boostrix, Adacel) 2017    Zoster Live (Zostavax) 2016     Allergies   Allergen Reactions    Hydroxychloroquine Hives    Hydroxyzine Pamoate Other (See Comments)     Insomnia     Nortriptyline Hives    Other     97.5 °F (36.4 °C), resp. rate 18, height 5' 3\" (1.6 m), weight 178 lb (80.7 kg), SpO2 96 %. General:    Well nourished. No overt distress  Head:  Normocephalic, atraumatic  Eyes:  Sclerae appear normal.  Pupils equally round. ENT:  Nares appear normal, no drainage. Moist oral mucosa  Neck:  No restricted ROM. Trachea midline   CV:   RRR. No m/r/g. No jugular venous distension. Lungs:   CTAB. No wheezing, rhonchi, or rales. Respirations even, unlabored  Abdomen: Bowel sounds present. Soft, nontender, nondistended. Extremities: No cyanosis or clubbing. No edema. Dressing/brace on L knee c/d/i  Skin:     No rashes and normal coloration. Warm and dry. Neuro:  CN II-XII grossly intact. Sensation intact. A&Ox3  Psych:  Normal mood and affect.       I have reviewed ordered lab tests and independently visualized imaging below:    Recent Labs:  Recent Results (from the past 48 hour(s))   Potassium w/ Reflex to Magnesium    Collection Time: 06/22/22  7:59 AM   Result Value Ref Range    Potassium 4.7 3.5 - 5.1 mmol/L   POCT Glucose    Collection Time: 06/22/22  8:11 AM   Result Value Ref Range    POC Glucose 96 65 - 100 mg/dL    Performed by: Amarilys    POCT Glucose    Collection Time: 06/22/22 11:50 AM   Result Value Ref Range    POC Glucose 147 (H) 65 - 100 mg/dL    Performed by: Iliana    POCT Glucose    Collection Time: 06/22/22 12:27 PM   Result Value Ref Range    POC Glucose 194 (H) 65 - 100 mg/dL    Performed by: Maryam    POCT Glucose    Collection Time: 06/22/22  4:20 PM   Result Value Ref Range    POC Glucose 329 (H) 65 - 100 mg/dL    Performed by: Katherine Holder    POCT Glucose    Collection Time: 06/22/22  8:40 PM   Result Value Ref Range    POC Glucose 245 (H) 65 - 100 mg/dL    Performed by: Luis    Hemoglobin and Hematocrit    Collection Time: 06/23/22  3:30 AM   Result Value Ref Range    Hemoglobin 11.4 (L) 11.7 - 15.4 g/dL    Hematocrit 33.5 (L) 35.8 - 46.3 %   POCT Glucose    Collection Time: 06/23/22  6:09 AM   Result Value Ref Range    POC Glucose 70 65 - 100 mg/dL    Performed by: Nidia    POCT Glucose    Collection Time: 06/23/22  9:10 AM   Result Value Ref Range    POC Glucose 236 (H) 65 - 100 mg/dL    Performed by: Doug Ricardo    POCT Glucose    Collection Time: 06/23/22 11:19 AM   Result Value Ref Range    POC Glucose 343 (H) 65 - 100 mg/dL    Performed by: Shavonne        Other Studies:  No results found. Signed: Carlton Campa DO    Part of this note may have been written by using a voice dictation software. The note has been proof read but may still contain some grammatical/other typographical errors.

## 2022-06-24 VITALS
HEIGHT: 63 IN | BODY MASS INDEX: 31.54 KG/M2 | TEMPERATURE: 97.5 F | HEART RATE: 62 BPM | WEIGHT: 178 LBS | SYSTOLIC BLOOD PRESSURE: 112 MMHG | OXYGEN SATURATION: 100 % | DIASTOLIC BLOOD PRESSURE: 70 MMHG | RESPIRATION RATE: 16 BRPM

## 2022-06-24 PROBLEM — E87.1 HYPONATREMIA: Status: ACTIVE | Noted: 2021-02-28

## 2022-06-24 PROBLEM — E87.5 HYPERKALEMIA: Status: ACTIVE | Noted: 2021-02-28

## 2022-06-24 LAB
ANION GAP SERPL CALC-SCNC: 8 MMOL/L (ref 7–16)
BUN SERPL-MCNC: 12 MG/DL (ref 8–23)
CALCIUM SERPL-MCNC: 8.7 MG/DL (ref 8.3–10.4)
CHLORIDE SERPL-SCNC: 96 MMOL/L (ref 98–107)
CO2 SERPL-SCNC: 24 MMOL/L (ref 21–32)
CREAT SERPL-MCNC: 0.8 MG/DL (ref 0.6–1)
GLUCOSE BLD STRIP.AUTO-MCNC: 107 MG/DL (ref 65–100)
GLUCOSE BLD STRIP.AUTO-MCNC: 155 MG/DL (ref 65–100)
GLUCOSE SERPL-MCNC: 160 MG/DL (ref 65–100)
POTASSIUM SERPL-SCNC: 4.2 MMOL/L (ref 3.5–5.1)
SERVICE CMNT-IMP: ABNORMAL
SERVICE CMNT-IMP: ABNORMAL
SODIUM SERPL-SCNC: 128 MMOL/L (ref 136–145)

## 2022-06-24 PROCEDURE — 6370000000 HC RX 637 (ALT 250 FOR IP): Performed by: NURSE PRACTITIONER

## 2022-06-24 PROCEDURE — 80048 BASIC METABOLIC PNL TOTAL CA: CPT

## 2022-06-24 PROCEDURE — 6360000002 HC RX W HCPCS: Performed by: NURSE PRACTITIONER

## 2022-06-24 PROCEDURE — 97535 SELF CARE MNGMENT TRAINING: CPT

## 2022-06-24 PROCEDURE — 97530 THERAPEUTIC ACTIVITIES: CPT

## 2022-06-24 PROCEDURE — 36415 COLL VENOUS BLD VENIPUNCTURE: CPT

## 2022-06-24 PROCEDURE — 82962 GLUCOSE BLOOD TEST: CPT

## 2022-06-24 RX ADMIN — TOPIRAMATE 25 MG: 25 TABLET, FILM COATED ORAL at 10:00

## 2022-06-24 RX ADMIN — DULOXETINE HYDROCHLORIDE 60 MG: 60 CAPSULE, DELAYED RELEASE ORAL at 10:00

## 2022-06-24 RX ADMIN — KETOROLAC TROMETHAMINE 15 MG: 30 INJECTION, SOLUTION INTRAMUSCULAR at 01:31

## 2022-06-24 RX ADMIN — HYDROMORPHONE HYDROCHLORIDE 2 MG: 2 TABLET ORAL at 10:00

## 2022-06-24 RX ADMIN — ACETAMINOPHEN 1000 MG: 500 TABLET, FILM COATED ORAL at 05:21

## 2022-06-24 RX ADMIN — PANTOPRAZOLE SODIUM 40 MG: 40 TABLET, DELAYED RELEASE ORAL at 05:21

## 2022-06-24 RX ADMIN — ASPIRIN 81 MG: 81 TABLET, COATED ORAL at 10:00

## 2022-06-24 RX ADMIN — BUSPIRONE HYDROCHLORIDE 10 MG: 5 TABLET ORAL at 09:59

## 2022-06-24 RX ADMIN — DOCUSATE SODIUM 50MG AND SENNOSIDES 8.6MG 1 TABLET: 8.6; 5 TABLET, FILM COATED ORAL at 09:59

## 2022-06-24 RX ADMIN — ATORVASTATIN CALCIUM 80 MG: 40 TABLET, FILM COATED ORAL at 10:00

## 2022-06-24 RX ADMIN — ACETAMINOPHEN 1000 MG: 500 TABLET, FILM COATED ORAL at 01:31

## 2022-06-24 RX ADMIN — GABAPENTIN 300 MG: 300 CAPSULE ORAL at 09:59

## 2022-06-24 ASSESSMENT — PAIN SCALES - GENERAL: PAINLEVEL_OUTOF10: 5

## 2022-06-24 NOTE — PLAN OF CARE
Problem: Chronic Conditions and Co-morbidities  Goal: Patient's chronic conditions and co-morbidity symptoms are monitored and maintained or improved  Outcome: Progressing     Problem: Pain  Goal: Verbalizes/displays adequate comfort level or baseline comfort level  Outcome: Progressing     Problem: Discharge Planning  Goal: Discharge to home or other facility with appropriate resources  Outcome: Progressing     Problem: ABCDS Injury Assessment  Goal: Absence of physical injury  Outcome: Progressing     Problem: Safety - Adult  Goal: Free from fall injury  Outcome: Progressing

## 2022-06-24 NOTE — PROGRESS NOTES
PHYSICAL THERAPY JOINT CAMP: TOTAL KNEE ARTHROPLASTY Daily Note and AM  (Link to Caseload Tracking: PT Visit Days : 3  Acknowledge Orders  Time In/Out  PT Charge Capture  Rehab Caseload Tracker  Episode   Wiley Persaud is a 78 y.o. female   PRIMARY DIAGNOSIS: Osteoarthritis of left knee  Primary osteoarthritis of left knee [M17.12]  Knee deformity, acquired, left [M21.962]  Osteoarthritis of left knee, unspecified osteoarthritis type [M17.12]  Procedure(s) (LRB):  LEFT SUAD KNEE TOTAL ARTHROPLASTY ROBOTIC ASIF (Left)  2 Days Post-Op  Reason for Referral: Pain in Left Knee (M25.562)  Stiffness of Left Knee, Not elsewhere classified (M25.662)  Difficulty in walking, Not elsewhere classified (R26.2)  Inpatient: Payor: MEDICARE / Plan: MEDICARE PART A AND B / Product Type: *No Product type* /     REHAB RECOMMENDATIONS:   Recommendation to date pending progress:  [address] [place] [address] Therapy    Equipment:     3 in 1 Bedside Commode   Rolling Walker     RANGE OF MOTION:   Left Knee Flexion:   grossly 85  Left Knee Extension:   grossly 5     GAIT: I Mod I S SBA CGA Min Mod Max Total  NT x2 Comments:   Level of Assistance [] [] [] [x] [] [] [] [] [] [] []            Weightbearing Status  Right Lower Extremity Weight Bearing: Weight Bearing As Tolerated    Distance  600 feet    Gait Quality Antalgic, Decreased annmarie , Decreased step clearance, Decreased step length and Decreased stance    DME Rolling Walker     Stairs Stairs  # Steps : 3  Rails: Bilateral  Device: No Device  Assistance: Stand by assistance    Ramp SBA x 1 with RW    I=Independent, Mod I=Modified Independent, S=Supervision, SBA=Standby Assistance, CGA=Contact Guard Assistance,   Min=Minimal Assistance, Mod=Moderate Assistance, Max=Maximal Assistance, Total=Total Assistance, NT=Not Tested    ASSESSMENT:   ASSESSMENT:  Ms. Laly Colin presents with decreased strength and range of motion left lower extremity and with decreased independence with functional mobility s/p left total knee arthroplasty. Pt will benefit from skilled PT interventions to maximize independence with functional mobility and TKA 250management. Pt did well with assessment and is willing to get up with therapy. 6/23 PM- patient not going home today due to issues with high blood sugar. She was supine on contact and more coherent and awake this PM. She transferred out of bed and ambulated 260 ft with RW and SBA. She returned to the chair and performed LE therex. Left up in chair with needs in reach. She will need to practice the ramp tomorrow and go over discharge instructions. 6/24- patient sitting up in chair and doing well. She is eager to go home. She transferred out of the chair and ambulated 600 ft, practiced going up and down 3 steps, and practiced ramp. She returned to the room and performed therex. PT discharge education provided. Planning on discharging home today.      Outcome Measure:   KOOS-JR:  Jr ANETA. Knee Survey Score: 7    SUBJECTIVE:   Ms. Julio C Walker states, \"I feel much better today\"    Social/Functional Lives With: Spouse  Type of Home: Mobile home  Home Layout: One level  Home Access: Ramped entrance  Bathroom Shower/Tub: Tub/Shower unit,Shower chair with back  Bathroom Toilet: Standard  ADL Assistance: Independent  Homemaking Assistance: Independent  Ambulation Assistance: Independent    OBJECTIVE:     PAIN: VITAL SIGNS: LINES/DRAINS:   Pre Treatment: 2         Post Treatment: did not complain of pain Vitals        Oxygen    None    RESTRICTIONS/PRECAUTIONS:  Restrictions/Precautions: Weight Bearing  Right Lower Extremity Weight Bearing: Weight Bearing As Tolerated        Restrictions/Precautions: Weight Bearing        LOWER EXTREMITY GROSS EVALUATION:  RIGHT LE   Within Functional Limits   Abnormal   Comments   Strength [x] []     Range of Motion [x] []        LEFT LE Within Functional Limits   Abnormal   Comments   Strength [] [x]      Range of Motion [] [x] 0-60°.  ________________________________________________________________________________________________    FREQUENCY AND DURATION: BID for duration of hospital stay or until stated goals are met, whichever comes first.    THERAPY PROGNOSIS: Excellent    PROBLEM LIST:   (Skilled intervention is medically necessary to address:)  Decreased Activity Tolerance  Decreased AROM/PROM  Decreased Gait Ability  Decreased Strength  Decreased Transfer Abilities  Increased Pain   INTERVENTIONS PLANNED:   (Benefits and precautions of physical therapy have been discussed with the patient.)  Therapeutic Activity  Therapeutic Exercise/HEP  Gait Training  Modalities  Education       TREATMENT:       TREATMENT:   Therapeutic Activity (45 Minutes): Therapeutic activity included Scooting, Transfer Training, Ambulation on level ground, Stair Training, Sitting balance , Standing balance and LE therex to improve functional Activity tolerance, Balance, Mobility, Strength and ROM. TREATMENT GRID:  THERAPEUTIC  EXERCISES: DATE:  6/22 DATE:  6/23 DATE:  6/24    AM PM AM PM AM PM    [] [] [] [] [] []   Ankle Pumps  10 15 15 20    Quad Sets  10 15 15 20    Gluteal Sets  10 15 15 20    Hip Abd/ADduction  10 15 15 20    Straight Leg Raises  10 15 15 20    Knee Slides  10 15 15 20    Short Arc Quads  10 15 15 20    Chair Slides     20                      B = bilateral; AA = active assistive; A = active; P = passive      EDUCATION:    EDUCATION:  [x] Home Exercises  [x] Fall Precautions  [x] No Pillow Under Knee  [x] D/C Instruction Review [x] Cryocuff  [x] Walker Management/Safety  [] Adaptive Equipment as Needed     AFTER TREATMENT PRECAUTIONS: Bed/Chair Locked, Chair, Needs within reach and RN notified    INTERDISCIPLINARY COLLABORATION:  RN/ PCT, PT/ PTA and OT/ MORTON    COMPLIANCE WITH PROGRAM/EXERCISE: compliant all of the time, Will assess as treatment progresses.     RECOMMENDATIONS/INTENT FOR NEXT TREATMENT SESSION: Treatment next visit will focus on increasing Ms. Peoples's independence with bed mobility, transfers, gait training, strength/ROM exercises, modalities for pain, and patient education.      TIME IN/OUT:  Time In: 0905  Time Out: Dustin 30  Minutes: [facility], PT

## 2022-06-24 NOTE — PROGRESS NOTES
Hospitalist Progress Note   Admit Date:  2022  6:46 AM   Name:  Guadalupe Hammond   Age:  78 y.o. Sex:  female  :  1955   MRN:  [id number]   Room:  [facility]    Presenting Complaint: Consultation for medical management  Reason(s) for Admission: Primary osteoarthritis of left knee [M17.12]  Knee deformity, acquired, left [M21.962]  Osteoarthritis of left knee, unspecified osteoarthritis type [facility] Course & Interval History:     Patient with past medical history of  Diabetes mellitus  Hypertension    Patient was admitted under orthopedic team for left knee total arthroplasty. It was performed on 2022. Hospitalist service is consulted for medical management. Subjective/24hr Events (22): 2022  No new complaints. No fever. No shortness of breath. No chest pain. Blood sugar has been running in low to mid 100s ranges now. Assessment & Plan:     Principal Problem:    Osteoarthritis of left knee  Status post left knee arthroplasty  Postop care as per orthopedic service  Physical therapy    Active Problems:    Transient alteration of awareness  Improved. Hyponatremia  Mild hypokalemia  Check BMP today just to make sure there is improving. I have discussed the plan of care with patient. Discharge Planning:    Home when ready and improved. Diet:  ADULT DIET; Regular; 3 carb choices (45 gm/meal)  DVT PPx: As per orthopedic service  Code status: Full Code    Hospital Problems:  Principal Problem:    Osteoarthritis of left knee  Active Problems:    Transient alteration of awareness  Resolved Problems:    * No resolved hospital problems.  *      Objective:     Patient Vitals for the past 24 hrs:   Temp Pulse Resp BP SpO2   22 0750 97.8 °F (36.6 °C) 68 16 117/73 99 %   22 0238 98.1 °F (36.7 °C) 69 16 107/74 99 %   22 2239 97.2 °F (36.2 °C) 74 16 (!) 141/88 100 %   22 -- -- -- -- 95 %   22 97.3 °F (36.3 °C) 74 16 124/78 (!) 82 %   06/23/22 1453 97.5 °F (36.4 °C) 76 18 115/73 --   06/23/22 1052 97.7 °F (36.5 °C) 78 16 (!) 104/57 96 %       Oxygen Therapy  SpO2: 99 %  Pulse via Oximetry: 63 beats per minute  Pulse Oximeter Device Mode: Intermittent  O2 Device: None (Room air)  O2 Flow Rate (L/min): 0 L/min    Estimated body mass index is 31.53 kg/m² as calculated from the following:    Height as of this encounter: 5' 3\" (1.6 m). Weight as of this encounter: 178 lb (80.7 kg). No intake or output data in the 24 hours ending 06/24/22 1024      Physical Exam:     Blood pressure 117/73, pulse 68, temperature 97.8 °F (36.6 °C), temperature source Axillary, resp. rate 16, height 5' 3\" (1.6 m), weight 178 lb (80.7 kg), SpO2 99 %. General:    Well nourished. Head:  Normocephalic, atraumatic  Eyes:  Sclerae appear normal.  Pupils equally round. ENT:  Nares appear normal, no drainage. Moist oral mucosa  Neck:  No restricted ROM. Trachea midline   CV:   RRR. No m/r/g. No jugular venous distension. Lungs:   CTAB. No wheezing, rhonchi, or rales. Respirations even, unlabored  Abdomen: Bowel sounds present. Soft, nontender, nondistended. Extremities: No cyanosis or clubbing. No edema, left knee under dressing, and elastic bandage. Skin:     No rashes and normal coloration. Warm and dry. Neuro:  CN II-XII grossly intact. Sensation intact. A&Ox3  Psych:  Normal mood and affect.       I have reviewed ordered lab tests and independently visualized imaging below:    Recent Labs:  Recent Results (from the past 48 hour(s))   POCT Glucose    Collection Time: 06/22/22 11:50 AM   Result Value Ref Range    POC Glucose 147 (H) 65 - 100 mg/dL    Performed by: Gracie Vizcaino    POCT Glucose    Collection Time: 06/22/22 12:27 PM   Result Value Ref Range    POC Glucose 194 (H) 65 - 100 mg/dL    Performed by: Alex Amador    POCT Glucose    Collection Time: 06/22/22  4:20 PM   Result Value Ref Range    POC Glucose 329 (H) 65 - 100 mg/dL    Performed by: Iliana    POCT Glucose    Collection Time: 06/22/22  8:40 PM   Result Value Ref Range    POC Glucose 245 (H) 65 - 100 mg/dL    Performed by: Luis    Hemoglobin and Hematocrit    Collection Time: 06/23/22  3:30 AM   Result Value Ref Range    Hemoglobin 11.4 (L) 11.7 - 15.4 g/dL    Hematocrit 33.5 (L) 35.8 - 46.3 %   POCT Glucose    Collection Time: 06/23/22  6:09 AM   Result Value Ref Range    POC Glucose 70 65 - 100 mg/dL    Performed by: Nidia    POCT Glucose    Collection Time: 06/23/22  9:10 AM   Result Value Ref Range    POC Glucose 236 (H) 65 - 100 mg/dL    Performed by: [facility]    POCT Glucose    Collection Time: 06/23/22 11:19 AM   Result Value Ref Range    POC Glucose 343 (H) 65 - 100 mg/dL    Performed by: Shavonne    POCT Glucose    Collection Time: 06/23/22  3:15 PM   Result Value Ref Range    POC Glucose 330 (H) 65 - 100 mg/dL    Performed by: Shavonne    CBC    Collection Time: 06/23/22  3:40 PM   Result Value Ref Range    WBC 9.6 4.3 - 11.1 K/uL    RBC 3.21 (L) 4.05 - 5.2 M/uL    Hemoglobin 10.2 (L) 11.7 - 15.4 g/dL    Hematocrit 30.0 (L) 35.8 - 46.3 %    MCV 93.5 79.6 - 97.8 FL    MCH 31.8 26.1 - 32.9 PG    MCHC 34.0 31.4 - 35.0 g/dL    RDW 14.4 11.9 - 14.6 %    Platelets 335 247 - 222 K/uL    MPV 12.1 9.4 - 12.3 FL    nRBC 0.00 0.0 - 0.2 K/uL   Comprehensive Metabolic Panel    Collection Time: 06/23/22  3:40 PM   Result Value Ref Range    Sodium 128 (L) 136 - 145 mmol/L    Potassium 5.2 (H) 3.5 - 5.1 mmol/L    Chloride 97 (L) 98 - 107 mmol/L    CO2 27 21 - 32 mmol/L    Anion Gap 4 (L) 7 - 16 mmol/L    Glucose 348 (H) 65 - 100 mg/dL    BUN 19 8 - 23 MG/DL    CREATININE 1.08 (H) 0.6 - 1.0 MG/DL    GFR African American >60 >60 ml/min/1.73m2    GFR Non- 54 (L) >60 ml/min/1.73m2    Calcium 8.8 8.3 - 10.4 MG/DL    Total Bilirubin 0.7 0.2 - 1.1 MG/DL    ALT 17 12 - 65 U/L    AST 18 15 - 37 U/L    Alk Phosphatase 54 50 - 136 U/L    Total Protein 6.0 (L) 6.3 - 8.2 g/dL    Albumin 3.1 (L) 3.2 - 4.6 g/dL    Globulin 2.9 2.3 - 3.5 g/dL    Albumin/Globulin Ratio 1.1 (L) 1.2 - 3.5     Hemoglobin A1C    Collection Time: 06/23/22  3:40 PM   Result Value Ref Range    Hemoglobin A1C 7.7 (H) 4.20 - 6.30 %    eAG 174 mg/dL   POCT Glucose    Collection Time: 06/23/22  4:43 PM   Result Value Ref Range    POC Glucose 292 (H) 65 - 100 mg/dL    Performed by: Shavonne    Potassium    Collection Time: 06/23/22  6:54 PM   Result Value Ref Range    Potassium 4.7 3.5 - 5.1 mmol/L   POCT Glucose    Collection Time: 06/23/22  8:33 PM   Result Value Ref Range    POC Glucose 110 (H) 65 - 100 mg/dL    Performed by: Luis    POCT Glucose    Collection Time: 06/24/22  5:51 AM   Result Value Ref Range    POC Glucose 155 (H) 65 - 100 mg/dL    Performed by: Luis        Other Studies:  No orders to display       Current Meds:  Current Facility-Administered Medications   Medication Dose Route Frequency    Insulin Pump - Bolus Dose    SubCUTAneous 4x Daily AC & HS    insulin glargine (LANTUS) injection vial 10 Units  10 Units SubCUTAneous Nightly    albuterol sulfate HFA (PROVENTIL;VENTOLIN;PROAIR) 108 (90 Base) MCG/ACT inhaler 1 puff  1 puff Inhalation Q6H PRN    ALPRAZolam (XANAX) tablet 2 mg  2 mg Oral TID PRN    ARIPiprazole (ABILIFY) tablet 5 mg  5 mg Oral Once    atorvastatin (LIPITOR) tablet 80 mg  80 mg Oral Daily    busPIRone (BUSPAR) tablet 10 mg  10 mg Oral BID    DULoxetine (CYMBALTA) extended release capsule 60 mg  60 mg Oral BID    [Held by provider] furosemide (LASIX) tablet 20 mg  20 mg Oral Daily    [Held by provider] lisinopril (PRINIVIL;ZESTRIL) tablet 40 mg  40 mg Oral Daily    [Held by provider] metoprolol succinate (TOPROL XL) extended release tablet 50 mg  50 mg Oral Daily    nitroGLYCERIN (NITROSTAT) SL tablet 0.4 mg  0.4 mg SubLINGual Q5 Min PRN    [Held by provider] potassium chloride (KLOR-CON M) extended release tablet 20 mEq  20 mEq Oral Daily    tiotropium (SPIRIVA RESPIMAT) 2.5 MCG/ACT inhaler 1 puff  1 puff Inhalation Daily    topiramate (TOPAMAX) tablet 25 mg  25 mg Oral Daily    0.9 % sodium chloride infusion   IntraVENous Continuous    sodium chloride flush 0.9 % injection 5-40 mL  5-40 mL IntraVENous 2 times per day    sodium chloride flush 0.9 % injection 5-40 mL  5-40 mL IntraVENous PRN    0.9 % sodium chloride infusion   IntraVENous PRN    acetaminophen (TYLENOL) tablet 1,000 mg  1,000 mg Oral Q6H    HYDROmorphone (DILAUDID) tablet 2 mg  2 mg Oral Q4H PRN    ondansetron (ZOFRAN-ODT) disintegrating tablet 4 mg  4 mg Oral Q8H PRN    Or    ondansetron (ZOFRAN) injection 4 mg  4 mg IntraVENous Q6H PRN    sennosides-docusate sodium (SENOKOT-S) 8.6-50 MG tablet 1 tablet  1 tablet Oral BID    aspirin EC tablet 81 mg  81 mg Oral BID    diphenhydrAMINE (BENADRYL) capsule 25 mg  25 mg Oral Q6H PRN    Or    diphenhydrAMINE (BENADRYL) injection 25 mg  25 mg IntraVENous Q6H PRN    gabapentin (NEURONTIN) capsule 300 mg  300 mg Oral BID    ketorolac (TORADOL) injection 15 mg  15 mg IntraVENous q8h    [START ON 6/26/2022] meloxicam (MOBIC) tablet 7.5 mg  7.5 mg Oral BID    naloxone (NARCAN) injection 0.4 mg  0.4 mg IntraVENous PRN    oxyCODONE (OXYCONTIN) extended release tablet 10 mg  10 mg Oral 2 times per day    pantoprazole (PROTONIX) tablet 40 mg  40 mg Oral QAM AC    zolpidem (AMBIEN) tablet 5 mg  5 mg Oral Nightly PRN    HYDROmorphone HCl PF (DILAUDID) injection 1 mg  1 mg IntraVENous Q3H PRN       Signed:  Odilia Gaitan MD    Part of this note may have been written by using a voice dictation software. The note has been proof read but may still contain some grammatical/other typographical errors.

## 2022-06-24 NOTE — DIABETES MGMT
Patient admitted 6/22/22 for a left knee total arthroplasty. Patient is alert and oriented x4. Admitting blood glucose 96. HbA1c 7.7%. Patient has a past medical history of type 1 DM, HTN, fibromyalgia, CAD, COPD, hypercholesterolemia, and depression. Patient states they were diagnosed in her 30's with diabetes. Patient states they do have a working glucometer with supplies at home. Per patient they typically check blood glucose levels several times a day via her Freestyle Zoraida CGM. 14 day Freestyle Zoraida CGM intact to right upper arm which pt states was put on 5 days ago. Patient states they are currently using a [employer] insulin pump for management of diabetes. Patient states that she has Lantus pens at home in case of insulin pump failure and states that she takes Lantus 20 units hs in the event of insulin pump failure. Patient's insulin pump infusing Novolog to lower abdomen. Patient basal rates:  12 am-3 am: 0.30 units/hr  3 am- 9 am: 0.95 units/hr  9 am- 3 pm: 0.75 units/hr  3 pm- 12 am: 0.55 units/hr  Total Daily Basal dose: 16.5 units  Carb ratio:12 am-9, 11:30 am- 9, 6 pm-8. Insulin sensitivity: 30  Patient states they do have extra supplies for their insulin pump. Patient states last site change was 6/23/22. Patient verbalizes understanding that per hospital policy staff will check fingerstick blood glucose levels as ordered by provider. Patient also verbalizes understanding that they need to report bolus doses to primary RN for documentation. Patient has attended formal diabetes education in the past. Patient reports no difficulty with affording their diabetic supplies. Patient states they see Dr. Vignesh Osullivan of [facility] for management of their diabetes. Pt states that she was last seen on 3/13/22 and has a follow up appointment on 7/22/22. Educated patient to report any signs and symptoms of hypoglycemia to primary RN.  Discussed the relationship between hyperglycemia and infection and the importance of good blood glucose control for proper wound healing. Patient verbalizes understanding and voices no further questions regarding diabetes management at this time.

## 2022-06-24 NOTE — PROGRESS NOTES
OCCUPATIONAL THERAPY Daily Note and AM      (Link to Caseload Tracking: OT Visit Days: 2  OT Orders   Time  OT Charge Capture  Rehab Caseload Tracker  Episode     Jimmie Capps is a 78 y.o. female   PRIMARY DIAGNOSIS: Osteoarthritis of left knee  Primary osteoarthritis of left knee [M17.12]  Knee deformity, acquired, left [M21.962]  Osteoarthritis of left knee, unspecified osteoarthritis type [M17.12]  Procedure(s) (LRB):  LEFT SUAD KNEE TOTAL ARTHROPLASTY ROBOTIC ASIF (Left)  2 Days Post-Op  Reason for Referral: Pain in Left Knee (M25.562)  Stiffness of Left Knee, Not elsewhere classified (M25.662)  Generalized Muscle Weakness (M62.81)  Other lack of cordination (R27.8)  Difficulty in walking, Not elsewhere classified (R26.2)  Other abnormalities of gait and mobility (R26.89)  Inpatient: Payor: MEDICARE / Plan: MEDICARE PART A AND B / Product Type: *No Product type* /     ASSESSMENT:     REHAB RECOMMENDATIONS:   Recommendation to date pending progress:  [facility]    Equipment:     3 in 1 Bedside Commode   Rolling Walker     ASSESSMENT:  Ms. Lucy Novak is s/p left TKA with small wound vac and presents with decreased independence with functional mobility and activities of daily living as compared to baseline level of function and safety. Patient would benefit from skilled Occupational Therapy to maximize independence and safety with self-care task and functional mobility. Patient donned clothes and transferred to EOB with min assist. She stood and took steps to reclienr with CGa. She rested and them ambulated to the bathroom. She toileted and washed her hands and returned to [address] [facility]. She plans to discharge home tomorrow. She lives with her . She was educated to call for assistance with getting out of recliner. She was educated in [facility] and [facility] [address] Will see in am for full ADL session. All needs in reach. 22 attempted to see patient 3 times today.  Her insulin pump fell off last night and she does not feel well. She ambulated with PT. She is not discharging home today. She declined showering. OT to see her tomorrow for full ADL session. Will follow with OT poc.    6/24/22 830am Patient up in recliner and much more awake and at baseline today. She declined taking a shower. Ot encouraged her but she declined. She plans to discharge home today. She was educated in post op ADL task performance for safety and infection prevention. All questions answered.         [address] [facility] [address] [id number] [facility] 6 Clicks Daily Activity Inpatient Short Form:    AM-PAC Daily Activity Inpatient   How much help for putting on and taking off regular lower body clothing?: A Little  How much help for Bathing?: A Little  How much help for Toileting?: A Little  How much help for putting on and taking off regular upper body clothing?: None  How much help for taking care of personal grooming?: None  How much help for eating meals?: None  AM-Merged with [facility] Inpatient Daily Activity Raw Score: 21  AM-PAC Inpatient ADL T-Scale Score : 44.27  ADL Inpatient CMS 0-100% Score: 32.79  ADL Inpatient CMS G-Code Modifier : CJ     SUBJECTIVE:     Ms. Julio Ferguson stated she did not want to shower today       Social/Functional   Lives With: Spouse  Type of Home: Mobile home  Home Layout: One level  Home Access: Ramped entrance  Bathroom Shower/Tub: Tub/Shower unit,Shower chair with back  Bathroom Toilet: Standard  ADL Assistance: Independent  Homemaking Assistance: Independent  Ambulation Assistance: Independent    OBJECTIVE:     MARGO / Chase Melissa / Timothy Ascencioen: Wound Vac    RESTRICTIONS/PRECAUTIONS:  Restrictions/Precautions: Weight Bearing  Right Lower Extremity Weight Bearing: Weight Bearing As Toleratedfall    PAIN: VITALS / O2:   Pre Treatment:      0/10    Post Treatment: 0/10 iceman in place Vitals          Oxygen        GROSS EVALUATION: INTACT IMPAIRED   (See Comments)   UE AROM [x] []   UE PROM [] []   Strength [x]       Posture / Balance [] CGA standing and ambulating   Sensation []     Coordination [x]       Tone [x]       Edema []    Activity Tolerance [x]  FAIR WITH AMBULATION     Hand Dominance R [] L []      COGNITION/  PERCEPTION: INTACT IMPAIRED   (See Comments)   Orientation [x]     Vision [x]     Hearing [x]     Cognition  [x]     Perception [x]       MOBILITY: I Mod I S SBA CGA Min Mod Max Total  NT x2 Comments:   Bed Mobility    Rolling [] [] [] [] [] [] [] [] [] [] []    Supine to Sit [] [] [] [] [] [x] [] [] [] [] []    Scooting [] [] [] [] [x] [] [] [] [] [] []    Sit to Supine [] [] [] [] [] [] [] [] [] [] []    Transfers    Sit to Stand [] [] [] [] [x] [] [] [] [] [] []    Bed to Chair [] [] [] [] [x] [] [] [] [] [] []    Stand to Sit [] [] [] [] [x] [] [] [] [] [] []    Tub/Shower [] [] [] [] [] [] [] [] [] [] []       Toilet [] [] [] [] [x] [] [] [] [] [] []        [] [] [] [] [] [] [] [] [] [] []    I=Independent, Mod I=Modified Independent, S=Supervision/Setup, SBA=Standby Assistance, CGA=Contact Guard Assistance, Min=Minimal Assistance, Mod=Moderate Assistance, Max=Maximal Assistance, Total=Total Assistance, NT=Not Tested    ACTIVITIES OF DAILY LIVING: I Mod I S SBA CGA Min Mod Max Total NT Comments   BASIC ADLs:              Upper Body Bathing [] [] [] [] [] [] [] [] [] []    Lower Body Bathing [] [] [] [] [] [] [] [] [] []    Toileting [] [] [] [] [x] [] [] [] [] []    Upper Body Dressing [] [] [x] [] [] [] [] [] [] []    Lower Body Dressing [] [] [] [] [] [x] [] [] [] []    Feeding [] [] [] [] [] [] [] [] [] []    Grooming [] [] [x] [] [] [] [] [] [] []    Personal Device Care [] [] [] [] [] [] [] [] [] []    Functional Mobility [] [] [] [] [x] [] [] [] [] []    I=Independent, Mod I=Modified Independent, S=Supervision/Setup, SBA=Standby Assistance, CGA=Contact Guard Assistance, Min=Minimal Assistance, Mod=Moderate Assistance, Max=Maximal Assistance, Total=Total Assistance, NT=Not Tested    PLAN:     FREQUENCY/DURATION   OT Plan of Care: 1 time/week,2 times/week for duration of hospital stay or until stated goals are met, whichever comes first.    ACUTE OCCUPATIONAL THERAPY GOALS:   (Developed with and agreed upon by patient and/or caregiver.)    GOALS:   DISCHARGE GOALS (in preparation for going home/rehab):  3 days  1. Ms. Yoseph Horner will perform lower body dressing activity with minimal assist required to demonstrate improved functional mobility and safety. 2.  Ms. Yoseph Horner will perform bathing activity with minimal assist required to demonstrate improved functional mobility and safety. 3.  Ms. Yoseph Horner will perform toileting activity with  contact guard assist to demonstrate improved functional mobility and safety. 4.  Ms. Yoseph Horner will perform all functional transfers transfer with contact guard assist to demonstrate improved functional mobility and safety. PROBLEM LIST:   (Skilled intervention is medically necessary to address:)  Decreased ADL/Functional Activities  Decreased Activity Tolerance  Decreased Balance  Decreased Gait Ability  Decreased Safety Awareness  Decreased Strength  Decreased Transfer Abilities  Increased Pain   INTERVENTIONS PLANNED:   (Benefits and precautions of occupational therapy have been discussed with the patient.)  Self Care Training  Therapeutic Activity  Therapeutic Exercise/HEP  Neuromuscular Re-education  Manual Therapy  Education         TREATMENT:          TREATMENT:    SELF CARE: (20 minutes):   Procedure(s) (per grid) utilized to improve and/or restore self-care/home management as related to dressing and bathing. Required minimal visual and verbal cueing to facilitate activities of daily living skills, compensatory activities and post op ADL task performance, safety and infection prevention.         AFTER TREATMENT PRECAUTIONS: Bed, Bed/Chair Locked, Call light within reach, Chair, Needs within reach and RN notified    INTERDISCIPLINARY COLLABORATION:  RN/ PCT, PT/ PTA and OT/ MORTON    EDUCATION:  Education Given To: Patient  Education Provided: Role of Jahaira 2 Strategies,Transfer Training  Education Method: Demonstration,Verbal  Barriers to Learning: None  Education Outcome: Verbalized understanding  [x] Safe And Effective Hygiene  [x] Fall Precautions  [] Hip Precautions  [] D/C Instruction Review [] Prosthesis Review  [x] Walker Management/Safety  [x] Adaptive Equipment as Needed  [] Therapeutic Resting Position of Joint       TOTAL TREATMENT DURATION AND TIME:  Time In: 0830  Time Out: 1621  Minutes: [address] [facility], OT

## 2022-06-27 NOTE — DISCHARGE SUMMARY
Total Joint Discharge Summary      Patient ID:  Winnie Deng  [id number]  63 y.o.  1955    Admit date: 6/22/2022  Discharge date: 6/24/2022  2:13 PM  Admitting Surgeon: Andi Reyna MD  Admission Diagnoses: Primary osteoarthritis of left knee [M17.12]  Knee deformity, acquired, left [M21.962]  Osteoarthritis of left knee, unspecified osteoarthritis type [M17.12]  Discharge Diagnoses: Principal Problem:    Osteoarthritis of left knee  Active Problems:    Transient alteration of awareness    Hyperkalemia    Hyponatremia  Resolved Problems:    * No resolved hospital problems. *    Procedure: Procedure(s) (LRB):  LEFT SUAD KNEE TOTAL ARTHROPLASTY ROBOTIC ASIF (Left)    Brief History: Was admitted 6/22/2022 for Procedure(s) (LRB):  LEFT SUAD KNEE TOTAL ARTHROPLASTY ROBOTIC ASIF (Left)    Hospital Course: Patient underwent surgery 6/22/2022 and under went Procedure(s) (LRB):  LEFT SUAD KNEE TOTAL ARTHROPLASTY ROBOTIC ASIF (Left). Patient did well postop. Pain was well controlled postop. Physical therapy and occupational therapy were initiated the day of surgery. The patient was placed on dual both chemical and mechanical DVT prophylaxis after surgery. The patient progressed well with therapy postop and was deemed safe and appropriate for discharge after surgery. Patient was discharged 6/24/2022  8:83 PM.    Complications in Hospital: None                             Perioperative Antibiotics: Antibiotics per administered per protocol based on weight and prior drug allergies. If the patient was undergoing revision surgery or had a positive MRSA nasal swab the patient also received vancomycin. Hospital Medications given:   No current facility-administered medications for this encounter.      Current Outpatient Medications   Medication Sig    acetaminophen (TYLENOL) 500 MG tablet Take 2 tablets by mouth every 6 hours as needed for Pain    zolpidem (AMBIEN) 10 MG tablet Take 1 tablet by mouth nightly as needed for Sleep for up to 30 days. Take 1-2 tabs as needed for insomnia postop. Start with one tab and if still unable to sleep after 1 hour can take second tablet.  aspirin EC 81 MG EC tablet Take 1 tablet by mouth in the morning and at bedtime    cyclobenzaprine (FLEXERIL) 5 MG tablet Take 1 tablet by mouth 3 times daily as needed for Muscle spasms    meloxicam (MOBIC) 7.5 MG tablet Take 1 tablet by mouth daily    ondansetron (ZOFRAN) 4 MG tablet Take 1 tablet by mouth 3 times daily as needed for Nausea or Vomiting    senna (SENOKOT) 8.6 MG tablet Take 1 tablet by mouth 2 times daily    Injection Device for Insulin JULIANE Novolog insulin on insulin pump    busPIRone (BUSPAR) 10 MG tablet Take 10 mg by mouth 2 times daily    potassium chloride (KLOR-CON M) 20 MEQ extended release tablet Take 20 mEq by mouth daily    insulin aspart (NOVOLOG) 100 UNIT/ML injection vial Inject into the skin as needed for High Blood Sugar Pt checks sqbs 5 times/daily:    Blood sugar less than 150=0 units; 151-199= 2 units; 200-249 = 4 units; 250-299= 6 units; 300-349= 8 units; 340 and above 10 units    Cholecalciferol (VITAMIN D3) 50 MCG (2000 UT) CAPS Take by mouth daily    Lancets MISC Check glucose ac and qhs. (E 10.10.)    albuterol sulfate  (90 Base) MCG/ACT inhaler Inhale 1 puff into the lungs every 6 hours as needed    ALPRAZolam (XANAX) 2 MG tablet Take 2 mg by mouth 3 times daily as needed.     ARIPiprazole (ABILIFY) 10 MG tablet Take 5 mg by mouth    atorvastatin (LIPITOR) 80 MG tablet Take 80 mg by mouth daily TAKE 1 TABLET BY MOUTH DAILY    DULoxetine (CYMBALTA) 60 MG extended release capsule Take 60 mg by mouth 2 times daily     fluconazole (DIFLUCAN) 150 MG tablet Take 150 mg by mouth every 30 days    fluticasone-salmeterol (ADVAIR) 250-50 MCG/DOSE AEPB Inhale 1 puff into the lungs 2 times daily (Patient not taking: Reported on 6/22/2022)    furosemide (LASIX) 20 MG tablet Take 20 mg by mouth daily    gabapentin (NEURONTIN) 300 MG capsule Take 300 mg by mouth 3 times daily.  insulin aspart (NOVOLOG) 100 UNIT/ML injection pen For Blood Sugar (mg/dL) of: Less than 150 = 0 [id number] -199 = 2 gmbrt514 -249 = 4 [id number] -299 = 6 hwygk517 -349 = 8 [id number] and above = 10 units    insulin glargine (LANTUS) 100 UNIT/ML injection vial Lantus 40 units sq in am and 10 units sq in pm. (Patient not taking: Reported on 6/22/2022)    lisinopril (PRINIVIL;ZESTRIL) 40 MG tablet Take 20 mg by mouth daily    metoprolol succinate (TOPROL XL) 50 MG extended release tablet Take 50 mg by mouth daily    nitroGLYCERIN (NITROSTAT) 0.4 MG SL tablet Place 0.4 mg under the tongue as needed    omeprazole (PRILOSEC) 20 MG delayed release capsule Take 20 mg by mouth daily    SUMAtriptan (IMITREX) 100 MG tablet Take 50 mg by mouth once as needed    tiotropium (SPIRIVA RESPIMAT) 1.25 MCG/ACT AERS inhaler Inhale 2 puffs into the lungs daily    topiramate (TOPAMAX) 50 MG tablet Take 50 mg by mouth daily         Postoperative transfusions: None    Objective    Hemoglobin at discharge:   Lab Results   Component Value Date    HGB 10.2 06/23/2022       Extremity Exam  Dressing Clean, dry intact. Positive tibialis anterior and gastroc-soleus function in operative extremity. Sensation grossly intact  Positive PT pulse  No sign of DVT  TEDS/SCDS in place    Physical Therapy started on the day of surgery and progressed. PT/OT:       Ambulation Response: Tolerated well                   Discharge instructions:  -WBAT operative extremity  - Anticoagulate for 4 week period  -Resume pre hospital diet            -Resume home medications per medical reconciliation form     -Ambulate with walker, appropriate total joint protocol.  Follow posterior hip precautions for 6 weeks if patient is status post total hip replacement  -Follow up in office as scheduled     IF YOU HAVE ANY PROBLEMS ONCE YOU ARE AT [address] NUMBERS:   Main office number: [phone]    Take Home Medications     Discharge Medication List as of 6/24/2022 10:15 AM      CONTINUE these medications which have NOT CHANGED    Details   acetaminophen (TYLENOL) 500 MG tablet Take 2 tablets by mouth every 6 hours as needed for Pain, Disp-180 tablet, R-2Normal      zolpidem (AMBIEN) 10 MG tablet Take 1 tablet by mouth nightly as needed for Sleep for up to 30 days. Take 1-2 tabs as needed for insomnia postop. Start with one tab and if still unable to sleep after 1 hour can take second tablet., Disp-30 tablet, R-0Normal      aspirin EC 81 MG EC tablet Take 1 tablet by mouth in the morning and at bedtime, Disp-60 tablet, R-0Normal      cyclobenzaprine (FLEXERIL) 5 MG tablet Take 1 tablet by mouth 3 times daily as needed for Muscle spasms, Disp-30 tablet, R-0Normal      meloxicam (MOBIC) 7.5 MG tablet Take 1 tablet by mouth daily, Disp-30 tablet, R-2Normal      ondansetron (ZOFRAN) 4 MG tablet Take 1 tablet by mouth 3 times daily as needed for Nausea or Vomiting, Disp-30 tablet, R-1Normal      senna (SENOKOT) 8.6 MG tablet Take 1 tablet by mouth 2 times daily, Disp-30 tablet, R-2Normal      HYDROmorphone (DILAUDID) 2 MG tablet Take 1 tablet by mouth every 4 hours as needed for Pain for up to 7 days. , Disp-40 tablet, R-0Normal      Injection Device for Insulin JULIANE Historical MedNovolog insulin on insulin pump      busPIRone (BUSPAR) 10 MG tablet Take 10 mg by mouth 2 times dailyHistorical Med      potassium chloride (KLOR-CON M) 20 MEQ extended release tablet Take 20 mEq by mouth dailyHistorical Med      !! insulin aspart (NOVOLOG) 100 UNIT/ML injection vial Inject into the skin as needed for High Blood Sugar Pt checks sqbs 5 times/daily:    Blood sugar less than 150=0 units; 151-199= 2 units; 200-249 = 4 units; 250-299= 6 units; 300-349= 8 units; 340 and above 10 unitsHistorical Med      Cholecalciferol (VITAMIN D3) 50 MCG (2000 UT) CAPS Take by mouth dailyHistorical Med      Lancets MISC Starting Sun 10/31/2021, Historical MedCheck glucose ac and qhs. (E 10.10.)      albuterol sulfate  (90 Base) MCG/ACT inhaler Inhale 1 puff into the lungs every 6 hours as neededHistorical Med      ALPRAZolam (XANAX) 2 MG tablet Take 2 mg by mouth 3 times daily as needed. Historical Med      ARIPiprazole (ABILIFY) 10 MG tablet Take 5 mg by mouthHistorical Med      atorvastatin (LIPITOR) 80 MG tablet Take 80 mg by mouth daily TAKE 1 TABLET BY MOUTH DAILYHistorical Med      DULoxetine (CYMBALTA) 60 MG extended release capsule Take 60 mg by mouth 2 times daily Historical Med      fluconazole (DIFLUCAN) 150 MG tablet Take 150 mg by mouth every 30 daysHistorical Med      fluticasone-salmeterol (ADVAIR) 250-50 MCG/DOSE AEPB Inhale 1 puff into the lungs 2 times dailyHistorical Med      furosemide (LASIX) 20 MG tablet Take 20 mg by mouth dailyHistorical Med      gabapentin (NEURONTIN) 300 MG capsule Take 300 mg by mouth 3 times daily. Historical Med      !! insulin aspart (NOVOLOG) 100 UNIT/ML injection pen For Blood Sugar (mg/dL) of: Less than 150 = 0 evuni288 -199 = 2 aklyi807 -249 = 4 sgdyt053 -299 = 6 rlidp287 -349 = 8 [id number] and above = 10 unitsHistorical Med      insulin glargine (LANTUS) 100 UNIT/ML injection vial Lantus 40 units sq in am and 10 units sq in pm.Historical Med      lisinopril (PRINIVIL;ZESTRIL) 40 MG tablet Take 20 mg by mouth dailyHistorical Med      metoprolol succinate (TOPROL XL) 50 MG extended release tablet Take 50 mg by mouth dailyHistorical Med      nitroGLYCERIN (NITROSTAT) 0.4 MG SL tablet Place 0.4 mg under the tongue as neededHistorical Med      omeprazole (PRILOSEC) 20 MG delayed release capsule Take 20 mg by mouth dailyHistorical Med      SUMAtriptan (IMITREX) 100 MG tablet Take 50 mg by mouth once as neededHistorical Med      tiotropium (SPIRIVA RESPIMAT) 1.25 MCG/ACT AERS inhaler Inhale 2 puffs into the lungs dailyHistorical Med topiramate (TOPAMAX) 50 MG tablet Take 50 mg by mouth dailyHistorical Med       !! - Potential duplicate medications found. Please discuss with provider. STOP taking these medications       aspirin 81 MG chewable tablet Comments:   Reason for Stopping:             · It is important that you take the medication exactly as they are prescribed. · Keep your medication in the bottles provided by the pharmacist and keep a list of the medication names, dosages, and times to be taken in your wallet. · Do not take other medications without consulting your doctor. What to do at [address] your prehospital diet. If you have excessive nausea or vomiting call your doctor's office. Home Physical Therapy is arranged. Use rolling walker when walking. Physical therapy will help advise you when it is safe to transition to a cane. Patients who have had a joint replacement should not drive until you are seen for your follow up appointment by Dr. Roger Rivera. Total Knee patients keep knee elevated above heart level to prevent and resolve swelling. Continue to ice the surgical site a few times a day until your follow-up appointment    Leave dressing alone until checked by home health. Patients with Prineo wound closure mesh may shower once at home but do not soak the wound (bath, swimming pool, hot tub, etc.). Gently pat dry wound after shower. No aggressive scrubbing as this may rub off adhesive used to keep wound sealed and irritate the wound. Further do not wear tight clothing such a spandex, yoga pants, or biker shorts as this type of clothing may pull the mesh dressing off. Wear loose fitting clothes only. Finally, do not allow anyone to remove this dressing. Dr Roger Rivera will remove the dressing in his office at your first postoperative visit. If your wound was closed with [facility] or the nursing facility will assist with dressing changes. Take it easy, you have just had major surgery.  Too much activity will cause additional soreness and swelling. When to Call    - Call if you have a temperature greater then 101  - Have increased wound drainage or wound drainage that persists after 7 days.  - Begin to notice increased redness, swelling, or pus coming from the incision  - Unable to keep food down  - Lose control of your bladder or bowel function  - Are unable to bear any weight on operative leg  - Need a pain medication refill - please have pharmacy phone number available. DO NOT WAIT UNTIL YOU ARE OUT OF MEDICATION TO CALL FOR A REFILL! WE OFTEN CANNOT FILL NARCOTIC PRESCRIPTIONS THE SAME DAY! IF YOU ARE GETTING LOW AND FEEL YOU WILL NEED MORE MEDICATION CALL AHEAD OF TIME    I have reviewed the patients controlled substance prescription history, as maintained in the [place] prescription monitoring program, so that the prescription(s) for a  controlled substance can be given.     Signed:  Radha Guan MD  6/27/2022  6:46 AM

## 2022-07-08 ENCOUNTER — TELEPHONE (OUTPATIENT)
Dept: ORTHOPEDIC SURGERY | Age: 67
End: 2022-07-08

## 2022-07-08 NOTE — TELEPHONE ENCOUNTER
I called and spoke to patient. She is wanting to know if there is anything else that you can send in for her pain?

## 2022-07-08 NOTE — TELEPHONE ENCOUNTER
Had a tka on 22nd and is having unusual pain right above the knee to the ankle, says pain meds not working

## 2022-07-11 NOTE — TELEPHONE ENCOUNTER
I called and left patient a voice mail regarding pain medication. I told her that Dr. Elaine Butler said that she is taking all he can give her for the pain.

## 2022-07-14 ENCOUNTER — OFFICE VISIT (OUTPATIENT)
Dept: ORTHOPEDIC SURGERY | Age: 67
End: 2022-07-14

## 2022-07-14 DIAGNOSIS — Z96.652 STATUS POST TOTAL LEFT KNEE REPLACEMENT: ICD-10-CM

## 2022-07-14 DIAGNOSIS — M25.562 LEFT KNEE PAIN, UNSPECIFIED CHRONICITY: Primary | ICD-10-CM

## 2022-07-14 DIAGNOSIS — Z09 FOLLOW-UP EXAMINATION FOLLOWING SURGERY: ICD-10-CM

## 2022-07-14 PROCEDURE — 99024 POSTOP FOLLOW-UP VISIT: CPT | Performed by: ORTHOPAEDIC SURGERY

## 2022-07-14 NOTE — PROGRESS NOTES
Patient ID:  Herbie Lugo  [id number]  74 y.o.  1955    Today: July 14, 2022    CHIEF COMPLAINT:  Follow-up right total knee replacement    HISTORY:  The patient presents today for 3-week follow-up after knee replacement. The patient is doing very well, is on aspirin for DVT prophylaxis. The patient is working with physical therapy to regain some strength and motion. Continues to take medication appropriately. The patient has done a good job keeping dressing/wound clean and dry. The patient is progressing nicely postoperatively. PE:  Incision is examined which is well healed. No erythema, induration or drainage. No significant fluid accumulation around the surgical site. ROM is 0-120. Overall the knee appears stable to varus/valgus stress throughout arc of motion. Anterior drawer testing at 45 and 90º of flexion is stable. Posterior drawer testing is stable. Distally able to plantar and dorsiflex foot and ankle. Sensation intact. Limb is perfused. No sign of DVT. X-RAYS:    XR RT Knee 2/3 view  Views Obtained: AP, Lateral and Sunrise views of the right knee  Indication: Postop Right TKA  Findings: All hardware to be intact. All the components appear to be well sized without any evidence of loosening. Overall mechanical alignment appears to be within acceptable criteria. No evidence of fracture. Patella appears to be tracking appropriately within the trochlear groove. Impression: Normal Xray after right total knee replacement    Leatha Gilliland MD    ASSESSMENT:  3 Weeks S/P Right Total Knee Replacement    PLAN:  Continue activity and weight bearing as tolerated. Continue PT/OT to focus on strengthening and stretching. Continue to take pain medication appropriately. The patient will continue to take aspirin for another week for a full month of DVT prophylaxis. The patient is given a script for antibiotics to be taken before dental prophylaxis today.   Will continue to work with PT/OT transitioning from home health PT to outpatient physical therapy. I have asked the patient not to submerge the incision under water for another couple of weeks and refrain from placing any creams or oils over the incision. If needed they are given a prescription for a refill on their pain medication today. I will plan to check them back in 3 months for a routine 4 month follow-up.       Signed By: Mariluz Kaminski MD  July 14, 2022

## 2022-07-15 ENCOUNTER — TELEPHONE (OUTPATIENT)
Dept: ORTHOPEDIC SURGERY | Age: 67
End: 2022-07-15

## 2022-07-15 DIAGNOSIS — Z96.652 STATUS POST TOTAL LEFT KNEE REPLACEMENT: Primary | ICD-10-CM

## 2022-07-15 DIAGNOSIS — G89.18 POSTOPERATIVE PAIN: Primary | ICD-10-CM

## 2022-07-15 DIAGNOSIS — Z96.651 STATUS POST TOTAL RIGHT KNEE REPLACEMENT: ICD-10-CM

## 2022-07-15 RX ORDER — HYDROMORPHONE HYDROCHLORIDE 2 MG/1
2 TABLET ORAL
Qty: 42 TABLET | Refills: 0 | Status: CANCELLED | OUTPATIENT
Start: 2022-07-15 | End: 2022-07-22

## 2022-07-15 RX ORDER — HYDROMORPHONE HYDROCHLORIDE 2 MG/1
2 TABLET ORAL EVERY 4 HOURS PRN
Qty: 40 TABLET | Refills: 0 | Status: SHIPPED | OUTPATIENT
Start: 2022-07-15 | End: 2022-07-22

## 2022-07-15 NOTE — TELEPHONE ENCOUNTER
She was seen yesterday and was supposed to have a RX sent in. Her pharmacy hasn't gotten anything. Can you check the status. She would like to have it before the weekend.

## 2022-07-27 ENCOUNTER — TELEPHONE (OUTPATIENT)
Dept: ORTHOPEDIC SURGERY | Age: 67
End: 2022-07-27

## 2022-07-27 DIAGNOSIS — Z96.652 STATUS POST TOTAL LEFT KNEE REPLACEMENT: Primary | ICD-10-CM

## 2022-08-01 ENCOUNTER — TELEPHONE (OUTPATIENT)
Dept: ORTHOPEDIC SURGERY | Age: 67
End: 2022-08-01

## 2022-08-03 ENCOUNTER — TELEPHONE (OUTPATIENT)
Dept: ORTHOPEDIC SURGERY | Age: 67
End: 2022-08-03

## 2024-09-12 NOTE — H&P
DERMATOLOGY CLINIC NOTE  Type of note: Return  Patient Name: Amanda Sim   MRN / CSN: [id number]  / [id number]    Date of Birth / Age: 1951 / 72 year old    Gender: female      CARE TEAM  Encounter Provider: Nima Myers MD     Referring Provider (if known): Francia Timmons, *,    PCP (if known): Francia Timmons PA-C     PROBLEM LIST  Patient Active Problem List   Diagnosis    Hyperlipidemia, mixed    IFG (impaired fasting glucose)    Lactose intolerance    Functional murmur    Age-related osteoporosis without current pathological fracture     CHIEF COMPLAINT/REASON FOR CONSULTATION  Office Visit and Skin Assessment    HISTORY OF PRESENT ILLNESS  Amanda Sim is a 72 year old White female h/o BCC and osteoporosis on alendronate who presents for skin check,  9/23. No concerns or changes noticed since last visit.      Biopsy history   -2/22, R AF: BCC s/p WLE w/Dr. Abad   No FH of skin cancer. H/o a few blistering sunburns in the past, occasionally wears sunscreen w/prolonged sun exposure SPF 50+ and gardens a lot in the summertime. Never tanning bed use. Occasionally does self and partner skin checks. Previously f/w [facility] for skin checks, no biopsies that she recalls. Daughter lives in [facility] Medical Records Reviewed: Derm     PAST MEDICAL HISTORY  Past Medical History:   Diagnosis Date    Allergy nickel, some antibiotics?    Bacterial overgrowth syndrome     Hyperlipidemia     Lactose intolerance     Osteoporosis, unspecified 06/24/2009     Past Surgical History:   Procedure Laterality Date    Colonoscopy diagnostic  03/02/2009    Dexa bone density axial skeleton  10/14/2008    No past surgeries  oopherectomy 1980, Skin Lesion 2021    Oophorectomy      Ovary surgery      solitary oopherectomy on side only     Social History     Socioeconomic History    Marital status: /Civil Union     Spouse name: Not on file    Number of children: Not on file    Years of education:  [employer]   History and Physical       Name:  Benita Hills  Age:66 y.o. Sex:female   :  1955    MRN:  [id number]   PCP:  Omar Daley MD      Admit Date:  10/28/2021  2:58 PM   Chief Complaint: Nausea vomiting and abdominal pain    Reason for Admission:   DKA (diabetic ketoacidosis) ([facility] [address] [E11.10]  ADRIENNE (acute kidney injury) ([facility] [address] [N17.9]  Hyperkalemia [E87.5]    Assessment & Plan:   -DKA  Ordered insulin drip, BMP every 4 hourly, venous pH every 4 hourly  Already ordered 2 more liters of normal saline bolus and then normal saline at 150 cc/h.  N.p.o. except for meds  Will DC patient insulin pump    -Acute kidney injury/hyperkalemia  Continue IV fluids  Patient on insulin drip should help her hyperkalemia    -Coronary disease  Continue metoprolol and continue aspirin    -Fibromyalgia  Continue home medication    -COPD  As needed nebs    Patient was in ICU, getting her third liter of fluid, mildly hypotensive systolic around 80 mmHg, improved on IV fluids, did order septic work-up with blood cultures lactic acid and also placed patient on Rocephin. Diet: DIET NPO  VTE ppx: Lovenox  GI ppx: Protonix  CODE STATUS: DNR  Surrogate decision-maker: Spouse      History of Presenting Illness:     Benita Hills is a 73 y.o. female with medical history of diabetes mellitus type 1 on insulin pump, hypertension, hyperlipidemia, fibromyalgia, diabetic neuropathy, anxiety disorder, COPD and coronary disease. I went to  patient on Monday to change her insulin pump but since then noticed elevated glucose levels, since yesterday had multiple episodes of vomiting and later noticed mid abdominal pain, intermittent, dull in nature with no radiation.  also felt patient mildly confused. Patient was brought to emergency room for further evaluation.     In the emergency room patient awake alert oriented x3, mild forgetful in terms of which stated she had changed her insulin pump when and Not on file    Highest education level: Not on file   Occupational History    Not on file   Tobacco Use    Smoking status: Never    Smokeless tobacco: Never   Vaping Use    Vaping status: never used   Substance and Sexual Activity    Alcohol use: Not Currently     Alcohol/week: 0.0 - 4.0 standard drinks of alcohol     Comment: Social    Drug use: Never    Sexual activity: Yes     Partners: Male   Other Topics Concern    Not on file   Social History Narrative    Not on file     Social Determinants of Health     Financial Resource Strain: Not on file   Food Insecurity: Not on file   Transportation Needs: Not on file   Physical Activity: Not on file   Stress: Not on file   Social Connections: Not on file   Interpersonal Safety: Not on file     Family History   Problem Relation Age of Onset    Heart disease Mother     Diabetes Mother     Cancer Maternal Aunt     Diabetes Maternal Aunt     Cancer Maternal Uncle     Cancer Maternal Grandfather      ALLERGIES AND ADVERSE DRUG REACTIONS  ALLERGIES:  Lactose   (food or med), Levaquin, Nickel, and Penicillins  CURRENT MEDICATIONS  Current Outpatient Medications   Medication Sig Dispense Refill    rifAXIMin (XIFAXAN) 550 MG Tab Take 1 tablet by mouth every 8 hours for 14 days. 42 tablet 0    ondansetron (ZOFRAN ODT) 4 MG disintegrating tablet Place 1 tablet onto the tongue every 8 hours as needed for Nausea. 20 tablet 0    rosuvastatin (CRESTOR) 5 MG tablet Take 1 tablet by mouth daily. 90 tablet 3    omeprazole (PrilOSEC) 20 MG capsule Take 20 mg by mouth daily.      Melatonin 5 MG Cap Take 1 capsule by mouth nightly.      Ascorbic Acid (vitamin C) 500 MG tablet Take 500 mg by mouth daily.      B Complex-C (SUPER B COMPLEX PO) Take 1 capsule by mouth daily.      Turmeric (QC TUMERIC COMPLEX PO) Take 1 capsule by mouth daily.      Multiple Vitamins-Minerals (Hair Skin & Nails) Tab Take 1 tablet by mouth daily. (Patient not taking: Reported on 7/30/2024)      lisinopril  how high her sugars were in her mid episodes of vomiting she had. Review of Systems:  A 14 point review of systems was taken and pertinent positive as per HPI.         Past Medical History:   Diagnosis Date    Anxiety     Arthritis     Asthma     CAD (coronary artery disease) 2019    mi  stent x 1-- followed by dr Christine Anderson Cervical spondylosis with myelopathy 2015    Chronic obstructive pulmonary disease (HCC)     daily inhaler    Chronic pain     back  - and all over pain     Depression     Diabetes (HCC)     type 1, sqbs am avg 240-250-- hypo at 60's -- uses insulin pump    Diverticulosis     resolved at present - sees DR Rony Babin when needed    DJD (degenerative joint disease)     Fibromyalgia     all over     Former smoker     quit     GERD (gastroesophageal reflux disease)     CONTROLLED WITH MED    Hormone replacement therapy     Hypertension     controlled with med    Insomnia     Left knee pain     Migraine     Psychiatric diagnosis     bipolar    RA (rheumatoid arthritis) ([facility] [address] dx yrs ago    does not see a rhuematologist    Unspecified adverse effect of anesthesia     \"woke up\"during  surgery on toe    Vitamin B 12 deficiency     Vitamin D deficiency        Past Surgical History:   Procedure Laterality Date    HX CATARACT REMOVAL Bilateral     HX  SECTION      times 2    HX COLONOSCOPY      HX HYSTERECTOMY N/A     total     HX ORTHOPAEDIC      left knee scope    HX ORTHOPAEDIC      right foot \"bone removal\"     HX ORTHOPAEDIC Bilateral     shoulder scopes    HX ORTHOPAEDIC Right     shaved down bone in great toe     HX ORTHOPAEDIC Right     right rotator cuff     HX ORTHOPAEDIC N/A     neck ( plate and screws was put in )     HX OTHER SURGICAL      insulin pump    NEUROLOGICAL PROCEDURE UNLISTED  2019    lower back       Family History : reviewed  Family History   Problem Relation Age of Onset    Cancer Father     Lung Disease Father (ZESTRIL) 5 MG tablet Take 1 tablet by mouth daily. 30 tablet 3    triamcinolone (ARISTOCORT) 0.1 % cream Apply topically 2 times daily. 15 g 3    cholecalciferol (VITAMIN D) 25 mcg(1,000 units) tablet Take 25 mcg by mouth daily.      acetaminophen (TYLENOL) 325 MG tablet Take 2 tablets by mouth every 6 hours as needed for Pain.      SELENIUM PO 1 daily , not sure of dose      calcium carbonate-vitamin D (CALTRATE+D) 600-400 MG-UNIT per tablet Take 1 tablet by mouth daily. (Patient taking differently: Take 1 tablet by mouth in the morning and 1 tablet in the evening.) 90 tablet 3    Probiotic Product (PROBIOTIC DAILY PO) Take 1 capsule by mouth in the morning and 1 capsule in the evening.      Magnesium 500 MG Tab Take 1 tablet by mouth daily.      L-Glutamine 500 MG Cap Take 1 capsule by mouth daily.      Zinc 50 MG Cap Take 1 tablet by mouth daily.      Digestive Enzyme Cap Take 1 capsule by mouth daily.      Glucosamine-Chondroitin-MSM Tab Take 1 tablet by mouth in the morning and 1 tablet in the evening.       No current facility-administered medications for this visit.     REVIEW OF SYSTEMS                         Nl Abn    x  Constitutional: no new fevers/chills/night sweats     Eyes: no new blurry vision/floaters/pain/photosensitivity     Ears, nose, mouth, throat: no new dysphagia, epistaxis, sensory problems or sore throats     Cardiovascular: no new chest pain or palpitations     Respiratory: no new shortness of breath or hemoptysis     Gastrointestinal: no new nausea/vomiting/diarrhea or pain     Genitourinary: no new dysuria, hematuria, or increased frequency     Musculoskeletal: no new joint pains or swelling    x Skin: per HPI     Neurological: no new weakness, paresthesia, or facial droop     Psychiatric: no new mood changes, no SI or HI     Endocrine: no new temperature sensitivity or sweating     Hematologic/Lymphatic: no new swollen glands or easy fatigue/bruising   x  Allergic/Immunologic: no new   Cancer Maternal Aunt     Heart Disease Paternal Grandmother     Heart Disease Paternal Grandfather         Social History     Tobacco Use    Smoking status: Former Smoker     Packs/day: 1.00     Years: 30.00     Pack years: 30.00     Types: Cigarettes     Quit date: 6/10/2002     Years since quittin.3    Smokeless tobacco: Never Used   Substance Use Topics    Alcohol use: Yes     Alcohol/week: 11.0 standard drinks     Types: 4 Glasses of wine, 7 Cans of beer per week       Allergies   Allergen Reactions    Doxepin Hives and Nausea and Vomiting    Nortriptyline Hives    Other Medication Hives and Nausea and Vomiting     elavil    Plaquenil [Hydroxychloroquine] Hives    Tizanidine Other (comments)     Sleep walking and  nocturnal eating     Vistaril [Hydroxyzine Pamoate] Other (comments)     Insomnia           Immunization History   Administered Date(s) Administered    COVID-19, PFIZER, MRNA, LNP-S, PF, 30MCG/0.3ML DOSE 2021, 2021    Influenza Vaccine 2016    Influenza Vaccine (Quad) PF (>6 Mo Flulaval, Fluarix, and >3 Yrs Afluria, Fluzone 48631) 2017    Pneumococcal Polysaccharide (PPSV-23) 2016    Tdap 2017    Zoster Vaccine, Live 2016         PTA Medications:  Current Outpatient Medications   Medication Instructions    albuterol (PROVENTIL HFA, VENTOLIN HFA, PROAIR HFA) 90 mcg/actuation inhaler 1 Puff, Inhalation, EVERY 6 HOURS AS NEEDED    ALPRAZolam (XANAX) 2 mg, Oral, 3 TIMES DAILY AS NEEDED, Usually takes 3 times daily    ARIPiprazole (ABILIFY) 5 mg, Oral, EVERY BEDTIME    aspirin 81 mg, Oral, DAILY    atorvastatin (LIPITOR) 80 mg, Oral, DAILY    DULoxetine (CYMBALTA) 60 mg, Oral, DAILY    fluconazole (DIFLUCAN) 150 mg, Oral, EVERY 30 DAYS, FDA advises cautious prescribing of oral fluconazole in pregnancy.  monthly    fluticasone propion-salmeteroL (ADVAIR/WIXELA) 250-50 mcg/dose diskus inhaler 1 Puff, Inhalation, 2 TIMES DAILY    furosemide (LASIX) 20 mg tablet TAKE 1 TABLET BY MOUTH DAILY    gabapentin (NEURONTIN) 300 mg, Oral, 3 TIMES DAILY    lisinopriL (PRINIVIL, ZESTRIL) 40 mg, Oral, DAILY    meloxicam (MOBIC) 7.5 mg tablet Oral, DAILY    metoprolol succinate (TOPROL-XL) 50 mg, Oral, DAILY    nitroglycerin (NITROSTAT) 0.4 mg, SubLINGual, AS NEEDED, Up to 3 doses.  omeprazole (PRILOSEC) 20 mg, Oral, DAILY    ondansetron hcl (ZOFRAN) 4 mg, Oral, EVERY 8 HOURS AS NEEDED    SUMAtriptan (IMITREX) 50 mg, Oral, ONCE PRN    Syringe with Needle (Disp) 1,000 mcg, Does Not Apply    Syringe with Needle, Safety 3 mL 25 gauge x 1\" syrg 1 mL, IntraMUSCular    tiotropium bromide (SPIRIVA RESPIMAT) 1.25 mcg/actuation inhaler 2 Puffs, Inhalation, DAILY    topiramate (TOPAMAX) 50 mg, Oral, DAILY       Objective:     Patient Vitals for the past 24 hrs:   Temp Pulse Resp BP SpO2   10/28/21 1455 97.7 °F (36.5 °C) 88 18 93/61 99 %       Oxygen Therapy  O2 Sat (%): 99 % (10/28/21 1455)  O2 Device: None (Room air) (10/28/21 1455)    Body mass index is 29.95 kg/m². Physical Exam:    General: No acute distress, speaking in full sentences, no use of accessory muscles   HEENT: Pupils equal and reactive to light and accommodation, dry mucous membrane  Neck: Supple, no lymphadenopathy, no JVD   Lungs: Clear to auscultation bilaterally   Cardiovascular: Regular rate and rhythm with normal S1 and S2   Abdomen: Mild mid abdominal tenderness, no rebound, minimal guarding, normal bowel sounds  Extremities: No cyanosis clubbing or edema   Neuro: Nonfocal, A&O x3   Psych: Normal mood and affect       Data Reviewed: I have reviewed all labs, meds, and studies.       Recent Results (from the past 24 hour(s))   GLUCOSE, POC    Collection Time: 10/28/21  3:13 PM   Result Value Ref Range    Glucose (POC) 417 (H) 65 - 100 mg/dL    Performed by Wyatt    CBC WITH AUTOMATED DIFF    Collection Time: 10/28/21  3:17 PM   Result Value Ref Range    WBC 13.7 (H) lip or tongue swelling     OBJECTIVE   NI Abn    General x  Well-developed, well-nourished, well-groomed, NAD   Eyes x  Conjunctivae/lids   ENT   Lips/teeth/gums/oropharynx   Respiratory x  Respiratory effort   Lymphatic x  Head    x  Neck      Axillae      Inguinal      Popliteal      Epitrochlear   Psychiatric x  Orientation to time/place/person      Mood/affect   Skin   LBSE performed per pt's expressed preference:      Scalp and hair-bearing areas    x  Head/face     x Torso/neck: stable 7mm L breast well-circumscribed smooth reddish papule, similar throughout torso and extremities      Genitalia/groin/buttocks     x Arms/legs: stable L 5th toe 3mm dome-shaped pink-brown uniform symmetric well-circumscribed papule w/uniform grey focus     pink-hypopigmented linear smooth sclerotic plaque R AF   Musculoskeletal   Range of motion, tenderness/deformity   Neurological x  Cranial nerves   There were no vitals filed for this visit.     TESTS REVIEWED OR ORDERED     1) Lab / Pathology: N/A    2) # photographs taken and uploaded to Epic: none     3) Additional data collected:  None    ASSESSMENT AND PLAN  Amanda Sim is a 72 year old female patient.  Amanda was seen today for office visit and skin assessment.    Diagnoses and all orders for this visit:    Cherry angioma  Nature of condition discussed and reassurance provided that appears clinically benign on exam today.  Reviewed changes that would warrant re-evaluation such as > in size, change in color or shape, and/or development of symptoms.  Recommend period self-assessment and RTC for in-person re-eval and consideration of bx if changes in appearance or symptom in the future.    History of skin cancer  NER on exam today    3.  Screening for malignant neoplasm of skin  LBSE performed today w/o recommendation for bx.  Educated patient on mole and skin self-examination, detailing the ABCDE method of mole evaluation.  Emphasized the importance of regular/monthly  4.3 - 11.1 K/uL    RBC 4.72 4.05 - 5.2 M/uL    HGB 14.9 11.7 - 15.4 g/dL    HCT 46.3 35.8 - 46.3 %    MCV 98.1 (H) 79.6 - 97.8 FL    MCH 31.6 26.1 - 32.9 PG    MCHC 32.2 31.4 - 35.0 g/dL    RDW 14.2 11.9 - 14.6 %    PLATELET 473 297 - 174 K/uL    MPV 12.2 9.4 - 12.3 FL    ABSOLUTE NRBC 0.00 0.0 - 0.2 K/uL    DF AUTOMATED      NEUTROPHILS 91 (H) 43 - 78 %    LYMPHOCYTES 5 (L) 13 - 44 %    MONOCYTES 2 (L) 4.0 - 12.0 %    EOSINOPHILS 0 (L) 0.5 - 7.8 %    BASOPHILS 0 0.0 - 2.0 %    IMMATURE GRANULOCYTES 1 0.0 - 5.0 %    ABS. NEUTROPHILS 12.5 (H) 1.7 - 8.2 K/UL    ABS. LYMPHOCYTES 0.7 0.5 - 4.6 K/UL    ABS. MONOCYTES 0.3 0.1 - 1.3 K/UL    ABS. EOSINOPHILS 0.0 0.0 - 0.8 K/UL    ABS. BASOPHILS 0.1 0.0 - 0.2 K/UL    ABS. IMM. GRANS. 0.2 0.0 - 0.5 K/UL   METABOLIC PANEL, COMPREHENSIVE    Collection Time: 10/28/21  3:17 PM   Result Value Ref Range    Sodium 128 (L) 136 - 145 mmol/L    Potassium 5.9 (H) 3.5 - 5.1 mmol/L    Chloride 93 (L) 98 - 107 mmol/L    CO2 12 (L) 21 - 32 mmol/L    Anion gap 23 (H) 7 - 16 mmol/L    Glucose 446 (H) 65 - 100 mg/dL    BUN 32 (H) 8 - 23 MG/DL    Creatinine 1.71 (H) 0.6 - 1.0 MG/DL    GFR est AA 39 (L) >60 ml/min/1.73m2    GFR est non-AA 32 (L) >60 ml/min/1.73m2    Calcium 9.0 8.3 - 10.4 MG/DL    Bilirubin, total 1.0 0.2 - 1.1 MG/DL    ALT (SGPT) 45 12 - 65 U/L    AST (SGOT) 42 (H) 15 - 37 U/L    Alk.  phosphatase 92 50 - 136 U/L    Protein, total 8.1 6.3 - 8.2 g/dL    Albumin 4.1 3.2 - 4.6 g/dL    Globulin 4.0 (H) 2.3 - 3.5 g/dL    A-G Ratio 1.0 (L) 1.2 - 3.5     MAGNESIUM    Collection Time: 10/28/21  3:17 PM   Result Value Ref Range    Magnesium 2.2 1.8 - 2.4 mg/dL   PHOSPHORUS    Collection Time: 10/28/21  3:17 PM   Result Value Ref Range    Phosphorus 8.6 (H) 2.3 - 3.7 MG/DL   LIPASE    Collection Time: 10/28/21  3:17 PM   Result Value Ref Range    Lipase 42 (L) 73 - 393 U/L   EKG, 12 LEAD, INITIAL    Collection Time: 10/28/21  3:19 PM   Result Value Ref Range    Ventricular Rate 82 BPM self and/or partner skin examinations and sun protection.  Recommended indefinite avoidance of indoor tanning and reviewed > risk of this w/cutaneous malignancies. Recommended period self-assessment and RTC if any suspicious changes in existing lesions or new lesions of concern. Reviewed changes that would warrant re-evaluation such as > in size, change in color or shape, and/or development of symptoms. Recommended SPF30+ daily sunblock and lip balm w/other photoprotective measures.    Educational materials given (in patient instructions unless otherwise specified): sunscreen HO, practice info HO    Return in about 1 year (around 9/12/2025) for 15 minute TBSE.    On 9/12/2024, I, Delma Maciel RN scribed the services personally performed by Dr. Nima Myers MD.    The documentation recorded by the scribe accurately and completely reflects the service(s) I personally performed and the decisions made by me.    I saw and evaluated this patient with 1 stable chronic illness of low risk w/o a medical student    Dr. Nima Myers MD (electronically signed on 9/15/2024 at 4:27 PM)   Atrial Rate 82 BPM    P-R Interval 200 ms    QRS Duration 92 ms    Q-T Interval 410 ms    QTC Calculation (Bezet) 479 ms    Calculated P Axis 75 degrees    Calculated R Axis 87 degrees    Calculated T Axis 67 degrees    Diagnosis       Normal sinus rhythm  Normal ECG  When compared with ECG of 28-FEB-2021 17:33,  Incomplete right bundle branch block is no longer Present     POC VENOUS BLOOD GAS    Collection Time: 10/28/21  3:41 PM   Result Value Ref Range    Device: ROOM AIR      FIO2 (POC) 21 %    pH, venous (POC) 7.07 (L) 7.32 - 7.42      pCO2, venous (POC) 35.4 (L) 41 - 51 MMHG    pO2, venous (POC) 33 mmHg    HCO3, venous (POC) 10.2 (L) 23 - 28 MMOL/L    sO2, venous (POC) 42.4 (L) 65 - 88 %    Base deficit, venous (POC) 19.2 mmol/L    Allens test (POC) NOT APPLICABLE      Specimen type (POC) VENOUS BLOOD      Performed by Peerio     CO2, POC 12 (L) 13 - 23 MMOL/L    Critical value read back COLLINSMD     Respiratory comment: Drawn_on_Room_Air    Linda Guess    Collection Time: 10/28/21  4:57 PM   Result Value Ref Range    Glucose 508 mg/dL    Insulin order 9.0 units/hour    Insulin adminstered 9.0 units/hour    Multiplier 0.020     Low target 150 mg/dL    High target 250 mg/dL    D50 order 0.0 ml    D50 administered 0.00 ml    Minutes until next BG 60 min    Order initials MF     Administered initials MF     GLSCOM Comments         EKG Results     Procedure 720 Value Units Date/Time    EKG 12 LEAD INITIAL [[id number]] Collected: 10/28/21 1519    Order Status: Completed Updated: 10/28/21 1639     Ventricular Rate 82 BPM      Atrial Rate 82 BPM      P-R Interval 200 ms      QRS Duration 92 ms      Q-T Interval 410 ms      QTC Calculation (Bezet) 479 ms      Calculated P Axis 75 degrees      Calculated R Axis 87 degrees      Calculated T Axis 67 degrees      Diagnosis --     Normal sinus rhythm  Normal ECG  When compared with ECG of 28-FEB-2021 17:33,  Incomplete right bundle branch block is no longer Present            All Micro Results     None          Other Studies:  XR CHEST PORT    Result Date: 10/28/2021  EXAMINATION: CHEST RADIOGRAPH 10/28/2021 3:28 PM ACCESSION NUMBER: [id number] INDICATION: Nausea and vomiting COMPARISON: Chest x-ray 3/1/2021, chest CT 5/17/2019 TECHNIQUE: A single AP view of the chest was obtained. FINDINGS: Support Lines and Tubes: None Cardiac Silhouette: Within normal limits in size. Lungs: No focal airspace disease. Pleura: No pleural effusion. No pneumothorax. Osseous Structures: Cervical spine fixation hardware. Bony anchors in the right humeral head. Chronic healed fracture of the posterior left fourth rib. Upper Abdomen: Unremarkable. No evidence of acute cardiopulmonary disease.  VOICE DICTATED BY: Dr. Maylin Canela        Medications:  Medications Administered     ondansetron [facility] injection 4 mg     Admin Date  10/28/2021 Action  Given Dose  4 mg Route  IntraVENous Administered By  Jessica Bartlett RN          sodium chloride 0.9 % bolus infusion 1,000 mL     Admin Date  10/28/2021 Action  New Bag Dose  1,000 mL Rate  1,000 mL/hr Route  IntraVENous Administered By  Jessica Bartlett RN           Admin Date  10/28/2021 Action  New Bag Dose  1,000 mL Rate  1,000 mL/hr Route  IntraVENous Administered By  Prem Cevallos RN                    Problem List:     Hospital Problems as of 10/28/2021 Date Reviewed: 1/29/2021        Codes Class Noted - Resolved POA    * (Principal) DKA (diabetic ketoacidosis) ([facility] ICD-10-CM: E11.10  ICD-9-CM: 250.12  10/28/2021 - Present Unknown        Hyperkalemia ICD-10-CM: E87.5  ICD-9-CM: 276.7  2/28/2021 - Present Unknown        Coronary artery disease involving native coronary artery of native heart with angina pectoris ([facility] ICD-10-CM: I25.119  ICD-9-CM: 414.01, 413.9  5/31/2019 - Present Yes        ADRIENNE (acute kidney injury) ([facility] ICD-10-CM: N17.9  ICD-9-CM: 584.9  5/7/2019 - Present Unknown        Fibromyalgia ICD-10-CM: M79.7  ICD-9-CM: 729.1  Unknown - Present Yes        Hypertension ICD-10-CM: I10  ICD-9-CM: 401.9  Unknown - Present Yes        Chronic obstructive pulmonary disease ([facility] ICD-10-CM: J44.9  ICD-9-CM: 111  Unknown - Present Yes                   Signed By: Elayne Way MD   [facility]    October 28, 2021

## (undated) DEVICE — MASTISOL ADHESIVE LIQ 2/3ML

## (undated) DEVICE — 1010 S-DRAPE TOWEL DRAPE 10/BX: Brand: STERI-DRAPE™

## (undated) DEVICE — SUTURE VCRL SZ 1 L27IN ABSRB UD L36MM CP-1 1/2 CIR REV CUT J268H

## (undated) DEVICE — WAX SURG 2.5GM HEMSTAT BNE BEESWAX PARAFFIN ISO PALMITATE

## (undated) DEVICE — KIT INT FIX FEM TIB CKPT MAKOPLASTY

## (undated) DEVICE — (D)PREP SKN CHLRAPRP APPL 26ML -- CONVERT TO ITEM 371833

## (undated) DEVICE — NEEDLE HYPO 21GA L1.5IN INTRAMUSCULAR S STL LATCH BVL UP

## (undated) DEVICE — INTENDED FOR TISSUE SEPARATION, AND OTHER PROCEDURES THAT REQUIRE A SHARP SURGICAL BLADE TO PUNCTURE OR CUT.: Brand: BARD-PARKER SAFETY BLADES SIZE 10, STERILE

## (undated) DEVICE — GUIDEPIN ORTHOPEDIC NAVIGATION 4X110 MM 2P SCREW STRL

## (undated) DEVICE — DRESSING,GAUZE,XEROFORM,CURAD,1"X8",ST: Brand: CURAD

## (undated) DEVICE — FLOSEAL HEMOSTATIC MATRIX, 5 ML: Brand: FLOSEAL

## (undated) DEVICE — DRAPE XR C ARM 41X74IN LF --

## (undated) DEVICE — BANDAGE COBAN 6 IN WND 6INX5YD FOAM

## (undated) DEVICE — WEREWOLF FLOW 50 COBLATION WAND: Brand: COBLATION

## (undated) DEVICE — SUTURE STRATAFIX SPRL SZ 1 L14IN ABSRB VLT L48CM CTX 1/2 SXPD2B405

## (undated) DEVICE — 5.0MM PRECISION ROUND

## (undated) DEVICE — 3M™ TEGADERM™ TRANSPARENT FILM DRESSING FRAME STYLE, 1626W, 4 IN X 4-3/4 IN (10 CM X 12 CM), 50/CT 4CT/CASE: Brand: 3M™ TEGADERM™

## (undated) DEVICE — SYRINGE MED 30ML STD CLR PLAS LUERLOCK TIP N CTRL DISP

## (undated) DEVICE — INTENDED FOR TISSUE SEPARATION, AND OTHER PROCEDURES THAT REQUIRE A SHARP SURGICAL BLADE TO PUNCTURE OR CUT.: Brand: BARD-PARKER ® STAINLESS STEEL BLADES

## (undated) DEVICE — 4-PORT MANIFOLD: Brand: NEPTUNE 2

## (undated) DEVICE — NEEDLE HYPO 18GA L1.5IN THN WALL PIVOTING SHLD BVL ORIENTED

## (undated) DEVICE — TOTAL KNEE DR WATSON: Brand: MEDLINE INDUSTRIES, INC.

## (undated) DEVICE — SOLUTION IV 250ML 0.9% SOD CHL PH 5 INJ USP VIAFLX PLAS

## (undated) DEVICE — STOCKINETTE,IMPERVIOUS,12X48,STERILE: Brand: MEDLINE

## (undated) DEVICE — SET IRRIG DST FLX M CONN

## (undated) DEVICE — DRAPE MICSCP W51XL150IN FOR LEICA M680 WILD OHS

## (undated) DEVICE — KNEE ARTHRO LEE-ROBERSON: Brand: MEDLINE INDUSTRIES, INC.

## (undated) DEVICE — SOLUTION IV 1000ML 0.9% SOD CHL

## (undated) DEVICE — DRAPE SHT 3 QTR PROXIMA 53X77 --

## (undated) DEVICE — 450 ML BOTTLE OF 0.05% CHLORHEXIDINE GLUCONATE IN 99.95% STERILE WATER FOR IRRIGATION, USP AND APPLICATOR.: Brand: IRRISEPT ANTIMICROBIAL WOUND LAVAGE

## (undated) DEVICE — SUTURE MCRYL SZ 2-0 L27IN ABSRB UD CP-1 1 L36MM 1/2 CIR REV Y266H

## (undated) DEVICE — BIPOLAR SEALER 23-313-1 AQM 9.5XL: Brand: AQUAMANTYS ®

## (undated) DEVICE — SUTURE VCRL + 3-0 L27IN ABSRB UD PS-2 L19MM 3/8 CIR PRIM VCP427H

## (undated) DEVICE — STERILE POLYISOPRENE POWDER-FREE SURGICAL GLOVES: Brand: PROTEXIS

## (undated) DEVICE — SYR LR LCK 1ML GRAD NSAF 30ML --

## (undated) DEVICE — DRAIN KT WND 10FR RND 400ML --

## (undated) DEVICE — STERILE PRESSURE PROTECTOR PAD® FOR DE MAYO UNIVERSAL DISTRACTOR® (10/CASE): Brand: DE MAYO UNIVERSAL DISTRACTOR®

## (undated) DEVICE — 2000CC GUARDIAN II: Brand: GUARDIAN

## (undated) DEVICE — STERILE HOOK LOCK LATEX FREE ELASTIC BANDAGE 6INX5YD: Brand: HOOK LOCK™

## (undated) DEVICE — DRAPE,TOP,102X53,STERILE: Brand: MEDLINE

## (undated) DEVICE — BLADE SURG SAW STD S STL OSC W/ SERR EDGE DISP

## (undated) DEVICE — SUTURE FIBERWIRE SZ 2 W/ TAPERED NEEDLE BLUE L38IN NONABSORB BLU L26.5MM 1/2 CIRCLE AR7200

## (undated) DEVICE — KENDALL SCD EXPRESS SLEEVES, KNEE LENGTH, MEDIUM: Brand: KENDALL SCD

## (undated) DEVICE — REM POLYHESIVE ADULT PATIENT RETURN ELECTRODE: Brand: VALLEYLAB

## (undated) DEVICE — GUIDEPIN ORTHOPEDIC NAVIGATION 4X140 MM 2P SCREW STRL

## (undated) DEVICE — PACK PROCEDURE SURG POST LAMINECTOMY CDS

## (undated) DEVICE — BAND RUB 1/8X2.5IN STRL --

## (undated) DEVICE — FLEXIBLE YANKAUER,MEDIUM TIP, NO VACUUM CONTROL: Brand: ARGYLE

## (undated) DEVICE — INTENDED FOR TISSUE SEPARATION, AND OTHER PROCEDURES THAT REQUIRE A SHARP SURGICAL BLADE TO PUNCTURE OR CUT.: Brand: BARD-PARKER SAFETY BLADES SIZE 15, STERILE

## (undated) DEVICE — ZIMMER® STERILE DISPOSABLE TOURNIQUET CUFF WITH PLC, DUAL PORT, SINGLE BLADDER, 30 IN. (76 CM)

## (undated) DEVICE — T-DRAPE,EXTREMITY,STERILE: Brand: MEDLINE

## (undated) DEVICE — SUTURE VCRL SZ 2-0 L27IN ABSRB UD L36MM CP-1 1/2 CIR REV J266H

## (undated) DEVICE — AMD ANTIMICROBIAL GAUZE SPONGES,12 PLY USP TYPE VII, 0.2% POLYHEXAMETHYLENE BIGUANIDE HCI (PHMB): Brand: CURITY

## (undated) DEVICE — SOLUTION IRRIG 1000ML 0.9% SOD CHL USP POUR PLAS BTL

## (undated) DEVICE — SET IRRIG L94IN ID0.281IN W/ 4.5IN DST FLX CONN 2 LD ON OFF

## (undated) DEVICE — 3M™ STERI-STRIP™ REINFORCED ADHESIVE SKIN CLOSURES, R1548, 1 IN X 5 IN (25 MM X 125 MM), 4 STRIPS/ENVELOPE: Brand: 3M™ STERI-STRIP™

## (undated) DEVICE — KIT POS W/ FOAM ARM CRADL SHEARGUARD CHST PD CVR FOR SPNL

## (undated) DEVICE — SOLUTION IRRIG 3000ML 0.9% SOD CHL USP UROMATIC PLAS CONT

## (undated) DEVICE — BLADE SHV CUT MENIS AGG + 4MM --

## (undated) DEVICE — PADDING CAST W4INXL4YD ST COT COHESIVE HND TEARABLE SPEC